# Patient Record
Sex: MALE | Race: ASIAN | NOT HISPANIC OR LATINO | URBAN - METROPOLITAN AREA
[De-identification: names, ages, dates, MRNs, and addresses within clinical notes are randomized per-mention and may not be internally consistent; named-entity substitution may affect disease eponyms.]

---

## 2017-03-04 ENCOUNTER — INPATIENT (INPATIENT)
Facility: HOSPITAL | Age: 67
LOS: 1 days | Discharge: HOME | End: 2017-03-06
Attending: INTERNAL MEDICINE | Admitting: INTERNAL MEDICINE
Payer: MEDICAID

## 2017-03-04 VITALS
SYSTOLIC BLOOD PRESSURE: 142 MMHG | OXYGEN SATURATION: 100 % | RESPIRATION RATE: 18 BRPM | HEART RATE: 60 BPM | DIASTOLIC BLOOD PRESSURE: 73 MMHG

## 2017-03-04 DIAGNOSIS — I24.9 ACUTE ISCHEMIC HEART DISEASE, UNSPECIFIED: ICD-10-CM

## 2017-03-04 DIAGNOSIS — Z95.1 PRESENCE OF AORTOCORONARY BYPASS GRAFT: Chronic | ICD-10-CM

## 2017-03-04 DIAGNOSIS — I25.10 ATHEROSCLEROTIC HEART DISEASE OF NATIVE CORONARY ARTERY WITHOUT ANGINA PECTORIS: ICD-10-CM

## 2017-03-04 DIAGNOSIS — I10 ESSENTIAL (PRIMARY) HYPERTENSION: ICD-10-CM

## 2017-03-04 LAB
ALBUMIN SERPL ELPH-MCNC: 4.1 G/DL — SIGNIFICANT CHANGE UP (ref 3.3–5)
ALP SERPL-CCNC: 91 U/L — SIGNIFICANT CHANGE UP (ref 40–120)
ALT FLD-CCNC: 12 U/L — SIGNIFICANT CHANGE UP (ref 4–41)
APTT BLD: 36.9 SEC — SIGNIFICANT CHANGE UP (ref 27.5–37.4)
AST SERPL-CCNC: 19 U/L — SIGNIFICANT CHANGE UP (ref 4–40)
BASE EXCESS BLDV CALC-SCNC: 3.9 MMOL/L — SIGNIFICANT CHANGE UP
BASOPHILS # BLD AUTO: 0.02 K/UL — SIGNIFICANT CHANGE UP (ref 0–0.2)
BASOPHILS NFR BLD AUTO: 0.4 % — SIGNIFICANT CHANGE UP (ref 0–2)
BILIRUB SERPL-MCNC: 0.4 MG/DL — SIGNIFICANT CHANGE UP (ref 0.2–1.2)
BLD GP AB SCN SERPL QL: NEGATIVE — SIGNIFICANT CHANGE UP
BLOOD GAS VENOUS - CREATININE: 1 MG/DL — SIGNIFICANT CHANGE UP (ref 0.5–1.3)
BUN SERPL-MCNC: 15 MG/DL — SIGNIFICANT CHANGE UP (ref 7–23)
CALCIUM SERPL-MCNC: 9 MG/DL — SIGNIFICANT CHANGE UP (ref 8.4–10.5)
CHLORIDE BLDV-SCNC: 104 MMOL/L — SIGNIFICANT CHANGE UP (ref 96–108)
CHLORIDE SERPL-SCNC: 101 MMOL/L — SIGNIFICANT CHANGE UP (ref 98–107)
CK MB BLD-MCNC: 4.65 NG/ML — SIGNIFICANT CHANGE UP (ref 1–6.6)
CK MB BLD-MCNC: 8.06 NG/ML — HIGH (ref 1–6.6)
CK MB BLD-MCNC: SIGNIFICANT CHANGE UP (ref 0–2.5)
CK SERPL-CCNC: 84 U/L — SIGNIFICANT CHANGE UP (ref 30–200)
CK SERPL-CCNC: 88 U/L — SIGNIFICANT CHANGE UP (ref 30–200)
CO2 SERPL-SCNC: 23 MMOL/L — SIGNIFICANT CHANGE UP (ref 22–31)
CREAT SERPL-MCNC: 1.11 MG/DL — SIGNIFICANT CHANGE UP (ref 0.5–1.3)
EOSINOPHIL # BLD AUTO: 0.1 K/UL — SIGNIFICANT CHANGE UP (ref 0–0.5)
EOSINOPHIL NFR BLD AUTO: 1.9 % — SIGNIFICANT CHANGE UP (ref 0–6)
GAS PNL BLDV: 134 MMOL/L — LOW (ref 136–146)
GLUCOSE BLDV-MCNC: 95 — SIGNIFICANT CHANGE UP (ref 70–99)
GLUCOSE SERPL-MCNC: 91 MG/DL — SIGNIFICANT CHANGE UP (ref 70–99)
HCO3 BLDV-SCNC: 25 MMOL/L — SIGNIFICANT CHANGE UP (ref 20–27)
HCT VFR BLD CALC: 40.2 % — SIGNIFICANT CHANGE UP (ref 39–50)
HCT VFR BLDV CALC: 43.5 % — SIGNIFICANT CHANGE UP (ref 39–51)
HGB BLD-MCNC: 13.8 G/DL — SIGNIFICANT CHANGE UP (ref 13–17)
HGB BLDV-MCNC: 14.2 G/DL — SIGNIFICANT CHANGE UP (ref 13–17)
IMM GRANULOCYTES NFR BLD AUTO: 0.2 % — SIGNIFICANT CHANGE UP (ref 0–1.5)
INR BLD: 0.99 — SIGNIFICANT CHANGE UP (ref 0.87–1.18)
LACTATE BLDV-MCNC: 1.8 MMOL/L — SIGNIFICANT CHANGE UP (ref 0.5–2)
LYMPHOCYTES # BLD AUTO: 1.56 K/UL — SIGNIFICANT CHANGE UP (ref 1–3.3)
LYMPHOCYTES # BLD AUTO: 29.3 % — SIGNIFICANT CHANGE UP (ref 13–44)
MCHC RBC-ENTMCNC: 29.3 PG — SIGNIFICANT CHANGE UP (ref 27–34)
MCHC RBC-ENTMCNC: 34.3 % — SIGNIFICANT CHANGE UP (ref 32–36)
MCV RBC AUTO: 85.4 FL — SIGNIFICANT CHANGE UP (ref 80–100)
MONOCYTES # BLD AUTO: 0.24 K/UL — SIGNIFICANT CHANGE UP (ref 0–0.9)
MONOCYTES NFR BLD AUTO: 4.5 % — SIGNIFICANT CHANGE UP (ref 2–14)
NEUTROPHILS # BLD AUTO: 3.4 K/UL — SIGNIFICANT CHANGE UP (ref 1.8–7.4)
NEUTROPHILS NFR BLD AUTO: 63.7 % — SIGNIFICANT CHANGE UP (ref 43–77)
PCO2 BLDV: 58 MMHG — HIGH (ref 41–51)
PH BLDV: 7.33 PH — SIGNIFICANT CHANGE UP (ref 7.32–7.43)
PLATELET # BLD AUTO: 156 K/UL — SIGNIFICANT CHANGE UP (ref 150–400)
PMV BLD: 11.6 FL — SIGNIFICANT CHANGE UP (ref 7–13)
PO2 BLDV: < 24 MMHG — LOW (ref 35–40)
POTASSIUM BLDV-SCNC: 4 MMOL/L — SIGNIFICANT CHANGE UP (ref 3.4–4.5)
POTASSIUM SERPL-MCNC: 3.8 MMOL/L — SIGNIFICANT CHANGE UP (ref 3.5–5.3)
POTASSIUM SERPL-SCNC: 3.8 MMOL/L — SIGNIFICANT CHANGE UP (ref 3.5–5.3)
PROT SERPL-MCNC: 7.3 G/DL — SIGNIFICANT CHANGE UP (ref 6–8.3)
PROTHROM AB SERPL-ACNC: 11.3 SEC — SIGNIFICANT CHANGE UP (ref 10–13.1)
RBC # BLD: 4.71 M/UL — SIGNIFICANT CHANGE UP (ref 4.2–5.8)
RBC # FLD: 13.9 % — SIGNIFICANT CHANGE UP (ref 10.3–14.5)
RH IG SCN BLD-IMP: POSITIVE — SIGNIFICANT CHANGE UP
SAO2 % BLDV: 33.8 % — LOW (ref 60–85)
SODIUM SERPL-SCNC: 140 MMOL/L — SIGNIFICANT CHANGE UP (ref 135–145)
TROPONIN T SERPL-MCNC: 0.14 NG/ML — HIGH (ref 0–0.06)
TROPONIN T SERPL-MCNC: < 0.06 NG/ML — SIGNIFICANT CHANGE UP (ref 0–0.06)
WBC # BLD: 5.33 K/UL — SIGNIFICANT CHANGE UP (ref 3.8–10.5)
WBC # FLD AUTO: 5.33 K/UL — SIGNIFICANT CHANGE UP (ref 3.8–10.5)

## 2017-03-04 PROCEDURE — 92937 PRQ TRLUML REVSC CAB GRF 1: CPT | Mod: LC

## 2017-03-04 PROCEDURE — 93459 L HRT ART/GRFT ANGIO: CPT | Mod: 26,59

## 2017-03-04 RX ORDER — CLOPIDOGREL BISULFATE 75 MG/1
600 TABLET, FILM COATED ORAL ONCE
Qty: 0 | Refills: 0 | Status: COMPLETED | OUTPATIENT
Start: 2017-03-04 | End: 2017-03-04

## 2017-03-04 RX ORDER — CLOPIDOGREL BISULFATE 75 MG/1
75 TABLET, FILM COATED ORAL DAILY
Qty: 0 | Refills: 0 | Status: DISCONTINUED | OUTPATIENT
Start: 2017-03-05 | End: 2017-03-06

## 2017-03-04 RX ORDER — GABAPENTIN 400 MG/1
300 CAPSULE ORAL THREE TIMES A DAY
Qty: 0 | Refills: 0 | Status: DISCONTINUED | OUTPATIENT
Start: 2017-03-04 | End: 2017-03-06

## 2017-03-04 RX ORDER — HEPARIN SODIUM 5000 [USP'U]/ML
5000 INJECTION INTRAVENOUS; SUBCUTANEOUS EVERY 8 HOURS
Qty: 0 | Refills: 0 | Status: DISCONTINUED | OUTPATIENT
Start: 2017-03-04 | End: 2017-03-04

## 2017-03-04 RX ORDER — HEPARIN SODIUM 5000 [USP'U]/ML
5000 INJECTION INTRAVENOUS; SUBCUTANEOUS EVERY 8 HOURS
Qty: 0 | Refills: 0 | Status: DISCONTINUED | OUTPATIENT
Start: 2017-03-05 | End: 2017-03-06

## 2017-03-04 RX ORDER — CARVEDILOL PHOSPHATE 80 MG/1
3.12 CAPSULE, EXTENDED RELEASE ORAL EVERY 12 HOURS
Qty: 0 | Refills: 0 | Status: DISCONTINUED | OUTPATIENT
Start: 2017-03-04 | End: 2017-03-06

## 2017-03-04 RX ORDER — HEPARIN SODIUM 5000 [USP'U]/ML
5000 INJECTION INTRAVENOUS; SUBCUTANEOUS ONCE
Qty: 0 | Refills: 0 | Status: COMPLETED | OUTPATIENT
Start: 2017-03-04 | End: 2017-03-04

## 2017-03-04 RX ORDER — ASPIRIN/CALCIUM CARB/MAGNESIUM 324 MG
81 TABLET ORAL DAILY
Qty: 0 | Refills: 0 | Status: DISCONTINUED | OUTPATIENT
Start: 2017-03-05 | End: 2017-03-06

## 2017-03-04 RX ORDER — ATORVASTATIN CALCIUM 80 MG/1
80 TABLET, FILM COATED ORAL AT BEDTIME
Qty: 0 | Refills: 0 | Status: DISCONTINUED | OUTPATIENT
Start: 2017-03-04 | End: 2017-03-06

## 2017-03-04 RX ADMIN — CLOPIDOGREL BISULFATE 600 MILLIGRAM(S): 75 TABLET, FILM COATED ORAL at 10:58

## 2017-03-04 RX ADMIN — ATORVASTATIN CALCIUM 80 MILLIGRAM(S): 80 TABLET, FILM COATED ORAL at 22:35

## 2017-03-04 RX ADMIN — GABAPENTIN 300 MILLIGRAM(S): 400 CAPSULE ORAL at 22:35

## 2017-03-04 RX ADMIN — HEPARIN SODIUM 5000 UNIT(S): 5000 INJECTION INTRAVENOUS; SUBCUTANEOUS at 10:58

## 2017-03-04 NOTE — ED PROVIDER NOTE - MEDICAL DECISION MAKING DETAILS
pt with active ischemia on EKGafter stress test  partial resolution of changes with treatment but elevation aVR  cath notified  received ASA enroute  plavix and heparin ordered  cath team activated pt with active ischemia on EKG after stress test  partial resolution of changes with treatment but elevation aVR  cath notified  received ASA enroute  plavix and heparin ordered  cath team activated

## 2017-03-04 NOTE — H&P ADULT. - FAMILY HISTORY
Sibling  Still living? Yes, Estimated age: Age Unknown  Family history of coronary artery disease in brother, Age at diagnosis: Age Unknown

## 2017-03-04 NOTE — ED ADULT NURSE REASSESSMENT NOTE - NS ED NURSE REASSESS COMMENT FT1
iv inserted to rt ac 20 gauge/ labs sent off/  pt has angio from ems in lt ac/  pt states pain at at 3 /10 after meds given by ems/ vss  additional meds to be given/

## 2017-03-04 NOTE — H&P ADULT. - HISTORY OF PRESENT ILLNESS
This is a 66yoM with PMHx CAD, HTN, PAD (stent to L lower extremity), s/p CABG ([facility], 2002), and HLD p/w chest pain when having his outpatient nuclear stress test.  Patient went for a nuclear stress test because he had worsening chest pain (sharp left sternal with radiation to L shoulder) for 3-4 months, worse with exertion a/w SOB, palpitations, and  dizziness and relieved by rest.  Denies pain at rest.  Patient was BIBEMS, chest pain was relieved by ASA and sublingual nitroglycerin; found to have TWI in interiolateral leads.  Cath team was activated.  LIMA found to be patent.  SVG to diag % occluded.  1 ANGI stent placed in SVG to OM2.

## 2017-03-04 NOTE — H&P ADULT. - PMH
CAD (coronary artery disease)    Constipation, unspecified constipation type    HTN (hypertension)    PAD (peripheral artery disease)  stent to L lower extremity artery  S/P CABG x 3  [facility], 2002

## 2017-03-04 NOTE — ED PROVIDER NOTE - OBJECTIVE STATEMENT
Pt arrives with chest pain which developed during stress test today.  Pt notes 1 wwk of similar sxs which have been occurring with exertion (walking)  aalso occasionally in the evenig   Pt with EKG changes on EMS EKG  ST dep TWI 1 avL  V$-V^  II and III with 3mm aVR elevation and 1 MM V! elevation  given ASA and ntg by EMS with improvement of sxs  EKG here V1 isoelectric aVR  1mm decrease in inf/lateral   cath team called 10:49   responded immediately by phone   team activated Pt arrives with chest pain which developed during stress test today.  Pt notes 1 wwk of similar sxs which have been occurring with exertion (walking)  aalso occasionally in the evenig   Pt with EKG changes on EMS EKG  ST dep TWI 1 avL  V4-V6  II and III with 3mm aVR elevation and 1 MM V! elevation  given ASA and ntg by EMS with improvement of sxs  EKG here V1 isoelectric aVR  1mm decrease in inf/lateral   cath team called 10:49   responded immediately by phone   team activated

## 2017-03-04 NOTE — ED PROVIDER NOTE - PMH
CAD (coronary artery disease)    Constipation, unspecified constipation type    HTN (hypertension)    PAD (peripheral artery disease)  stent to L lower extremity artery  S/P CABG x 3  [facility], 2002

## 2017-03-04 NOTE — ED ADULT TRIAGE NOTE - CHIEF COMPLAINT QUOTE
Arrives via EMS from PMD for abnormal EKG while having a stress test today.  Pt endorses active chest pain at this time.

## 2017-03-05 LAB
ALBUMIN SERPL ELPH-MCNC: 3.8 G/DL — SIGNIFICANT CHANGE UP (ref 3.3–5)
ALP SERPL-CCNC: 97 U/L — SIGNIFICANT CHANGE UP (ref 40–120)
ALT FLD-CCNC: 15 U/L — SIGNIFICANT CHANGE UP (ref 4–41)
AST SERPL-CCNC: 23 U/L — SIGNIFICANT CHANGE UP (ref 4–40)
BILIRUB SERPL-MCNC: 0.9 MG/DL — SIGNIFICANT CHANGE UP (ref 0.2–1.2)
BUN SERPL-MCNC: 17 MG/DL — SIGNIFICANT CHANGE UP (ref 7–23)
CALCIUM SERPL-MCNC: 9.1 MG/DL — SIGNIFICANT CHANGE UP (ref 8.4–10.5)
CHLORIDE SERPL-SCNC: 102 MMOL/L — SIGNIFICANT CHANGE UP (ref 98–107)
CHOLEST SERPL-MCNC: 199 MG/DL — SIGNIFICANT CHANGE UP (ref 120–199)
CK MB BLD-MCNC: 11.08 NG/ML — HIGH (ref 1–6.6)
CK SERPL-CCNC: 112 U/L — SIGNIFICANT CHANGE UP (ref 30–200)
CK SERPL-CCNC: 122 U/L — SIGNIFICANT CHANGE UP (ref 30–200)
CO2 SERPL-SCNC: 22 MMOL/L — SIGNIFICANT CHANGE UP (ref 22–31)
CREAT SERPL-MCNC: 1.08 MG/DL — SIGNIFICANT CHANGE UP (ref 0.5–1.3)
GLUCOSE SERPL-MCNC: 119 MG/DL — HIGH (ref 70–99)
HBA1C BLD-MCNC: 6.3 % — HIGH (ref 4–5.6)
HCT VFR BLD CALC: 42.2 % — SIGNIFICANT CHANGE UP (ref 39–50)
HDLC SERPL-MCNC: 26 MG/DL — LOW (ref 35–55)
HGB BLD-MCNC: 14.4 G/DL — SIGNIFICANT CHANGE UP (ref 13–17)
LIPID PNL WITH DIRECT LDL SERPL: 162 MG/DL — SIGNIFICANT CHANGE UP
MAGNESIUM SERPL-MCNC: 2.2 MG/DL — SIGNIFICANT CHANGE UP (ref 1.6–2.6)
MCHC RBC-ENTMCNC: 28.7 PG — SIGNIFICANT CHANGE UP (ref 27–34)
MCHC RBC-ENTMCNC: 34.1 % — SIGNIFICANT CHANGE UP (ref 32–36)
MCV RBC AUTO: 84.2 FL — SIGNIFICANT CHANGE UP (ref 80–100)
PHOSPHATE SERPL-MCNC: 3.7 MG/DL — SIGNIFICANT CHANGE UP (ref 2.5–4.5)
PLATELET # BLD AUTO: 144 K/UL — LOW (ref 150–400)
PMV BLD: 11.4 FL — SIGNIFICANT CHANGE UP (ref 7–13)
POTASSIUM SERPL-MCNC: 3.7 MMOL/L — SIGNIFICANT CHANGE UP (ref 3.5–5.3)
POTASSIUM SERPL-SCNC: 3.7 MMOL/L — SIGNIFICANT CHANGE UP (ref 3.5–5.3)
PROT SERPL-MCNC: 7 G/DL — SIGNIFICANT CHANGE UP (ref 6–8.3)
RBC # BLD: 5.01 M/UL — SIGNIFICANT CHANGE UP (ref 4.2–5.8)
RBC # FLD: 13.9 % — SIGNIFICANT CHANGE UP (ref 10.3–14.5)
SODIUM SERPL-SCNC: 141 MMOL/L — SIGNIFICANT CHANGE UP (ref 135–145)
TRIGL SERPL-MCNC: 97 MG/DL — SIGNIFICANT CHANGE UP (ref 10–149)
TROPONIN T SERPL-MCNC: 0.22 NG/ML — HIGH (ref 0–0.06)
TROPONIN T SERPL-MCNC: 0.28 NG/ML — HIGH (ref 0–0.06)
TSH SERPL-MCNC: 0.76 UIU/ML — SIGNIFICANT CHANGE UP (ref 0.27–4.2)
WBC # BLD: 6.02 K/UL — SIGNIFICANT CHANGE UP (ref 3.8–10.5)
WBC # FLD AUTO: 6.02 K/UL — SIGNIFICANT CHANGE UP (ref 3.8–10.5)

## 2017-03-05 PROCEDURE — 93010 ELECTROCARDIOGRAM REPORT: CPT

## 2017-03-05 PROCEDURE — 93306 TTE W/DOPPLER COMPLETE: CPT | Mod: 26

## 2017-03-05 RX ORDER — LISINOPRIL 2.5 MG/1
2.5 TABLET ORAL DAILY
Qty: 0 | Refills: 0 | Status: DISCONTINUED | OUTPATIENT
Start: 2017-03-05 | End: 2017-03-06

## 2017-03-05 RX ADMIN — GABAPENTIN 300 MILLIGRAM(S): 400 CAPSULE ORAL at 15:09

## 2017-03-05 RX ADMIN — HEPARIN SODIUM 5000 UNIT(S): 5000 INJECTION INTRAVENOUS; SUBCUTANEOUS at 05:56

## 2017-03-05 RX ADMIN — LISINOPRIL 2.5 MILLIGRAM(S): 2.5 TABLET ORAL at 11:45

## 2017-03-05 RX ADMIN — HEPARIN SODIUM 5000 UNIT(S): 5000 INJECTION INTRAVENOUS; SUBCUTANEOUS at 15:07

## 2017-03-05 RX ADMIN — CARVEDILOL PHOSPHATE 3.12 MILLIGRAM(S): 80 CAPSULE, EXTENDED RELEASE ORAL at 17:33

## 2017-03-05 RX ADMIN — HEPARIN SODIUM 5000 UNIT(S): 5000 INJECTION INTRAVENOUS; SUBCUTANEOUS at 22:22

## 2017-03-05 RX ADMIN — ATORVASTATIN CALCIUM 80 MILLIGRAM(S): 80 TABLET, FILM COATED ORAL at 22:21

## 2017-03-05 RX ADMIN — CLOPIDOGREL BISULFATE 75 MILLIGRAM(S): 75 TABLET, FILM COATED ORAL at 11:45

## 2017-03-05 RX ADMIN — GABAPENTIN 300 MILLIGRAM(S): 400 CAPSULE ORAL at 05:56

## 2017-03-05 RX ADMIN — CARVEDILOL PHOSPHATE 3.12 MILLIGRAM(S): 80 CAPSULE, EXTENDED RELEASE ORAL at 06:00

## 2017-03-05 RX ADMIN — Medication 81 MILLIGRAM(S): at 11:45

## 2017-03-05 RX ADMIN — GABAPENTIN 300 MILLIGRAM(S): 400 CAPSULE ORAL at 22:21

## 2017-03-06 ENCOUNTER — TRANSCRIPTION ENCOUNTER (OUTPATIENT)
Age: 67
End: 2017-03-06

## 2017-03-06 LAB
BUN SERPL-MCNC: 14 MG/DL — SIGNIFICANT CHANGE UP (ref 7–23)
CALCIUM SERPL-MCNC: 9.2 MG/DL — SIGNIFICANT CHANGE UP (ref 8.4–10.5)
CHLORIDE SERPL-SCNC: 103 MMOL/L — SIGNIFICANT CHANGE UP (ref 98–107)
CK MB BLD-MCNC: 2.76 NG/ML — SIGNIFICANT CHANGE UP (ref 1–6.6)
CK MB BLD-MCNC: SIGNIFICANT CHANGE UP (ref 0–2.5)
CK SERPL-CCNC: 45 U/L — SIGNIFICANT CHANGE UP (ref 30–200)
CO2 SERPL-SCNC: 22 MMOL/L — SIGNIFICANT CHANGE UP (ref 22–31)
CREAT SERPL-MCNC: 0.94 MG/DL — SIGNIFICANT CHANGE UP (ref 0.5–1.3)
GLUCOSE SERPL-MCNC: 112 MG/DL — HIGH (ref 70–99)
HCT VFR BLD CALC: 39.7 % — SIGNIFICANT CHANGE UP (ref 39–50)
HGB BLD-MCNC: 13.6 G/DL — SIGNIFICANT CHANGE UP (ref 13–17)
MAGNESIUM SERPL-MCNC: 2.2 MG/DL — SIGNIFICANT CHANGE UP (ref 1.6–2.6)
MCHC RBC-ENTMCNC: 28.9 PG — SIGNIFICANT CHANGE UP (ref 27–34)
MCHC RBC-ENTMCNC: 34.3 % — SIGNIFICANT CHANGE UP (ref 32–36)
MCV RBC AUTO: 84.5 FL — SIGNIFICANT CHANGE UP (ref 80–100)
NT-PROBNP SERPL-SCNC: 1054 PG/ML — SIGNIFICANT CHANGE UP
PHOSPHATE SERPL-MCNC: 3.7 MG/DL — SIGNIFICANT CHANGE UP (ref 2.5–4.5)
PLATELET # BLD AUTO: 144 K/UL — LOW (ref 150–400)
PMV BLD: 11.6 FL — SIGNIFICANT CHANGE UP (ref 7–13)
POTASSIUM SERPL-MCNC: 3.8 MMOL/L — SIGNIFICANT CHANGE UP (ref 3.5–5.3)
POTASSIUM SERPL-SCNC: 3.8 MMOL/L — SIGNIFICANT CHANGE UP (ref 3.5–5.3)
RBC # BLD: 4.7 M/UL — SIGNIFICANT CHANGE UP (ref 4.2–5.8)
RBC # FLD: 13.7 % — SIGNIFICANT CHANGE UP (ref 10.3–14.5)
SODIUM SERPL-SCNC: 142 MMOL/L — SIGNIFICANT CHANGE UP (ref 135–145)
TROPONIN T SERPL-MCNC: 0.3 NG/ML — HIGH (ref 0–0.06)
WBC # BLD: 4.95 K/UL — SIGNIFICANT CHANGE UP (ref 3.8–10.5)
WBC # FLD AUTO: 4.95 K/UL — SIGNIFICANT CHANGE UP (ref 3.8–10.5)

## 2017-03-06 PROCEDURE — 93010 ELECTROCARDIOGRAM REPORT: CPT

## 2017-03-06 PROCEDURE — 99233 SBSQ HOSP IP/OBS HIGH 50: CPT

## 2017-03-06 RX ORDER — CLOPIDOGREL BISULFATE 75 MG/1
1 TABLET, FILM COATED ORAL
Qty: 30 | Refills: 0 | OUTPATIENT
Start: 2017-03-06 | End: 2017-04-05

## 2017-03-06 RX ORDER — LISINOPRIL 2.5 MG/1
1 TABLET ORAL
Qty: 30 | Refills: 0 | OUTPATIENT
Start: 2017-03-06 | End: 2017-04-05

## 2017-03-06 RX ORDER — POTASSIUM CHLORIDE 20 MEQ
20 PACKET (EA) ORAL ONCE
Qty: 0 | Refills: 0 | Status: COMPLETED | OUTPATIENT
Start: 2017-03-06 | End: 2017-03-06

## 2017-03-06 RX ORDER — FUROSEMIDE 40 MG
20 TABLET ORAL ONCE
Qty: 0 | Refills: 0 | Status: COMPLETED | OUTPATIENT
Start: 2017-03-06 | End: 2017-03-06

## 2017-03-06 RX ORDER — CARVEDILOL PHOSPHATE 80 MG/1
1 CAPSULE, EXTENDED RELEASE ORAL
Qty: 60 | Refills: 0 | OUTPATIENT
Start: 2017-03-06 | End: 2017-04-05

## 2017-03-06 RX ORDER — LISINOPRIL 2.5 MG/1
1 TABLET ORAL
Qty: 0 | Refills: 0 | COMMUNITY
Start: 2017-03-06

## 2017-03-06 RX ORDER — CARVEDILOL PHOSPHATE 80 MG/1
1 CAPSULE, EXTENDED RELEASE ORAL
Qty: 0 | Refills: 0 | COMMUNITY
Start: 2017-03-06

## 2017-03-06 RX ORDER — ATORVASTATIN CALCIUM 80 MG/1
1 TABLET, FILM COATED ORAL
Qty: 30 | Refills: 0
Start: 2017-03-06 | End: 2017-04-05

## 2017-03-06 RX ORDER — ATORVASTATIN CALCIUM 80 MG/1
1 TABLET, FILM COATED ORAL
Qty: 0 | Refills: 0 | COMMUNITY
Start: 2017-03-06

## 2017-03-06 RX ADMIN — HEPARIN SODIUM 5000 UNIT(S): 5000 INJECTION INTRAVENOUS; SUBCUTANEOUS at 14:17

## 2017-03-06 RX ADMIN — Medication 81 MILLIGRAM(S): at 13:11

## 2017-03-06 RX ADMIN — GABAPENTIN 300 MILLIGRAM(S): 400 CAPSULE ORAL at 05:37

## 2017-03-06 RX ADMIN — Medication 20 MILLIEQUIVALENT(S): at 06:36

## 2017-03-06 RX ADMIN — Medication 20 MILLIGRAM(S): at 08:09

## 2017-03-06 RX ADMIN — LISINOPRIL 2.5 MILLIGRAM(S): 2.5 TABLET ORAL at 05:37

## 2017-03-06 RX ADMIN — CLOPIDOGREL BISULFATE 75 MILLIGRAM(S): 75 TABLET, FILM COATED ORAL at 13:11

## 2017-03-06 RX ADMIN — HEPARIN SODIUM 5000 UNIT(S): 5000 INJECTION INTRAVENOUS; SUBCUTANEOUS at 05:37

## 2017-03-06 RX ADMIN — CARVEDILOL PHOSPHATE 3.12 MILLIGRAM(S): 80 CAPSULE, EXTENDED RELEASE ORAL at 05:37

## 2017-03-06 RX ADMIN — GABAPENTIN 300 MILLIGRAM(S): 400 CAPSULE ORAL at 14:18

## 2017-03-06 NOTE — DISCHARGE NOTE ADULT - MEDICATION SUMMARY - MEDICATIONS TO TAKE
I will START or STAY ON the medications listed below when I get home from the hospital:    aspirin 81 mg oral tablet  -- 1 tab(s) by mouth once a day  -- Indication: For CAD (coronary artery disease)    acetaminophen-oxyCODONE 325 mg-5 mg oral tablet  -- 1 tab(s) by mouth every 6 hours, As needed, Moderate Pain  -- Indication: For Pain    lisinopril 2.5 mg oral tablet  -- 1 tab(s) by mouth once a day  -- Indication: For CAD (coronary artery disease)    gabapentin 300 mg oral capsule  -- Take 1 cap by mouth once in AM on day 1.  Take 1 caps by mouth twice a day on day 2.  Take 1 cap three times a day thereafter.  -- It is very important that you take or use this exactly as directed.  Do not skip doses or discontinue unless directed by your doctor.  May cause drowsiness.  Alcohol may intensify this effect.  Use care when operating dangerous machinery.    -- Indication: For Pain    atorvastatin 80 mg oral tablet  -- 1 tab(s) by mouth once a day (at bedtime)  -- Indication: For CAD (coronary artery disease)    Plavix 75 mg oral tablet  -- 1 tab(s) by mouth once a day  -- Indication: For CAD (coronary artery disease)    carvedilol 3.125 mg oral tablet  -- 1 tab(s) by mouth every 12 hours  -- Indication: For CAD (coronary artery disease)

## 2017-03-06 NOTE — DISCHARGE NOTE ADULT - PLAN OF CARE
prevent recurrence of acute coronary syndrome You were diagnosed with a heart attack during this hospitalization. You were taken to the catheterization lab and a stent was placed in your heart to fix the blockage. Please take your medications as prescribed to prevent complications from the stent. Please follow up with you primary care physician within 1 week of discharge from the hospital. Please seek medical attention if you develop pain/pressure/discomfort in the center of the chest, shortness of breath, racing or uneven heartbeat, or dizziness. You were diagnosed with a heart attack during this hospitalization. You were taken to the catheterization lab and a stent was placed in your heart to fix the blockage. Please take your medications as prescribed to prevent complications from the stent. Please follow up with you primary care physician (Dr. Kendall) within 1 week of discharge from the hospital. Please follow up with your cardiologist (Dr. Abarca) within 1 week of discharge from the hospital. Please seek medical attention if you develop pain/pressure/discomfort in the center of the chest, shortness of breath, racing or uneven heartbeat, or dizziness.

## 2017-03-06 NOTE — DISCHARGE NOTE ADULT - CARE PROVIDERS DIRECT ADDRESSES
[email or web address],DirectAddress_[email or web address]

## 2017-03-06 NOTE — DISCHARGE NOTE ADULT - CARE PROVIDER_API CALL
Matheus Khan (MD), Cardiovascular Disease; Internal Medicine; Interventional Cardiology  [address] [place] [address]  Phone: [phone]  Fax: [phone]    Jose Ramon Hernandes  Phone: [phone]  Fax: (   )    -    Bell Kendall  Phone: [phone]  Fax: (   )    - Matheus Khan), Cardiovascular Disease; Internal Medicine; Interventional Cardiology  [address] [place] [address]  Phone: [phone]  Fax: [phone]    Bell Kendall  Phone: [phone]  Fax: (   )    -    Jose Ramon Bustillos  Phone: [phone]  Fax: (   )    -

## 2017-03-06 NOTE — DISCHARGE NOTE ADULT - PATIENT PORTAL LINK FT
“You can access the [facility] Patient Portal, offered by [facility], by registering with the following website: [email or web address]”

## 2017-03-06 NOTE — DISCHARGE NOTE ADULT - MEDICATION SUMMARY - MEDICATIONS TO STOP TAKING
I will STOP taking the medications listed below when I get home from the hospital:    Zocor 40 mg oral tablet  -- 1 tab(s) by mouth once a day (at bedtime)    Toprol-XL 25 mg oral tablet, extended release  -- 1 tab(s) by mouth once a day    Norvasc 5 mg oral tablet  -- 1 tab(s) by mouth once a day  -- It is very important that you take or use this exactly as directed.  Do not skip doses or discontinue unless directed by your doctor.  Some non-prescription drugs may aggravate your condition.  Read all labels carefully.  If a warning appears, check with your doctor before taking.    losartan-hydroCHLOROthiazide 50mg-12.5mg oral tablet  -- tab(s) by mouth once a day

## 2017-03-06 NOTE — DISCHARGE NOTE ADULT - PROVIDER TOKENS
TOKEN:'[id number]',FREE:[LAST:[Cecilio],FIRST:[Sayed],PHONE:[phone]],FAX:[(   )    -]],FREE:[LAST:[Hossain],FIRST:[Bell],PHONE:[phone]:[(   )    -]] TOKEN:'[id number]',FREE:[LAST:[Hossain],FIRST:[Bell],PHONE:[phone]],FAX:[(   )    -]],FREE:[LAST:[Apollo],FIRST:[Sayed],PHONE:[phone]],FAX:[(   )    -]]

## 2017-03-06 NOTE — DISCHARGE NOTE ADULT - HOSPITAL COURSE
Mr. Rahman was admitted to the hospital for NSTEMI and underwent cath. He had % occlusion of the SVG-OM2 and ANGI was placed. He was monitored in the CCU and did not have any complications from the procedure. On the day of discharge, he was medically cleared with stable vitals. Mr. Rahman was admitted to the hospital for NSTEMI and underwent cath. He had % occlusion of the SVG-OM2 and ANGI was placed. He was monitored in the CCU and did not have any complications from the procedure. On the day of discharge, he was medically cleared with stable vitals. An attempt was made to schedule a cardiology appointment for follow up. As the clinic was not open, a message was left with the answering service and the patient will be contacted directly for an appointment.

## 2017-03-06 NOTE — DISCHARGE NOTE ADULT - CARE PLAN
Principal Discharge DX:	ACS (acute coronary syndrome)  Goal:	prevent recurrence of acute coronary syndrome  Instructions for follow-up, activity and diet:	You were diagnosed with a heart attack during this hospitalization. You were taken to the catheterization lab and a stent was placed in your heart to fix the blockage. Please take your medications as prescribed to prevent complications from the stent. Please follow up with you primary care physician within 1 week of discharge from the hospital. Please seek medical attention if you develop pain/pressure/discomfort in the center of the chest, shortness of breath, racing or uneven heartbeat, or dizziness. Principal Discharge DX:	ACS (acute coronary syndrome)  Goal:	prevent recurrence of acute coronary syndrome  Instructions for follow-up, activity and diet:	You were diagnosed with a heart attack during this hospitalization. You were taken to the catheterization lab and a stent was placed in your heart to fix the blockage. Please take your medications as prescribed to prevent complications from the stent. Please follow up with you primary care physician (Dr. Kendall) within 1 week of discharge from the hospital. Please follow up with your cardiologist (Dr. Abarca) within 1 week of discharge from the hospital. Please seek medical attention if you develop pain/pressure/discomfort in the center of the chest, shortness of breath, racing or uneven heartbeat, or dizziness.

## 2017-03-07 VITALS — TEMPERATURE: 98 F

## 2017-07-07 ENCOUNTER — INPATIENT (INPATIENT)
Facility: HOSPITAL | Age: 67
LOS: 1 days | Discharge: HOME | End: 2017-07-09
Attending: INTERNAL MEDICINE | Admitting: INTERNAL MEDICINE
Payer: MEDICAID

## 2017-07-07 VITALS
OXYGEN SATURATION: 100 % | TEMPERATURE: 98 F | RESPIRATION RATE: 16 BRPM | HEART RATE: 68 BPM | DIASTOLIC BLOOD PRESSURE: 66 MMHG | WEIGHT: 138.01 LBS | SYSTOLIC BLOOD PRESSURE: 115 MMHG

## 2017-07-07 DIAGNOSIS — I21.4 NON-ST ELEVATION (NSTEMI) MYOCARDIAL INFARCTION: ICD-10-CM

## 2017-07-07 DIAGNOSIS — Z98.890 OTHER SPECIFIED POSTPROCEDURAL STATES: Chronic | ICD-10-CM

## 2017-07-07 DIAGNOSIS — K59.00 CONSTIPATION, UNSPECIFIED: ICD-10-CM

## 2017-07-07 DIAGNOSIS — Z95.1 PRESENCE OF AORTOCORONARY BYPASS GRAFT: Chronic | ICD-10-CM

## 2017-07-07 DIAGNOSIS — I73.9 PERIPHERAL VASCULAR DISEASE, UNSPECIFIED: ICD-10-CM

## 2017-07-07 DIAGNOSIS — I25.10 ATHEROSCLEROTIC HEART DISEASE OF NATIVE CORONARY ARTERY WITHOUT ANGINA PECTORIS: ICD-10-CM

## 2017-07-07 DIAGNOSIS — I10 ESSENTIAL (PRIMARY) HYPERTENSION: ICD-10-CM

## 2017-07-07 DIAGNOSIS — Z86.79 PERSONAL HISTORY OF OTHER DISEASES OF THE CIRCULATORY SYSTEM: Chronic | ICD-10-CM

## 2017-07-07 DIAGNOSIS — Z95.1 PRESENCE OF AORTOCORONARY BYPASS GRAFT: ICD-10-CM

## 2017-07-07 LAB
ALBUMIN SERPL ELPH-MCNC: 3.7 G/DL — SIGNIFICANT CHANGE UP (ref 3.3–5)
ALP SERPL-CCNC: 95 U/L — SIGNIFICANT CHANGE UP (ref 40–120)
ALT FLD-CCNC: 19 U/L — SIGNIFICANT CHANGE UP (ref 4–41)
APTT BLD: 32.8 SEC — SIGNIFICANT CHANGE UP (ref 27.5–37.4)
AST SERPL-CCNC: 35 U/L — SIGNIFICANT CHANGE UP (ref 4–40)
BASE EXCESS BLDV CALC-SCNC: 1.4 MMOL/L — SIGNIFICANT CHANGE UP
BASOPHILS # BLD AUTO: 0.04 K/UL — SIGNIFICANT CHANGE UP (ref 0–0.2)
BASOPHILS NFR BLD AUTO: 0.8 % — SIGNIFICANT CHANGE UP (ref 0–2)
BILIRUB SERPL-MCNC: 0.4 MG/DL — SIGNIFICANT CHANGE UP (ref 0.2–1.2)
BLOOD GAS VENOUS - CREATININE: 1.11 MG/DL — SIGNIFICANT CHANGE UP (ref 0.5–1.3)
BUN SERPL-MCNC: 13 MG/DL — SIGNIFICANT CHANGE UP (ref 7–23)
CALCIUM SERPL-MCNC: 8.6 MG/DL — SIGNIFICANT CHANGE UP (ref 8.4–10.5)
CHLORIDE BLDV-SCNC: 108 MMOL/L — SIGNIFICANT CHANGE UP (ref 96–108)
CHLORIDE SERPL-SCNC: 105 MMOL/L — SIGNIFICANT CHANGE UP (ref 98–107)
CK MB BLD-MCNC: 14.2 — HIGH (ref 0–2.5)
CK MB BLD-MCNC: 27.8 NG/ML — HIGH (ref 1–6.6)
CK MB BLD-MCNC: 64.65 NG/ML — HIGH (ref 1–6.6)
CK SERPL-CCNC: 219 U/L — HIGH (ref 30–200)
CK SERPL-CCNC: 454 U/L — HIGH (ref 30–200)
CO2 SERPL-SCNC: 25 MMOL/L — SIGNIFICANT CHANGE UP (ref 22–31)
CREAT SERPL-MCNC: 1.13 MG/DL — SIGNIFICANT CHANGE UP (ref 0.5–1.3)
EOSINOPHIL # BLD AUTO: 0.23 K/UL — SIGNIFICANT CHANGE UP (ref 0–0.5)
EOSINOPHIL NFR BLD AUTO: 4.6 % — SIGNIFICANT CHANGE UP (ref 0–6)
GAS PNL BLDV: 142 MMOL/L — SIGNIFICANT CHANGE UP (ref 136–146)
GLUCOSE BLDV-MCNC: 110 — HIGH (ref 70–99)
GLUCOSE SERPL-MCNC: 114 MG/DL — HIGH (ref 70–99)
HCO3 BLDV-SCNC: 24 MMOL/L — SIGNIFICANT CHANGE UP (ref 20–27)
HCT VFR BLD CALC: 38.5 % — LOW (ref 39–50)
HCT VFR BLD CALC: 41.7 % — SIGNIFICANT CHANGE UP (ref 39–50)
HCT VFR BLDV CALC: 40.7 % — SIGNIFICANT CHANGE UP (ref 39–51)
HGB BLD-MCNC: 13 G/DL — SIGNIFICANT CHANGE UP (ref 13–17)
HGB BLD-MCNC: 13.9 G/DL — SIGNIFICANT CHANGE UP (ref 13–17)
HGB BLDV-MCNC: 13.2 G/DL — SIGNIFICANT CHANGE UP (ref 13–17)
IMM GRANULOCYTES # BLD AUTO: 0.01 # — SIGNIFICANT CHANGE UP
IMM GRANULOCYTES NFR BLD AUTO: 0.2 % — SIGNIFICANT CHANGE UP (ref 0–1.5)
INR BLD: 0.95 — SIGNIFICANT CHANGE UP (ref 0.88–1.17)
LACTATE BLDV-MCNC: 1.1 MMOL/L — SIGNIFICANT CHANGE UP (ref 0.5–2)
LYMPHOCYTES # BLD AUTO: 1.13 K/UL — SIGNIFICANT CHANGE UP (ref 1–3.3)
LYMPHOCYTES # BLD AUTO: 22.8 % — SIGNIFICANT CHANGE UP (ref 13–44)
MCHC RBC-ENTMCNC: 28.3 PG — SIGNIFICANT CHANGE UP (ref 27–34)
MCHC RBC-ENTMCNC: 29.2 PG — SIGNIFICANT CHANGE UP (ref 27–34)
MCHC RBC-ENTMCNC: 33.3 % — SIGNIFICANT CHANGE UP (ref 32–36)
MCHC RBC-ENTMCNC: 33.8 % — SIGNIFICANT CHANGE UP (ref 32–36)
MCV RBC AUTO: 84.9 FL — SIGNIFICANT CHANGE UP (ref 80–100)
MCV RBC AUTO: 86.5 FL — SIGNIFICANT CHANGE UP (ref 80–100)
MONOCYTES # BLD AUTO: 0.31 K/UL — SIGNIFICANT CHANGE UP (ref 0–0.9)
MONOCYTES NFR BLD AUTO: 6.3 % — SIGNIFICANT CHANGE UP (ref 2–14)
NEUTROPHILS # BLD AUTO: 3.23 K/UL — SIGNIFICANT CHANGE UP (ref 1.8–7.4)
NEUTROPHILS NFR BLD AUTO: 65.3 % — SIGNIFICANT CHANGE UP (ref 43–77)
NRBC # FLD: 0 — SIGNIFICANT CHANGE UP
NRBC # FLD: 0 — SIGNIFICANT CHANGE UP
PCO2 BLDV: 46 MMHG — SIGNIFICANT CHANGE UP (ref 41–51)
PH BLDV: 7.37 PH — SIGNIFICANT CHANGE UP (ref 7.32–7.43)
PLATELET # BLD AUTO: 124 K/UL — LOW (ref 150–400)
PLATELET # BLD AUTO: 134 K/UL — LOW (ref 150–400)
PMV BLD: 11.5 FL — SIGNIFICANT CHANGE UP (ref 7–13)
PMV BLD: 11.7 FL — SIGNIFICANT CHANGE UP (ref 7–13)
PO2 BLDV: 37 MMHG — SIGNIFICANT CHANGE UP (ref 35–40)
POTASSIUM BLDV-SCNC: 3.9 MMOL/L — SIGNIFICANT CHANGE UP (ref 3.4–4.5)
POTASSIUM SERPL-MCNC: 4 MMOL/L — SIGNIFICANT CHANGE UP (ref 3.5–5.3)
POTASSIUM SERPL-SCNC: 4 MMOL/L — SIGNIFICANT CHANGE UP (ref 3.5–5.3)
PROT SERPL-MCNC: 6.1 G/DL — SIGNIFICANT CHANGE UP (ref 6–8.3)
PROTHROM AB SERPL-ACNC: 10.6 SEC — SIGNIFICANT CHANGE UP (ref 9.8–13.1)
RBC # BLD: 4.45 M/UL — SIGNIFICANT CHANGE UP (ref 4.2–5.8)
RBC # BLD: 4.91 M/UL — SIGNIFICANT CHANGE UP (ref 4.2–5.8)
RBC # FLD: 13.8 % — SIGNIFICANT CHANGE UP (ref 10.3–14.5)
RBC # FLD: 13.8 % — SIGNIFICANT CHANGE UP (ref 10.3–14.5)
SAO2 % BLDV: 60.5 % — SIGNIFICANT CHANGE UP (ref 60–85)
SODIUM SERPL-SCNC: 142 MMOL/L — SIGNIFICANT CHANGE UP (ref 135–145)
TROPONIN T SERPL-MCNC: 0.6 NG/ML — HIGH (ref 0–0.06)
TROPONIN T SERPL-MCNC: 1.62 NG/ML — HIGH (ref 0–0.06)
WBC # BLD: 4.95 K/UL — SIGNIFICANT CHANGE UP (ref 3.8–10.5)
WBC # BLD: 7.11 K/UL — SIGNIFICANT CHANGE UP (ref 3.8–10.5)
WBC # FLD AUTO: 4.95 K/UL — SIGNIFICANT CHANGE UP (ref 3.8–10.5)
WBC # FLD AUTO: 7.11 K/UL — SIGNIFICANT CHANGE UP (ref 3.8–10.5)

## 2017-07-07 PROCEDURE — 93010 ELECTROCARDIOGRAM REPORT: CPT

## 2017-07-07 PROCEDURE — 71010: CPT | Mod: 26

## 2017-07-07 RX ORDER — ACETAMINOPHEN 500 MG
650 TABLET ORAL EVERY 6 HOURS
Qty: 0 | Refills: 0 | Status: DISCONTINUED | OUTPATIENT
Start: 2017-07-07 | End: 2017-07-09

## 2017-07-07 RX ORDER — CLOPIDOGREL BISULFATE 75 MG/1
300 TABLET, FILM COATED ORAL ONCE
Qty: 0 | Refills: 0 | Status: COMPLETED | OUTPATIENT
Start: 2017-07-07 | End: 2017-07-07

## 2017-07-07 RX ORDER — GABAPENTIN 400 MG/1
300 CAPSULE ORAL THREE TIMES A DAY
Qty: 0 | Refills: 0 | Status: DISCONTINUED | OUTPATIENT
Start: 2017-07-07 | End: 2017-07-09

## 2017-07-07 RX ORDER — HEPARIN SODIUM 5000 [USP'U]/ML
3800 INJECTION INTRAVENOUS; SUBCUTANEOUS EVERY 6 HOURS
Qty: 0 | Refills: 0 | Status: DISCONTINUED | OUTPATIENT
Start: 2017-07-07 | End: 2017-07-07

## 2017-07-07 RX ORDER — LISINOPRIL 2.5 MG/1
2.5 TABLET ORAL DAILY
Qty: 0 | Refills: 0 | Status: DISCONTINUED | OUTPATIENT
Start: 2017-07-07 | End: 2017-07-09

## 2017-07-07 RX ORDER — IPRATROPIUM/ALBUTEROL SULFATE 18-103MCG
3 AEROSOL WITH ADAPTER (GRAM) INHALATION ONCE
Qty: 0 | Refills: 0 | Status: COMPLETED | OUTPATIENT
Start: 2017-07-07 | End: 2017-07-07

## 2017-07-07 RX ORDER — HEPARIN SODIUM 5000 [USP'U]/ML
3800 INJECTION INTRAVENOUS; SUBCUTANEOUS ONCE
Qty: 0 | Refills: 0 | Status: COMPLETED | OUTPATIENT
Start: 2017-07-07 | End: 2017-07-07

## 2017-07-07 RX ORDER — TICAGRELOR 90 MG/1
90 TABLET ORAL
Qty: 0 | Refills: 0 | Status: DISCONTINUED | OUTPATIENT
Start: 2017-07-08 | End: 2017-07-09

## 2017-07-07 RX ORDER — HEPARIN SODIUM 5000 [USP'U]/ML
INJECTION INTRAVENOUS; SUBCUTANEOUS
Qty: 25000 | Refills: 0 | Status: DISCONTINUED | OUTPATIENT
Start: 2017-07-07 | End: 2017-07-07

## 2017-07-07 RX ORDER — ASPIRIN/CALCIUM CARB/MAGNESIUM 324 MG
81 TABLET ORAL DAILY
Qty: 0 | Refills: 0 | Status: DISCONTINUED | OUTPATIENT
Start: 2017-07-08 | End: 2017-07-09

## 2017-07-07 RX ORDER — CARVEDILOL PHOSPHATE 80 MG/1
3.12 CAPSULE, EXTENDED RELEASE ORAL EVERY 12 HOURS
Qty: 0 | Refills: 0 | Status: DISCONTINUED | OUTPATIENT
Start: 2017-07-07 | End: 2017-07-09

## 2017-07-07 RX ORDER — NITROGLYCERIN 6.5 MG
0.4 CAPSULE, EXTENDED RELEASE ORAL ONCE
Qty: 0 | Refills: 0 | Status: COMPLETED | OUTPATIENT
Start: 2017-07-07 | End: 2017-07-07

## 2017-07-07 RX ORDER — ASPIRIN/CALCIUM CARB/MAGNESIUM 324 MG
325 TABLET ORAL ONCE
Qty: 0 | Refills: 0 | Status: COMPLETED | OUTPATIENT
Start: 2017-07-07 | End: 2017-07-07

## 2017-07-07 RX ORDER — ATORVASTATIN CALCIUM 80 MG/1
80 TABLET, FILM COATED ORAL AT BEDTIME
Qty: 0 | Refills: 0 | Status: DISCONTINUED | OUTPATIENT
Start: 2017-07-07 | End: 2017-07-09

## 2017-07-07 RX ADMIN — HEPARIN SODIUM 750 UNIT(S)/HR: 5000 INJECTION INTRAVENOUS; SUBCUTANEOUS at 08:02

## 2017-07-07 RX ADMIN — Medication 3 MILLILITER(S): at 12:12

## 2017-07-07 RX ADMIN — GABAPENTIN 300 MILLIGRAM(S): 400 CAPSULE ORAL at 17:17

## 2017-07-07 RX ADMIN — CARVEDILOL PHOSPHATE 3.12 MILLIGRAM(S): 80 CAPSULE, EXTENDED RELEASE ORAL at 17:18

## 2017-07-07 RX ADMIN — HEPARIN SODIUM 3800 UNIT(S): 5000 INJECTION INTRAVENOUS; SUBCUTANEOUS at 08:02

## 2017-07-07 RX ADMIN — GABAPENTIN 300 MILLIGRAM(S): 400 CAPSULE ORAL at 23:03

## 2017-07-07 RX ADMIN — ATORVASTATIN CALCIUM 80 MILLIGRAM(S): 80 TABLET, FILM COATED ORAL at 23:03

## 2017-07-07 RX ADMIN — Medication 0.4 MILLIGRAM(S): at 06:57

## 2017-07-07 RX ADMIN — CLOPIDOGREL BISULFATE 300 MILLIGRAM(S): 75 TABLET, FILM COATED ORAL at 08:51

## 2017-07-07 NOTE — ED ADULT NURSE NOTE - OBJECTIVE STATEMENT
Received on stretcher in room 8. Awake, ambulatory, A&Ox4, NAD observed, respirations even and unlabored on room air. 68 YO male with h/o HTN, HLD, CABG in Plavix c/o left sided CP. Left sided CP radiates to left arm and left side of back. VS recorded. NSR on CM. 20G IV access obtained in right AC, labs drawn and sent as ordered. Comfort and safety measures provided. Call bell within reach.

## 2017-07-07 NOTE — ED PROVIDER NOTE - MEDICAL DECISION MAKING DETAILS
67 M h/o CAD s/p cabg and stent, with L sided chest pain woke him from sleep, concerning EKG. Labs, nitro, cards consult

## 2017-07-07 NOTE — H&P ADULT - ATTENDING COMMENTS
Patient seen and examined.  Agree with above pa note    s/p nstemi  s/p pci to svg to om  stable   cp free

## 2017-07-07 NOTE — H&P ADULT - HISTORY OF PRESENT ILLNESS
68 y/o male PmHx CAD on ASA and Plavix, PAD s/p stent to lower extremity in 2016 and s/p CABG (2002), multiple stents (most recently March 2017) p/w of severe, 10/10, nonpleuritic, non-reproducible, left sided chest pain radiating down his left arm and left jaw/neck and pressure that woke him up from sleep this morning at approximately 5 am. He took ASA and nitro with some relief prior to coming into the hospital.  Pt endorses at least 3 day history of mild left sided chest pain, associated with SOB, LANZA, palpitations, relieved with rest and nitro.  He denies N/V/D, diaphoresis, syncope, recent infections, recent travel. At time of exam patient stated the pain had become more of a pressure than the severe pain that woke him prior to his arrival, and the pain had subsided to a 4/10. 68 y/o male, with a PmHx of CAD on ASA and Plavix, PAD s/p stent to left lower extremity in 2016 and s/p CABG x 3 (2002), HTN, HLD, multiple stents (most recently March 2017) p/w of severe, 10/10, nonpleuritic, non-reproducible, left sided chest pain radiating down his left arm and left jaw/neck and pressure that woke him up from sleep this morning at approximately 5 am. He took ASA and nitro with some relief prior to coming into the hospital.  Pt endorses at least 3 day history of mild left sided chest pain, associated with SOB, LANZA, palpitations, relieved with rest and nitro.  He denies N/V/D, diaphoresis, syncope, recent infections, recent travel. At time of exam patient stated the pain had become more of a pressure than the severe pain that woke him prior to his arrival, and the pain had subsided to a 4/10. Pt was found to have a NSTEMI and was started on a heparin gtt by the ED. On Admission, he received 300mg plavix x 1 and had already received aspirin by EMS. Pt admitted to telemetry for NSTEMI.

## 2017-07-07 NOTE — H&P ADULT - NEGATIVE GENERAL SYMPTOMS
no chills/no weight gain/no fever/no sweating/no weight loss no sweating/no chills/no weight gain/no fever/no fatigue/no malaise/no weight loss

## 2017-07-07 NOTE — ED PROVIDER NOTE - PMH
CAD (coronary artery disease)    Constipation, unspecified constipation type    HTN (hypertension)    PAD (peripheral artery disease)  stent to L lower extremity artery  S/P CABG x 3  [facility], 2002

## 2017-07-07 NOTE — H&P ADULT - PMH
CAD (coronary artery disease)    Constipation, unspecified constipation type    HTN (hypertension)    PAD (peripheral artery disease)  stent to L lower extremity artery  S/P CABG x 3  [facility], 2002 CAD (coronary artery disease)    Constipation, unspecified constipation type    HTN (hypertension)    PAD (peripheral artery disease)  stent to L lower extremity artery

## 2017-07-07 NOTE — ED ADULT NURSE REASSESSMENT NOTE - NS ED NURSE REASSESS COMMENT FT1
Pt no longer being transported to [facility]. Pt being taken to Cath lab instead as per provider. Rpt given to DINESH Avery. Pt c/o 3/10 chest pressure. LIZ Gomez aware/at bedside. Pt transported to cath lab with PA & RN on cardiac monitor.

## 2017-07-07 NOTE — H&P ADULT - NSHPPHYSICALEXAM_GEN_ALL_CORE
Vital Signs Last 24 Hrs  T(C): 36.8 (07 Jul 2017 08:02), Max: 36.8 (07 Jul 2017 08:02)  T(F): 98.2 (07 Jul 2017 08:02), Max: 98.2 (07 Jul 2017 08:02)  HR: 57 (07 Jul 2017 08:02) (57 - 71)  BP: 111/64 (07 Jul 2017 08:02) (111/64 - 124/78)  BP(mean): 78 (07 Jul 2017 08:02) (78 - 78)  RR: 17 (07 Jul 2017 08:02) (16 - 20)  SpO2: 100% (07 Jul 2017 08:02) (100% - 100%)    EKG: NSR @ 68, T inv I, AVL, V5-6, ST dep V3-6 (unchanged from previous

## 2017-07-07 NOTE — H&P ADULT - NEGATIVE ENMT SYMPTOMS
no hearing difficulty/no tinnitus/no ear pain/no vertigo no hearing difficulty/no ear pain/no tinnitus/no vertigo/no nasal congestion/no nasal discharge

## 2017-07-07 NOTE — H&P ADULT - PROBLEM SELECTOR PLAN 1
Cards called- urgent cath is scheduled  Admit to telemetry  Continue ASA, Plavix, Heparin gtt,  F/U Norman ODONNELL note. Cards called- urgent cath is scheduled  Admit to telemetry  f/u ce's  Continue ASA, Plavix re-loaded, Heparin gtt,  F/U Norman ODONNELL note.

## 2017-07-07 NOTE — CHART NOTE - NSCHARTNOTEFT_GEN_A_CORE
Status post Cath, Right femoral site without bleeding or hematoma.  Dressing is intact.  Positive Pulses.  Will continue to monitor.                                                                                                                                                  WILLIE Kaiser, RPA-C [id number]

## 2017-07-07 NOTE — H&P ADULT - NEGATIVE OPHTHALMOLOGIC SYMPTOMS
no diplopia/no loss of vision L/no lacrimation R/no loss of vision R/no photophobia/no lacrimation L no photophobia/no loss of vision R/no blurred vision L/no blurred vision R/no diplopia/no loss of vision L

## 2017-07-07 NOTE — H&P ADULT - NSHPSOCIALHISTORY_GEN_ALL_CORE
Marital Status:     Occupation:     Tobacco Use: Smoked 1 pack a day for 20 years prior to quitting more than 20 years ago.    ETOH Use: Never.    Flu Vaccine:          unknown                        Pneumonia Vaccine: unknown

## 2017-07-07 NOTE — H&P ADULT - ASSESSMENT
68 y/o male PmHx CAD on ASA and Plavix, PAD s/p stent to lower extremity in 2016 and s/p CABG (2002), multiple stents (most recently March 2017) p/w of severe, 10/10, nonpleuritic, non-reproducible, left sided chest pain radiating down his left arm and left jaw/neck and pressure that woke him up from sleep this morning at approximately 5 am. First CE's positive, and EKG shoed NSR 68 bpm, ST depressions in aVR, and multiple TWI in lateral leads. 68 y/o male, with a PmHx of CAD on ASA and Plavix, PAD s/p stent to left lower extremity in 2016 and s/p CABG (2002), multiple stents (most recently March 2017) p/w of severe, 10/10, nonpleuritic, non-reproducible, left sided chest pain radiating down his left arm and left jaw/neck and pressure that woke him up from sleep this morning at approximately 5 am. First CE's positive, and EKG shoed NSR 68 bpm, ST depressions in aVR, and multiple TWI in lateral leads.

## 2017-07-07 NOTE — H&P ADULT - MS GEN HX ROS MEA POS PC
leg pain L/Claudication from PAD in left leg primarily. leg pain L/neck pain/Claudication from PAD in left leg primarily.

## 2017-07-07 NOTE — ED ADULT NURSE REASSESSMENT NOTE - NS ED NURSE REASSESS COMMENT FT1
Rpt rcvd from prior RN. Pt awake, alert. Aox4. In NAD at this time. Currently NSR on cardiac monitor, respirations even/unlabored. 20g IV observed to R ac, IV dry/intact. Hep infusing at 7.5ml/hr at this time. Rpt given for 729B to DINESH Campos. Will continue to monitor.

## 2017-07-07 NOTE — ED PROVIDER NOTE - OBJECTIVE STATEMENT
67 M h/o CAD s/p CABG and multiple stents, last in March 17, p/w L sided chest pain that woke him from sleep. Patient says he has been having exertional L sided chest pain x 3 days, with SOB and palpitations, relieved with rest and nitro. Then patient woke up at 5AM with severe L sided pain to back and L arm, with SOb and palpitations. took asa and nitro with some relief. Says this feels same but less severe to CP he had in March when he was cath'd.

## 2017-07-07 NOTE — ED PROVIDER NOTE - ATTENDING CONTRIBUTION TO CARE
67m, pmhx cad, s/p stent to OM in 04/2017. p/w left sided c/p radiating to left back and left shoulder, similar to recent MI. no sob, palps, leg pain/swelling. took asa by EMS and a ntg with improvement in pain. here, pain has started to return. exam, vs wnl, nad. hs and lungs normal, abdo being, legs normal. ECG shows lateral STD. aVR mildly elevated but less so then previously. pt is having ACS, will heparanize. tried to d/w [facility] cards dr. delong who is onv vacation. dr. pineda covering.

## 2017-07-07 NOTE — H&P ADULT - PSH
S/P CABG (coronary artery bypass graft) History of coronary angiogram  multiple stents placed  History of femoral angiogram  stent placed in Left leg  S/P CABG (coronary artery bypass graft)  [facility]

## 2017-07-07 NOTE — H&P ADULT - MUSCULOSKELETAL
details… detailed exam no joint erythema/normal/no calf tenderness/no joint swelling/no joint warmth/ROM intact

## 2017-07-07 NOTE — ED ADULT TRIAGE NOTE - CHIEF COMPLAINT QUOTE
pt comes to ED for CP x 1 hour that woke him up from sleep. Pt states he is now having shoulder and arm pain.  pt has hx of HTN, HLD, & CABG. pt is on Plavix. pt took 1 nitro and received 162 mg of ASA by EMS. EKG performed in triage.

## 2017-07-07 NOTE — ED PROVIDER NOTE - PROGRESS NOTE DETAILS
Dr. Puentes paged x 1 Dr. Puentes paged second time. Dr. Puentes paged second time.  pt now sleeping after second ntg. awaiting callback. trops pending. Klaudia: trop 0.6. pt with nstemi. d/w dr. pineda. he does not want CCU admit. agrees with heparin. will give verbal s/o to tele PA.

## 2017-07-08 ENCOUNTER — TRANSCRIPTION ENCOUNTER (OUTPATIENT)
Age: 67
End: 2017-07-08

## 2017-07-08 LAB
BUN SERPL-MCNC: 11 MG/DL — SIGNIFICANT CHANGE UP (ref 7–23)
CALCIUM SERPL-MCNC: 8.8 MG/DL — SIGNIFICANT CHANGE UP (ref 8.4–10.5)
CHLORIDE SERPL-SCNC: 105 MMOL/L — SIGNIFICANT CHANGE UP (ref 98–107)
CK MB BLD-MCNC: 10.9 — HIGH (ref 0–2.5)
CK MB BLD-MCNC: 33.69 NG/ML — HIGH (ref 1–6.6)
CK SERPL-CCNC: 308 U/L — HIGH (ref 30–200)
CO2 SERPL-SCNC: 23 MMOL/L — SIGNIFICANT CHANGE UP (ref 22–31)
CREAT SERPL-MCNC: 0.96 MG/DL — SIGNIFICANT CHANGE UP (ref 0.5–1.3)
GLUCOSE SERPL-MCNC: 130 MG/DL — HIGH (ref 70–99)
HCT VFR BLD CALC: 40.4 % — SIGNIFICANT CHANGE UP (ref 39–50)
HGB BLD-MCNC: 13.9 G/DL — SIGNIFICANT CHANGE UP (ref 13–17)
MCHC RBC-ENTMCNC: 29.5 PG — SIGNIFICANT CHANGE UP (ref 27–34)
MCHC RBC-ENTMCNC: 34.4 % — SIGNIFICANT CHANGE UP (ref 32–36)
MCV RBC AUTO: 85.8 FL — SIGNIFICANT CHANGE UP (ref 80–100)
NRBC # FLD: 0 — SIGNIFICANT CHANGE UP
PLATELET # BLD AUTO: 124 K/UL — LOW (ref 150–400)
PMV BLD: 11.9 FL — SIGNIFICANT CHANGE UP (ref 7–13)
POTASSIUM SERPL-MCNC: 3.5 MMOL/L — SIGNIFICANT CHANGE UP (ref 3.5–5.3)
POTASSIUM SERPL-SCNC: 3.5 MMOL/L — SIGNIFICANT CHANGE UP (ref 3.5–5.3)
RBC # BLD: 4.71 M/UL — SIGNIFICANT CHANGE UP (ref 4.2–5.8)
RBC # FLD: 13.6 % — SIGNIFICANT CHANGE UP (ref 10.3–14.5)
SODIUM SERPL-SCNC: 142 MMOL/L — SIGNIFICANT CHANGE UP (ref 135–145)
TROPONIN T SERPL-MCNC: 1.96 NG/ML — HIGH (ref 0–0.06)
WBC # BLD: 5.54 K/UL — SIGNIFICANT CHANGE UP (ref 3.8–10.5)
WBC # FLD AUTO: 5.54 K/UL — SIGNIFICANT CHANGE UP (ref 3.8–10.5)

## 2017-07-08 RX ORDER — TICAGRELOR 90 MG/1
1 TABLET ORAL
Qty: 0 | Refills: 0 | COMMUNITY
Start: 2017-07-08

## 2017-07-08 RX ORDER — TICAGRELOR 90 MG/1
1 TABLET ORAL
Qty: 180 | Refills: 0 | OUTPATIENT
Start: 2017-07-08 | End: 2017-10-06

## 2017-07-08 RX ORDER — FUROSEMIDE 40 MG
20 TABLET ORAL ONCE
Qty: 0 | Refills: 0 | Status: COMPLETED | OUTPATIENT
Start: 2017-07-08 | End: 2017-07-08

## 2017-07-08 RX ORDER — FUROSEMIDE 40 MG
20 TABLET ORAL ONCE
Qty: 0 | Refills: 0 | Status: COMPLETED | OUTPATIENT
Start: 2017-07-09 | End: 2017-07-09

## 2017-07-08 RX ORDER — ACETAMINOPHEN 500 MG
2 TABLET ORAL
Qty: 0 | Refills: 0 | COMMUNITY
Start: 2017-07-08

## 2017-07-08 RX ADMIN — GABAPENTIN 300 MILLIGRAM(S): 400 CAPSULE ORAL at 05:27

## 2017-07-08 RX ADMIN — Medication 20 MILLIGRAM(S): at 13:55

## 2017-07-08 RX ADMIN — CARVEDILOL PHOSPHATE 3.12 MILLIGRAM(S): 80 CAPSULE, EXTENDED RELEASE ORAL at 17:04

## 2017-07-08 RX ADMIN — Medication 81 MILLIGRAM(S): at 11:03

## 2017-07-08 RX ADMIN — TICAGRELOR 90 MILLIGRAM(S): 90 TABLET ORAL at 05:27

## 2017-07-08 RX ADMIN — TICAGRELOR 90 MILLIGRAM(S): 90 TABLET ORAL at 17:04

## 2017-07-08 RX ADMIN — GABAPENTIN 300 MILLIGRAM(S): 400 CAPSULE ORAL at 13:57

## 2017-07-08 RX ADMIN — ATORVASTATIN CALCIUM 80 MILLIGRAM(S): 80 TABLET, FILM COATED ORAL at 21:16

## 2017-07-08 RX ADMIN — CARVEDILOL PHOSPHATE 3.12 MILLIGRAM(S): 80 CAPSULE, EXTENDED RELEASE ORAL at 05:26

## 2017-07-08 RX ADMIN — LISINOPRIL 2.5 MILLIGRAM(S): 2.5 TABLET ORAL at 05:26

## 2017-07-08 RX ADMIN — GABAPENTIN 300 MILLIGRAM(S): 400 CAPSULE ORAL at 21:16

## 2017-07-08 NOTE — PROGRESS NOTE ADULT - ASSESSMENT
66 y/o male, with a PmHx of CABG x 3, CAD w/stents (last one in 3/2017), PAD w/Left leg stent, HTN, HLD, presented + NSTEMI, s/p cath     1. cv stable  no events on tele   + NSTEMI , S/P cath : PCI to severe lesion of SVG to OM2   EF 35%  continue with  ASA/statin/ ticagrelor   continue with BB/ ACEI  hemodynamically stable     2. SOB   no evidence of fluid overload on exam  given patient reduce EF, give lasix 20 mg IVP x 1   reassess patient after dose is given 66 y/o male, with a PmHx of CABG x 3, CAD w/stents (last one in 3/2017), PAD w/Left leg stent, HTN, HLD, presented + NSTEMI, s/p cath     1. cv stable  no events on tele   + NSTEMI , S/P cath : PCI to severe lesion of SVG to OM2   EF 35%  continue with  ASA/statin/ ticagrelor   continue with BB/ ACEI  hemodynamically stable     2. SOB   no evidence of fluid overload on exam  f/u ck/trop levels   given patient reduce EF, give lasix 20 mg IVP x 1   reassess patient after dose is given

## 2017-07-08 NOTE — DISCHARGE NOTE ADULT - SECONDARY DIAGNOSIS.
HTN (hypertension) PAD (peripheral artery disease) Combined systolic and diastolic congestive heart failure, unspecified congestive heart failure chronicity

## 2017-07-08 NOTE — PROGRESS NOTE ADULT - SUBJECTIVE AND OBJECTIVE BOX
CARDIOLOGY FOLLOW UP     CC no chest pain / c.o SOB       PHYSICAL EXAM:  T(C): 36.6 (07-08-17 @ 05:23), Max: 36.7 (07-07-17 @ 22:59)  HR: 72 (07-08-17 @ 05:23) (67 - 72)  BP: 131/78 (07-08-17 @ 05:23) (125/72 - 146/88)  RR: 18 (07-08-17 @ 05:23) (18 - 18)  SpO2: 98% (07-08-17 @ 05:23) (98% - 100%)  Wt(kg): --  I&O's Summary    07 Jul 2017 07:01  -  08 Jul 2017 07:00  --------------------------------------------------------  IN: 100 mL / OUT: 625 mL / NET: -525 mL        Appearance: Normal	  Cardiovascular: Normal S1 S2,RRR, No JVD, No murmurs  Respiratory: Lungs clear to auscultation	  Gastrointestinal:  Soft, Non-tender, + BS	  Extremities: Normal range of motion, No clubbing, cyanosis or edema  s/p cath right groin , dressing CDI, no ecchymosis  ,  + swelling, + pulses noted bl to LE         MEDICATIONS  (STANDING):  aspirin enteric coated 81 milliGRAM(s) Oral daily  lisinopril 2.5 milliGRAM(s) Oral daily  gabapentin 300 milliGRAM(s) Oral three times a day  atorvastatin 80 milliGRAM(s) Oral at bedtime  carvedilol 3.125 milliGRAM(s) Oral every 12 hours  ticagrelor 90 milliGRAM(s) Oral two times a day      TELEMETRY: 	 NSR      RADIOLOGY:   DIAGNOSTIC TESTING:    [ x]  Catheterization:  < from: Cardiac Cath Lab - Adult (07.07.17 @ 09:31) >  DIAGNOSTIC RECOMMENDATIONS: PCI to severe lesion of SVG to OM2 in setting  of ACS/NSTEMI. ASA and brilinta. Echo in 3 months to re-evaluate ejection  fraction.  INTERVENTIONAL IMPRESSIONS: NSTEMI presentation with recurrent angina on  maximal medical therapy. Cath with severe subtotal proximal lesion in SVG  to OM2. More distal stent of SVG to OM2 was patent. Patent RIDER to LAD.  Closed native RCA with distal vessel filled via left to right collaterals.  Severe LV dysfunction.  INTERVENTIONAL RECOMMENDATIONS: PCI to severe lesion of SVG to OM2 in  setting of ACS/NSTEMI. ASA and brilinta. Echo in 3 months to re-evaluate  ejection fraction.    < end of copied text >    OTHER: 	    LABS:	 	        CKMB: 64.65 ng/mL (07-07 @ 19:35)                          13.9   5.54  )-----------( 124      ( 08 Jul 2017 05:40 )             40.4     07-08    142  |  105  |  11  ----------------------------<  130<H>  3.5   |  23  |  0.96    Ca    8.8      08 Jul 2017 05:40    TPro  6.1  /  Alb  3.7  /  TBili  0.4  /  DBili  x   /  AST  35  /  ALT  19  /  AlkPhos  95  07-07    PT/INR - ( 07 Jul 2017 06:48 )   PT: 10.6 SEC;   INR: 0.95          PTT - ( 07 Jul 2017 06:48 )  PTT:32.8 SEC

## 2017-07-08 NOTE — DISCHARGE NOTE ADULT - CARE PLAN
Principal Discharge DX:	NSTEMI (non-ST elevated myocardial infarction)  Goal:	Resolution of symptoms and preservation of blood flow through the Coronary Arteries.  Instructions for follow-up, activity and diet:	Continue medications as prescribed including coreg, lisinopril, aspirin, ticagrelor, and lipitor.   Low salt, low fat, fluid restricted diet.  Follow up with cardiology within 1-2 weeks for further monitoring.  You will need a repeat echocardiogram with cardiology as outpatient in 3 months.  Secondary Diagnosis:	HTN (hypertension)  Instructions for follow-up, activity and diet:	Continue coreg and lisinopril as prescribed.  Low salt diet.  Follow up with cardiology within 1-2 weeks.  Secondary Diagnosis:	PAD (peripheral artery disease)  Instructions for follow-up, activity and diet:	Continue medications as prescribed.  Low salt, low fat diet.  Follow up with your PCP and cardiology.  Secondary Diagnosis:	Combined systolic and diastolic congestive heart failure, unspecified congestive heart failure chronicity  Instructions for follow-up, activity and diet:	Continue medications as prescribed.  Low salt, low fat, fluid restricted diet.  Monitor your daily morning weight.  Follow up with cardiology within 1-2 weeks.  You will need a repeat echocardiogram with cardiology as outpatient in 3 months.

## 2017-07-08 NOTE — DISCHARGE NOTE ADULT - PLAN OF CARE
Resolution of symptoms and preservation of blood flow through the Coronary Arteries. Continue medications as prescribed including coreg, lisinopril, aspirin, ticagrelor, and lipitor.   Low salt, low fat, fluid restricted diet.  Follow up with cardiology within 1-2 weeks for further monitoring.  You will need a repeat echocardiogram with cardiology as outpatient in 3 months. Continue coreg and lisinopril as prescribed.  Low salt diet.  Follow up with cardiology within 1-2 weeks. Continue medications as prescribed.  Low salt, low fat diet.  Follow up with your PCP and cardiology. Continue medications as prescribed.  Low salt, low fat, fluid restricted diet.  Monitor your daily morning weight.  Follow up with cardiology within 1-2 weeks.  You will need a repeat echocardiogram with cardiology as outpatient in 3 months.

## 2017-07-08 NOTE — DISCHARGE NOTE ADULT - MEDICATION SUMMARY - MEDICATIONS TO TAKE
I will START or STAY ON the medications listed below when I get home from the hospital:    aspirin 81 mg oral tablet  -- 1 tab(s) by mouth once a day  -- Indication: For NSTEMI (non-ST elevated myocardial infarction)    acetaminophen 325 mg oral tablet  -- 2 tab(s) by mouth every 6 hours, As needed, mild, moderate pain  -- Indication: For PAin    lisinopril 2.5 mg oral tablet  -- 1 tab(s) by mouth once a day  -- Indication: For HTN (hypertension)    gabapentin 300 mg oral tablet  -- 1 tab(s) by mouth 3 times a day  -- Indication: For Neuropathy    atorvastatin 80 mg oral tablet  -- 1 tab(s) by mouth once a day (at bedtime)  -- Indication: For Non-ST elevation (NSTEMI) myocardial infarction    ticagrelor 90 mg oral tablet  -- 1 tab(s) by mouth 2 times a day  -- Indication: For NSTEMI (non-ST elevated myocardial infarction)    carvedilol 3.125 mg oral tablet  -- 1 tab(s) by mouth every 12 hours  -- Indication: For NSTEMI (non-ST elevated myocardial infarction)    furosemide 20 mg oral tablet  -- 1 tab(s) by mouth once a day  -- Indication: For Combined systolic and diastolic congestive heart failure, unspecified congestive heart failure chronicity    polyethylene glycol 3350 oral powder for reconstitution  -- 17 gram(s) by mouth once a day  -- Indication: For Constipation, unspecified constipation type

## 2017-07-08 NOTE — DISCHARGE NOTE ADULT - PATIENT PORTAL LINK FT
“You can access the Follow[facility] Patient Portal, offered by [facility], by registering with the following website: [email or web address]”

## 2017-07-08 NOTE — DISCHARGE NOTE ADULT - CARE PROVIDER_API CALL
Ulisses Abarca), Cardiovascular Disease; Internal Medicine; Nuclear Cardiology  [address] [place] [address]  Phone: [phone]  Fax: [phone]

## 2017-07-08 NOTE — DISCHARGE NOTE ADULT - HOSPITAL COURSE
HPI:  66 y/o male, with a PmHx of CAD on ASA and Plavix, PAD s/p stent to left lower extremity in 2016 and s/p CABG x 3 (2002), HTN, HLD, multiple stents (most recently March 2017) p/w of severe, 10/10, nonpleuritic, non-reproducible, left sided chest pain radiating down his left arm and left jaw/neck and pressure that woke him up from sleep this morning at approximately 5 am. He took ASA and nitro with some relief prior to coming into the hospital.  Pt endorses at least 3 day history of mild left sided chest pain, associated with SOB, LANZA, palpitations, relieved with rest and nitro.  He denies N/V/D, diaphoresis, syncope, recent infections, recent travel. At time of exam patient stated the pain had become more of a pressure than the severe pain that woke him prior to his arrival, and the pain had subsided to a 4/10. Pt was found to have a NSTEMI and was started on a heparin gtt by the ED. On Admission, he received 300mg plavix x 1 and had already received aspirin by EMS. Pt admitted to telemetry for NSTEMI. (07 Jul 2017 08:27)    On admission:  EKG: NSR @ 68, T inv I, AVL, V5-6, ST dep V3-6 (unchanged)  CE # 1 (CK: 219; MB: 27.80; Trop: 0.60)     CE # 2 (, MB 64.65, CKMB RI 14.2, Trop: 1.62)                    7/7 CXR - clear lungs  7/7 LHC: PCI to severe lesion of SVG to OM2 in setting of ACS/NSTEMI. ASA and brilinta. Ef 35%, Echo in 3 months to re-evaluate ejection fraction, RFA accessed, Full report in [place].  RFA site checked and remained stable.  Monitored overnight with no events.  Patient on asa, brilinta, lisinopril, coreg, and lipitor 80.  Patient cleared for discharge with outpatient follow up with cardiology. HPI:  68 y/o male, with a PmHx of CAD on ASA and Plavix, PAD s/p stent to left lower extremity in 2016 and s/p CABG x 3 (2002), HTN, HLD, multiple stents (most recently March 2017) p/w of severe, 10/10, nonpleuritic, non-reproducible, left sided chest pain radiating down his left arm and left jaw/neck and pressure that woke him up from sleep this morning at approximately 5 am. He took ASA and nitro with some relief prior to coming into the hospital.  Pt endorses at least 3 day history of mild left sided chest pain, associated with SOB, LANZA, palpitations, relieved with rest and nitro.  He denies N/V/D, diaphoresis, syncope, recent infections, recent travel. At time of exam patient stated the pain had become more of a pressure than the severe pain that woke him prior to his arrival, and the pain had subsided to a 4/10. Pt was found to have a NSTEMI and was started on a heparin gtt by the ED. On Admission, he received 300mg plavix x 1 and had already received aspirin by EMS. Pt admitted to telemetry for NSTEMI. (07 Jul 2017 08:27)    On admission:  EKG: NSR @ 68, T inv I, AVL, V5-6, ST dep V3-6 (unchanged)  CE # 1 (CK: 219; MB: 27.80; Trop: 0.60)     CE # 2 (, MB 64.65, CKMB RI 14.2, Trop: 1.62)                    7/7 CXR - clear lungs  7/7 LHC: PCI to severe lesion of SVG to OM2 in setting of ACS/NSTEMI. ASA and brilinta. Ef 35%, Echo in 3 months to re-evaluate ejection fraction, RFA accessed, Full report in [place].  RFA site checked and remained stable.  Monitored overnight with no events.  Patient on asa, brilinta, lisinopril, coreg, and lipitor 80.  Patient cleared for discharge with outpatient follow up with cardiology.     Patient cleared for discharge by Dr. Puentes.  Outpatient follow up with Dr. Abarca (cardiology) within 1-2 weeks.

## 2017-07-08 NOTE — DISCHARGE NOTE ADULT - ADDITIONAL INSTRUCTIONS
Follow up with cardiology within 1-2 weeks; call for appointment.  You will need a repeat echocardiogram in 3 months with cardiology.    Monitor cardiac catheterization site for signs of bleeding, increased bruising, swelling, or discharge. If you experience any of these symptoms, please follow up with your primary care physician or return to the hospital immediately. Do not submerge the site in water (bathe or swim). You may shower. No strenuous activity for 3 weeks. Do not drive for 48hrs following angiogram.

## 2017-07-09 VITALS
HEART RATE: 75 BPM | SYSTOLIC BLOOD PRESSURE: 142 MMHG | DIASTOLIC BLOOD PRESSURE: 85 MMHG | TEMPERATURE: 98 F | OXYGEN SATURATION: 100 % | RESPIRATION RATE: 16 BRPM

## 2017-07-09 LAB
BUN SERPL-MCNC: 12 MG/DL — SIGNIFICANT CHANGE UP (ref 7–23)
CALCIUM SERPL-MCNC: 9.3 MG/DL — SIGNIFICANT CHANGE UP (ref 8.4–10.5)
CHLORIDE SERPL-SCNC: 103 MMOL/L — SIGNIFICANT CHANGE UP (ref 98–107)
CO2 SERPL-SCNC: 21 MMOL/L — LOW (ref 22–31)
CREAT SERPL-MCNC: 0.92 MG/DL — SIGNIFICANT CHANGE UP (ref 0.5–1.3)
GLUCOSE SERPL-MCNC: 108 MG/DL — HIGH (ref 70–99)
HCT VFR BLD CALC: 40.7 % — SIGNIFICANT CHANGE UP (ref 39–50)
HGB BLD-MCNC: 14.2 G/DL — SIGNIFICANT CHANGE UP (ref 13–17)
MAGNESIUM SERPL-MCNC: 2.1 MG/DL — SIGNIFICANT CHANGE UP (ref 1.6–2.6)
MCHC RBC-ENTMCNC: 29.6 PG — SIGNIFICANT CHANGE UP (ref 27–34)
MCHC RBC-ENTMCNC: 34.9 % — SIGNIFICANT CHANGE UP (ref 32–36)
MCV RBC AUTO: 84.8 FL — SIGNIFICANT CHANGE UP (ref 80–100)
NRBC # FLD: 0 — SIGNIFICANT CHANGE UP
PLATELET # BLD AUTO: 130 K/UL — LOW (ref 150–400)
PMV BLD: 11.6 FL — SIGNIFICANT CHANGE UP (ref 7–13)
POTASSIUM SERPL-MCNC: 3.7 MMOL/L — SIGNIFICANT CHANGE UP (ref 3.5–5.3)
POTASSIUM SERPL-SCNC: 3.7 MMOL/L — SIGNIFICANT CHANGE UP (ref 3.5–5.3)
RBC # BLD: 4.8 M/UL — SIGNIFICANT CHANGE UP (ref 4.2–5.8)
RBC # FLD: 13.6 % — SIGNIFICANT CHANGE UP (ref 10.3–14.5)
SODIUM SERPL-SCNC: 140 MMOL/L — SIGNIFICANT CHANGE UP (ref 135–145)
WBC # BLD: 5.33 K/UL — SIGNIFICANT CHANGE UP (ref 3.8–10.5)
WBC # FLD AUTO: 5.33 K/UL — SIGNIFICANT CHANGE UP (ref 3.8–10.5)

## 2017-07-09 RX ORDER — FUROSEMIDE 40 MG
20 TABLET ORAL DAILY
Qty: 0 | Refills: 0 | Status: DISCONTINUED | OUTPATIENT
Start: 2017-07-09 | End: 2017-07-09

## 2017-07-09 RX ORDER — POLYETHYLENE GLYCOL 3350 17 G/17G
17 POWDER, FOR SOLUTION ORAL DAILY
Qty: 0 | Refills: 0 | Status: DISCONTINUED | OUTPATIENT
Start: 2017-07-09 | End: 2017-07-09

## 2017-07-09 RX ORDER — FUROSEMIDE 40 MG
1 TABLET ORAL
Qty: 30 | Refills: 0 | OUTPATIENT
Start: 2017-07-09 | End: 2017-08-08

## 2017-07-09 RX ORDER — TICAGRELOR 90 MG/1
1 TABLET ORAL
Qty: 180 | Refills: 0 | OUTPATIENT
Start: 2017-07-09 | End: 2017-10-07

## 2017-07-09 RX ORDER — POLYETHYLENE GLYCOL 3350 17 G/17G
17 POWDER, FOR SOLUTION ORAL
Qty: 119 | Refills: 0 | OUTPATIENT
Start: 2017-07-09 | End: 2017-07-16

## 2017-07-09 RX ADMIN — Medication 81 MILLIGRAM(S): at 11:24

## 2017-07-09 RX ADMIN — TICAGRELOR 90 MILLIGRAM(S): 90 TABLET ORAL at 05:45

## 2017-07-09 RX ADMIN — POLYETHYLENE GLYCOL 3350 17 GRAM(S): 17 POWDER, FOR SOLUTION ORAL at 13:15

## 2017-07-09 RX ADMIN — GABAPENTIN 300 MILLIGRAM(S): 400 CAPSULE ORAL at 13:15

## 2017-07-09 RX ADMIN — CARVEDILOL PHOSPHATE 3.12 MILLIGRAM(S): 80 CAPSULE, EXTENDED RELEASE ORAL at 05:45

## 2017-07-09 RX ADMIN — LISINOPRIL 2.5 MILLIGRAM(S): 2.5 TABLET ORAL at 05:45

## 2017-07-09 RX ADMIN — GABAPENTIN 300 MILLIGRAM(S): 400 CAPSULE ORAL at 05:45

## 2017-07-09 RX ADMIN — Medication 20 MILLIGRAM(S): at 08:21

## 2017-07-09 NOTE — PROGRESS NOTE ADULT - ATTENDING COMMENTS
Patient seen and examined.  Agree with above NP note.    cv stable   nstemi s/p pci to svg to om  asa/brilinta  lasix 20 daily for icmp/dyspnea  f/u with dr. roth in office  re-eval ef in 3 mos
Patient seen and examined.  Agree with above NP note.    cv stable  cp free  groin ok  some beltran today   ? maybe secondary to volume/lv dysfxn/mi  lasix iv x 1 now  if imrpoves d/c home later today on 20 po lasix    f/u Dr. roth next week  asa/juan pablota

## 2017-07-09 NOTE — PROGRESS NOTE ADULT - ASSESSMENT
68 y/o male, with a PmHx of CABG x 3, CAD w/stents (last one in 3/2017), PAD w/Left leg stent, HTN, HLD, presented + NSTEMI, s/p cath     1. cv stable  no events on tele   + NSTEMI , S/P cath : PCI to severe lesion of SVG to OM2   EF 35%  continue with  ASA/statin/ ticagrelor   continue with BB/ ACEI  hemodynamically stable     2. SOB   per patient SOB improved yesterday after lasix dose    no evidence of fluid overload on exam  given patient reduce EF, start lasix 20 mg PO daily     dc planning today, f/u with Dr. Abarca

## 2017-07-09 NOTE — PROGRESS NOTE ADULT - SUBJECTIVE AND OBJECTIVE BOX
CARDIOLOGY FOLLOW UP     CC no chest pain  occasional SOB       PHYSICAL EXAM:  T(C): 36.8 (07-09-17 @ 05:44), Max: 36.8 (07-08-17 @ 21:00)  HR: 74 (07-09-17 @ 08:20) (60 - 78)  BP: 141/80 (07-09-17 @ 08:20) (121/85 - 145/85)  RR: 16 (07-09-17 @ 05:44) (16 - 16)  SpO2: 100% (07-09-17 @ 05:44) (100% - 100%)  Wt(kg): --  I&O's Summary    09 Jul 2017 07:01  -  09 Jul 2017 12:28  --------------------------------------------------------  IN: 240 mL / OUT: 580 mL / NET: -340 mL        Appearance: Normal	  Cardiovascular: Normal S1 S2,RRR, No JVD, No murmurs  Respiratory: Lungs clear to auscultation	  Gastrointestinal:  Soft, Non-tender, + BS	  Extremities: Normal range of motion, No clubbing, cyanosis or edema  s/p cath right groin , dressing CDI, no ecchymosis  ,  + minimal  swelling, no hematoma + pulses noted bl to LE         MEDICATIONS  (STANDING):  aspirin enteric coated 81 milliGRAM(s) Oral daily  lisinopril 2.5 milliGRAM(s) Oral daily  gabapentin 300 milliGRAM(s) Oral three times a day  atorvastatin 80 milliGRAM(s) Oral at bedtime  carvedilol 3.125 milliGRAM(s) Oral every 12 hours  ticagrelor 90 milliGRAM(s) Oral two times a day      TELEMETRY: NSR 	    DIAGNOSTIC TESTING:    [ x]  Catheterization:    < from: Cardiac Cath Lab - Adult (07.07.17 @ 09:31) >  DIAGNOSTIC RECOMMENDATIONS: PCI to severe lesion of SVG to OM2 in setting  of ACS/NSTEMI. ASA and brilinta. Echo in 3 months to re-evaluate ejection  fraction.  INTERVENTIONAL IMPRESSIONS: NSTEMI presentation with recurrent angina on  maximal medical therapy. Cath with severe subtotal proximal lesion in SVG  to OM2. More distal stent of SVG to OM2 was patent. Patent RIDER to LAD.  Closed native RCA with distal vessel filled via left to right collaterals.  Severe LV dysfunction.  INTERVENTIONAL RECOMMENDATIONS: PCI to severe lesion of SVG to OM2 in  setting of ACS/NSTEMI. ASA and brilinta. Echo in 3 months to re-evaluate  ejection fraction.    < end of copied text >    OTHER: 	    LABS:	 	                                14.2   5.33  )-----------( 130      ( 09 Jul 2017 07:00 )             40.7     07-09    140  |  103  |  12  ----------------------------<  108<H>  3.7   |  21<L>  |  0.92    Ca    9.3      09 Jul 2017 07:00  Mg     2.1     07-09

## 2017-07-09 NOTE — PROVIDER CONTACT NOTE (OTHER) - SITUATION
PA made aware patient c/o sob- currently lying in bed, 99% on room air, base of lungs light crackles- pa aware

## 2018-03-20 ENCOUNTER — INPATIENT (INPATIENT)
Facility: HOSPITAL | Age: 68
LOS: 3 days | Discharge: HOME | End: 2018-03-24
Attending: INTERNAL MEDICINE | Admitting: INTERNAL MEDICINE
Payer: MEDICAID

## 2018-03-20 VITALS
DIASTOLIC BLOOD PRESSURE: 60 MMHG | OXYGEN SATURATION: 100 % | TEMPERATURE: 98 F | SYSTOLIC BLOOD PRESSURE: 123 MMHG | RESPIRATION RATE: 18 BRPM | HEART RATE: 68 BPM

## 2018-03-20 DIAGNOSIS — Z86.79 PERSONAL HISTORY OF OTHER DISEASES OF THE CIRCULATORY SYSTEM: Chronic | ICD-10-CM

## 2018-03-20 DIAGNOSIS — Z98.890 OTHER SPECIFIED POSTPROCEDURAL STATES: Chronic | ICD-10-CM

## 2018-03-20 DIAGNOSIS — Z95.1 PRESENCE OF AORTOCORONARY BYPASS GRAFT: Chronic | ICD-10-CM

## 2018-03-20 DIAGNOSIS — I21.4 NON-ST ELEVATION (NSTEMI) MYOCARDIAL INFARCTION: ICD-10-CM

## 2018-03-20 LAB
BUN SERPL-MCNC: 23 MG/DL — SIGNIFICANT CHANGE UP (ref 7–23)
CALCIUM SERPL-MCNC: 9.2 MG/DL — SIGNIFICANT CHANGE UP (ref 8.4–10.5)
CHLORIDE SERPL-SCNC: 103 MMOL/L — SIGNIFICANT CHANGE UP (ref 98–107)
CO2 SERPL-SCNC: 23 MMOL/L — SIGNIFICANT CHANGE UP (ref 22–31)
CREAT SERPL-MCNC: 1.34 MG/DL — HIGH (ref 0.5–1.3)
D DIMER BLD IA.RAPID-MCNC: 351 NG/ML — SIGNIFICANT CHANGE UP
GLUCOSE SERPL-MCNC: 100 MG/DL — HIGH (ref 70–99)
HBA1C BLD-MCNC: 6.1 % — HIGH (ref 4–5.6)
HCT VFR BLD CALC: 40.2 % — SIGNIFICANT CHANGE UP (ref 39–50)
HGB BLD-MCNC: 13.7 G/DL — SIGNIFICANT CHANGE UP (ref 13–17)
MCHC RBC-ENTMCNC: 29.5 PG — SIGNIFICANT CHANGE UP (ref 27–34)
MCHC RBC-ENTMCNC: 34.1 % — SIGNIFICANT CHANGE UP (ref 32–36)
MCV RBC AUTO: 86.6 FL — SIGNIFICANT CHANGE UP (ref 80–100)
NRBC # FLD: 0 — SIGNIFICANT CHANGE UP
NT-PROBNP SERPL-SCNC: 1173 PG/ML — SIGNIFICANT CHANGE UP
PLATELET # BLD AUTO: 137 K/UL — LOW (ref 150–400)
PMV BLD: 11.9 FL — SIGNIFICANT CHANGE UP (ref 7–13)
POTASSIUM SERPL-MCNC: 5 MMOL/L — SIGNIFICANT CHANGE UP (ref 3.5–5.3)
POTASSIUM SERPL-SCNC: 5 MMOL/L — SIGNIFICANT CHANGE UP (ref 3.5–5.3)
RBC # BLD: 4.64 M/UL — SIGNIFICANT CHANGE UP (ref 4.2–5.8)
RBC # FLD: 12.3 % — SIGNIFICANT CHANGE UP (ref 10.3–14.5)
SODIUM SERPL-SCNC: 139 MMOL/L — SIGNIFICANT CHANGE UP (ref 135–145)
WBC # BLD: 7.1 K/UL — SIGNIFICANT CHANGE UP (ref 3.8–10.5)
WBC # FLD AUTO: 7.1 K/UL — SIGNIFICANT CHANGE UP (ref 3.8–10.5)

## 2018-03-20 PROCEDURE — 71045 X-RAY EXAM CHEST 1 VIEW: CPT | Mod: 26

## 2018-03-20 PROCEDURE — 93010 ELECTROCARDIOGRAM REPORT: CPT | Mod: 76

## 2018-03-20 PROCEDURE — 93010 ELECTROCARDIOGRAM REPORT: CPT | Mod: 77

## 2018-03-20 RX ORDER — FUROSEMIDE 40 MG
20 TABLET ORAL DAILY
Qty: 0 | Refills: 0 | Status: DISCONTINUED | OUTPATIENT
Start: 2018-03-20 | End: 2018-03-21

## 2018-03-20 RX ORDER — IPRATROPIUM/ALBUTEROL SULFATE 18-103MCG
3 AEROSOL WITH ADAPTER (GRAM) INHALATION ONCE
Qty: 0 | Refills: 0 | Status: COMPLETED | OUTPATIENT
Start: 2018-03-20 | End: 2018-03-20

## 2018-03-20 RX ORDER — ACETAMINOPHEN 500 MG
650 TABLET ORAL ONCE
Qty: 0 | Refills: 0 | Status: COMPLETED | OUTPATIENT
Start: 2018-03-20 | End: 2018-03-20

## 2018-03-20 RX ORDER — ASPIRIN/CALCIUM CARB/MAGNESIUM 324 MG
81 TABLET ORAL DAILY
Qty: 0 | Refills: 0 | Status: DISCONTINUED | OUTPATIENT
Start: 2018-03-21 | End: 2018-03-24

## 2018-03-20 RX ORDER — CARVEDILOL PHOSPHATE 80 MG/1
12.5 CAPSULE, EXTENDED RELEASE ORAL EVERY 12 HOURS
Qty: 0 | Refills: 0 | Status: DISCONTINUED | OUTPATIENT
Start: 2018-03-20 | End: 2018-03-24

## 2018-03-20 RX ORDER — SODIUM CHLORIDE 9 MG/ML
3 INJECTION INTRAMUSCULAR; INTRAVENOUS; SUBCUTANEOUS EVERY 8 HOURS
Qty: 0 | Refills: 0 | Status: DISCONTINUED | OUTPATIENT
Start: 2018-03-20 | End: 2018-03-24

## 2018-03-20 RX ORDER — ATORVASTATIN CALCIUM 80 MG/1
80 TABLET, FILM COATED ORAL AT BEDTIME
Qty: 0 | Refills: 0 | Status: DISCONTINUED | OUTPATIENT
Start: 2018-03-20 | End: 2018-03-24

## 2018-03-20 RX ORDER — RANOLAZINE 500 MG/1
500 TABLET, FILM COATED, EXTENDED RELEASE ORAL
Qty: 0 | Refills: 0 | Status: DISCONTINUED | OUTPATIENT
Start: 2018-03-20 | End: 2018-03-24

## 2018-03-20 RX ORDER — GABAPENTIN 400 MG/1
1 CAPSULE ORAL
Qty: 0 | Refills: 0 | COMMUNITY

## 2018-03-20 RX ORDER — LORATADINE 10 MG/1
10 TABLET ORAL DAILY
Qty: 0 | Refills: 0 | Status: DISCONTINUED | OUTPATIENT
Start: 2018-03-20 | End: 2018-03-24

## 2018-03-20 RX ORDER — IPRATROPIUM/ALBUTEROL SULFATE 18-103MCG
3 AEROSOL WITH ADAPTER (GRAM) INHALATION ONCE
Qty: 0 | Refills: 0 | Status: DISCONTINUED | OUTPATIENT
Start: 2018-03-20 | End: 2018-03-20

## 2018-03-20 RX ORDER — TICAGRELOR 90 MG/1
90 TABLET ORAL
Qty: 0 | Refills: 0 | Status: DISCONTINUED | OUTPATIENT
Start: 2018-03-20 | End: 2018-03-24

## 2018-03-20 RX ORDER — INFLUENZA VIRUS VACCINE 15; 15; 15; 15 UG/.5ML; UG/.5ML; UG/.5ML; UG/.5ML
0.5 SUSPENSION INTRAMUSCULAR ONCE
Qty: 0 | Refills: 0 | Status: DISCONTINUED | OUTPATIENT
Start: 2018-03-20 | End: 2018-03-24

## 2018-03-20 RX ORDER — FUROSEMIDE 40 MG
20 TABLET ORAL ONCE
Qty: 0 | Refills: 0 | Status: COMPLETED | OUTPATIENT
Start: 2018-03-20 | End: 2018-03-20

## 2018-03-20 RX ADMIN — CARVEDILOL PHOSPHATE 12.5 MILLIGRAM(S): 80 CAPSULE, EXTENDED RELEASE ORAL at 18:04

## 2018-03-20 RX ADMIN — ATORVASTATIN CALCIUM 80 MILLIGRAM(S): 80 TABLET, FILM COATED ORAL at 21:08

## 2018-03-20 RX ADMIN — TICAGRELOR 90 MILLIGRAM(S): 90 TABLET ORAL at 18:04

## 2018-03-20 RX ADMIN — Medication 20 MILLIGRAM(S): at 22:56

## 2018-03-20 RX ADMIN — SODIUM CHLORIDE 3 MILLILITER(S): 9 INJECTION INTRAMUSCULAR; INTRAVENOUS; SUBCUTANEOUS at 17:21

## 2018-03-20 RX ADMIN — Medication 650 MILLIGRAM(S): at 21:30

## 2018-03-20 RX ADMIN — Medication 650 MILLIGRAM(S): at 20:59

## 2018-03-20 RX ADMIN — RANOLAZINE 500 MILLIGRAM(S): 500 TABLET, FILM COATED, EXTENDED RELEASE ORAL at 18:53

## 2018-03-20 RX ADMIN — Medication 3 MILLILITER(S): at 22:45

## 2018-03-20 RX ADMIN — SODIUM CHLORIDE 3 MILLILITER(S): 9 INJECTION INTRAMUSCULAR; INTRAVENOUS; SUBCUTANEOUS at 21:09

## 2018-03-20 NOTE — H&P CARDIOLOGY - ATTENDING COMMENTS
s/ cath shows Prox SVG to OM2 99% stenosis s/p ANGI neri stent, switch plavix to brilinta     will admit pt overnight for observation

## 2018-03-20 NOTE — H&P CARDIOLOGY - PMH
CAD (coronary artery disease)    Constipation, unspecified constipation type    HTN (hypertension)    PAD (peripheral artery disease)  stent to L lower extremity artery CAD (coronary artery disease)    Constipation, unspecified constipation type    HTN (hypertension)    Hyperlipidemia    NSTEMI (non-ST elevated myocardial infarction)  2017 and February 2018  PAD (peripheral artery disease)  stent to L lower extremity artery

## 2018-03-20 NOTE — H&P CARDIOLOGY - SOURCE
Patient/- reliable and medical records Patient/- poor historian and medical records and wife - fair historian and patient poor historian via [place]  [id number] and medical records/Patient

## 2018-03-20 NOTE — H&P CARDIOLOGY - PSH
History of coronary angiogram  multiple stents placed  History of femoral angiogram  stent placed in Left leg  S/P CABG (coronary artery bypass graft)  [facility] History of coronary angiogram  multiple stents placed  History of femoral angiogram  stent placed in Left leg  S/P CABG (coronary artery bypass graft)  3V CABG; [facility]

## 2018-03-20 NOTE — H&P CARDIOLOGY - FAMILY HISTORY
Sibling  Still living? Yes, Estimated age: Age Unknown  Family history of coronary artery disease in brother, Age at diagnosis: Age Unknown Sibling  Still living? Yes, Estimated age: Age Unknown  Family history of coronary artery disease in brother, Age at diagnosis: Age Unknown     Mother  Still living? No  Family history of stroke, Age at diagnosis: Age Unknown

## 2018-03-20 NOTE — CHART NOTE - NSCHARTNOTEFT_GEN_A_CORE
BRENT BECKMAN 67y Male s/p cath femoral site check.  Cardiac cath showed , LCx 100, , LIMA to LAD patent, SVG to OM 99 treated with resolute neri stent. Patient endorsing SOB and abdominal pain. States symptoms started about 1 month ago when he was in [facility]. Symptoms unchanged since stent placement.       Vital Signs Last 24 Hrs  T(C): 36.3 (03-20-18 @ 22:22), Max: 36.7 (03-20-18 @ 20:06)  T(F): 97.4 (03-20-18 @ 22:22), Max: 98.1 (03-20-18 @ 20:06)  HR: 74 (03-20-18 @ 22:46) (67 - 82)  BP: 113/66 (03-20-18 @ 22:22) (109/61 - 123/60)  RR: 18 (03-20-18 @ 22:22) (18 - 18)  SpO2: 96% (03-20-18 @ 22:46) (96% - 100%)    Appearance: Normal, NAD, NRD.  Cardiovascular: Normal S1 S2, No JVD, No murmurs, No edema  Respiratory: Lungs coarse to ascultation; no rales/rhonchi/wheezing	  Psychiatry: A & O x 3, Mood & affect appropriate  Gastrointestinal:  endorsing epigastric pain ;Soft ND + BS	  Extremities: Normal range of motion, No clubbing, cyanosis or . Dressing is clear/dry/intact. Site is without hematoma or bleeding. Pulses palpable, cap refill <2 sec.  Vascular: Peripheral pulses palpable    EKG unchanged from post cath EKG.   Ddimer 351  BNP 1150      Will give 1 dose of IV lasix 20mg x 1   Duoneb x 1   O2 via NC  continue to monitor.     ADDENDUM: 1030pm Pt seen and examined post Nebulizer treatment and IV lasix. Now asymptomatic, resting comfortably in bed. Will continue to monitor closely. Serial EKGs.

## 2018-03-20 NOTE — H&P CARDIOLOGY - HISTORY OF PRESENT ILLNESS
67 year old male with HTN, PAD with stent, known CAD with PTCA/stent who presented to his Cardiologist complaining of   In light of patients cardiac risk factors, symptoms and abnormal noninvasive test findings there is high suspicion for CAD. Patient is now referred to [facility] for a cardiac catheterization with possible PTCA/stent. 67 year old Pashto speaking male with HTN, hyperlipidemia, PAD with stent, known CAD with PTCA/stent and 3V CABG 2002 who presented to his Cardiologist complaining of a heart attack in [facility] 1 month ago for which he was hospitalized and treated with medications. At time of MI last month patient admits to feeling chest pain, however, since he denies symptoms. Admits to SOB laying in bed at nights with stable 2 pillows. Admits to increase in fatigue and decrease in exercise tolerance. Denies edema, PND.   In light of patients cardiac risk factors and symptoms there is high suspicion for CAD progression. Patient is now referred to [facility] for a cardiac catheterization with possible PTCA/stent. 67 year old English and Sami speaking male with HTN, hyperlipidemia, PAD with stent, known CAD with PTCA/stent and 3V CABG 2002 who presented to his Cardiologist complaining of a heart attack in [facility] 1 month ago for which he was hospitalized and treated with medications. At time of MI last month patient admits to feeling chest pain, however, since he denies symptoms. Admits to SOB laying in bed at nights with stable 2 pillows. Admits to increase in fatigue and decrease in exercise tolerance. Denies edema, PND.   In light of patients cardiac risk factors and symptoms there is high suspicion for CAD progression. Patient is now referred to [facility] for a cardiac catheterization with possible PTCA/stent.

## 2018-03-21 DIAGNOSIS — I25.10 ATHEROSCLEROTIC HEART DISEASE OF NATIVE CORONARY ARTERY WITHOUT ANGINA PECTORIS: ICD-10-CM

## 2018-03-21 DIAGNOSIS — I21.4 NON-ST ELEVATION (NSTEMI) MYOCARDIAL INFARCTION: ICD-10-CM

## 2018-03-21 DIAGNOSIS — I10 ESSENTIAL (PRIMARY) HYPERTENSION: ICD-10-CM

## 2018-03-21 LAB
BASOPHILS # BLD AUTO: 0.04 K/UL — SIGNIFICANT CHANGE UP (ref 0–0.2)
BASOPHILS NFR BLD AUTO: 0.7 % — SIGNIFICANT CHANGE UP (ref 0–2)
BUN SERPL-MCNC: 22 MG/DL — SIGNIFICANT CHANGE UP (ref 7–23)
CALCIUM SERPL-MCNC: 9 MG/DL — SIGNIFICANT CHANGE UP (ref 8.4–10.5)
CHLORIDE SERPL-SCNC: 102 MMOL/L — SIGNIFICANT CHANGE UP (ref 98–107)
CHOLEST SERPL-MCNC: 160 MG/DL — SIGNIFICANT CHANGE UP (ref 120–199)
CO2 SERPL-SCNC: 21 MMOL/L — LOW (ref 22–31)
CREAT SERPL-MCNC: 1.26 MG/DL — SIGNIFICANT CHANGE UP (ref 0.5–1.3)
EOSINOPHIL # BLD AUTO: 0.08 K/UL — SIGNIFICANT CHANGE UP (ref 0–0.5)
EOSINOPHIL NFR BLD AUTO: 1.4 % — SIGNIFICANT CHANGE UP (ref 0–6)
GLUCOSE SERPL-MCNC: 105 MG/DL — HIGH (ref 70–99)
HCT VFR BLD CALC: 40.9 % — SIGNIFICANT CHANGE UP (ref 39–50)
HDLC SERPL-MCNC: 25 MG/DL — LOW (ref 35–55)
HGB BLD-MCNC: 13.8 G/DL — SIGNIFICANT CHANGE UP (ref 13–17)
IMM GRANULOCYTES # BLD AUTO: 0.03 # — SIGNIFICANT CHANGE UP
IMM GRANULOCYTES NFR BLD AUTO: 0.5 % — SIGNIFICANT CHANGE UP (ref 0–1.5)
LIPID PNL WITH DIRECT LDL SERPL: 125 MG/DL — SIGNIFICANT CHANGE UP
LYMPHOCYTES # BLD AUTO: 0.98 K/UL — LOW (ref 1–3.3)
LYMPHOCYTES # BLD AUTO: 17 % — SIGNIFICANT CHANGE UP (ref 13–44)
MAGNESIUM SERPL-MCNC: 2.2 MG/DL — SIGNIFICANT CHANGE UP (ref 1.6–2.6)
MCHC RBC-ENTMCNC: 29.1 PG — SIGNIFICANT CHANGE UP (ref 27–34)
MCHC RBC-ENTMCNC: 33.7 % — SIGNIFICANT CHANGE UP (ref 32–36)
MCV RBC AUTO: 86.3 FL — SIGNIFICANT CHANGE UP (ref 80–100)
MONOCYTES # BLD AUTO: 0.36 K/UL — SIGNIFICANT CHANGE UP (ref 0–0.9)
MONOCYTES NFR BLD AUTO: 6.2 % — SIGNIFICANT CHANGE UP (ref 2–14)
NEUTROPHILS # BLD AUTO: 4.28 K/UL — SIGNIFICANT CHANGE UP (ref 1.8–7.4)
NEUTROPHILS NFR BLD AUTO: 74.2 % — SIGNIFICANT CHANGE UP (ref 43–77)
NRBC # FLD: 0 — SIGNIFICANT CHANGE UP
NT-PROBNP SERPL-SCNC: 1376 PG/ML — SIGNIFICANT CHANGE UP
PHOSPHATE SERPL-MCNC: 4.2 MG/DL — SIGNIFICANT CHANGE UP (ref 2.5–4.5)
PLATELET # BLD AUTO: 118 K/UL — LOW (ref 150–400)
PMV BLD: 12.3 FL — SIGNIFICANT CHANGE UP (ref 7–13)
POTASSIUM SERPL-MCNC: 4.2 MMOL/L — SIGNIFICANT CHANGE UP (ref 3.5–5.3)
POTASSIUM SERPL-SCNC: 4.2 MMOL/L — SIGNIFICANT CHANGE UP (ref 3.5–5.3)
RBC # BLD: 4.74 M/UL — SIGNIFICANT CHANGE UP (ref 4.2–5.8)
RBC # FLD: 12.2 % — SIGNIFICANT CHANGE UP (ref 10.3–14.5)
SODIUM SERPL-SCNC: 138 MMOL/L — SIGNIFICANT CHANGE UP (ref 135–145)
TRIGL SERPL-MCNC: 90 MG/DL — SIGNIFICANT CHANGE UP (ref 10–149)
WBC # BLD: 5.77 K/UL — SIGNIFICANT CHANGE UP (ref 3.8–10.5)
WBC # FLD AUTO: 5.77 K/UL — SIGNIFICANT CHANGE UP (ref 3.8–10.5)

## 2018-03-21 PROCEDURE — 74177 CT ABD & PELVIS W/CONTRAST: CPT | Mod: 26

## 2018-03-21 RX ORDER — SENNA PLUS 8.6 MG/1
2 TABLET ORAL AT BEDTIME
Qty: 0 | Refills: 0 | Status: DISCONTINUED | OUTPATIENT
Start: 2018-03-21 | End: 2018-03-24

## 2018-03-21 RX ORDER — DOCUSATE SODIUM 100 MG
100 CAPSULE ORAL
Qty: 0 | Refills: 0 | Status: DISCONTINUED | OUTPATIENT
Start: 2018-03-21 | End: 2018-03-24

## 2018-03-21 RX ORDER — PANTOPRAZOLE SODIUM 20 MG/1
40 TABLET, DELAYED RELEASE ORAL EVERY 12 HOURS
Qty: 0 | Refills: 0 | Status: DISCONTINUED | OUTPATIENT
Start: 2018-03-21 | End: 2018-03-24

## 2018-03-21 RX ORDER — FUROSEMIDE 40 MG
20 TABLET ORAL EVERY 12 HOURS
Qty: 0 | Refills: 0 | Status: DISCONTINUED | OUTPATIENT
Start: 2018-03-22 | End: 2018-03-24

## 2018-03-21 RX ORDER — FUROSEMIDE 40 MG
40 TABLET ORAL ONCE
Qty: 0 | Refills: 0 | Status: COMPLETED | OUTPATIENT
Start: 2018-03-21 | End: 2018-03-21

## 2018-03-21 RX ADMIN — Medication 20 MILLIGRAM(S): at 06:10

## 2018-03-21 RX ADMIN — Medication 100 MILLIGRAM(S): at 17:31

## 2018-03-21 RX ADMIN — CARVEDILOL PHOSPHATE 12.5 MILLIGRAM(S): 80 CAPSULE, EXTENDED RELEASE ORAL at 06:10

## 2018-03-21 RX ADMIN — PANTOPRAZOLE SODIUM 40 MILLIGRAM(S): 20 TABLET, DELAYED RELEASE ORAL at 17:31

## 2018-03-21 RX ADMIN — RANOLAZINE 500 MILLIGRAM(S): 500 TABLET, FILM COATED, EXTENDED RELEASE ORAL at 17:31

## 2018-03-21 RX ADMIN — LORATADINE 10 MILLIGRAM(S): 10 TABLET ORAL at 12:09

## 2018-03-21 RX ADMIN — ATORVASTATIN CALCIUM 80 MILLIGRAM(S): 80 TABLET, FILM COATED ORAL at 21:53

## 2018-03-21 RX ADMIN — Medication 40 MILLIGRAM(S): at 12:09

## 2018-03-21 RX ADMIN — RANOLAZINE 500 MILLIGRAM(S): 500 TABLET, FILM COATED, EXTENDED RELEASE ORAL at 06:10

## 2018-03-21 RX ADMIN — TICAGRELOR 90 MILLIGRAM(S): 90 TABLET ORAL at 17:31

## 2018-03-21 RX ADMIN — SODIUM CHLORIDE 3 MILLILITER(S): 9 INJECTION INTRAMUSCULAR; INTRAVENOUS; SUBCUTANEOUS at 06:11

## 2018-03-21 RX ADMIN — CARVEDILOL PHOSPHATE 12.5 MILLIGRAM(S): 80 CAPSULE, EXTENDED RELEASE ORAL at 17:31

## 2018-03-21 RX ADMIN — Medication 81 MILLIGRAM(S): at 12:09

## 2018-03-21 RX ADMIN — SODIUM CHLORIDE 3 MILLILITER(S): 9 INJECTION INTRAMUSCULAR; INTRAVENOUS; SUBCUTANEOUS at 15:06

## 2018-03-21 RX ADMIN — TICAGRELOR 90 MILLIGRAM(S): 90 TABLET ORAL at 06:10

## 2018-03-21 RX ADMIN — SODIUM CHLORIDE 3 MILLILITER(S): 9 INJECTION INTRAMUSCULAR; INTRAVENOUS; SUBCUTANEOUS at 21:53

## 2018-03-21 NOTE — CONSULT NOTE ADULT - SUBJECTIVE AND OBJECTIVE BOX
Patient is a 67y old  Male who presents with a chief complaint of abdominal pain    HPI:  67 year old English and Azeri speaking male with HTN, hyperlipidemia, PAD with stent, known CAD with PTCA/stent and 3V CABG  who presented to his Cardiologist complaining of a heart attack in [facility] 1 month ago for which he was hospitalized and treated with medications. At time of MI last month patient admits to feeling chest pain, however, since he denies symptoms. Admits to SOB laying in bed at nights with stable 2 pillows. Admits to increase in fatigue and decrease in exercise tolerance. Denies edema, PND.   In light of patients cardiac risk factors and symptoms there is high suspicion for CAD progression. Patient is now referred to [facility] for a cardiac catheterization with possible PTCA/stent. (20 Mar 2018 09:18)      PAST MEDICAL & SURGICAL HISTORY:  NSTEMI (non-ST elevated myocardial infarction):  and 2018  Hyperlipidemia  Constipation, unspecified constipation type  PAD (peripheral artery disease): stent to L lower extremity artery  HTN (hypertension)  CAD (coronary artery disease)  History of femoral angiogram: stent placed in Left leg  History of coronary angiogram: multiple stents placed  S/P CABG (coronary artery bypass graft): 3V CABG; [facility]      MEDICATIONS  (STANDING):  aspirin enteric coated 81 milliGRAM(s) Oral daily  atorvastatin 80 milliGRAM(s) Oral at bedtime  carvedilol 12.5 milliGRAM(s) Oral every 12 hours  docusate sodium 100 milliGRAM(s) Oral two times a day  enalapril 20 milliGRAM(s) Oral daily  furosemide    Tablet 20 milliGRAM(s) Oral daily  influenza   Vaccine 0.5 milliLiter(s) IntraMuscular once  loratadine 10 milliGRAM(s) Oral daily  ranolazine 500 milliGRAM(s) Oral two times a day  senna 2 Tablet(s) Oral at bedtime  sodium chloride 0.9% lock flush 3 milliLiter(s) IV Push every 8 hours  ticagrelor 90 milliGRAM(s) Oral two times a day      Allergies    Broccoli (Anaphylaxis)  No Known Drug Allergies  shellfish (Anaphylaxis)    Intolerances        SOCIAL HISTORY:  Denies ETOh,Smoking,     FAMILY HISTORY:  Family history of stroke: 60yo CVA, heart attack 61yo  Family history of coronary artery disease in brother (Sibling): 4brothers with MI/CABG, 1  at 77yo      REVIEW OF SYSTEMS:    CONSTITUTIONAL: No weakness, fevers or chills  EYES/ENT: No visual changes;  No vertigo or throat pain   NECK: No pain or stiffness  RESPIRATORY: No cough, wheezing, hemoptysis; No shortness of breath  CARDIOVASCULAR: No chest pain or palpitations  GASTROINTESTINAL: No abdominal or epigastric pain. No nausea, vomiting, or hematemesis; No diarrhea or constipation. No melena or hematochezia.  GENITOURINARY: No dysuria, frequency or hematuria  NEUROLOGICAL: No numbness or weakness  SKIN: No itching, burning, rashes, or lesions   All other review of systems is negative unless indicated above.    VITAL:  T(C): , Max: 36.7 (18 @ 20:06)  T(F): , Max: 98.1 (18 @ 20:06)  HR: 70 (18 @ 12:08)  BP: 101/60 (18 @ 12:08)  BP(mean): --  RR: 18 (18 @ 12:08)  SpO2: 100% (18 @ 12:08)  Wt(kg): --    I and O's:     @ 07:01  -   @ 14:27  --------------------------------------------------------  IN: 100 mL / OUT: 500 mL / NET: -400 mL          PHYSICAL EXAM:    Constitutional: NAD  HEENT: PERRLA,   Neck: No JVD  Respiratory: CTA B/L  Cardiovascular: S1 and S2  Gastrointestinal: BS+, soft, NT/ND  Extremities: No peripheral edema  Neurological: A/O x 3, no focal deficits  Psychiatric: Normal mood, normal affect  : No Patricia  Skin: No rashes  Access: Not applicable  Back: No CVA tenderness    LABS:                        13.8   5.77  )-----------( 118      ( 21 Mar 2018 07:10 )             40.9     -    138  |  102  |  22  ----------------------------<  105<H>  4.2   |  21<L>  |  1.26    Ca    9.0      21 Mar 2018 07:10  Phos  4.2     03-21  Mg     2.2     03-21            RADIOLOGY & ADDITIONAL STUDIES:

## 2018-03-21 NOTE — CONSULT NOTE ADULT - ASSESSMENT
67 year old English and French speaking male with HTN, hyperlipidemia, PAD with stent, known CAD with PTCA/stent and 3V CABG 2002 who presented to his Cardiologist complaining of a heart attack in [facility] 1 month ago for which he was hospitalized and treated with medications. At time of MI last month patient admits to feeling chest pain, however, since he denies symptoms. Admits to SOB laying in bed at nights with stable 2 pillows. Admits to increase in fatigue and decrease in exercise tolerance. Denies edema, PND.   In light of patients cardiac risk factors and symptoms there is high suspicion for CAD progression. Patient is now referred to [facility] for a cardiac catheterization with possible PTCA/stent. (20 Mar 2018 09:18)  now s/p stent:

## 2018-03-21 NOTE — CONSULT NOTE ADULT - SUBJECTIVE AND OBJECTIVE BOX
I have seen and examined the patient and reviewed the history Pt has no underlying pulmonary disease but admits to LANZA : He had MI in [facility] and came here for further treatment.   Patient is a 67y old  Male who presents with a chief complaint of     HPI:  67 year old English and Malay speaking male with HTN, hyperlipidemia, PAD with stent, known CAD with PTCA/stent and 3V CABG  who presented to his Cardiologist complaining of a heart attack in [facility] 1 month ago for which he was hospitalized and treated with medications. At time of MI last month patient admits to feeling chest pain, however, since he denies symptoms. Admits to SOB laying in bed at nights with stable 2 pillows. Admits to increase in fatigue and decrease in exercise tolerance. Denies edema, PND.   In light of patients cardiac risk factors and symptoms there is high suspicion for CAD progression. Patient is now referred to [facility] for a cardiac catheterization with possible PTCA/stent. (20 Mar 2018 09:18)      ?FOLLOWING PRESENT  [ x] Hx of PE/DVT, [x ] Hx COPD, [x ] Hx of Asthma, [ x] Hx of Hospitalization, [ x]  Hx of BiPAP/CPAP use, x[ ] Hx of SANGEETHA    Allergies    Broccoli (Anaphylaxis)  No Known Drug Allergies  shellfish (Anaphylaxis)    Intolerances        PAST MEDICAL & SURGICAL HISTORY:  NSTEMI (non-ST elevated myocardial infarction):  and 2018  Hyperlipidemia  Constipation, unspecified constipation type  PAD (peripheral artery disease): stent to L lower extremity artery  HTN (hypertension)  CAD (coronary artery disease)  History of femoral angiogram: stent placed in Left leg  History of coronary angiogram: multiple stents placed  S/P CABG (coronary artery bypass graft): 3V CABG; [facility]      FAMILY HISTORY:  Family history of stroke: 58yo CVA, heart attack 61yo  Family history of coronary artery disease in brother (Sibling): 4brothers with MI/CABG, 1  at 79yo      Social History: [ ex smoker: 15 yrs  ] TOBACCO                  [x  ] ETOH                                 [ x ] IVDA/DRUGS    REVIEW OF SYSTEMS      General:x	    Skin/Breast:x  	  Ophthalmologic:x  	  ENMT:	x    Respiratory and Thorax: LANZA , chest pain   	  Cardiovascular:	x    Gastrointestinal:	x    Genitourinary:	x    Musculoskeletal:	x    Neurological:	x    Psychiatric:	x  xx  Hematology/Lymphatics:	x    Endocrine:x	    Allergic/Immunologic:	x    MEDICATIONS  (STANDING):  aspirin enteric coated 81 milliGRAM(s) Oral daily  atorvastatin 80 milliGRAM(s) Oral at bedtime  carvedilol 12.5 milliGRAM(s) Oral every 12 hours  docusate sodium 100 milliGRAM(s) Oral two times a day  enalapril 20 milliGRAM(s) Oral daily  furosemide    Tablet 20 milliGRAM(s) Oral daily  influenza   Vaccine 0.5 milliLiter(s) IntraMuscular once  loratadine 10 milliGRAM(s) Oral daily  ranolazine 500 milliGRAM(s) Oral two times a day  senna 2 Tablet(s) Oral at bedtime  sodium chloride 0.9% lock flush 3 milliLiter(s) IV Push every 8 hours  ticagrelor 90 milliGRAM(s) Oral two times a day    MEDICATIONS  (PRN):       Vital Signs Last 24 Hrs  T(C): 36.3 (21 Mar 2018 12:08), Max: 36.7 (20 Mar 2018 20:06)  T(F): 97.4 (21 Mar 2018 12:08), Max: 98.1 (20 Mar 2018 20:06)  HR: 70 (21 Mar 2018 12:08) (61 - 82)  BP: 101/60 (21 Mar 2018 12:08) (101/60 - 123/60)  BP(mean): --  RR: 18 (21 Mar 2018 12:08) (18 - 18)  SpO2: 100% (21 Mar 2018 12:08) (96% - 100%)        I&O's Summary    21 Mar 2018 07:01  -  21 Mar 2018 13:32  --------------------------------------------------------  IN: 100 mL / OUT: 500 mL / NET: -400 mL        Physical Exam:   GENERAL: NAD, well-groomed, well-developed  HEENT: NEWTON/   Atraumatic, Normocephalic  ENMT: No tonsillar erythema, exudates, or enlargement; Moist mucous membranes, Good dentition, No lesions  NECK: Supple, No JVD, Normal thyroid  CHEST/LUNG: Clear to auscultation bilaterally; No rales, rhonchi, wheezing, or rubs  CVS: Regular rate and rhythm; No murmurs, rubs, or gallops  GI: : Soft, Nontender, Nondistended; Bowel sounds present  NERVOUS SYSTEM:  Alert & Oriented X3  EXTREMITIES:  2+ Peripheral Pulses, No clubbing, cyanosis, or edema  LYMPH: No lymphadenopathy noted  SKIN: No rashes or lesions  ENDOCRINOLOGY: No Thyromegaly  PSYCH: Appropriate    Labs:                              13.8   5.77  )-----------( 118      ( 21 Mar 2018 07:10 )             40.9                         13.7   7.10  )-----------( 137      ( 20 Mar 2018 10:00 )             40.2     -    138  |  102  |  22  ----------------------------<  105<H>  4.2   |  21<L>  |  1.26      139  |  103  |  23  ----------------------------<  100<H>  5.0   |  23  |  1.34<H>    Ca    9.0      21 Mar 2018 07:10  Ca    9.2      20 Mar 2018 10:00  Phos  4.2       Mg     2.2           CAPILLARY BLOOD GLUCOSE              D DImer  D-Dimer Assay, Quantitative: 351 ng/mL ( @ 21:21)  Serum Pro-Brain Natriuretic Peptide: 1376 pg/mL ( @ 07:10)  Serum Pro-Brain Natriuretic Peptide: 1173 pg/mL ( @ 21:21)    Cultures:                   < from: Xray Chest 1 View- PORTABLE-Urgent (18 @ 21:38) >    PROCEDURE DATE:  Mar 20 2018         INTERPRETATION:  CLINICAL INFORMATION: Shortness of breath.    COMPARISON:  Chest x-ray dated 2017.    TECHNIQUE:   AP portable chest xray.    FINDINGS:   Status post venous sternotomy.  Clips there are seen within the mediastinum.    There is no focal consolidation.  There are no pleural effusions.  No evidence of pneumothorax.    Degenerative changes of the spine are noted.  The cardiac silhouette not well evaluated but appears enlarged.    IMPRESSION:      The lungs are clear.                HEMALATHA SELLERS M.D., RADIOLOGY RESIDENT  This document has been electronically signed.  EMILEE SANDOVAL M.D., ATTENDING RADIOLOGIST  This document has been electronically signed. Mar 21 2018 10:40AM        < end of copied text >          Studies  Chest X-RAY  CT SCAN Chest   CT Abdomen  Venous Dopplers: LE:   Others          < from: CT Head No Cont (16 @ 09:14) >  INTERPRETATION:  History: Left leg pain and numbness.    Technique:  Multiple axial sections were acquired from the base of the skull to the   vertex without contrast enhancement. Coronal and sagittal reconstructed   images were performed as well.    Findings:  The lateral ventricles have a normal configuration.    There is no evidence of acute hemorrhage, mass or mass-effect in the   posterior fossa or in the supratentorial region.    Evaluation of the osseous structures with the appropriate window appears   unremarkable.    IMPRESSION: Unremarkable noncontrast head CT.                   CARMINA LANDA M.D., ATTENDING RADIOLOGIST  This document has been electronically signed. Aug 12 2016  9:37A              < end of copied text >

## 2018-03-21 NOTE — CONSULT NOTE ADULT - ASSESSMENT
pt with complaints of abdominal pain.  reports post cath.  points to periumbicla area.  english is appropriate.  exam benign.  I do not think symtpoms are related to cath.  recommend ct abdomen pelvis.  will start protonix bid.

## 2018-03-21 NOTE — CONSULT NOTE ADULT - PROBLEM SELECTOR RECOMMENDATION 9
s/p stent: doing ok: His LANZA seems to be related to his CAD: He is s/p stent placement: He is an ex smoker too: he would need to do full PFT as an outpatient: He has not been wheezing at this time

## 2018-03-22 LAB
BUN SERPL-MCNC: 23 MG/DL — SIGNIFICANT CHANGE UP (ref 7–23)
CALCIUM SERPL-MCNC: 8.8 MG/DL — SIGNIFICANT CHANGE UP (ref 8.4–10.5)
CHLORIDE SERPL-SCNC: 98 MMOL/L — SIGNIFICANT CHANGE UP (ref 98–107)
CO2 SERPL-SCNC: 22 MMOL/L — SIGNIFICANT CHANGE UP (ref 22–31)
CREAT SERPL-MCNC: 1.25 MG/DL — SIGNIFICANT CHANGE UP (ref 0.5–1.3)
GLUCOSE SERPL-MCNC: 135 MG/DL — HIGH (ref 70–99)
HCT VFR BLD CALC: 38.5 % — LOW (ref 39–50)
HGB BLD-MCNC: 13.2 G/DL — SIGNIFICANT CHANGE UP (ref 13–17)
MAGNESIUM SERPL-MCNC: 2.2 MG/DL — SIGNIFICANT CHANGE UP (ref 1.6–2.6)
MCHC RBC-ENTMCNC: 29.7 PG — SIGNIFICANT CHANGE UP (ref 27–34)
MCHC RBC-ENTMCNC: 34.3 % — SIGNIFICANT CHANGE UP (ref 32–36)
MCV RBC AUTO: 86.5 FL — SIGNIFICANT CHANGE UP (ref 80–100)
NRBC # FLD: 0 — SIGNIFICANT CHANGE UP
PHOSPHATE SERPL-MCNC: 4 MG/DL — SIGNIFICANT CHANGE UP (ref 2.5–4.5)
PLATELET # BLD AUTO: 118 K/UL — LOW (ref 150–400)
PMV BLD: 11.9 FL — SIGNIFICANT CHANGE UP (ref 7–13)
POTASSIUM SERPL-MCNC: 3.9 MMOL/L — SIGNIFICANT CHANGE UP (ref 3.5–5.3)
POTASSIUM SERPL-SCNC: 3.9 MMOL/L — SIGNIFICANT CHANGE UP (ref 3.5–5.3)
RBC # BLD: 4.45 M/UL — SIGNIFICANT CHANGE UP (ref 4.2–5.8)
RBC # FLD: 12.3 % — SIGNIFICANT CHANGE UP (ref 10.3–14.5)
SODIUM SERPL-SCNC: 135 MMOL/L — SIGNIFICANT CHANGE UP (ref 135–145)
WBC # BLD: 6.43 K/UL — SIGNIFICANT CHANGE UP (ref 3.8–10.5)
WBC # FLD AUTO: 6.43 K/UL — SIGNIFICANT CHANGE UP (ref 3.8–10.5)

## 2018-03-22 PROCEDURE — 93306 TTE W/DOPPLER COMPLETE: CPT | Mod: 26

## 2018-03-22 RX ADMIN — Medication 20 MILLIGRAM(S): at 05:13

## 2018-03-22 RX ADMIN — Medication 100 MILLIGRAM(S): at 05:12

## 2018-03-22 RX ADMIN — Medication 100 MILLIGRAM(S): at 18:40

## 2018-03-22 RX ADMIN — Medication 20 MILLIGRAM(S): at 18:41

## 2018-03-22 RX ADMIN — SODIUM CHLORIDE 3 MILLILITER(S): 9 INJECTION INTRAMUSCULAR; INTRAVENOUS; SUBCUTANEOUS at 05:15

## 2018-03-22 RX ADMIN — Medication 20 MILLIGRAM(S): at 05:12

## 2018-03-22 RX ADMIN — CARVEDILOL PHOSPHATE 12.5 MILLIGRAM(S): 80 CAPSULE, EXTENDED RELEASE ORAL at 18:40

## 2018-03-22 RX ADMIN — RANOLAZINE 500 MILLIGRAM(S): 500 TABLET, FILM COATED, EXTENDED RELEASE ORAL at 05:13

## 2018-03-22 RX ADMIN — LORATADINE 10 MILLIGRAM(S): 10 TABLET ORAL at 12:03

## 2018-03-22 RX ADMIN — PANTOPRAZOLE SODIUM 40 MILLIGRAM(S): 20 TABLET, DELAYED RELEASE ORAL at 18:40

## 2018-03-22 RX ADMIN — TICAGRELOR 90 MILLIGRAM(S): 90 TABLET ORAL at 18:51

## 2018-03-22 RX ADMIN — ATORVASTATIN CALCIUM 80 MILLIGRAM(S): 80 TABLET, FILM COATED ORAL at 21:44

## 2018-03-22 RX ADMIN — RANOLAZINE 500 MILLIGRAM(S): 500 TABLET, FILM COATED, EXTENDED RELEASE ORAL at 18:40

## 2018-03-22 RX ADMIN — Medication 81 MILLIGRAM(S): at 12:03

## 2018-03-22 RX ADMIN — SENNA PLUS 2 TABLET(S): 8.6 TABLET ORAL at 21:44

## 2018-03-22 RX ADMIN — TICAGRELOR 90 MILLIGRAM(S): 90 TABLET ORAL at 05:13

## 2018-03-22 RX ADMIN — SODIUM CHLORIDE 3 MILLILITER(S): 9 INJECTION INTRAMUSCULAR; INTRAVENOUS; SUBCUTANEOUS at 21:46

## 2018-03-22 RX ADMIN — SODIUM CHLORIDE 3 MILLILITER(S): 9 INJECTION INTRAMUSCULAR; INTRAVENOUS; SUBCUTANEOUS at 18:38

## 2018-03-22 RX ADMIN — CARVEDILOL PHOSPHATE 12.5 MILLIGRAM(S): 80 CAPSULE, EXTENDED RELEASE ORAL at 05:13

## 2018-03-22 RX ADMIN — PANTOPRAZOLE SODIUM 40 MILLIGRAM(S): 20 TABLET, DELAYED RELEASE ORAL at 05:12

## 2018-03-23 ENCOUNTER — TRANSCRIPTION ENCOUNTER (OUTPATIENT)
Age: 68
End: 2018-03-23

## 2018-03-23 LAB
BUN SERPL-MCNC: 21 MG/DL — SIGNIFICANT CHANGE UP (ref 7–23)
CALCIUM SERPL-MCNC: 9.2 MG/DL — SIGNIFICANT CHANGE UP (ref 8.4–10.5)
CHLORIDE SERPL-SCNC: 104 MMOL/L — SIGNIFICANT CHANGE UP (ref 98–107)
CO2 SERPL-SCNC: 21 MMOL/L — LOW (ref 22–31)
CREAT SERPL-MCNC: 1.25 MG/DL — SIGNIFICANT CHANGE UP (ref 0.5–1.3)
GLUCOSE SERPL-MCNC: 112 MG/DL — HIGH (ref 70–99)
HCT VFR BLD CALC: 40.4 % — SIGNIFICANT CHANGE UP (ref 39–50)
HGB BLD-MCNC: 13.8 G/DL — SIGNIFICANT CHANGE UP (ref 13–17)
MAGNESIUM SERPL-MCNC: 2.3 MG/DL — SIGNIFICANT CHANGE UP (ref 1.6–2.6)
MCHC RBC-ENTMCNC: 29.4 PG — SIGNIFICANT CHANGE UP (ref 27–34)
MCHC RBC-ENTMCNC: 34.2 % — SIGNIFICANT CHANGE UP (ref 32–36)
MCV RBC AUTO: 86.1 FL — SIGNIFICANT CHANGE UP (ref 80–100)
NRBC # FLD: 0 — SIGNIFICANT CHANGE UP
PLATELET # BLD AUTO: 130 K/UL — LOW (ref 150–400)
PMV BLD: 12.5 FL — SIGNIFICANT CHANGE UP (ref 7–13)
POTASSIUM SERPL-MCNC: 4.3 MMOL/L — SIGNIFICANT CHANGE UP (ref 3.5–5.3)
POTASSIUM SERPL-SCNC: 4.3 MMOL/L — SIGNIFICANT CHANGE UP (ref 3.5–5.3)
RBC # BLD: 4.69 M/UL — SIGNIFICANT CHANGE UP (ref 4.2–5.8)
RBC # FLD: 12.3 % — SIGNIFICANT CHANGE UP (ref 10.3–14.5)
SODIUM SERPL-SCNC: 138 MMOL/L — SIGNIFICANT CHANGE UP (ref 135–145)
WBC # BLD: 5.79 K/UL — SIGNIFICANT CHANGE UP (ref 3.8–10.5)
WBC # FLD AUTO: 5.79 K/UL — SIGNIFICANT CHANGE UP (ref 3.8–10.5)

## 2018-03-23 RX ADMIN — Medication 20 MILLIGRAM(S): at 06:15

## 2018-03-23 RX ADMIN — Medication 20 MILLIGRAM(S): at 18:27

## 2018-03-23 RX ADMIN — RANOLAZINE 500 MILLIGRAM(S): 500 TABLET, FILM COATED, EXTENDED RELEASE ORAL at 06:11

## 2018-03-23 RX ADMIN — ATORVASTATIN CALCIUM 80 MILLIGRAM(S): 80 TABLET, FILM COATED ORAL at 21:48

## 2018-03-23 RX ADMIN — CARVEDILOL PHOSPHATE 12.5 MILLIGRAM(S): 80 CAPSULE, EXTENDED RELEASE ORAL at 18:26

## 2018-03-23 RX ADMIN — PANTOPRAZOLE SODIUM 40 MILLIGRAM(S): 20 TABLET, DELAYED RELEASE ORAL at 18:28

## 2018-03-23 RX ADMIN — Medication 20 MILLIGRAM(S): at 06:10

## 2018-03-23 RX ADMIN — Medication 100 MILLIGRAM(S): at 18:31

## 2018-03-23 RX ADMIN — TICAGRELOR 90 MILLIGRAM(S): 90 TABLET ORAL at 18:26

## 2018-03-23 RX ADMIN — SENNA PLUS 2 TABLET(S): 8.6 TABLET ORAL at 21:48

## 2018-03-23 RX ADMIN — Medication 100 MILLIGRAM(S): at 06:10

## 2018-03-23 RX ADMIN — SODIUM CHLORIDE 3 MILLILITER(S): 9 INJECTION INTRAMUSCULAR; INTRAVENOUS; SUBCUTANEOUS at 16:12

## 2018-03-23 RX ADMIN — Medication 81 MILLIGRAM(S): at 11:42

## 2018-03-23 RX ADMIN — RANOLAZINE 500 MILLIGRAM(S): 500 TABLET, FILM COATED, EXTENDED RELEASE ORAL at 18:27

## 2018-03-23 RX ADMIN — CARVEDILOL PHOSPHATE 12.5 MILLIGRAM(S): 80 CAPSULE, EXTENDED RELEASE ORAL at 06:10

## 2018-03-23 RX ADMIN — PANTOPRAZOLE SODIUM 40 MILLIGRAM(S): 20 TABLET, DELAYED RELEASE ORAL at 06:10

## 2018-03-23 RX ADMIN — SODIUM CHLORIDE 3 MILLILITER(S): 9 INJECTION INTRAMUSCULAR; INTRAVENOUS; SUBCUTANEOUS at 21:45

## 2018-03-23 RX ADMIN — TICAGRELOR 90 MILLIGRAM(S): 90 TABLET ORAL at 06:14

## 2018-03-23 RX ADMIN — SODIUM CHLORIDE 3 MILLILITER(S): 9 INJECTION INTRAMUSCULAR; INTRAVENOUS; SUBCUTANEOUS at 06:17

## 2018-03-23 RX ADMIN — LORATADINE 10 MILLIGRAM(S): 10 TABLET ORAL at 11:42

## 2018-03-23 NOTE — DISCHARGE NOTE ADULT - PLAN OF CARE
To be asymptomatic, to reduce risks factors such as hypertension, diabetes and hyperlipidemia to lower the risk of blood clots formation; and to prevent complications of coronary artery disease such as worsening chest pain, heart attack and death. Continue aspirin and Brilinta, do not stop unless instructed by your physician.  Continue low salt, fat, cholesterol and carbohydrate diet. Follow up with cardiologist and primary care physician's routine appointment. Low sodium and fat diet, continue anti-hypertensive medications, and follow up with primary care physician.

## 2018-03-23 NOTE — DISCHARGE NOTE ADULT - PATIENT PORTAL LINK FT
You can access the [facility] Patient Portal, offered by [facility], by registering with the following website: [email or web address]

## 2018-03-23 NOTE — DISCHARGE NOTE ADULT - CARE PLAN
Principal Discharge DX:	CAD (coronary artery disease)  Goal:	To be asymptomatic, to reduce risks factors such as hypertension, diabetes and hyperlipidemia to lower the risk of blood clots formation; and to prevent complications of coronary artery disease such as worsening chest pain, heart attack and death.  Assessment and plan of treatment:	Continue aspirin and Brilinta, do not stop unless instructed by your physician.  Continue low salt, fat, cholesterol and carbohydrate diet. Follow up with cardiologist and primary care physician's routine appointment.  Secondary Diagnosis:	HTN (hypertension)  Assessment and plan of treatment:	Low sodium and fat diet, continue anti-hypertensive medications, and follow up with primary care physician.

## 2018-03-23 NOTE — DISCHARGE NOTE ADULT - HOSPITAL COURSE
67 year old English and Amharic speaking male with HTN, hyperlipidemia, PAD with stent, known CAD with PTCA/stent and 3V CABG 2002 who presented to his Cardiologist complaining of a heart attack in [facility] 1 month ago for which he was hospitalized and treated with medications. At time of MI last month patient admits to feeling chest pain, however, since he denies symptoms. Admits to SOB laying in bed at nights with stable 2 pillows. Admits to increase in fatigue and decrease in exercise tolerance. Denies edema, PND. In light of patients cardiac risk factors and symptoms there is high suspicion for CAD progression. Patient is now referred to [facility] for a cardiac catheterization with possible PTCA/stent.     On admission Pt underwent cardiac cath on 3/21 which revealed , LCx 100, , LIMA to LAD patent, SVG to OM 99 treated with resolute neri stent; RFA access.     Pulmonary c/s Mehrishi: LANZA seems to be related to his CAD: He is s/p stent placement: He is an ex smoker too: he would need to do full PFT as an outpatient: He has not been wheezing at this time.    GI c/s was called for c/o abdominal pain. Reports post cath. Points to periumbicla area. Exam benign. I do not think symptoms are related to cath. Recommend CT abdomen pelvis. Will start Protonix bid. CT abd/pelvis: Gallstones. Right inguinal hernia containing fat. GI follow up: gallstones, fat containing hernia. Continue rx.    TTE: EF 41%: Mitral annular calcification, otherwise normal mitral valve. Mild mitral regurgitation. Calcified trileaflet aortic valve with normal opening. Mild aortic regurgitation. Normal left ventricular internal dimensions and wall thicknesses. Moderate segmental left ventricular systolic dysfunction. Hypokinesis of the basal to mid inferior wall and basal to mid-inferolateral wall. Normal right ventricular size and function. *** Compared with echocardiogram of 3/5/2017, no  significant changes noted. 67 year old English and Kazakh speaking male with HTN, hyperlipidemia, PAD with stent, known CAD with PTCA/stent and 3V CABG 2002 who presented to his Cardiologist complaining of a heart attack in [facility] 1 month ago for which he was hospitalized and treated with medications. At time of MI last month patient admits to feeling chest pain, however, since he denies symptoms. Admits to SOB laying in bed at nights with stable 2 pillows. Admits to increase in fatigue and decrease in exercise tolerance. Denies edema, PND. In light of patients cardiac risk factors and symptoms there is high suspicion for CAD progression. Patient is now referred to [facility] for a cardiac catheterization with possible PTCA/stent.     On admission Pt underwent cardiac cath on 3/21 which revealed , LCx 100, , LIMA to LAD patent, SVG to OM 99 treated with resolute neri stent; RFA access.     Pulmonary c/s Mehrishi: LANZA seems to be related to his CAD: He is s/p stent placement: He is an ex smoker too: he would need to do full PFT as an outpatient: He has not been wheezing at this time.    GI c/s was called for c/o abdominal pain. Reports post cath. Points to periumbicla area. Exam benign. I do not think symptoms are related to cath. Recommend CT abdomen pelvis. Will start Protonix bid. CT abd/pelvis: Gallstones. Right inguinal hernia containing fat. GI follow up: gallstones, fat containing hernia. Continue rx.    TTE: EF 41%: Mitral annular calcification, otherwise normal mitral valve. Mild mitral regurgitation. Calcified trileaflet aortic valve with normal opening. Mild aortic regurgitation. Normal left ventricular internal dimensions and wall thicknesses. Moderate segmental left ventricular systolic dysfunction. Hypokinesis of the basal to mid inferior wall and basal to mid-inferolateral wall. Normal right ventricular size and function. *** Compared with echocardiogram of 3/5/2017, no  significant changes noted.    Pt. received stent to SVG to OM

## 2018-03-23 NOTE — DISCHARGE NOTE ADULT - MEDICATION SUMMARY - MEDICATIONS TO TAKE
I will START or STAY ON the medications listed below when I get home from the hospital:    aspirin 81 mg oral tablet  -- 1 tab(s) by mouth once a day  -- Indication: For CAD (coronary artery disease)    enalapril 20 mg oral tablet  -- 1 tab(s) by mouth once a day  -- Indication: For HTN (hypertension)    Ranexa 500 mg oral tablet, extended release  -- 1 tab(s) by mouth 2 times a day  -- Indication: For CAD (coronary artery disease)    loratadine 10 mg oral tablet  -- 1 tab(s) by mouth once a day  -- Indication: For allergies     atorvastatin 80 mg oral tablet  -- 1 tab(s) by mouth once a day (at bedtime)  -- Indication: For Hyperlipidemia    ticagrelor 90 mg oral tablet  -- 1 tab(s) by mouth 2 times a day  -- Indication: For CAD (coronary artery disease)    Coreg 12.5 mg oral tablet  -- 1 tab(s) by mouth 2 times a day  -- Indication: For CAD (coronary artery disease)    furosemide 40 mg oral tablet  -- 1 tab(s) by mouth once a day  -- Indication: For HTN (hypertension)    pantoprazole 40 mg oral delayed release tablet  -- 1 tab(s) by mouth every 12 hours  -- Indication: For GI protection

## 2018-03-23 NOTE — DISCHARGE NOTE ADULT - INSTRUCTIONS
low salt, low cholesterol PLEASE BRING ALL DISCHARGE PAPERWORK WITH YOU TO YOUR FOLLOW-UP APPOINTMENTS.

## 2018-03-24 VITALS
RESPIRATION RATE: 17 BRPM | DIASTOLIC BLOOD PRESSURE: 52 MMHG | OXYGEN SATURATION: 99 % | TEMPERATURE: 98 F | HEART RATE: 86 BPM | SYSTOLIC BLOOD PRESSURE: 112 MMHG

## 2018-03-24 LAB
BUN SERPL-MCNC: 19 MG/DL — SIGNIFICANT CHANGE UP (ref 7–23)
CALCIUM SERPL-MCNC: 9 MG/DL — SIGNIFICANT CHANGE UP (ref 8.4–10.5)
CHLORIDE SERPL-SCNC: 100 MMOL/L — SIGNIFICANT CHANGE UP (ref 98–107)
CO2 SERPL-SCNC: 24 MMOL/L — SIGNIFICANT CHANGE UP (ref 22–31)
CREAT SERPL-MCNC: 1.33 MG/DL — HIGH (ref 0.5–1.3)
GLUCOSE SERPL-MCNC: 112 MG/DL — HIGH (ref 70–99)
HCT VFR BLD CALC: 41.4 % — SIGNIFICANT CHANGE UP (ref 39–50)
HGB BLD-MCNC: 14.3 G/DL — SIGNIFICANT CHANGE UP (ref 13–17)
MAGNESIUM SERPL-MCNC: 2.8 MG/DL — HIGH (ref 1.6–2.6)
MCHC RBC-ENTMCNC: 29.5 PG — SIGNIFICANT CHANGE UP (ref 27–34)
MCHC RBC-ENTMCNC: 34.5 % — SIGNIFICANT CHANGE UP (ref 32–36)
MCV RBC AUTO: 85.5 FL — SIGNIFICANT CHANGE UP (ref 80–100)
NRBC # FLD: 0 — SIGNIFICANT CHANGE UP
PHOSPHATE SERPL-MCNC: 4 MG/DL — SIGNIFICANT CHANGE UP (ref 2.5–4.5)
PLATELET # BLD AUTO: 135 K/UL — LOW (ref 150–400)
PMV BLD: 12.1 FL — SIGNIFICANT CHANGE UP (ref 7–13)
POTASSIUM SERPL-MCNC: 4.1 MMOL/L — SIGNIFICANT CHANGE UP (ref 3.5–5.3)
POTASSIUM SERPL-SCNC: 4.1 MMOL/L — SIGNIFICANT CHANGE UP (ref 3.5–5.3)
RBC # BLD: 4.84 M/UL — SIGNIFICANT CHANGE UP (ref 4.2–5.8)
RBC # FLD: 12.1 % — SIGNIFICANT CHANGE UP (ref 10.3–14.5)
SODIUM SERPL-SCNC: 139 MMOL/L — SIGNIFICANT CHANGE UP (ref 135–145)
WBC # BLD: 7.72 K/UL — SIGNIFICANT CHANGE UP (ref 3.8–10.5)
WBC # FLD AUTO: 7.72 K/UL — SIGNIFICANT CHANGE UP (ref 3.8–10.5)

## 2018-03-24 RX ORDER — IBUPROFEN 200 MG
1 TABLET ORAL
Qty: 0 | Refills: 0 | COMMUNITY

## 2018-03-24 RX ORDER — FUROSEMIDE 40 MG
1 TABLET ORAL
Qty: 30 | Refills: 0
Start: 2018-03-24 | End: 2018-04-22

## 2018-03-24 RX ORDER — PANTOPRAZOLE SODIUM 20 MG/1
1 TABLET, DELAYED RELEASE ORAL
Qty: 60 | Refills: 0
Start: 2018-03-24 | End: 2018-04-22

## 2018-03-24 RX ORDER — TICAGRELOR 90 MG/1
1 TABLET ORAL
Qty: 60 | Refills: 0 | OUTPATIENT
Start: 2018-03-24 | End: 2018-04-22

## 2018-03-24 RX ORDER — CLOPIDOGREL BISULFATE 75 MG/1
1 TABLET, FILM COATED ORAL
Qty: 0 | Refills: 0 | COMMUNITY

## 2018-03-24 RX ORDER — FUROSEMIDE 40 MG
1 TABLET ORAL
Qty: 0 | Refills: 0 | COMMUNITY

## 2018-03-24 RX ORDER — FUROSEMIDE 40 MG
40 TABLET ORAL DAILY
Qty: 0 | Refills: 0 | Status: DISCONTINUED | OUTPATIENT
Start: 2018-03-24 | End: 2018-03-24

## 2018-03-24 RX ORDER — PANTOPRAZOLE SODIUM 20 MG/1
1 TABLET, DELAYED RELEASE ORAL
Qty: 0 | Refills: 0 | DISCHARGE
Start: 2018-03-24 | End: 2018-04-22

## 2018-03-24 RX ADMIN — TICAGRELOR 90 MILLIGRAM(S): 90 TABLET ORAL at 05:15

## 2018-03-24 RX ADMIN — CARVEDILOL PHOSPHATE 12.5 MILLIGRAM(S): 80 CAPSULE, EXTENDED RELEASE ORAL at 05:15

## 2018-03-24 RX ADMIN — RANOLAZINE 500 MILLIGRAM(S): 500 TABLET, FILM COATED, EXTENDED RELEASE ORAL at 05:15

## 2018-03-24 RX ADMIN — SODIUM CHLORIDE 3 MILLILITER(S): 9 INJECTION INTRAMUSCULAR; INTRAVENOUS; SUBCUTANEOUS at 05:18

## 2018-03-24 RX ADMIN — Medication 81 MILLIGRAM(S): at 12:03

## 2018-03-24 RX ADMIN — PANTOPRAZOLE SODIUM 40 MILLIGRAM(S): 20 TABLET, DELAYED RELEASE ORAL at 05:15

## 2018-03-24 RX ADMIN — Medication 20 MILLIGRAM(S): at 05:15

## 2018-03-24 RX ADMIN — Medication 100 MILLIGRAM(S): at 05:15

## 2018-03-24 RX ADMIN — SODIUM CHLORIDE 3 MILLILITER(S): 9 INJECTION INTRAMUSCULAR; INTRAVENOUS; SUBCUTANEOUS at 13:12

## 2018-03-24 RX ADMIN — LORATADINE 10 MILLIGRAM(S): 10 TABLET ORAL at 12:03

## 2018-03-24 NOTE — PROGRESS NOTE ADULT - PROVIDER SPECIALTY LIST ADULT
Cardiology
Gastroenterology
Pulmonology

## 2018-03-24 NOTE — PROGRESS NOTE ADULT - ATTENDING COMMENTS
3/24: pt has improved: no SOB only mild LANZA: his creatinine is high too; Very low suspicion of PE: would avoid giving him any nephrotoxic contrast!

## 2018-03-24 NOTE — PROGRESS NOTE ADULT - SUBJECTIVE AND OBJECTIVE BOX
Patient is a 67y old  Male who presents with a chief complaint of shortness of breath (23 Mar 2018 10:51)    Any change in ROS: denies SOB, cough, sputum     MEDICATIONS  (STANDING):  aspirin enteric coated 81 milliGRAM(s) Oral daily  atorvastatin 80 milliGRAM(s) Oral at bedtime  carvedilol 12.5 milliGRAM(s) Oral every 12 hours  docusate sodium 100 milliGRAM(s) Oral two times a day  enalapril 20 milliGRAM(s) Oral daily  furosemide    Tablet 40 milliGRAM(s) Oral daily  influenza   Vaccine 0.5 milliLiter(s) IntraMuscular once  loratadine 10 milliGRAM(s) Oral daily  pantoprazole    Tablet 40 milliGRAM(s) Oral every 12 hours  ranolazine 500 milliGRAM(s) Oral two times a day  senna 2 Tablet(s) Oral at bedtime  sodium chloride 0.9% lock flush 3 milliLiter(s) IV Push every 8 hours  ticagrelor 90 milliGRAM(s) Oral two times a day    MEDICATIONS  (PRN):    Vital Signs Last 24 Hrs  T(C): 36.8 (24 Mar 2018 14:17), Max: 36.8 (24 Mar 2018 14:17)  T(F): 98.3 (24 Mar 2018 14:17), Max: 98.3 (24 Mar 2018 14:17)  HR: 86 (24 Mar 2018 14:17) (69 - 86)  BP: 112/52 (24 Mar 2018 14:17) (110/61 - 113/59)  BP(mean): --  RR: 17 (24 Mar 2018 14:17) (16 - 19)  SpO2: 99% (24 Mar 2018 14:17) (99% - 100%)    I&O's Summary    23 Mar 2018 07:01  -  24 Mar 2018 07:00  --------------------------------------------------------  IN: 0 mL / OUT: 400 mL / NET: -400 mL      Physical Exam:   General: NAD, well developed, well nourished.   Eyes: PERRL, EOM intact; conjunctiva and sclera clear  Head: Normocephalic, atraumatic  ENMT: No nasal discharge, airway clear  Neck: Supple, non tender,  no masses, no JVD  Respiratory: Clear to auscultation bilaterally without wheezing, rhonchi or rales  Cardiovascular: Regular rate and rhythm, no murmurs.  Gastrointestinal: Soft non-tender non-distended, positive bowel sounds  Extremities: Normal range of motion, no clubbing, cyanosis or edema  Vascular: Peripheral pulses palpable 2+ bilaterally  Neurological: Alert and oriented x3, follows commands, answers questions appropriately.   Skin: Warm and dry. No obvious rash  Lymph Nodes: No acute cervical or supraclavicular adenopathy  Musculoskeletal: kyphosis ( - ), Move all extremities,   Psychiatric: Cooperative and appropriate mood    Labs:              14.3   7.72  )-----------( 135      ( 24 Mar 2018 06:00 )             41.4                         13.8   5.79  )-----------( 130      ( 23 Mar 2018 07:00 )             40.4                         13.2   6.43  )-----------( 118      ( 22 Mar 2018 05:20 )             38.5                         13.8   5.77  )-----------( 118      ( 21 Mar 2018 07:10 )             40.9     03-24    139  |  100  |  19  ----------------------------<  112<H>  4.1   |  24  |  1.33<H>  03-23    138  |  104  |  21  ----------------------------<  112<H>  4.3   |  21<L>  |  1.25  03-22    135  |  98  |  23  ----------------------------<  135<H>  3.9   |  22  |  1.25  03-21    138  |  102  |  22  ----------------------------<  105<H>  4.2   |  21<L>  |  1.26    Ca    9.0      24 Mar 2018 06:00  Ca    9.2      23 Mar 2018 07:00  Phos  4.0     03-24  Mg     2.8     03-24  Mg     2.3     03-23      CAPILLARY BLOOD GLUCOSE    D-Dimer Assay, Quantitative: 351 ng/mL (03-20 @ 21:21)  Cultures:       Studies  Chest X-RAY < from: Xray Chest 1 View- PORTABLE-Urgent (03.20.18 @ 21:38) >  EXAM:  XR CHEST PORTABLE URGENT 1V        PROCEDURE DATE:  Mar 20 2018         INTERPRETATION:  CLINICAL INFORMATION: Shortness of breath.    COMPARISON:  Chest x-ray dated 7/7/2017.    TECHNIQUE:   AP portable chest xray.    FINDINGS:   Status post venous sternotomy.  Clips there are seen within the mediastinum.    There is no focal consolidation.  There are no pleural effusions.  No evidence of pneumothorax.    Degenerative changes of the spine are noted.  The cardiac silhouette not well evaluated but appears enlarged.    IMPRESSION:      The lungs are clear.    < end of copied text >    CT SCAN Chest     Venous Dopplers: LE:   CT Abdomen  Others    < from: Transthoracic Echocardiogram (03.22.18 @ 10:23) >  Patient name: BRENT BECKMAN  YOB: 1950   Age: 67 (M)   MR#: [id number]  Study Date: 3/22/2018  Location: [place] [address] w/c kSonographer: Lidya Evans RDCS  Study quality: Technically good  Referring Physician: Ulisses Abarca MD  Blood Pressure: 113/66 mmHg  Height: 165 cm  Weight: 63 kg  BSA: 1.7 m2  ------------------------------------------------------------------------  PROCEDURE: Transthoracic echocardiogram with 2-D, M-Mode  and complete spectral and color flow Doppler.  INDICATION: Other forms of chronic ischemic heart disease  (I25.89)  ------------------------------------------------------------------------  DIMENSIONS:  Dimensions:     Normal Values:  LA:     3.7 cm    2.0 - 4.0 cm  Ao:     3.0 cm    2.0 - 3.8 cm  SEPTUM:0.9 cm    0.6 - 1.2 cm  PWT:    0.8 cm    0.6 - 1.1 cm  LVIDd:  5.4 cm    3.0 - 5.6 cm  LVIDs:  4.3 cm    1.8 - 4.0 cm  Derived Variables:  LVMI: 99 g/m2  RWT: 0.29  Fractional short: 20 %  Ejection Fraction (Teicholtz): 41 %  ------------------------------------------------------------------------  OBSERVATIONS:  Mitral Valve: Mitral annular calcification, otherwise  normal mitral valve. Mild mitral regurgitation.  Aortic Root: Normal aortic root.  Aortic Valve: Calcified trileaflet aortic valvewith normal  opening. Mild aortic regurgitation.  Left Atrium: Normal left atrium.  Left Ventricle: Moderate segmental left ventricular  systolic dysfunction.  Hypokinesis of the basal to mid  inferior wall and basal to mid inferolateral wall. Normal  left ventricular internal dimensions and wall thicknesses.  Right Heart: Normal right atrium. Normal right ventricular  size and function. Normal tricuspid valve. Normal pulmonic  valve.  Pericardium/PleuraNormal pericardium with no pericardial  effusion.  ------------------------------------------------------------------------  CONCLUSIONS:  1. Mitral annular calcification, otherwise normal mitral  valve. Mild mitral regurgitation.  2. Calcified trileaflet aortic valve with normal opening.  Mild aortic regurgitation.  3. Normal left ventricular internal dimensions and wall  thicknesses.  4. Moderate segmental left ventricular systolic  dysfunction.  Hypokinesis of the basal to mid inferior wall  and basal to mid inferolateral wall.  5. Normal right ventricular size and function.  *** Compared with echocardiogram of 3/5/2017, no    < end of copied text >
BRENT [employer]:[id number],   67yMale followed for:  Broccoli (Anaphylaxis)  No Known Drug Allergies  shellfish (Anaphylaxis)    PAST MEDICAL & SURGICAL HISTORY:  NSTEMI (non-ST elevated myocardial infarction):  and 2018  Hyperlipidemia  Constipation, unspecified constipation type  PAD (peripheral artery disease): stent to L lower extremity artery  HTN (hypertension)  CAD (coronary artery disease)  History of femoral angiogram: stent placed in Left leg  History of coronary angiogram: multiple stents placed  S/P CABG (coronary artery bypass graft): 3V CABG; [facility]    FAMILY HISTORY:  Family history of stroke: 58yo CVA, heart attack 59yo  Family history of coronary artery disease in brother (Sibling): 4brothers with MI/CABG, 1  at 77yo    MEDICATIONS  (STANDING):  aspirin enteric coated 81 milliGRAM(s) Oral daily  atorvastatin 80 milliGRAM(s) Oral at bedtime  carvedilol 12.5 milliGRAM(s) Oral every 12 hours  docusate sodium 100 milliGRAM(s) Oral two times a day  enalapril 20 milliGRAM(s) Oral daily  furosemide   Injectable 20 milliGRAM(s) IV Push every 12 hours  influenza   Vaccine 0.5 milliLiter(s) IntraMuscular once  loratadine 10 milliGRAM(s) Oral daily  pantoprazole    Tablet 40 milliGRAM(s) Oral every 12 hours  ranolazine 500 milliGRAM(s) Oral two times a day  senna 2 Tablet(s) Oral at bedtime  sodium chloride 0.9% lock flush 3 milliLiter(s) IV Push every 8 hours  ticagrelor 90 milliGRAM(s) Oral two times a day    MEDICATIONS  (PRN):      Vital Signs Last 24 Hrs  T(C): 36.6 (23 Mar 2018 11:41), Max: 36.7 (22 Mar 2018 18:36)  T(F): 97.8 (23 Mar 2018 11:41), Max: 98 (22 Mar 2018 18:36)  HR: 78 (23 Mar 2018 11:41) (67 - 78)  BP: 128/62 (23 Mar 2018 11:41) (104/54 - 128/62)  BP(mean): --  RR: 18 (23 Mar 2018 11:41) (18 - 18)  SpO2: 100% (23 Mar 2018 11:41) (100% - 100%)  nc/at  s1s2  cta  soft, nt, nd no guarding or rebound  no c/c/e    CBC Full  -  ( 23 Mar 2018 07:00 )  WBC Count : 5.79 K/uL  Hemoglobin : 13.8 g/dL  Hematocrit : 40.4 %  Platelet Count - Automated : 130 K/uL  Mean Cell Volume : 86.1 fL  Mean Cell Hemoglobin : 29.4 pg  Mean Cell Hemoglobin Concentration : 34.2 %  Auto Neutrophil # : x  Auto Lymphocyte # : x  Auto Monocyte # : x  Auto Eosinophil # : x  Auto Basophil # : x  Auto Neutrophil % : x  Auto Lymphocyte % : x  Auto Monocyte % : x  Auto Eosinophil % : x  Auto Basophil % : x    03-23    138  |  104  |  21  ----------------------------<  112<H>  4.3   |  21<L>  |  1.25    Ca    9.2      23 Mar 2018 07:00  Phos  4.0     03-22  Mg     2.3     03-23
BRENT [employer]:[id number],   67yMale followed for:  Broccoli (Anaphylaxis)  No Known Drug Allergies  shellfish (Anaphylaxis)    PAST MEDICAL & SURGICAL HISTORY:  NSTEMI (non-ST elevated myocardial infarction):  and 2018  Hyperlipidemia  Constipation, unspecified constipation type  PAD (peripheral artery disease): stent to L lower extremity artery  HTN (hypertension)  CAD (coronary artery disease)  History of femoral angiogram: stent placed in Left leg  History of coronary angiogram: multiple stents placed  S/P CABG (coronary artery bypass graft): 3V CABG; [facility]    FAMILY HISTORY:  Family history of stroke: 58yo CVA, heart attack 59yo  Family history of coronary artery disease in brother (Sibling): 4brothers with MI/CABG, 1  at 77yo    MEDICATIONS  (STANDING):  aspirin enteric coated 81 milliGRAM(s) Oral daily  atorvastatin 80 milliGRAM(s) Oral at bedtime  carvedilol 12.5 milliGRAM(s) Oral every 12 hours  docusate sodium 100 milliGRAM(s) Oral two times a day  enalapril 20 milliGRAM(s) Oral daily  furosemide   Injectable 20 milliGRAM(s) IV Push every 12 hours  influenza   Vaccine 0.5 milliLiter(s) IntraMuscular once  loratadine 10 milliGRAM(s) Oral daily  pantoprazole    Tablet 40 milliGRAM(s) Oral every 12 hours  ranolazine 500 milliGRAM(s) Oral two times a day  senna 2 Tablet(s) Oral at bedtime  sodium chloride 0.9% lock flush 3 milliLiter(s) IV Push every 8 hours  ticagrelor 90 milliGRAM(s) Oral two times a day    MEDICATIONS  (PRN):      Vital Signs Last 24 Hrs  T(C): 36.7 (22 Mar 2018 05:05), Max: 36.7 (22 Mar 2018 05:05)  T(F): 98 (22 Mar 2018 05:05), Max: 98 (22 Mar 2018 05:05)  HR: 82 (22 Mar 2018 05:05) (70 - 82)  BP: 112/62 (22 Mar 2018 05:05) (95/57 - 112/62)  BP(mean): --  RR: 16 (22 Mar 2018 05:05) (16 - 18)  SpO2: 100% (22 Mar 2018 05:05) (100% - 100%)  nc/at  s1s2  cta  soft, nt, nd no guarding or rebound  no c/c/e    CBC Full  -  ( 22 Mar 2018 05:20 )  WBC Count : 6.43 K/uL  Hemoglobin : 13.2 g/dL  Hematocrit : 38.5 %  Platelet Count - Automated : 118 K/uL  Mean Cell Volume : 86.5 fL  Mean Cell Hemoglobin : 29.7 pg  Mean Cell Hemoglobin Concentration : 34.3 %  Auto Neutrophil # : x  Auto Lymphocyte # : x  Auto Monocyte # : x  Auto Eosinophil # : x  Auto Basophil # : x  Auto Neutrophil % : x  Auto Lymphocyte % : x  Auto Monocyte % : x  Auto Eosinophil % : x  Auto Basophil % : x        135  |  98  |  23  ----------------------------<  135<H>  3.9   |  22  |  1.25    Ca    8.8      22 Mar 2018 05:20  Phos  4.0     -  Mg     2.2     
BRENT [employer]:[id number],   67yMale followed for:  Broccoli (Anaphylaxis)  No Known Drug Allergies  shellfish (Anaphylaxis)    PAST MEDICAL & SURGICAL HISTORY:  NSTEMI (non-ST elevated myocardial infarction):  and 2018  Hyperlipidemia  Constipation, unspecified constipation type  PAD (peripheral artery disease): stent to L lower extremity artery  HTN (hypertension)  CAD (coronary artery disease)  History of femoral angiogram: stent placed in Left leg  History of coronary angiogram: multiple stents placed  S/P CABG (coronary artery bypass graft): 3V CABG; [facility]    FAMILY HISTORY:  Family history of stroke: 58yo CVA, heart attack 59yo  Family history of coronary artery disease in brother (Sibling): 4brothers with MI/CABG, 1  at 79yo    MEDICATIONS  (STANDING):  aspirin enteric coated 81 milliGRAM(s) Oral daily  atorvastatin 80 milliGRAM(s) Oral at bedtime  carvedilol 12.5 milliGRAM(s) Oral every 12 hours  docusate sodium 100 milliGRAM(s) Oral two times a day  enalapril 20 milliGRAM(s) Oral daily  furosemide   Injectable 20 milliGRAM(s) IV Push every 12 hours  influenza   Vaccine 0.5 milliLiter(s) IntraMuscular once  loratadine 10 milliGRAM(s) Oral daily  pantoprazole    Tablet 40 milliGRAM(s) Oral every 12 hours  ranolazine 500 milliGRAM(s) Oral two times a day  senna 2 Tablet(s) Oral at bedtime  sodium chloride 0.9% lock flush 3 milliLiter(s) IV Push every 8 hours  ticagrelor 90 milliGRAM(s) Oral two times a day    MEDICATIONS  (PRN):      Vital Signs Last 24 Hrs  T(C): 36.4 (24 Mar 2018 05:13), Max: 36.7 (23 Mar 2018 18:25)  T(F): 97.5 (24 Mar 2018 05:13), Max: 98 (23 Mar 2018 18:25)  HR: 85 (24 Mar 2018 08:00) (69 - 85)  BP: 113/59 (24 Mar 2018 05:13) (110/61 - 128/62)  BP(mean): --  RR: 16 (24 Mar 2018 08:00) (16 - 19)  SpO2: 99% (24 Mar 2018 05:13) (99% - 100%)  nc/at  s1s2  cta  soft, nt, nd no guarding or rebound  no c/c/e    CBC Full  -  ( 24 Mar 2018 06:00 )  WBC Count : 7.72 K/uL  Hemoglobin : 14.3 g/dL  Hematocrit : 41.4 %  Platelet Count - Automated : 135 K/uL  Mean Cell Volume : 85.5 fL  Mean Cell Hemoglobin : 29.5 pg  Mean Cell Hemoglobin Concentration : 34.5 %  Auto Neutrophil # : x  Auto Lymphocyte # : x  Auto Monocyte # : x  Auto Eosinophil # : x  Auto Basophil # : x  Auto Neutrophil % : x  Auto Lymphocyte % : x  Auto Monocyte % : x  Auto Eosinophil % : x  Auto Basophil % : x    03-24    139  |  100  |  19  ----------------------------<  112<H>  4.1   |  24  |  1.33<H>    Ca    9.0      24 Mar 2018 06:00  Phos  4.0     03-24  Mg     2.8     
Patient is a 67y old  Male who presents with a chief complaint of     Any change in ROS: Doing ok: but is still complaining of SOB especially LANZA :   he is complaining of abdominal distension too:    MEDICATIONS  (STANDING):  aspirin enteric coated 81 milliGRAM(s) Oral daily  atorvastatin 80 milliGRAM(s) Oral at bedtime  carvedilol 12.5 milliGRAM(s) Oral every 12 hours  docusate sodium 100 milliGRAM(s) Oral two times a day  enalapril 20 milliGRAM(s) Oral daily  furosemide   Injectable 20 milliGRAM(s) IV Push every 12 hours  influenza   Vaccine 0.5 milliLiter(s) IntraMuscular once  loratadine 10 milliGRAM(s) Oral daily  pantoprazole    Tablet 40 milliGRAM(s) Oral every 12 hours  ranolazine 500 milliGRAM(s) Oral two times a day  senna 2 Tablet(s) Oral at bedtime  sodium chloride 0.9% lock flush 3 milliLiter(s) IV Push every 8 hours  ticagrelor 90 milliGRAM(s) Oral two times a day    MEDICATIONS  (PRN):    Vital Signs Last 24 Hrs  T(C): 36.7 (22 Mar 2018 18:36), Max: 36.7 (22 Mar 2018 05:05)  T(F): 98 (22 Mar 2018 18:36), Max: 98 (22 Mar 2018 05:05)  HR: 71 (22 Mar 2018 18:36) (70 - 82)  BP: 113/61 (22 Mar 2018 18:36) (95/57 - 113/61)  BP(mean): --  RR: 18 (22 Mar 2018 18:36) (16 - 18)  SpO2: 100% (22 Mar 2018 18:36) (100% - 100%)    I&O's Summary    21 Mar 2018 07:01  -  22 Mar 2018 07:00  --------------------------------------------------------  IN: 200 mL / OUT: 1460 mL / NET: -1260 mL          Physical Exam:   GENERAL: NAD, well-groomed, well-developed  HEENT: [place]/   Atraumatic, Normocephalic  ENMT: No tonsillar erythema, exudates, or enlargement; Moist mucous membranes, Good dentition, No lesions  NECK: Supple, No JVD, Normal thyroid  CHEST/LUNG: Clear to auscultaion, ; No rales, rhonchi, wheezing, or rubs  CVS: Regular rate and rhythm; No murmurs, rubs, or gallops  GI: : Soft, Nontender, Nondistended; Bowel sounds present  NERVOUS SYSTEM:  Alert & Oriented X3  EXTREMITIES:  2+ Peripheral Pulses, No clubbing, cyanosis, or edema  LYMPH: No lymphadenopathy noted  SKIN: No rashes or lesions  ENDOCRINOLOGY: No Thyromegaly  PSYCH: Appropriate    Labs:                              13.2   6.43  )-----------( 118      ( 22 Mar 2018 05:20 )             38.5                         13.8   5.77  )-----------( 118      ( 21 Mar 2018 07:10 )             40.9                         13.7   7.10  )-----------( 137      ( 20 Mar 2018 10:00 )             40.2     03-22    135  |  98  |  23  ----------------------------<  135<H>  3.9   |  22  |  1.25  03-21    138  |  102  |  22  ----------------------------<  105<H>  4.2   |  21<L>  |  1.26  03-20    139  |  103  |  23  ----------------------------<  100<H>  5.0   |  23  |  1.34<H>    Ca    8.8      22 Mar 2018 05:20  Ca    9.0      21 Mar 2018 07:10  Phos  4.0     03-22  Phos  4.2     03-21  Mg     2.2     03-22  Mg     2.2     03-21      CAPILLARY BLOOD GLUCOSE                D-Dimer Assay, Quantitative: 351 ng/mL (03-20 @ 21:21)  Serum Pro-Brain Natriuretic Peptide: 1376 pg/mL (03-21 @ 07:10)  Serum Pro-Brain Natriuretic Peptide: 1173 pg/mL (03-20 @ 21:21)  Cultures:             < from: CT Abdomen and Pelvis w/ Oral Cont and w/ IV Cont (03.21.18 @ 18:34) >  granuloma.    LIVER: A 1.5 x 1 cm left hepatic lesion too small to characterize.  BILE DUCTS: Normal caliber.  GALLBLADDER: Collapsed with stones.  SPLEEN: Within normal limits.  PANCREAS: Within normal limits.  ADRENALS: Within normal limits.  KIDNEYS/URETERS: Left kidney cyst. Otherwise, normal kidneys.    BLADDER: Within normal limits.  REPRODUCTIVE ORGANS: Prostate and seminal vesicles normal.    BOWEL: No bowel obstruction. Appendix normal.  PERITONEUM: No ascites.  VESSELS:  Extensive calcific atherosclerotic disease. Patent left common   iliac artery stent.  RETROPERITONEUM: No lymphadenopathy.    ABDOMINAL WALL: Right inguinal hernia containing fat.  BONES: Within normal limits.    IMPRESSION: Gallstones. Right inguinal hernia containing fat.                      VILMA SR M.D., ATTENDING RADIOLOGIST    < end of copied text >          < from: Transthoracic Echocardiogram (03.22.18 @ 10:23) >  Ventricle: Moderate segmental left ventricular  systolic dysfunction.  Hypokinesis of the basal to mid  inferior wall and basal to mid inferolateral wall. Normal  left ventricular internal dimensions and wall thicknesses.  Right Heart: Normal right atrium. Normal right ventricular  size and function. Normal tricuspid valve. Normal pulmonic  valve.  Pericardium/PleuraNormal pericardium with no pericardial  effusion.  ------------------------------------------------------------------------  CONCLUSIONS:  1. Mitral annular calcification, otherwise normal mitral  valve. Mild mitral regurgitation.  2. Calcified trileaflet aortic valve with normal opening.  Mild aortic regurgitation.  3. Normal left ventricular internal dimensions and wall  thicknesses.  4. Moderate segmental left ventricular systolic  dysfunction.  Hypokinesis of the basal to mid inferior wall  and basal to mid inferolateral wall.  5. Normal right ventricular size and function.  *** Compared with echocardiogram of 3/5/2017, no  significant changes noted.  ------------------------------------------------------------------------  Confirmed on  3/22/2018 - 12:58:29 by Cleve Bueno M.D.  ------------------------------------------------------------------------    < end of copied text >          Studies  Chest X-RAY  CT SCAN Chest   Venous Dopplers: LE:   CT Abdomen  Others
Patient is a 67y old  Male who presents with a chief complaint of     Any change in ROS: today he feels little better     MEDICATIONS  (STANDING):  aspirin enteric coated 81 milliGRAM(s) Oral daily  atorvastatin 80 milliGRAM(s) Oral at bedtime  carvedilol 12.5 milliGRAM(s) Oral every 12 hours  docusate sodium 100 milliGRAM(s) Oral two times a day  enalapril 20 milliGRAM(s) Oral daily  furosemide   Injectable 20 milliGRAM(s) IV Push every 12 hours  influenza   Vaccine 0.5 milliLiter(s) IntraMuscular once  loratadine 10 milliGRAM(s) Oral daily  pantoprazole    Tablet 40 milliGRAM(s) Oral every 12 hours  ranolazine 500 milliGRAM(s) Oral two times a day  senna 2 Tablet(s) Oral at bedtime  sodium chloride 0.9% lock flush 3 milliLiter(s) IV Push every 8 hours  ticagrelor 90 milliGRAM(s) Oral two times a day    MEDICATIONS  (PRN):    Vital Signs Last 24 Hrs  T(C): 36.6 (23 Mar 2018 11:41), Max: 36.7 (22 Mar 2018 18:36)  T(F): 97.8 (23 Mar 2018 11:41), Max: 98 (22 Mar 2018 18:36)  HR: 78 (23 Mar 2018 11:41) (67 - 78)  BP: 128/62 (23 Mar 2018 11:41) (104/54 - 128/62)  BP(mean): --  RR: 18 (23 Mar 2018 11:41) (18 - 18)  SpO2: 100% (23 Mar 2018 11:41) (100% - 100%)    I&O's Summary    22 Mar 2018 07:01  -  23 Mar 2018 07:00  --------------------------------------------------------  IN: 0 mL / OUT: 800 mL / NET: -800 mL          Physical Exam:   GENERAL: NAD, well-groomed, well-developed  HEENT: [place]/   Atraumatic, Normocephalic  ENMT: No tonsillar erythema, exudates, or enlargement; Moist mucous membranes, Good dentition, No lesions  NECK: Supple, No JVD, Normal thyroid  CHEST/LUNG: Clear to auscultaion, ; No rales, rhonchi, wheezing, or rubs  CVS: Regular rate and rhythm; No murmurs, rubs, or gallops  GI: : Soft, Nontender, Nondistended; Bowel sounds present  NERVOUS SYSTEM:  Alert & Oriented X3  EXTREMITIES:  2+ Peripheral Pulses, No clubbing, cyanosis, or edema  LYMPH: No lymphadenopathy noted  SKIN: No rashes or lesions  ENDOCRINOLOGY: No Thyromegaly  PSYCH: Appropriate    Labs:                              13.8   5.79  )-----------( 130      ( 23 Mar 2018 07:00 )             40.4                         13.2   6.43  )-----------( 118      ( 22 Mar 2018 05:20 )             38.5                         13.8   5.77  )-----------( 118      ( 21 Mar 2018 07:10 )             40.9                         13.7   7.10  )-----------( 137      ( 20 Mar 2018 10:00 )             40.2     03-23    138  |  104  |  21  ----------------------------<  112<H>  4.3   |  21<L>  |  1.25  03-22    135  |  98  |  23  ----------------------------<  135<H>  3.9   |  22  |  1.25  03-21    138  |  102  |  22  ----------------------------<  105<H>  4.2   |  21<L>  |  1.26  03-20    139  |  103  |  23  ----------------------------<  100<H>  5.0   |  23  |  1.34<H>    Ca    9.2      23 Mar 2018 07:00  Ca    8.8      22 Mar 2018 05:20  Phos  4.0     03-22  Mg     2.3     03-23  Mg     2.2     03-22      CAPILLARY BLOOD GLUCOSE                D-Dimer Assay, Quantitative: 351 ng/mL (03-20 @ 21:21)  Serum Pro-Brain Natriuretic Peptide: 1376 pg/mL (03-21 @ 07:10)  Serum Pro-Brain Natriuretic Peptide: 1173 pg/mL (03-20 @ 21:21)  Cultures:             < from: CT Abdomen and Pelvis w/ Oral Cont and w/ IV Cont (03.21.18 @ 18:34) >  rast: positive contrast was administered.  Sagittal and coronal reformats were performed.    FINDINGS:    LOWER CHEST: Median sternotomy. Small right lower lobe calcified   granuloma.    LIVER: A 1.5 x 1 cm left hepatic lesion too small to characterize.  BILE DUCTS: Normal caliber.  GALLBLADDER: Collapsed with stones.  SPLEEN: Within normal limits.  PANCREAS: Within normal limits.  ADRENALS: Within normal limits.  KIDNEYS/URETERS: Left kidney cyst. Otherwise, normal kidneys.    BLADDER: Within normal limits.  REPRODUCTIVE ORGANS: Prostate and seminal vesicles normal.    BOWEL: No bowel obstruction. Appendix normal.  PERITONEUM: No ascites.  VESSELS:  Extensive calcific atherosclerotic disease. Patent left common   iliac artery stent.  RETROPERITONEUM: No lymphadenopathy.    ABDOMINAL WALL: Right inguinal hernia containing fat.  BONES: Within normal limits.    IMPRESSION: Gallstones. Right inguinal hernia containing fat.                      VILMA SR M.D., ATTENDING RADIOLOGIST  This document has been electronically signed. Mar 22 2018  9:56AM        < end of copied text >              < from: Xray Chest 1 View- PORTABLE-Urgent (03.20.18 @ 21:38) >      PROCEDURE DATE:  Mar 20 2018         INTERPRETATION:  CLINICAL INFORMATION: Shortness of breath.    COMPARISON:  Chest x-ray dated 7/7/2017.    TECHNIQUE:   AP portable chest xray.    FINDINGS:   Status post venous sternotomy.  Clips there are seen within the mediastinum.    There is no focal consolidation.  There are no pleural effusions.  No evidence of pneumothorax.    Degenerative changes of the spine are noted.  The cardiac silhouette not well evaluated but appears enlarged.    IMPRESSION:      The lungs are clear.                HEMALATHA SELLERS M.D., RADIOLOGY RESIDENT  This document has been electronically signed.  EMILEE SANDOVAL M.D., ATTENDING RADIOLOGIST  This document has been electronically signed. Mar 21 2018 10:40AM        < end of copied text >      Studies  Chest X-RAY  CT SCAN Chest   Venous Dopplers: LE:   CT Abdomen  Others
Ulisses Abarca MD  Interventional Cardiology  [place] Office : [address] [place] [address] [address]. [address]  Tel:   [place] Office : [address] [facility] [place] [address]  Tel: [phone]  Cell : [phone]    Subjective : Pt lying in bed comfortable, not in distress, denies any chest pain or SOB  	  MEDICATIONS:  aspirin enteric coated 81 milliGRAM(s) Oral daily  carvedilol 12.5 milliGRAM(s) Oral every 12 hours  enalapril 20 milliGRAM(s) Oral daily  furosemide   Injectable 20 milliGRAM(s) IV Push every 12 hours  ranolazine 500 milliGRAM(s) Oral two times a day  ticagrelor 90 milliGRAM(s) Oral two times a day      loratadine 10 milliGRAM(s) Oral daily      docusate sodium 100 milliGRAM(s) Oral two times a day  pantoprazole    Tablet 40 milliGRAM(s) Oral every 12 hours  senna 2 Tablet(s) Oral at bedtime    atorvastatin 80 milliGRAM(s) Oral at bedtime    influenza   Vaccine 0.5 milliLiter(s) IntraMuscular once      PHYSICAL EXAM:  T(C): 36.7 (03-23-18 @ 18:25), Max: 36.7 (03-23-18 @ 18:25)  HR: 72 (03-23-18 @ 18:25) (67 - 78)  BP: 110/61 (03-23-18 @ 18:25) (104/54 - 128/62)  RR: 19 (03-23-18 @ 18:25) (18 - 19)  SpO2: 100% (03-23-18 @ 18:25) (100% - 100%)  Wt(kg): --  I&O's Summary    22 Mar 2018 07:01  -  23 Mar 2018 07:00  --------------------------------------------------------  IN: 0 mL / OUT: 800 mL / NET: -800 mL    23 Mar 2018 07:01  -  23 Mar 2018 20:32  --------------------------------------------------------  IN: 0 mL / OUT: 400 mL / NET: -400 mL          Appearance: Normal	  HEENT:   Normal oral mucosa, PERRL, EOMI	  Cardiovascular: Normal S1 S2, No JVD, No murmurs, No edema  Respiratory: Lungs clear to auscultation	  Gastrointestinal:  Soft, Non-tender, + BS	  Extremities: Normal range of motion, No clubbing, cyanosis or edema                                    13.8   5.79  )-----------( 130      ( 23 Mar 2018 07:00 )             40.4     03-23    138  |  104  |  21  ----------------------------<  112<H>  4.3   |  21<L>  |  1.25    Ca    9.2      23 Mar 2018 07:00  Phos  4.0     03-22  Mg     2.3     03-23      proBNP:   Lipid Profile:   HgA1c:   TSH:
Ulisses Abarca MD  Interventional Cardiology  [place] Office : [address] [place] [address] [facility] [address]  Tel:   [place] Office : [address] [facility] [place] [address]  Tel: [phone]  Cell : [phone]    Subjective : Pt lying in bed comfortable, not in distress, denies any chest pain, SOB improving  	  MEDICATIONS:  aspirin enteric coated 81 milliGRAM(s) Oral daily  carvedilol 12.5 milliGRAM(s) Oral every 12 hours  enalapril 20 milliGRAM(s) Oral daily  furosemide   Injectable 20 milliGRAM(s) IV Push every 12 hours  ranolazine 500 milliGRAM(s) Oral two times a day  ticagrelor 90 milliGRAM(s) Oral two times a day  loratadine 10 milliGRAM(s) Oral daily  docusate sodium 100 milliGRAM(s) Oral two times a day  pantoprazole    Tablet 40 milliGRAM(s) Oral every 12 hours  senna 2 Tablet(s) Oral at bedtime  atorvastatin 80 milliGRAM(s) Oral at bedtime  influenza   Vaccine 0.5 milliLiter(s) IntraMuscular once      PHYSICAL EXAM:  T(C): 36.7 (03-22-18 @ 12:01), Max: 36.7 (03-22-18 @ 05:05)  HR: 70 (03-22-18 @ 12:01) (70 - 82)  BP: 101/62 (03-22-18 @ 12:01) (95/57 - 112/62)  RR: 17 (03-22-18 @ 12:01) (16 - 17)  SpO2: 100% (03-22-18 @ 12:01) (100% - 100%)  Wt(kg): --  I&O's Summary    21 Mar 2018 07:01  -  22 Mar 2018 07:00  --------------------------------------------------------  IN: 200 mL / OUT: 1460 mL / NET: -1260 mL          Appearance: Normal	  HEENT:   Normal oral mucosa, PERRL, EOMI	  Cardiovascular: Normal S1 S2, No JVD, No murmurs, No edema  Respiratory: Lungs clear to auscultation	  Gastrointestinal:  Soft, Non-tender, + BS	  Extremities: Normal range of motion, No clubbing, cyanosis or edema                                    13.2   6.43  )-----------( 118      ( 22 Mar 2018 05:20 )             38.5     03-22    135  |  98  |  23  ----------------------------<  135<H>  3.9   |  22  |  1.25    Ca    8.8      22 Mar 2018 05:20  Phos  4.0     03-22  Mg     2.2     03-22      proBNP:   Lipid Profile:   HgA1c:   TSH:
Ulisses Abarca MD  Interventional Cardiology  [place] Office : [address] [place] [address]  Tel:   [place] Office : [address] [facility] [place] [address]  Tel: [phone]  Cell : [phone]    Subjective : Pt lying in bed comfortable, not in distress, complaining of shortness of breath   	  MEDICATIONS:  aspirin enteric coated 81 milliGRAM(s) Oral daily  carvedilol 12.5 milliGRAM(s) Oral every 12 hours  enalapril 20 milliGRAM(s) Oral daily  furosemide    Tablet 20 milliGRAM(s) Oral daily  ranolazine 500 milliGRAM(s) Oral two times a day  ticagrelor 90 milliGRAM(s) Oral two times a day      loratadine 10 milliGRAM(s) Oral daily      docusate sodium 100 milliGRAM(s) Oral two times a day  pantoprazole    Tablet 40 milliGRAM(s) Oral every 12 hours  senna 2 Tablet(s) Oral at bedtime    atorvastatin 80 milliGRAM(s) Oral at bedtime    influenza   Vaccine 0.5 milliLiter(s) IntraMuscular once      PHYSICAL EXAM:  T(C): 36.3 (03-21-18 @ 12:08), Max: 36.7 (03-20-18 @ 20:06)  HR: 70 (03-21-18 @ 12:08) (61 - 82)  BP: 101/60 (03-21-18 @ 12:08) (101/60 - 123/60)  RR: 18 (03-21-18 @ 12:08) (18 - 18)  SpO2: 100% (03-21-18 @ 12:08) (96% - 100%)  Wt(kg): --  I&O's Summary    21 Mar 2018 07:01  -  21 Mar 2018 15:23  --------------------------------------------------------  IN: 100 mL / OUT: 500 mL / NET: -400 mL          Appearance: Normal	  HEENT:   Normal oral mucosa, PERRL, EOMI	  Cardiovascular: Normal S1 S2, No JVD, No murmurs, No edema  Respiratory: Lungs clear to auscultation	  Gastrointestinal:  Soft, Non-tender, + BS	  Extremities: no edema right groin site soft                                     13.8   5.77  )-----------( 118      ( 21 Mar 2018 07:10 )             40.9     03-21    138  |  102  |  22  ----------------------------<  105<H>  4.2   |  21<L>  |  1.26    Ca    9.0      21 Mar 2018 07:10  Phos  4.2     03-21  Mg     2.2     03-21      proBNP: Serum Pro-Brain Natriuretic Peptide: 1376 pg/mL (03-21 @ 07:10)  Serum Pro-Brain Natriuretic Peptide: 1173 pg/mL (03-20 @ 21:21)    Lipid Profile:   HgA1c:   TSH:

## 2018-03-24 NOTE — PROGRESS NOTE ADULT - ASSESSMENT
67 year old English and Khmer speaking male with HTN, hyperlipidemia, PAD with stent, known CAD with PTCA/stent and 3V CABG 2002 who presented to his Cardiologist complaining of a heart attack in [facility] 1 month ago for which he was hospitalized and treated with medications. At time of MI last month patient admits to feeling chest pain, however, since he denies symptoms. Admits to SOB laying in bed at nights with stable 2 pillows. Admits to increase in fatigue and decrease in exercise tolerance. Denies edema, PND.   In light of patients cardiac risk factors and symptoms there is high suspicion for CAD progression. Patient is now referred to [facility] for a cardiac catheterization with possible PTCA/stent. (20 Mar 2018 09:18)  now s/p stent:
1) NSTEMI - s/p PCI of proximal SVG to OM2, drug eluting stent , cont aspirin and brilinta, SOB improving cont  20mg IV lasix q12     2) SOB - start IV lasix strict ins and outs ,    3) ischemic cardiomyopathy - cont coreg and enalapril
1) NSTEMI - s/p PCI of proximal SVG to OM2, drug eluting stent , cont aspirin and brilinta, SOB improving cont  20mg IV lasix q12     2) SOB - start IV lasix strict ins and outs , CT chest r/o PE    3) ischemic cardiomyopathy - cont coreg and enalapril
1) NSTEMI - s/p PCI of proximal SVG to OM2, drug eluting stent , cont aspirin and brilinta, pt complaining of SOB will get pulm consult, start diuresis 40mg IV now followed by 20mg IV lasix q12     2) SOB - start IV lasix strict ins and outs , pulm consult     3) ischemic cardiomyopathy - cont coreg and enalapril
67 year old English and Georgian speaking male with HTN, hyperlipidemia, PAD with stent, known CAD with PTCA/stent and 3V CABG 2002 who presented to his Cardiologist complaining of a heart attack in [facility] 1 month ago for which he was hospitalized and treated with medications. At time of MI last month patient admits to feeling chest pain, however, since he denies symptoms. Admits to SOB laying in bed at nights with stable 2 pillows. Admits to increase in fatigue and decrease in exercise tolerance. Denies edema, PND.   In light of patients cardiac risk factors and symptoms there is high suspicion for CAD progression. Patient is now referred to [facility] for a cardiac catheterization with possible PTCA/stent. (20 Mar 2018 09:18)  now s/p stent:
67 year old English and Kazakh speaking male with HTN, hyperlipidemia, PAD with stent, known CAD with PTCA/stent and 3V CABG 2002 who presented to his Cardiologist complaining of a heart attack in [facility] 1 month ago for which he was hospitalized and treated with medications. At time of MI last month patient admits to feeling chest pain, however, since he denies symptoms. Admits to SOB laying in bed at nights with stable 2 pillows. Admits to increase in fatigue and decrease in exercise tolerance. Denies edema, PND.   In light of patients cardiac risk factors and symptoms there is high suspicion for CAD progression. Patient is now referred to [facility] for a cardiac catheterization with possible PTCA/stent. (20 Mar 2018 09:18)  now s/p stent:
abdominal pain improved.  continue current rx.
continue ppi. no acute complaints.  agree with d.c plan
gallstones, fat contianging hernia.  continue rx.

## 2018-03-24 NOTE — PROGRESS NOTE ADULT - PROBLEM SELECTOR PLAN 3
seems to be controlled  3/23: Blood pressure is controlled  3/24 stable
seems to be controlled
seems to be controlled  3/23: Blood pressure is controlled

## 2018-03-24 NOTE — PROGRESS NOTE ADULT - PROBLEM SELECTOR PLAN 2
s/p stent placement: cont current treatment  3/23: Cont current treatment::  3/24 s/p PCI with ANGI
s/p stent placement: cont current treatment
s/p stent placement: cont current treatment  3/23: Cont current treatment::

## 2018-03-24 NOTE — PROGRESS NOTE ADULT - PROBLEM SELECTOR PLAN 1
s/p stent placement: Has moderate LV dysfunction: chest x-ray is clear: His SOB seems also to be contributed by his abdominal distension:  May need PE evaluation: recently received contrast with cath: If by tomorrow his SOB is still persisting, would consider doing CTA.  HIs D idmer is high too:  3/23: NOEMI cardiologist: carol order CTA: He is still very SOB  3/24 Per Cardiology s/p PCI of proximal SVG to OM2, drug eluting stent , cont aspirin and brilinta
s/p stent placement: Has moderate LV dysfunction: chest x-ray is clear: His SOB seems also to be contributed by his abdominal distension:  May need PE evaluation: recently received contrast with cath: If by tomorrow his SOB is still persisting, would consider doing CTA.  HIs D idmer is high too:  3/23: NOEMI cardiologist: carol order CTA: He is still very SOB
s/p stent placement: Has moderate LV dysfunction: chest x-ray is clear: His SOB seems also to be contributed by his abdominal distension:  May need PE evaluation: recently received contrast with cath: If by tomorrw his SOB is still persisting, would consider doing CTA.  HIs D idmer is high too:

## 2019-02-01 ENCOUNTER — OUTPATIENT (OUTPATIENT)
Dept: OUTPATIENT SERVICES | Facility: HOSPITAL | Age: 69
LOS: 1 days | End: 2019-02-01
Payer: MEDICARE

## 2019-02-01 DIAGNOSIS — Z95.1 PRESENCE OF AORTOCORONARY BYPASS GRAFT: Chronic | ICD-10-CM

## 2019-02-01 DIAGNOSIS — Z86.79 PERSONAL HISTORY OF OTHER DISEASES OF THE CIRCULATORY SYSTEM: Chronic | ICD-10-CM

## 2019-02-01 DIAGNOSIS — Z98.890 OTHER SPECIFIED POSTPROCEDURAL STATES: Chronic | ICD-10-CM

## 2019-02-01 PROCEDURE — G9005: CPT

## 2019-02-27 ENCOUNTER — INPATIENT (INPATIENT)
Facility: HOSPITAL | Age: 69
LOS: 1 days | Discharge: HOME | End: 2019-03-01
Attending: INTERNAL MEDICINE | Admitting: INTERNAL MEDICINE
Payer: MEDICARE

## 2019-02-27 VITALS
SYSTOLIC BLOOD PRESSURE: 152 MMHG | OXYGEN SATURATION: 98 % | RESPIRATION RATE: 15 BRPM | TEMPERATURE: 98 F | HEART RATE: 69 BPM | DIASTOLIC BLOOD PRESSURE: 70 MMHG

## 2019-02-27 DIAGNOSIS — Z98.890 OTHER SPECIFIED POSTPROCEDURAL STATES: Chronic | ICD-10-CM

## 2019-02-27 DIAGNOSIS — Z95.1 PRESENCE OF AORTOCORONARY BYPASS GRAFT: Chronic | ICD-10-CM

## 2019-02-27 DIAGNOSIS — Z86.79 PERSONAL HISTORY OF OTHER DISEASES OF THE CIRCULATORY SYSTEM: Chronic | ICD-10-CM

## 2019-02-27 RX ORDER — SODIUM CHLORIDE 9 MG/ML
500 INJECTION INTRAMUSCULAR; INTRAVENOUS; SUBCUTANEOUS ONCE
Qty: 0 | Refills: 0 | Status: COMPLETED | OUTPATIENT
Start: 2019-02-27 | End: 2019-02-27

## 2019-02-27 RX ORDER — METOCLOPRAMIDE HCL 10 MG
10 TABLET ORAL ONCE
Qty: 0 | Refills: 0 | Status: COMPLETED | OUTPATIENT
Start: 2019-02-27 | End: 2019-02-27

## 2019-02-27 RX ADMIN — SODIUM CHLORIDE 500 MILLILITER(S): 9 INJECTION INTRAMUSCULAR; INTRAVENOUS; SUBCUTANEOUS at 23:56

## 2019-02-27 RX ADMIN — Medication 10 MILLIGRAM(S): at 23:56

## 2019-02-27 NOTE — ED ADULT NURSE NOTE - OBJECTIVE STATEMENT
RN Facilitator: PT received into room 29 A&Ox4, ambulatory C/O Dizziness, nausea, vomiting and chest pain. PT states he was eating dinner tonight after eating felt suddenly dizzy, nauseous and vomited, tried eating a second time and vomited again; felt Chest pain shortly afterwards after vomiting but is currently denying CP on assessment. Denies SOB, fevers, chills, sick contacts, diarrhea. PMH: CABG, HTN, HLD, Cardiac cath with stent placement 1 month ago. MD evaluated, VS as noted. 18 G IV placed to L ARM, labs sent, medicated as ordered. Family at bedside, comfort measures provided. Report given to primary area RN.

## 2019-02-27 NOTE — ED ADULT TRIAGE NOTE - CHIEF COMPLAINT QUOTE
Pt brought in by EMS for dizziness and vomiting starting after eating fish and rice.  Immediately after vomiting, pt tried to eat soup and then vomited again.  Denies any chest pain/SOB/palpitations/vision changes.  PMHx:  CABG, MI, HLD, constipation, CAD, HTN, PAD Pt brought in by EMS for dizziness and vomiting starting after eating fish and rice.  Immediately after vomiting, pt tried to eat soup and then vomited again.  Denies any chest pain/SOB/palpitations/vision changes.  PMHx:  CABG, MI, HLD, constipation, CAD, HTN, PAD    EKG abnormal, charge RN notified.

## 2019-02-27 NOTE — ED ADULT NURSE NOTE - CHIEF COMPLAINT QUOTE
Pt brought in by EMS for dizziness and vomiting starting after eating fish and rice.  Immediately after vomiting, pt tried to eat soup and then vomited again.  Denies any chest pain/SOB/palpitations/vision changes.  PMHx:  CABG, MI, HLD, constipation, CAD, HTN, PAD    EKG abnormal, charge RN notified.

## 2019-02-27 NOTE — ED ADULT NURSE NOTE - PMH
CAD (coronary artery disease)    Constipation, unspecified constipation type    HTN (hypertension)    Hyperlipidemia    NSTEMI (non-ST elevated myocardial infarction)  2017 and February 2018  PAD (peripheral artery disease)  stent to L lower extremity artery

## 2019-02-28 DIAGNOSIS — R42 DIZZINESS AND GIDDINESS: ICD-10-CM

## 2019-02-28 DIAGNOSIS — I73.9 PERIPHERAL VASCULAR DISEASE, UNSPECIFIED: ICD-10-CM

## 2019-02-28 DIAGNOSIS — E78.5 HYPERLIPIDEMIA, UNSPECIFIED: ICD-10-CM

## 2019-02-28 DIAGNOSIS — Z29.9 ENCOUNTER FOR PROPHYLACTIC MEASURES, UNSPECIFIED: ICD-10-CM

## 2019-02-28 DIAGNOSIS — I50.22 CHRONIC SYSTOLIC (CONGESTIVE) HEART FAILURE: ICD-10-CM

## 2019-02-28 DIAGNOSIS — I65.23 OCCLUSION AND STENOSIS OF BILATERAL CAROTID ARTERIES: ICD-10-CM

## 2019-02-28 DIAGNOSIS — I25.10 ATHEROSCLEROTIC HEART DISEASE OF NATIVE CORONARY ARTERY WITHOUT ANGINA PECTORIS: ICD-10-CM

## 2019-02-28 DIAGNOSIS — Z90.49 ACQUIRED ABSENCE OF OTHER SPECIFIED PARTS OF DIGESTIVE TRACT: Chronic | ICD-10-CM

## 2019-02-28 DIAGNOSIS — I10 ESSENTIAL (PRIMARY) HYPERTENSION: ICD-10-CM

## 2019-02-28 LAB
ALBUMIN SERPL ELPH-MCNC: 4.5 G/DL — SIGNIFICANT CHANGE UP (ref 3.3–5)
ALP SERPL-CCNC: 136 U/L — HIGH (ref 40–120)
ALT FLD-CCNC: 21 U/L — SIGNIFICANT CHANGE UP (ref 4–41)
ANION GAP SERPL CALC-SCNC: 15 MMO/L — HIGH (ref 7–14)
APTT BLD: 34.8 SEC — SIGNIFICANT CHANGE UP (ref 27.5–36.3)
AST SERPL-CCNC: 20 U/L — SIGNIFICANT CHANGE UP (ref 4–40)
BASE EXCESS BLDV CALC-SCNC: -0.3 MMOL/L — SIGNIFICANT CHANGE UP
BASOPHILS # BLD AUTO: 0.02 K/UL — SIGNIFICANT CHANGE UP (ref 0–0.2)
BASOPHILS NFR BLD AUTO: 0.3 % — SIGNIFICANT CHANGE UP (ref 0–2)
BILIRUB SERPL-MCNC: 1.1 MG/DL — SIGNIFICANT CHANGE UP (ref 0.2–1.2)
BLOOD GAS VENOUS - CREATININE: 0.77 MG/DL — SIGNIFICANT CHANGE UP (ref 0.5–1.3)
BUN SERPL-MCNC: 11 MG/DL — SIGNIFICANT CHANGE UP (ref 7–23)
CALCIUM SERPL-MCNC: 9.5 MG/DL — SIGNIFICANT CHANGE UP (ref 8.4–10.5)
CHLORIDE BLDV-SCNC: 107 MMOL/L — SIGNIFICANT CHANGE UP (ref 96–108)
CHLORIDE SERPL-SCNC: 102 MMOL/L — SIGNIFICANT CHANGE UP (ref 98–107)
CO2 SERPL-SCNC: 21 MMOL/L — LOW (ref 22–31)
CREAT SERPL-MCNC: 0.88 MG/DL — SIGNIFICANT CHANGE UP (ref 0.5–1.3)
EOSINOPHIL # BLD AUTO: 0.04 K/UL — SIGNIFICANT CHANGE UP (ref 0–0.5)
EOSINOPHIL NFR BLD AUTO: 0.7 % — SIGNIFICANT CHANGE UP (ref 0–6)
GAS PNL BLDV: 137 MMOL/L — SIGNIFICANT CHANGE UP (ref 136–146)
GLUCOSE BLDV-MCNC: 127 — HIGH (ref 70–99)
GLUCOSE SERPL-MCNC: 125 MG/DL — HIGH (ref 70–99)
HCO3 BLDV-SCNC: 24 MMOL/L — SIGNIFICANT CHANGE UP (ref 20–27)
HCT VFR BLD CALC: 37.6 % — LOW (ref 39–50)
HCT VFR BLDV CALC: 38.3 % — LOW (ref 39–51)
HCV AB S/CO SERPL IA: 0.12 S/CO — SIGNIFICANT CHANGE UP (ref 0–0.79)
HCV AB SERPL-IMP: SIGNIFICANT CHANGE UP
HGB BLD-MCNC: 12.1 G/DL — LOW (ref 13–17)
HGB BLDV-MCNC: 12.4 G/DL — LOW (ref 13–17)
IMM GRANULOCYTES NFR BLD AUTO: 0.5 % — SIGNIFICANT CHANGE UP (ref 0–1.5)
INR BLD: 1.08 — SIGNIFICANT CHANGE UP (ref 0.88–1.17)
LACTATE BLDV-MCNC: 2 MMOL/L — SIGNIFICANT CHANGE UP (ref 0.5–2)
LYMPHOCYTES # BLD AUTO: 0.99 K/UL — LOW (ref 1–3.3)
LYMPHOCYTES # BLD AUTO: 16.6 % — SIGNIFICANT CHANGE UP (ref 13–44)
MCHC RBC-ENTMCNC: 27.5 PG — SIGNIFICANT CHANGE UP (ref 27–34)
MCHC RBC-ENTMCNC: 32.2 % — SIGNIFICANT CHANGE UP (ref 32–36)
MCV RBC AUTO: 85.5 FL — SIGNIFICANT CHANGE UP (ref 80–100)
MONOCYTES # BLD AUTO: 0.2 K/UL — SIGNIFICANT CHANGE UP (ref 0–0.9)
MONOCYTES NFR BLD AUTO: 3.4 % — SIGNIFICANT CHANGE UP (ref 2–14)
NEUTROPHILS # BLD AUTO: 4.67 K/UL — SIGNIFICANT CHANGE UP (ref 1.8–7.4)
NEUTROPHILS NFR BLD AUTO: 78.5 % — HIGH (ref 43–77)
NRBC # FLD: 0 K/UL — LOW (ref 25–125)
NT-PROBNP SERPL-SCNC: 1405 PG/ML — SIGNIFICANT CHANGE UP
PCO2 BLDV: 40 MMHG — LOW (ref 41–51)
PH BLDV: 7.39 PH — SIGNIFICANT CHANGE UP (ref 7.32–7.43)
PLATELET # BLD AUTO: 123 K/UL — LOW (ref 150–400)
PMV BLD: 12.9 FL — SIGNIFICANT CHANGE UP (ref 7–13)
PO2 BLDV: 38 MMHG — SIGNIFICANT CHANGE UP (ref 35–40)
POTASSIUM BLDV-SCNC: 3.7 MMOL/L — SIGNIFICANT CHANGE UP (ref 3.4–4.5)
POTASSIUM SERPL-MCNC: 3.7 MMOL/L — SIGNIFICANT CHANGE UP (ref 3.5–5.3)
POTASSIUM SERPL-SCNC: 3.7 MMOL/L — SIGNIFICANT CHANGE UP (ref 3.5–5.3)
PROT SERPL-MCNC: 7.6 G/DL — SIGNIFICANT CHANGE UP (ref 6–8.3)
PROTHROM AB SERPL-ACNC: 12 SEC — SIGNIFICANT CHANGE UP (ref 9.8–13.1)
RBC # BLD: 4.4 M/UL — SIGNIFICANT CHANGE UP (ref 4.2–5.8)
RBC # FLD: 14.1 % — SIGNIFICANT CHANGE UP (ref 10.3–14.5)
SAO2 % BLDV: 69.4 % — SIGNIFICANT CHANGE UP (ref 60–85)
SODIUM SERPL-SCNC: 138 MMOL/L — SIGNIFICANT CHANGE UP (ref 135–145)
TROPONIN T, HIGH SENSITIVITY: 14 NG/L — SIGNIFICANT CHANGE UP (ref ?–14)
TROPONIN T, HIGH SENSITIVITY: 9 NG/L — SIGNIFICANT CHANGE UP (ref ?–14)
WBC # BLD: 5.95 K/UL — SIGNIFICANT CHANGE UP (ref 3.8–10.5)
WBC # FLD AUTO: 5.95 K/UL — SIGNIFICANT CHANGE UP (ref 3.8–10.5)

## 2019-02-28 PROCEDURE — 70498 CT ANGIOGRAPHY NECK: CPT | Mod: 26

## 2019-02-28 PROCEDURE — 93306 TTE W/DOPPLER COMPLETE: CPT | Mod: 26

## 2019-02-28 PROCEDURE — 70496 CT ANGIOGRAPHY HEAD: CPT | Mod: 26

## 2019-02-28 PROCEDURE — 71045 X-RAY EXAM CHEST 1 VIEW: CPT | Mod: 26

## 2019-02-28 PROCEDURE — 70551 MRI BRAIN STEM W/O DYE: CPT | Mod: 26

## 2019-02-28 PROCEDURE — 99222 1ST HOSP IP/OBS MODERATE 55: CPT | Mod: GC

## 2019-02-28 RX ORDER — ASPIRIN/CALCIUM CARB/MAGNESIUM 324 MG
81 TABLET ORAL DAILY
Qty: 0 | Refills: 0 | Status: DISCONTINUED | OUTPATIENT
Start: 2019-02-28 | End: 2019-03-01

## 2019-02-28 RX ORDER — LISINOPRIL 2.5 MG/1
5 TABLET ORAL DAILY
Qty: 0 | Refills: 0 | Status: DISCONTINUED | OUTPATIENT
Start: 2019-02-28 | End: 2019-03-01

## 2019-02-28 RX ORDER — ATORVASTATIN CALCIUM 80 MG/1
80 TABLET, FILM COATED ORAL AT BEDTIME
Qty: 0 | Refills: 0 | Status: DISCONTINUED | OUTPATIENT
Start: 2019-02-28 | End: 2019-03-01

## 2019-02-28 RX ORDER — CLONAZEPAM 1 MG
0.5 TABLET ORAL
Qty: 0 | Refills: 0 | Status: DISCONTINUED | OUTPATIENT
Start: 2019-02-28 | End: 2019-02-28

## 2019-02-28 RX ORDER — ISOSORBIDE MONONITRATE 60 MG/1
60 TABLET, EXTENDED RELEASE ORAL DAILY
Qty: 0 | Refills: 0 | Status: DISCONTINUED | OUTPATIENT
Start: 2019-02-28 | End: 2019-03-01

## 2019-02-28 RX ORDER — MECLIZINE HCL 12.5 MG
12.5 TABLET ORAL ONCE
Qty: 0 | Refills: 0 | Status: COMPLETED | OUTPATIENT
Start: 2019-02-28 | End: 2019-02-28

## 2019-02-28 RX ORDER — METOCLOPRAMIDE HCL 10 MG
5 TABLET ORAL
Qty: 0 | Refills: 0 | Status: DISCONTINUED | OUTPATIENT
Start: 2019-02-28 | End: 2019-03-01

## 2019-02-28 RX ORDER — SODIUM CHLORIDE 9 MG/ML
1000 INJECTION INTRAMUSCULAR; INTRAVENOUS; SUBCUTANEOUS
Qty: 0 | Refills: 0 | Status: DISCONTINUED | OUTPATIENT
Start: 2019-02-28 | End: 2019-03-01

## 2019-02-28 RX ORDER — CARVEDILOL PHOSPHATE 80 MG/1
12.5 CAPSULE, EXTENDED RELEASE ORAL EVERY 12 HOURS
Qty: 0 | Refills: 0 | Status: DISCONTINUED | OUTPATIENT
Start: 2019-02-28 | End: 2019-03-01

## 2019-02-28 RX ORDER — HEPARIN SODIUM 5000 [USP'U]/ML
5000 INJECTION INTRAVENOUS; SUBCUTANEOUS EVERY 12 HOURS
Qty: 0 | Refills: 0 | Status: DISCONTINUED | OUTPATIENT
Start: 2019-02-28 | End: 2019-03-01

## 2019-02-28 RX ORDER — RANOLAZINE 500 MG/1
1 TABLET, FILM COATED, EXTENDED RELEASE ORAL
Qty: 0 | Refills: 0 | COMMUNITY

## 2019-02-28 RX ORDER — PANTOPRAZOLE SODIUM 20 MG/1
40 TABLET, DELAYED RELEASE ORAL
Qty: 0 | Refills: 0 | Status: DISCONTINUED | OUTPATIENT
Start: 2019-02-28 | End: 2019-03-01

## 2019-02-28 RX ORDER — PRASUGREL 5 MG/1
10 TABLET, FILM COATED ORAL DAILY
Qty: 0 | Refills: 0 | Status: DISCONTINUED | OUTPATIENT
Start: 2019-02-28 | End: 2019-03-01

## 2019-02-28 RX ORDER — FUROSEMIDE 40 MG
40 TABLET ORAL DAILY
Qty: 0 | Refills: 0 | Status: DISCONTINUED | OUTPATIENT
Start: 2019-02-28 | End: 2019-03-01

## 2019-02-28 RX ORDER — MECLIZINE HCL 12.5 MG
25 TABLET ORAL EVERY 8 HOURS
Qty: 0 | Refills: 0 | Status: DISCONTINUED | OUTPATIENT
Start: 2019-02-28 | End: 2019-03-01

## 2019-02-28 RX ORDER — LORATADINE 10 MG/1
1 TABLET ORAL
Qty: 0 | Refills: 0 | COMMUNITY

## 2019-02-28 RX ADMIN — Medication 12.5 MILLIGRAM(S): at 00:33

## 2019-02-28 RX ADMIN — ATORVASTATIN CALCIUM 80 MILLIGRAM(S): 80 TABLET, FILM COATED ORAL at 22:12

## 2019-02-28 RX ADMIN — PRASUGREL 10 MILLIGRAM(S): 5 TABLET, FILM COATED ORAL at 12:35

## 2019-02-28 RX ADMIN — HEPARIN SODIUM 5000 UNIT(S): 5000 INJECTION INTRAVENOUS; SUBCUTANEOUS at 18:07

## 2019-02-28 RX ADMIN — CARVEDILOL PHOSPHATE 12.5 MILLIGRAM(S): 80 CAPSULE, EXTENDED RELEASE ORAL at 18:07

## 2019-02-28 RX ADMIN — ISOSORBIDE MONONITRATE 60 MILLIGRAM(S): 60 TABLET, EXTENDED RELEASE ORAL at 12:44

## 2019-02-28 RX ADMIN — SODIUM CHLORIDE 500 MILLILITER(S): 9 INJECTION INTRAMUSCULAR; INTRAVENOUS; SUBCUTANEOUS at 01:30

## 2019-02-28 RX ADMIN — Medication 0.5 MILLIGRAM(S): at 18:07

## 2019-02-28 RX ADMIN — Medication 81 MILLIGRAM(S): at 12:35

## 2019-02-28 RX ADMIN — Medication 40 MILLIGRAM(S): at 12:35

## 2019-02-28 RX ADMIN — LISINOPRIL 5 MILLIGRAM(S): 2.5 TABLET ORAL at 12:35

## 2019-02-28 RX ADMIN — SODIUM CHLORIDE 125 MILLILITER(S): 9 INJECTION INTRAMUSCULAR; INTRAVENOUS; SUBCUTANEOUS at 10:48

## 2019-02-28 NOTE — H&P ADULT - NEGATIVE GENERAL GENITOURINARY SYMPTOMS
no urinary hesitancy/no renal colic/no dysuria/normal urinary frequency/no flank pain R/no flank pain L/no incontinence/no hematuria/no bladder infections

## 2019-02-28 NOTE — H&P ADULT - NSHPSOCIALHISTORY_GEN_ALL_CORE
Marital status: . Has 2 adult children.  Living situation: Lives in a house with his wife.  Occupation: Works at [employer]. Out of work for the past month due to health issues.  Tobacco use: Denies.  Alcohol use: Denies.  Substance use: Denies.

## 2019-02-28 NOTE — H&P ADULT - ASSESSMENT
68 year old male, PmHx of CAD s/p NSTEMI (2017 and 2018), multiple stents (last Feb 2019 [facility]) and CABG x 3 (2002), PAD s/p LLE stent 2016, HTN, and HLD, presenting with multiple episodes of dizziness and vomiting since last night. Patient found to have multiple vessel stenosis on head CT and is admitted to telemetry for further workup and monitoring. 68 year old male, PmHx of CAD s/p NSTEMI (2017 and 2018), multiple stents (last Feb 2019 [facility]) and CABG x 3 (2002), PAD s/p LLE stent 2016, HTN, and HLD, presenting with multiple episodes of dizziness and vomiting with multiple vessel cerebral vasculature stenosis

## 2019-02-28 NOTE — H&P ADULT - PROBLEM SELECTOR PLAN 1
Possibly related to vertebral stenosis vs peripheral vertigo.  Admitted to telemetry. Neurology consulted and following.  Clonopin 0.5 BID PRN for vertigo.  Reglan PRN for nausea/vomiting.  IV fluids for rehydration. Possibly related to vertebral stenosis vs peripheral vertigo.  Admitted to telemetry. Neurology consulted and following.  Clonopin 0.5 BID PRN for vertigo.  Reglan PRN for nausea/vomiting.  IV fluids for rehydration.  Consider MRI  Case dw Dr Bustillos Possibly related to vertebral stenosis vs peripheral vertigo.  Admitted to telemetry. Neurology consulted and following.  Clonopin 0.5 BID PRN for vertigo.  Reglan PRN for nausea/vomiting.  IV fluids for rehydration.  Consider MRI  Case dw Dr Bustillos- as per attending order MRI and TTE

## 2019-02-28 NOTE — H&P ADULT - NEGATIVE GENERAL SYMPTOMS
no malaise/no weight gain/no polyuria/no polydipsia/no chills/no anorexia/no polyphagia/no weight loss/no sweating/no fever

## 2019-02-28 NOTE — H&P ADULT - PSH
History of coronary angiogram  multiple stents placed  History of femoral angiogram  stent placed in LLE 2016  History of laparoscopic cholecystectomy  2016  S/P CABG (coronary artery bypass graft)  x3; [facility] 2002

## 2019-02-28 NOTE — H&P ADULT - PROBLEM SELECTOR PLAN 4
Continue Lisinopril 5 mg PO daily and Coreg.   Monitor BP and adjust medications as necessary.  DASH diet.

## 2019-02-28 NOTE — ED PROVIDER NOTE - CLINICAL SUMMARY MEDICAL DECISION MAKING FREE TEXT BOX
67 yo M with a PMH CAD s/p multiple stents (last 2/10/19 at [facility]) and multiple vessel CABG 2002, HTN, HLD who presents with vomiting and dizziness after recent cath with Impella 2.5 weeks ago. Patient is at high risk for stroke but without focal neuro deficits, less likely. Given Reglan and fluids with some improvement, but now starting to worsen slightly. Will give Meclizine, check CXR due to exam findings, labs 67 yo M c CAD s/p multiple stents (last 2/10/19 at [facility]) and multiple vessel CABG 2002, HTN, HLD who presents with vomiting and dizziness after recent cath with Impella 2.5 weeks ago. Patient is at high risk for stroke but without focal neuro deficits, somewhat less likely. Given Reglan and fluids with some improvement, but now starting to worsen slightly. Will give Meclizine, check CXR due to exam findings, labs.

## 2019-02-28 NOTE — H&P ADULT - ATTENDING COMMENTS
EKG - NSR LVH  Echo 3/18 - mod segmental LV dysfunction    a/p     1) CAD s/p CABG s/p PCI - s/p PCI of SVG to OM , CONT ASA EFFIENT     2) Vertigo - MRI brain negative for acute stroke shows old lacunar infarct, CTA shows multiple arteries with intra cranial stenosis , meclizine prn vertigo , neuro consult appreciated     3) PVD - s/p stent to external Iliac , cont asa and effient    3)

## 2019-02-28 NOTE — ED PROVIDER NOTE - ATTENDING CONTRIBUTION TO CARE
Attending Attestation: Dr. Paul  I have personally performed a history and physical examination of the patient and discussed management with the resident as well as the patient.  I reviewed the resident's note and agree with the documented findings and plan of care.  I have authored and modified critical sections of the Provider Note, including but not limited to HPI, Physical Exam and MDM. 67 yo M c CAD s/p multiple stents (last 2/10/19 at [facility]) and multiple vessel CABG 2002, HTN, HLD who presents with vomiting and dizziness after recent cath with Impella 2.5 weeks ago. Patient is at high risk for stroke but without focal neuro deficits, somewhat less likely. Given Reglan and fluids with some improvement, but now starting to worsen slightly. Will give Meclizine, check CXR due to exam findings, labs.

## 2019-02-28 NOTE — H&P ADULT - NSHPOUTPATIENTPROVIDERS_GEN_ALL_CORE
PCP: Dr. Luu [phone]  GI: Dr. Olga Lidia Seals [phone]  Cardio: Dr. Ulisses Abarca [phone]

## 2019-02-28 NOTE — H&P ADULT - NEGATIVE CARDIOVASCULAR SYMPTOMS
no dyspnea on exertion/no palpitations/no peripheral edema/no claudication/no orthopnea/no paroxysmal nocturnal dyspnea

## 2019-02-28 NOTE — H&P ADULT - NEGATIVE ENMT SYMPTOMS
No no dysphagia/no ear pain/no sinus symptoms/no nasal congestion/no nasal obstruction/no post-nasal discharge/no nose bleeds/no dry mouth/no nasal discharge/no recurrent cold sores/no hearing difficulty/no tinnitus/no throat pain

## 2019-02-28 NOTE — CONSULT NOTE ADULT - SUBJECTIVE AND OBJECTIVE BOX
Neurology Consult    Reason for consult: Intracranial stenosis    HPI: Patient is a 68 year old right handed Urdu male presenting with vertigo. Patient has PMH of CAD (s/p stents and CABG), L anterior BG stroke (no symptoms), HLD, HTN. Patient states he was sleeping around 5pm when he woke up and felt vertiginous. He had nausea and vomiting. Episode would last several minutes and resolve spontaneously, worse with head movement and sitting up. Better when laying still. Symptoms improved while in ED although still vertiginous when sitting up. Reports generalized weakness.    REVIEW OF SYSTEMS:  Constitutional: No fever, chills, fatigue, weakness.  Eyes: No eye pain, visual disturbances, or discharge.  ENT:  No difficulty hearing, tinnitus, vertigo. No sinus or throat pain.  Neck: No pain or stiffness.  Respiratory: No cough, dyspnea, wheezing.  Cardiovascular: No chest pain, palpitations.  Gastrointestinal: No abdominal pain. No nausea, vomiting, diarrhea, or constipation.   Genitourinary: No dysuria, frequency, hematuria or incontinence.  Neurological: No headaches, lightheadedness, numbness or tremors. vertigo  Psychiatric: No depression, anxiety, mood swings, or difficulty sleeping.  Musculoskeletal: No joint pain or swelling. No muscle, back, or extremity pain.  Skin: No itching, burning, rashes or lesions.   Lymph Nodes: No enlarged glands  Endocrine: No heat or cold intolerance, no hair loss.  Allergy and Immunologic: No hives or eczema.    MEDICATIONS  ASA  Plavix    PMH: NSTEMI (non-ST elevated myocardial infarction)  Hyperlipidemia  Constipation, unspecified constipation type  S/P CABG x 3  PAD (peripheral artery disease)  HTN (hypertension)  CAD (coronary artery disease)     PSH: History of femoral angiogram  History of coronary angiogram  S/P CABG (coronary artery bypass graft)    FAMILY HISTORY:  Family history of stroke: 58yo CVA, heart attack 59yo  Family history of coronary artery disease in brother (Sibling): 4brothers with MI/CABG, 1  at 79yo  No history of dementia, or seizures     SOCIAL HISTORY:  No history of tobacco or alcohol use     Allergies  Broccoli (Anaphylaxis)  No Known Drug Allergies  shellfish (Anaphylaxis)    Vital Signs Last 24 Hrs  T(C): 36.8 (2019 06:34), Max: 36.8 (2019 06:34)  T(F): 98.3 (2019 06:34), Max: 98.3 (2019 06:34)  HR: 72 (2019 06:34) (69 - 84)  BP: 134/63 (2019 06:34) (119/58 - 167/72)  RR: 16 (2019 06:34) (15 - 18)  SpO2: 99% (2019 06:34) (97% - 100%)    Neurological Examination:    Mental Status: Patient is alert, awake, oriented X3. Patient is fluent, no dysarthria, no aphasia. Follows commands well and able to answer questions appropriately. Mood and affect normal.    Cranial Nerves: PERRL, EOMI, visual field intact, no nystagmus. V1-V3 intact, no gross facial asymmetry, tongue/uvula midline  Reggie hallpike negative     Motor Exam: No drift  Right upper extremity: 5/5  Left upper extremity: 5/5  Right lower extremity: 5/5  Left lower extremity: 5/5    Normal bulk/tone    Sensory: Intact to light touch bilaterally. No extinction    Coordination: Finger to nose intact bilaterally     Gait: Unable to test as patient felt unsteady when sitting up.    Reflexes: Bilateral 2+ Biceps, Brachial, Patellar  Plantar: Down bilateral    GENERAL: No acute distress  HEENT:  Normocephalic, atraumatic  EXTREMITIES: No edema, clubbing, cyanosis  MUSCULOSKELETAL: Normal range of motion  SKIN: No rashes    LABS:  CBC Full  -  ( 2019 23:45 )  WBC Count : 5.95 K/uL  Hemoglobin : 12.1 g/dL  Hematocrit : 37.6 %  Platelet Count - Automated : 123 K/uL  Mean Cell Volume : 85.5 fL  Mean Cell Hemoglobin : 27.5 pg  Mean Cell Hemoglobin Concentration : 32.2 %  Auto Neutrophil # : 4.67 K/uL  Auto Lymphocyte # : 0.99 K/uL  Auto Monocyte # : 0.20 K/uL  Auto Eosinophil # : 0.04 K/uL  Auto Basophil # : 0.02 K/uL  Auto Neutrophil % : 78.5 %  Auto Lymphocyte % : 16.6 %  Auto Monocyte % : 3.4 %  Auto Eosinophil % : 0.7 %  Auto Basophil % : 0.3 %        138  |  102  |  11  ----------------------------<  125<H>  3.7   |  21<L>  |  0.88    Ca    9.5      2019 23:45    TPro  7.6  /  Alb  4.5  /  TBili  1.1  /  DBili  x   /  AST  20  /  ALT  21  /  AlkPhos  136<H>  02    LIVER FUNCTIONS - ( 2019 23:45 )  Alb: 4.5 g/dL / Pro: 7.6 g/dL / ALK PHOS: 136 u/L / ALT: 21 u/L / AST: 20 u/L / GGT: x           PT/INR - ( 2019 23:45 )   PT: 12.0 SEC;   INR: 1.08       PTT - ( 2019 23:45 )  PTT:34.8 SEC

## 2019-02-28 NOTE — CONSULT NOTE ADULT - ATTENDING COMMENTS
Patient seen and examined with neurology team and above note reviewed and I agree with assessment and plan as outlined. Patient hx as noted and presented with acute vertigo and nausea and vomiting made worse with movement and attempting to sit up or stand.  No nystagmus noted and no finger to nose dysmetria noted or focal weakness or numbness.  CTA head revealed multiple regions and segments of intracranial stenosis and so will proceed with MRI brain to rule out an acute infarct that may potentially causing his symptoms.  Continue fluids and symptomatic management of his vertigo/dizziness.   PT evaluation and continue medical mgt and supportive care. All questions answered.

## 2019-02-28 NOTE — ED PROVIDER NOTE - PSH
History of coronary angiogram  multiple stents placed  History of femoral angiogram  stent placed in Left leg  S/P CABG (coronary artery bypass graft)  3V CABG; [facility]

## 2019-02-28 NOTE — H&P ADULT - NEUROLOGICAL DETAILS
sensation intact/no spontaneous movement/normal strength/alert and oriented x 3/responds to pain/responds to verbal commands

## 2019-02-28 NOTE — ED PROVIDER NOTE - CARE PLAN
Principal Discharge DX:	Carotid artery stenosis, symptomatic, bilateral  Assessment and plan of treatment:	Nausea/dizziness. Improvement in symptoms, f/u neuro (consulted already, will see in AM), monitor on telemetry.

## 2019-02-28 NOTE — ED PROVIDER NOTE - PLAN OF CARE
Nausea/dizziness. Improvement in symptoms, f/u neuro (consulted already, will see in AM), monitor on telemetry.

## 2019-02-28 NOTE — H&P ADULT - HISTORY OF PRESENT ILLNESS
This is a 68 year old male, PmHx of CAD s/p NSTEMI (2017 and 2018), multiple stents (last Feb 2019 [facility]) and CABG x 3 (2002), PAD s/p LLE stent 2016, HTN, and HLD, presenting with multiple episodes of dizziness and vomiting since last night. Patient states the episodes starting around 5 PM last night when he woke up from sleeping. When he opened his eyes the room was spinning and he had 1 episode of NBNB emesis. Patient describes the emesis as white/yellow in color mixed with food and was about 2 cups in quantity. The dizziness lasted about 10 minutes and was associated with a band-like 3/10 pressure headache on his right side. He went back to sleep and woke up at 7 PM to the same symptoms and 2 episodes of NBNB emesis. The dizziness is worse with movement with no alleviating factors. He called 911 and states he had 1 episode of emesis in the ambulance and 1 since being in the ER. Patient states when vomiting he gets a left sided, non-radiating, non-pleuritic, non-reproducible 5/10 chest pain. He also complains of LLE "biting" pain when he is sleeping at night almost every night for 3-4 years. Of note, patient was recently admitted to [facility] (Feb 10-11 2019) for CP and SOB x 1 month, found to have a mild heart attack and they placed a stent. He states symptoms have since resolved. Patient states he feels generalized weakness from the vomiting. Patient denies SOB, orthopnea, abdominal pain, hematemesis, weight changes, numbness/tingling, cough, sinus symptoms, dysphagia, fevers, chills, leg edema, dysuria, urinary frequency, diarrhea, syncope, or change in appetite. This is a 68 year old male, PmHx of CAD s/p NSTEMI (2017 and 2018), multiple stents (last Feb 2019 [facility]) and CABG x 3 (2002), PAD s/p LLE stent 2016, HTN, and HLD, presenting with multiple episodes of dizziness and vomiting since last night. Patient states the episodes started around 5 PM last night when he woke up from sleeping. When he opened his eyes the room was spinning and he had 1 episode of NBNB emesis. Patient describes the emesis as white/yellow in color mixed with food and was about 2 cups in quantity. The dizziness lasted about 10 minutes and was associated with a band-like 3/10 pressure headache on his right side. He went back to sleep and woke up at 7 PM to the same symptoms and 2 episodes of NBNB emesis. The dizziness is worse with movement with no alleviating factors. He called 911 and states he had 1 episode of emesis in the ambulance and 1 since being in the ER. Patient states when vomiting he gets a left sided, non-radiating, non-pleuritic, non-reproducible 5/10 chest pain. He also complains of LLE "biting" pain when he is sleeping at night almost every night for 3-4 years. Of note, patient was recently admitted to [facility] (Feb 10-11 2019) for CP and SOB x 1 month, found to have a mild heart attack and they placed a stent. He states symptoms have since resolved. Patient states he feels generalized weakness from the vomiting. Patient denies SOB, orthopnea, abdominal pain, hematemesis, weight changes, numbness/tingling, cough, sinus symptoms, dysphagia, fevers, chills, leg edema, dysuria, urinary frequency, diarrhea, syncope, or change in appetite. 68 year old male, PmHx of CAD s/p NSTEMI (2017 and 2018), multiple stents (last Feb 2019 [facility]) and CABG x 3 (2002), PAD s/p LLE stent 2016, HTN, and HLD, presenting with multiple episodes of dizziness and vomiting since last night. Patient states the episodes started around 5 PM last night when he woke up from sleeping. When he opened his eyes the room was spinning and he had 1 episode of NBNB emesis. Patient describes the emesis as white/yellow in color mixed with food and was about 2 cups in quantity. The dizziness lasted about 10 minutes and was associated with a band-like 3/10 pressure headache on his right side. He went back to sleep and woke up at 7 PM to the same symptoms and 2 episodes of NBNB emesis. The dizziness is worse with movement with no alleviating factors. He called 911 and states he had 1 episode of emesis in the ambulance and 1 since being in the ER. Patient states when vomiting, he gets a left sided, non-radiating, non-pleuritic, non-reproducible 5/10 chest pain.   He also complains of LLE "biting" pain when he is sleeping at night almost every night for 3-4 years. Of note, patient was recently admitted to [facility] (Feb 10-11 2019) for CP and SOB x 1 month, found to have a mild heart attack and they placed a stent. He states symptoms have since resolved. Patient states he feels generalized weakness from the vomiting. Patient denies SOB, orthopnea, abdominal pain, hematemesis, weight changes, numbness/tingling, cough, sinus symptoms, dysphagia, fevers, chills, leg edema, dysuria, urinary frequency, diarrhea, syncope, or change in appetite. 68 year old male, PmHx of CAD s/p NSTEMI (2017 and 2018), multiple stents (last Feb 2019 [facility]) and CABG x 3 (2002), PAD s/p LLE stent 2016, HTN, and HLD, presenting with multiple episodes of dizziness and vomiting since last night. Patient states the episodes started around 5 PM last night when he woke up from sleeping. When he opened his eyes the room was spinning and he had 1 episode of NBNB emesis. Patient describes the emesis as white/yellow in color mixed with food and was about 2 cups in quantity. The dizziness lasted about 10 minutes and was associated with a band-like 3/10 pressure headache on his right side. He went back to sleep and woke up at 7 PM to the same symptoms and 2 episodes of NBNB emesis. The dizziness is worse with movement with no alleviating factors. He called 911 and states he had 1 episode of emesis in the ambulance and 1 since being in the ER. Patient states when vomiting, he gets a left sided, non-radiating, non-pleuritic, non-reproducible "pressure like "5/10 chest pain lasting 5-10 minutes which occur right after vomiting. He also complains of LLE "biting" pain when he is sleeping at night almost every night for 3-4 years. Of note, patient was recently admitted to [facility] (Feb 10-11 2019) for CP and SOB x 1 month, found to have a mild heart attack and they placed a stent. He states symptoms have since resolved. Patient states he feels generalized weakness from the vomiting. Patient denies SOB, orthopnea, abdominal pain, hematemesis, weight changes, numbness/tingling, cough, sinus symptoms, dysphagia, fevers, chills, leg edema, dysuria, urinary frequency, diarrhea, syncope, or change in appetite.

## 2019-02-28 NOTE — H&P ADULT - NEGATIVE MUSCULOSKELETAL SYMPTOMS
no joint swelling/no myalgia/no stiffness/no arm pain R/no leg pain L/no leg pain R/no arm pain L/no muscle weakness/no arthralgia/no arthritis

## 2019-02-28 NOTE — H&P ADULT - PROBLEM SELECTOR PLAN 6
Continue Celebrex 200 mg PO BID, aspirin, statin, and prasugrel. Continue  aspirin, statin, and prasugrel.

## 2019-02-28 NOTE — CONSULT NOTE ADULT - ASSESSMENT
68 year old right handed [facility] male presenting with vertigo. Patient has PMH of CAD (s/p stents and CABG), L anterior BG stroke (no symptoms), HLD, HTN. Patient states he was sleeping around 5pm when he woke up and felt vertiginous. He had nausea and vomiting. Episode would last several minutes and resolve spontaneously, worse with head movement and sitting up. Better when laying still. Symptoms improved while in ED although still vertiginous when sitting up. Reports generalized weakness.  Nonfocal neuro exam.  CTH showed age indeterminate left anterior BG stroke. Multiple vessels with moderate to severe stenosis: Left proximal cervical internal carotid artery at the carotid bifurcation, approximately 50% stenosis. Bilateral distal cervical internal carotid arteries, approximately 50% stenosis. Right internal carotid artery distal petrous and lacerous segments, up to   80% stenosis. Left ICA distal petrous and proximal cavernous segments, approximately 50% stenosis. Right vertebral artery V4 segment multifocal severe stenoses. Left vertebral artery V4 segment moderate stenosis.    Peripheral vertigo with incidental finding of multiple asymptomatic vessel stenosis    Recommend:  Clonopin 0.5 BID PRN for vertigo  Reglan PRN  IV fluids  Outpatient vestibular therapy  Outpatient follow up with neurology at [address] [place] [address] [phone] 68 year old right handed [facility] male presenting with vertigo. Patient has PMH of CAD (s/p stents and CABG), L anterior BG stroke (no symptoms), HLD, HTN. Patient states he was sleeping around 5pm when he woke up and felt vertiginous. He had nausea and vomiting. Episode would last several minutes and resolve spontaneously, worse with head movement and sitting up. Better when laying still. Symptoms improved while in ED although still vertiginous when sitting up. Reports generalized weakness.  Nonfocal neuro exam.  CTH showed age indeterminate left anterior BG stroke. Multiple vessels with moderate to severe stenosis: Left proximal cervical internal carotid artery at the carotid bifurcation, approximately 50% stenosis. Bilateral distal cervical internal carotid arteries, approximately 50% stenosis. Right internal carotid artery distal petrous and lacerous segments, up to   80% stenosis. Left ICA distal petrous and proximal cavernous segments, approximately 50% stenosis. Right vertebral artery V4 segment multifocal severe stenoses. Left vertebral artery V4 segment moderate stenosis.    Peripheral vertigo with incidental finding of multiple asymptomatic vessel stenosis    Recommend:    Clonopin 0.5 BID PRN for vertigo  Reglan PRN  IV fluids  MRI brain to rule out ischemic event  Outpatient vestibular therapy if MRI negative for infarct  Outpatient follow up with neurology at [address] [place] [address] [phone]

## 2019-02-28 NOTE — H&P ADULT - FAMILY HISTORY
Family history of stroke, 60yo CVA, heart attack 61yo     Sibling  Still living? No  Family history of coronary artery disease in brother, Age at diagnosis: Age Unknown  Family history of coronary artery disease in sister, Age at diagnosis: Age Unknown     Mother  Still living? No  Family history of MI (myocardial infarction), Age at diagnosis: Age Unknown

## 2019-02-28 NOTE — H&P ADULT - PMH
CAD (coronary artery disease)    Constipation, unspecified constipation type    HTN (hypertension)    Hyperlipidemia    NSTEMI (non-ST elevated myocardial infarction)  2017 and February 2018 and 2019  PAD (peripheral artery disease)  stent to L lower extremity artery

## 2019-02-28 NOTE — H&P ADULT - NEGATIVE OPHTHALMOLOGIC SYMPTOMS
no discharge L/no pain R/no loss of vision L/no scleral injection L/no scleral injection R/no blurred vision R/no irritation L/no irritation R/no loss of vision R/no diplopia/no photophobia/no lacrimation L/no lacrimation R/no blurred vision L/no discharge R/no pain L

## 2019-02-28 NOTE — ED PROVIDER NOTE - OBJECTIVE STATEMENT
67 yo M with a PMH CAD s/p multiple stents (last 2/10/19 at [facility]) and multiple vessel CABG 2002, HTN, HLD who presents with vomiting and dizziness. The symptoms started suddenly at 5PM (7 hours ago) as he was lying down. He felt the room spinning and has vomited NBNB emesis multiple times since. He was feeling some chest tightness after vomiting and has started coughing some recently. Breathing is otherwise good. Last BM was this morning, no abdominal pain. No blood in stool.  Of note, he was at [facility] from 2/10-11/2019 (2.5 weeks ago) for chest pain, found to have a "mild heart attack" with Impella placement and stent placement. 69 yo M with CAD s/p multiple stents (last 2/10/19 at [facility]) and multiple vessel CABG 2002, HTN, HLD who presents with vomiting and dizziness. The symptoms started suddenly at 5PM (7 hours ago) as he was lying down. He felt the room spinning and has vomited NBNB emesis multiple times since. He was feeling some chest tightness after vomiting and has started coughing some recently. Breathing is otherwise good. Last BM was this morning, no abdominal pain. No blood in stool. Of note, he was at [facility] from 2/10-11/2019 (2.5 weeks ago) for chest pain, found to have a "mild heart attack" with Impella placement and stent placement.  Well appearing at present. Is dizzy only when he moves his head

## 2019-02-28 NOTE — H&P ADULT - NEGATIVE ALLERGY TYPES
no reactions to medicines/no reactions to animals/no outdoor environmental allergies/no indoor environmental allergies

## 2019-02-28 NOTE — ED PROVIDER NOTE - FAMILY HISTORY
Family history of stroke, 58yo CVA, heart attack 59yo     Sibling  Still living? Yes, Estimated age: Age Unknown  Family history of coronary artery disease in brother, Age at diagnosis: Age Unknown

## 2019-02-28 NOTE — H&P ADULT - PROBLEM SELECTOR PLAN 2
Continue Prasugrel 10 mg PO daily, Aspirin 81 mg PO daily, Atovastatin 80 mg PO daily, and Coreg 12.5 mg PO BID.  Order [id number] and FLP.

## 2019-02-28 NOTE — ED PROVIDER NOTE - PROGRESS NOTE DETAILS
Feeling better, no nausea and dizziness unless moving. CTA shows multiple levels of stenoses, particularly at the different levels of the ICA b/l and vertebral arteries. Will plan for admission, notified pt. Also consulted Neuro, will see in AM. Feeling better, no nausea and dizziness unless moving. CTA shows multiple levels of stenoses, particularly at the different levels of the ICA b/l and vertebral arteries. Will admit to Dr. Ulisses Abarca (spoke with him, pt's PCP), notified pt. Also consulted Neuro, who will see in AM.

## 2019-02-28 NOTE — H&P ADULT - PROBLEM SELECTOR PLAN 3
Continue Isosorbide mononitrate 60 mg PO daily and Lasix 40 mg PO daily  I&Os and daily weights. Continue Isosorbide mononitrate 60 mg PO daily and Lasix 40 mg PO daily, coreg, ACEI  I&Os and daily weights.

## 2019-02-28 NOTE — H&P ADULT - NEGATIVE NEUROLOGICAL SYMPTOMS
no focal seizures/no transient paralysis/no tremors/no loss of sensation/no syncope/no confusion/no paresthesias/no loss of consciousness/no generalized seizures

## 2019-02-28 NOTE — H&P ADULT - RS GEN PE MLT RESP DETAILS PC
good air movement/no rhonchi/no wheezes/breath sounds equal/respirations non-labored/clear to auscultation bilaterally/no rales/no chest wall tenderness/airway patent

## 2019-03-01 ENCOUNTER — OUTPATIENT (OUTPATIENT)
Dept: OUTPATIENT SERVICES | Facility: HOSPITAL | Age: 69
LOS: 1 days | End: 2019-03-01
Payer: MEDICARE

## 2019-03-01 ENCOUNTER — TRANSCRIPTION ENCOUNTER (OUTPATIENT)
Age: 69
End: 2019-03-01

## 2019-03-01 VITALS
HEART RATE: 65 BPM | DIASTOLIC BLOOD PRESSURE: 72 MMHG | SYSTOLIC BLOOD PRESSURE: 123 MMHG | RESPIRATION RATE: 18 BRPM | OXYGEN SATURATION: 98 %

## 2019-03-01 DIAGNOSIS — Z95.1 PRESENCE OF AORTOCORONARY BYPASS GRAFT: Chronic | ICD-10-CM

## 2019-03-01 DIAGNOSIS — Z98.890 OTHER SPECIFIED POSTPROCEDURAL STATES: Chronic | ICD-10-CM

## 2019-03-01 DIAGNOSIS — Z86.79 PERSONAL HISTORY OF OTHER DISEASES OF THE CIRCULATORY SYSTEM: Chronic | ICD-10-CM

## 2019-03-01 DIAGNOSIS — Z71.89 OTHER SPECIFIED COUNSELING: ICD-10-CM

## 2019-03-01 DIAGNOSIS — Z90.49 ACQUIRED ABSENCE OF OTHER SPECIFIED PARTS OF DIGESTIVE TRACT: Chronic | ICD-10-CM

## 2019-03-01 PROBLEM — I21.4 NON-ST ELEVATION (NSTEMI) MYOCARDIAL INFARCTION: Chronic | Status: ACTIVE | Noted: 2018-03-20

## 2019-03-01 LAB
ANION GAP SERPL CALC-SCNC: 10 MMO/L — SIGNIFICANT CHANGE UP (ref 7–14)
BASOPHILS # BLD AUTO: 0.03 K/UL — SIGNIFICANT CHANGE UP (ref 0–0.2)
BASOPHILS NFR BLD AUTO: 0.8 % — SIGNIFICANT CHANGE UP (ref 0–2)
BUN SERPL-MCNC: 14 MG/DL — SIGNIFICANT CHANGE UP (ref 7–23)
CALCIUM SERPL-MCNC: 8.5 MG/DL — SIGNIFICANT CHANGE UP (ref 8.4–10.5)
CHLORIDE SERPL-SCNC: 111 MMOL/L — HIGH (ref 98–107)
CO2 SERPL-SCNC: 22 MMOL/L — SIGNIFICANT CHANGE UP (ref 22–31)
CREAT SERPL-MCNC: 0.94 MG/DL — SIGNIFICANT CHANGE UP (ref 0.5–1.3)
EOSINOPHIL # BLD AUTO: 0.12 K/UL — SIGNIFICANT CHANGE UP (ref 0–0.5)
EOSINOPHIL NFR BLD AUTO: 3.4 % — SIGNIFICANT CHANGE UP (ref 0–6)
GLUCOSE SERPL-MCNC: 93 MG/DL — SIGNIFICANT CHANGE UP (ref 70–99)
HCT VFR BLD CALC: 33.5 % — LOW (ref 39–50)
HGB BLD-MCNC: 11 G/DL — LOW (ref 13–17)
IMM GRANULOCYTES NFR BLD AUTO: 0 % — SIGNIFICANT CHANGE UP (ref 0–1.5)
LYMPHOCYTES # BLD AUTO: 1.33 K/UL — SIGNIFICANT CHANGE UP (ref 1–3.3)
LYMPHOCYTES # BLD AUTO: 37.6 % — SIGNIFICANT CHANGE UP (ref 13–44)
MAGNESIUM SERPL-MCNC: 2 MG/DL — SIGNIFICANT CHANGE UP (ref 1.6–2.6)
MCHC RBC-ENTMCNC: 27.7 PG — SIGNIFICANT CHANGE UP (ref 27–34)
MCHC RBC-ENTMCNC: 32.8 % — SIGNIFICANT CHANGE UP (ref 32–36)
MCV RBC AUTO: 84.4 FL — SIGNIFICANT CHANGE UP (ref 80–100)
MONOCYTES # BLD AUTO: 0.27 K/UL — SIGNIFICANT CHANGE UP (ref 0–0.9)
MONOCYTES NFR BLD AUTO: 7.6 % — SIGNIFICANT CHANGE UP (ref 2–14)
NEUTROPHILS # BLD AUTO: 1.79 K/UL — LOW (ref 1.8–7.4)
NEUTROPHILS NFR BLD AUTO: 50.6 % — SIGNIFICANT CHANGE UP (ref 43–77)
NRBC # FLD: 0 K/UL — LOW (ref 25–125)
PHOSPHATE SERPL-MCNC: 2.9 MG/DL — SIGNIFICANT CHANGE UP (ref 2.5–4.5)
PLATELET # BLD AUTO: 97 K/UL — LOW (ref 150–400)
PMV BLD: 13.2 FL — HIGH (ref 7–13)
POTASSIUM SERPL-MCNC: 3.3 MMOL/L — LOW (ref 3.5–5.3)
POTASSIUM SERPL-SCNC: 3.3 MMOL/L — LOW (ref 3.5–5.3)
RBC # BLD: 3.97 M/UL — LOW (ref 4.2–5.8)
RBC # FLD: 14.4 % — SIGNIFICANT CHANGE UP (ref 10.3–14.5)
SODIUM SERPL-SCNC: 143 MMOL/L — SIGNIFICANT CHANGE UP (ref 135–145)
WBC # BLD: 3.54 K/UL — LOW (ref 3.8–10.5)
WBC # FLD AUTO: 3.54 K/UL — LOW (ref 3.8–10.5)

## 2019-03-01 PROCEDURE — 99232 SBSQ HOSP IP/OBS MODERATE 35: CPT | Mod: GC

## 2019-03-01 RX ORDER — LISINOPRIL 2.5 MG/1
1 TABLET ORAL
Qty: 0 | Refills: 0 | COMMUNITY

## 2019-03-01 RX ORDER — CELECOXIB 200 MG/1
1 CAPSULE ORAL
Qty: 0 | Refills: 0 | COMMUNITY

## 2019-03-01 RX ORDER — LISINOPRIL 2.5 MG/1
1 TABLET ORAL
Qty: 30 | Refills: 0
Start: 2019-03-01 | End: 2019-03-30

## 2019-03-01 RX ORDER — MECLIZINE HCL 12.5 MG
1 TABLET ORAL
Qty: 90 | Refills: 0
Start: 2019-03-01 | End: 2019-03-30

## 2019-03-01 RX ORDER — POTASSIUM CHLORIDE 20 MEQ
40 PACKET (EA) ORAL ONCE
Qty: 0 | Refills: 0 | Status: COMPLETED | OUTPATIENT
Start: 2019-03-01 | End: 2019-03-01

## 2019-03-01 RX ADMIN — CARVEDILOL PHOSPHATE 12.5 MILLIGRAM(S): 80 CAPSULE, EXTENDED RELEASE ORAL at 17:43

## 2019-03-01 RX ADMIN — HEPARIN SODIUM 5000 UNIT(S): 5000 INJECTION INTRAVENOUS; SUBCUTANEOUS at 05:51

## 2019-03-01 RX ADMIN — LISINOPRIL 5 MILLIGRAM(S): 2.5 TABLET ORAL at 05:51

## 2019-03-01 RX ADMIN — SODIUM CHLORIDE 125 MILLILITER(S): 9 INJECTION INTRAMUSCULAR; INTRAVENOUS; SUBCUTANEOUS at 04:15

## 2019-03-01 RX ADMIN — PRASUGREL 10 MILLIGRAM(S): 5 TABLET, FILM COATED ORAL at 12:39

## 2019-03-01 RX ADMIN — Medication 40 MILLIGRAM(S): at 05:51

## 2019-03-01 RX ADMIN — ISOSORBIDE MONONITRATE 60 MILLIGRAM(S): 60 TABLET, EXTENDED RELEASE ORAL at 12:39

## 2019-03-01 RX ADMIN — Medication 81 MILLIGRAM(S): at 12:39

## 2019-03-01 RX ADMIN — Medication 40 MILLIEQUIVALENT(S): at 10:32

## 2019-03-01 RX ADMIN — PANTOPRAZOLE SODIUM 40 MILLIGRAM(S): 20 TABLET, DELAYED RELEASE ORAL at 05:51

## 2019-03-01 NOTE — DISCHARGE NOTE ADULT - CARE PLAN
Principal Discharge DX:	Chronic systolic heart failure  Goal:	To relieve and prevent worsening symptoms associated with congestive heart failure, to improve quality of life, and to treat underlying conditions such as coronary heart disease, high blood pressure, or diabetes, and to maintain euvolemia.  Assessment and plan of treatment:	Low salt diet, monitor your fluid intake and weight daily, exercise as tolerated 30 minutes daily, and follow up with your physician within one week.  Secondary Diagnosis:	Vertigo  Goal:	on meclizine, MRI brain is neg continue present care

## 2019-03-01 NOTE — DISCHARGE NOTE ADULT - ADDITIONAL INSTRUCTIONS
follow up with your doctor in a week Tevin [phone]  follow up with your doctor in a week Dr Luu [phone]

## 2019-03-01 NOTE — PROGRESS NOTE ADULT - ASSESSMENT
EKG - NSR LVH  Echo 2/19 - mod segmental LV dysfunction    a/p     1) CAD s/p CABG s/p PCI - s/p PCI of SVG to OM , CONT ASA EFFIENT     2) Vertigo - MRI brain negative for acute stroke shows old lacunar infarct, CTA shows multiple arteries with intra cranial stenosis , meclizine prn vertigo , neuro consult appreciated     3) PVD - s/p stent to external Iliac , cont asa and effient    d/c plan

## 2019-03-01 NOTE — DISCHARGE NOTE ADULT - CARE PROVIDER_API CALL
Ulisses Abarca (MD)  Cardiovascular Disease; Internal Medicine; Nuclear Cardiology  [address] [place] [address]  Phone: [phone]  Fax: [phone]  Follow Up Time:

## 2019-03-01 NOTE — DISCHARGE NOTE ADULT - PLAN OF CARE
To relieve and prevent worsening symptoms associated with congestive heart failure, to improve quality of life, and to treat underlying conditions such as coronary heart disease, high blood pressure, or diabetes, and to maintain euvolemia. Low salt diet, monitor your fluid intake and weight daily, exercise as tolerated 30 minutes daily, and follow up with your physician within one week. on meclizine, MRI brain is neg continue present care

## 2019-03-01 NOTE — PROGRESS NOTE ADULT - ATTENDING COMMENTS
Patient seen and examined with neurology resident and above note reviewed and I agree with assessment and plan as outlined. Patient reports that his vertigo symptoms have improved a great deal. Exam shows no nystagmus and he is able to sit and walk without any reproduction of symptoms.  MRI brain reviewed and no acute stroke or masses.   Will continue with ASA and Effient and medical management and outpatient vestibular therapy and neurology follow up and information given to patient and he understood plan.

## 2019-03-01 NOTE — PROGRESS NOTE ADULT - SUBJECTIVE AND OBJECTIVE BOX
Neurology Follow up note    Patient is a 68y old  Male who presents with a chief complaint of dizziness and vomiting (28 Feb 2019 08:51)    Subjective:Interval History - No events overnight, patient feels better, not dizzy    Objective:   Vital Signs Last 24 Hrs  T(C): 37 (01 Mar 2019 12:36), Max: 37 (01 Mar 2019 12:36)  T(F): 98.6 (01 Mar 2019 12:36), Max: 98.6 (01 Mar 2019 12:36)  HR: 64 (01 Mar 2019 12:36) (55 - 64)  BP: 137/71 (01 Mar 2019 12:36) (118/61 - 138/68)  BP(mean): --  RR: 18 (01 Mar 2019 12:36) (18 - 18)  SpO2: 99% (01 Mar 2019 12:36) (99% - 100%)    General Exam:   General appearance: No acute distress                   Neurological Exam:  Mental Status: Orientated to self, date and place.  Attention intact.  No dysarthria, aphasia or neglect.  Knowledge intact.  Registration intact.  Short and long term memory grossly intact.    Cranial Nerves:  PERRL, EOMI, VFF, no nystagmus or diplopia. No facial asymmetry.  Hearing intact to finger rub bilaterally.  Tongue, uvula and palate midline.  Sternocleidomastoid and Trapezius intact bilaterally.    Motor:   Tone: normal.                  Strength: intact throughout  Pronator drift: none                 Dysmeria: None to finger-nose-finger   No truncal ataxia.    Tremor: No resting, postural or action tremor.  No myoclonus.  Sensation: intact to light touch  Toes flexor bilaterally  Gait: normal and stable.      Other:  03-01    143  |  111<H>  |  14  ----------------------------<  93  3.3<L>   |  22  |  0.94    Ca    8.5      01 Mar 2019 05:59  Phos  2.9     03-01  Mg     2.0     03-01    TPro  7.6  /  Alb  4.5  /  TBili  1.1  /  DBili  x   /  AST  20  /  ALT  21  /  AlkPhos  136<H>  02-27    LIVER FUNCTIONS - ( 27 Feb 2019 23:45 )  Alb: 4.5 g/dL / Pro: 7.6 g/dL / ALK PHOS: 136 u/L / ALT: 21 u/L / AST: 20 u/L / GGT: x                      11.0   3.54  )-----------( 97       ( 01 Mar 2019 05:59 )             33.5     MEDICATIONS  (STANDING):  aspirin enteric coated 81 milliGRAM(s) Oral daily  atorvastatin 80 milliGRAM(s) Oral at bedtime  carvedilol 12.5 milliGRAM(s) Oral every 12 hours  furosemide    Tablet 40 milliGRAM(s) Oral daily  heparin  Injectable 5000 Unit(s) SubCutaneous every 12 hours  isosorbide   mononitrate ER Tablet (IMDUR) 60 milliGRAM(s) Oral daily  lisinopril 5 milliGRAM(s) Oral daily  pantoprazole    Tablet 40 milliGRAM(s) Oral before breakfast  prasugrel 10 milliGRAM(s) Oral daily  sodium chloride 0.9%. 1000 milliLiter(s) (125 mL/Hr) IV Continuous <Continuous>    MEDICATIONS  (PRN):  meclizine 25 milliGRAM(s) Oral every 8 hours PRN vertigo  metoclopramide 5 milliGRAM(s) Oral two times a day PRN nausea

## 2019-03-01 NOTE — DISCHARGE NOTE ADULT - MEDICATION SUMMARY - MEDICATIONS TO TAKE
I will START or STAY ON the medications listed below when I get home from the hospital:    aspirin 81 mg oral tablet  -- 1 tab(s) by mouth once a day  -- Indication: For Cv protecton    lisinopril 5 mg oral tablet  -- 1 tab(s) by mouth once a day  -- Indication: For HTN (hypertension)    Imdur 60 mg oral tablet, extended release  -- 1 tab(s) by mouth once a day (in the morning)  -- Indication: For CAD (coronary artery disease)    meclizine 25 mg oral tablet  -- 1 tab(s) by mouth every 8 hours, As needed, vertigo  -- Indication: For vertigo    atorvastatin 80 mg oral tablet  -- 1 tab(s) by mouth once a day (at bedtime)  -- Indication: For Cholesterol    prasugrel 10 mg oral tablet  -- 1 tab(s) by mouth once a day  -- Indication: For CAD (coronary artery disease)    Coreg 12.5 mg oral tablet  -- 1 tab(s) by mouth 2 times a day  -- Indication: For CAD (coronary artery disease)    furosemide 40 mg oral tablet  -- 1 tab(s) by mouth once a day  -- Indication: For CAD (coronary artery disease)    pantoprazole 40 mg oral delayed release tablet  -- 1 tab(s) by mouth every 12 hours  -- Indication: For gerd I will START or STAY ON the medications listed below when I get home from the hospital:    vestibular physical Therapy for Vertigo  -- Indication: For Vertigo    aspirin 81 mg oral tablet  -- 1 tab(s) by mouth once a day  -- Indication: For Cv protecton    lisinopril 5 mg oral tablet  -- 1 tab(s) by mouth once a day  -- Indication: For HTN (hypertension)    Imdur 60 mg oral tablet, extended release  -- 1 tab(s) by mouth once a day (in the morning)  -- Indication: For CAD (coronary artery disease)    meclizine 25 mg oral tablet  -- 1 tab(s) by mouth every 8 hours, As needed, vertigo  -- Indication: For vertigo    atorvastatin 80 mg oral tablet  -- 1 tab(s) by mouth once a day (at bedtime)  -- Indication: For Cholesterol    prasugrel 10 mg oral tablet  -- 1 tab(s) by mouth once a day  -- Indication: For CAD (coronary artery disease)    Coreg 12.5 mg oral tablet  -- 1 tab(s) by mouth 2 times a day  -- Indication: For CAD (coronary artery disease)    furosemide 40 mg oral tablet  -- 1 tab(s) by mouth once a day  -- Indication: For CAD (coronary artery disease)    pantoprazole 40 mg oral delayed release tablet  -- 1 tab(s) by mouth every 12 hours  -- Indication: For gerd

## 2019-03-01 NOTE — PROGRESS NOTE ADULT - SUBJECTIVE AND OBJECTIVE BOX
Ulisses Abarca MD  Interventional Cardiology / Advance Heart Failure and Cardiac Transplant Specialist  [place] Office : [address] [place] [id number]  Tel:   [place] Office : [address] [facility] [address] [address]  Tel: [phone]  Cell : [phone]    Subjective : Pt lying in bed comfortable, not in distress, denies any chest pain or SOB  	  MEDICATIONS:  aspirin enteric coated 81 milliGRAM(s) Oral daily  carvedilol 12.5 milliGRAM(s) Oral every 12 hours  furosemide    Tablet 40 milliGRAM(s) Oral daily  heparin  Injectable 5000 Unit(s) SubCutaneous every 12 hours  isosorbide   mononitrate ER Tablet (IMDUR) 60 milliGRAM(s) Oral daily  lisinopril 5 milliGRAM(s) Oral daily  prasugrel 10 milliGRAM(s) Oral daily        meclizine 25 milliGRAM(s) Oral every 8 hours PRN    metoclopramide 5 milliGRAM(s) Oral two times a day PRN  pantoprazole    Tablet 40 milliGRAM(s) Oral before breakfast    atorvastatin 80 milliGRAM(s) Oral at bedtime    sodium chloride 0.9%. 1000 milliLiter(s) IV Continuous <Continuous>      PHYSICAL EXAM:  T(C): 37 (03-01-19 @ 12:36), Max: 37 (03-01-19 @ 12:36)  HR: 64 (03-01-19 @ 12:36) (55 - 64)  BP: 137/71 (03-01-19 @ 12:36) (118/61 - 138/68)  RR: 18 (03-01-19 @ 12:36) (18 - 18)  SpO2: 99% (03-01-19 @ 12:36) (99% - 100%)  Wt(kg): --  I&O's Summary    28 Feb 2019 07:01  -  01 Mar 2019 07:00  --------------------------------------------------------  IN: 1317 mL / OUT: 800 mL / NET: 517 mL      Height (cm): 177.8 (02-28 @ 21:25)  Weight (kg): 62.1 (02-28 @ 21:25)  BMI (kg/m2): 19.6 (02-28 @ 21:25)  BSA (m2): 1.78 (02-28 @ 21:25)      HEENT:   Normal oral mucosa, PERRL, EOMI	  Cardiovascular: Normal S1 S2, No JVD, No murmurs, No edema  Respiratory: Lungs clear to auscultation	  Gastrointestinal:  Soft, Non-tender, + BS	  Extremities: Normal range of motion, No clubbing, cyanosis or edema                                    11.0   3.54  )-----------( 97       ( 01 Mar 2019 05:59 )             33.5     03-01    143  |  111<H>  |  14  ----------------------------<  93  3.3<L>   |  22  |  0.94    Ca    8.5      01 Mar 2019 05:59  Phos  2.9     03-01  Mg     2.0     03-01    TPro  7.6  /  Alb  4.5  /  TBili  1.1  /  DBili  x   /  AST  20  /  ALT  21  /  AlkPhos  136<H>  02-27    proBNP:   Lipid Profile:   HgA1c:   TSH:

## 2019-03-01 NOTE — DISCHARGE NOTE ADULT - HOSPITAL COURSE
68 year old right handed Spanish male presenting with vertigo. Patient has PMH of CAD (s/p stents and CABG), L anterior BG stroke (no symptoms), HLD, HTN PVD Patient states he was sleeping around 5pm when he woke up and felt vertiginous.   EKG - NSR LVH  Echo 2/19 - mod segmental LV dysfunction    a/p     1) CAD s/p CABG s/p PCI - s/p PCI of SVG to OM , CONT ASA EFFIENT     2) Vertigo - MRI brain negative for acute stroke shows old lacunar infarct, CTA shows multiple arteries with intra cranial stenosis , meclizine prn vertigo , neuro consult appreciated     3) PVD - s/p stent to external Iliac , cont asa and effient  Patient is hemodynamically stable and without complaints and patient is ready for discharge.  Case discussed and medications reviewed with PMD.  Agreed with above.

## 2019-03-01 NOTE — PROGRESS NOTE ADULT - ASSESSMENT
68 year old right handed [facility] male presenting with vertigo. Patient has PMH of CAD (s/p stents and CABG), L anterior BG stroke (no symptoms), HLD, HTN. Patient states he was sleeping around 5pm when he woke up and felt vertiginous. He had nausea and vomiting. Episode would last several minutes and resolve spontaneously, worse with head movement and sitting up. Better when laying still. Symptoms improved while in ED although still vertiginous when sitting up. Reports generalized weakness.  Nonfocal neuro exam.  CTH showed age indeterminate left anterior BG stroke. Multiple vessels with moderate to severe stenosis: Left proximal cervical internal carotid artery at the carotid bifurcation, approximately 50% stenosis. Bilateral distal cervical internal carotid arteries, approximately 50% stenosis. Right internal carotid artery distal petrous and lacerous segments, up to   80% stenosis. Left ICA distal petrous and proximal cavernous segments, approximately 50% stenosis. Right vertebral artery V4 segment multifocal severe stenoses. Left vertebral artery V4 segment moderate stenosis.    Peripheral vertigo with incidental finding of multiple asymptomatic vessel stenosis    Plan  [x] outpatient vestibular therapy  [x] continue ASA 81, effient 10 daily  [x] follow up at [address] [place] [address] for outpatient neuro,   [x] no further neurological workup. 68 year old right handed [facility] male presenting with vertigo. Patient has PMH of CAD (s/p stents and CABG), L anterior BG stroke (no symptoms), HLD, HTN. Patient states he was sleeping around 5pm when he woke up and felt vertiginous. He had nausea and vomiting. Episode would last several minutes and resolve spontaneously, worse with head movement and sitting up. Better when laying still. Symptoms improved while in ED although still vertiginous when sitting up. Reports generalized weakness.  Nonfocal neuro exam.  CTH showed age indeterminate left anterior BG stroke. Multiple vessels with moderate to severe stenosis: Left proximal cervical internal carotid artery at the carotid bifurcation, approximately 50% stenosis. Bilateral distal cervical internal carotid arteries, approximately 50% stenosis. Right internal carotid artery distal petrous and lacerous segments, up to   80% stenosis. Left ICA distal petrous and proximal cavernous segments, approximately 50% stenosis. Right vertebral artery V4 segment multifocal severe stenoses. Left vertebral artery V4 segment moderate stenosis.    Peripheral vertigo with incidental finding of multiple asymptomatic vessel stenosis    Plan  [x] outpatient vestibular therapy  [x] continue ASA 81, effient 10 daily for multifocal intracranial stenosis  [x] follow up at [address] [place] [address] for outpatient neuro,   [x] no further neurological workup and all questions answered.

## 2019-03-01 NOTE — DISCHARGE NOTE ADULT - PATIENT PORTAL LINK FT
You can access the [facility] Patient Portal, offered by [facility], by registering with the following website: [email or web address]

## 2019-03-06 DIAGNOSIS — Z76.89 PERSONS ENCOUNTERING HEALTH SERVICES IN OTHER SPECIFIED CIRCUMSTANCES: ICD-10-CM

## 2019-04-12 DIAGNOSIS — Z71.89 OTHER SPECIFIED COUNSELING: ICD-10-CM

## 2019-06-30 ENCOUNTER — INPATIENT (INPATIENT)
Facility: HOSPITAL | Age: 69
LOS: 0 days | Discharge: HOME | End: 2019-07-01
Attending: INTERNAL MEDICINE | Admitting: INTERNAL MEDICINE
Payer: MEDICARE

## 2019-06-30 VITALS
RESPIRATION RATE: 18 BRPM | HEART RATE: 82 BPM | OXYGEN SATURATION: 100 % | TEMPERATURE: 99 F | SYSTOLIC BLOOD PRESSURE: 159 MMHG | DIASTOLIC BLOOD PRESSURE: 75 MMHG

## 2019-06-30 DIAGNOSIS — R42 DIZZINESS AND GIDDINESS: ICD-10-CM

## 2019-06-30 DIAGNOSIS — Z90.49 ACQUIRED ABSENCE OF OTHER SPECIFIED PARTS OF DIGESTIVE TRACT: Chronic | ICD-10-CM

## 2019-06-30 DIAGNOSIS — I10 ESSENTIAL (PRIMARY) HYPERTENSION: ICD-10-CM

## 2019-06-30 DIAGNOSIS — E78.5 HYPERLIPIDEMIA, UNSPECIFIED: ICD-10-CM

## 2019-06-30 DIAGNOSIS — Z86.79 PERSONAL HISTORY OF OTHER DISEASES OF THE CIRCULATORY SYSTEM: Chronic | ICD-10-CM

## 2019-06-30 DIAGNOSIS — R94.31 ABNORMAL ELECTROCARDIOGRAM [ECG] [EKG]: ICD-10-CM

## 2019-06-30 DIAGNOSIS — Z29.9 ENCOUNTER FOR PROPHYLACTIC MEASURES, UNSPECIFIED: ICD-10-CM

## 2019-06-30 DIAGNOSIS — I50.22 CHRONIC SYSTOLIC (CONGESTIVE) HEART FAILURE: ICD-10-CM

## 2019-06-30 DIAGNOSIS — Z98.890 OTHER SPECIFIED POSTPROCEDURAL STATES: Chronic | ICD-10-CM

## 2019-06-30 DIAGNOSIS — Z95.1 PRESENCE OF AORTOCORONARY BYPASS GRAFT: Chronic | ICD-10-CM

## 2019-06-30 LAB
ALBUMIN SERPL ELPH-MCNC: 4.1 G/DL — SIGNIFICANT CHANGE UP (ref 3.3–5)
ALP SERPL-CCNC: 125 U/L — HIGH (ref 40–120)
ALT FLD-CCNC: 19 U/L — SIGNIFICANT CHANGE UP (ref 4–41)
ANION GAP SERPL CALC-SCNC: 10 MMO/L — SIGNIFICANT CHANGE UP (ref 7–14)
AST SERPL-CCNC: 21 U/L — SIGNIFICANT CHANGE UP (ref 4–40)
BASOPHILS # BLD AUTO: 0.03 K/UL — SIGNIFICANT CHANGE UP (ref 0–0.2)
BASOPHILS NFR BLD AUTO: 0.7 % — SIGNIFICANT CHANGE UP (ref 0–2)
BILIRUB SERPL-MCNC: 0.8 MG/DL — SIGNIFICANT CHANGE UP (ref 0.2–1.2)
BUN SERPL-MCNC: 12 MG/DL — SIGNIFICANT CHANGE UP (ref 7–23)
CALCIUM SERPL-MCNC: 9.6 MG/DL — SIGNIFICANT CHANGE UP (ref 8.4–10.5)
CHLORIDE SERPL-SCNC: 106 MMOL/L — SIGNIFICANT CHANGE UP (ref 98–107)
CO2 SERPL-SCNC: 22 MMOL/L — SIGNIFICANT CHANGE UP (ref 22–31)
CREAT SERPL-MCNC: 0.94 MG/DL — SIGNIFICANT CHANGE UP (ref 0.5–1.3)
EOSINOPHIL # BLD AUTO: 0.1 K/UL — SIGNIFICANT CHANGE UP (ref 0–0.5)
EOSINOPHIL NFR BLD AUTO: 2.2 % — SIGNIFICANT CHANGE UP (ref 0–6)
GLUCOSE SERPL-MCNC: 123 MG/DL — HIGH (ref 70–99)
HCT VFR BLD CALC: 37.4 % — LOW (ref 39–50)
HGB BLD-MCNC: 12.5 G/DL — LOW (ref 13–17)
IMM GRANULOCYTES NFR BLD AUTO: 0 % — SIGNIFICANT CHANGE UP (ref 0–1.5)
LYMPHOCYTES # BLD AUTO: 1.06 K/UL — SIGNIFICANT CHANGE UP (ref 1–3.3)
LYMPHOCYTES # BLD AUTO: 23.7 % — SIGNIFICANT CHANGE UP (ref 13–44)
MCHC RBC-ENTMCNC: 27.5 PG — SIGNIFICANT CHANGE UP (ref 27–34)
MCHC RBC-ENTMCNC: 33.4 % — SIGNIFICANT CHANGE UP (ref 32–36)
MCV RBC AUTO: 82.4 FL — SIGNIFICANT CHANGE UP (ref 80–100)
MONOCYTES # BLD AUTO: 0.4 K/UL — SIGNIFICANT CHANGE UP (ref 0–0.9)
MONOCYTES NFR BLD AUTO: 8.9 % — SIGNIFICANT CHANGE UP (ref 2–14)
NEUTROPHILS # BLD AUTO: 2.89 K/UL — SIGNIFICANT CHANGE UP (ref 1.8–7.4)
NEUTROPHILS NFR BLD AUTO: 64.5 % — SIGNIFICANT CHANGE UP (ref 43–77)
NRBC # FLD: 0 K/UL — SIGNIFICANT CHANGE UP (ref 0–0)
PLATELET # BLD AUTO: 137 K/UL — LOW (ref 150–400)
PMV BLD: 11.7 FL — SIGNIFICANT CHANGE UP (ref 7–13)
POTASSIUM SERPL-MCNC: 4.2 MMOL/L — SIGNIFICANT CHANGE UP (ref 3.5–5.3)
POTASSIUM SERPL-SCNC: 4.2 MMOL/L — SIGNIFICANT CHANGE UP (ref 3.5–5.3)
PROT SERPL-MCNC: 7.4 G/DL — SIGNIFICANT CHANGE UP (ref 6–8.3)
RBC # BLD: 4.54 M/UL — SIGNIFICANT CHANGE UP (ref 4.2–5.8)
RBC # FLD: 14.6 % — HIGH (ref 10.3–14.5)
SODIUM SERPL-SCNC: 138 MMOL/L — SIGNIFICANT CHANGE UP (ref 135–145)
TROPONIN T, HIGH SENSITIVITY: 14 NG/L — SIGNIFICANT CHANGE UP (ref ?–14)
TROPONIN T, HIGH SENSITIVITY: 16 NG/L — SIGNIFICANT CHANGE UP (ref ?–14)
WBC # BLD: 4.48 K/UL — SIGNIFICANT CHANGE UP (ref 3.8–10.5)
WBC # FLD AUTO: 4.48 K/UL — SIGNIFICANT CHANGE UP (ref 3.8–10.5)

## 2019-06-30 PROCEDURE — 72125 CT NECK SPINE W/O DYE: CPT | Mod: 26

## 2019-06-30 PROCEDURE — 70450 CT HEAD/BRAIN W/O DYE: CPT | Mod: 26

## 2019-06-30 RX ORDER — ATORVASTATIN CALCIUM 80 MG/1
80 TABLET, FILM COATED ORAL AT BEDTIME
Refills: 0 | Status: DISCONTINUED | OUTPATIENT
Start: 2019-06-30 | End: 2019-07-01

## 2019-06-30 RX ORDER — CARVEDILOL PHOSPHATE 80 MG/1
12.5 CAPSULE, EXTENDED RELEASE ORAL EVERY 12 HOURS
Refills: 0 | Status: DISCONTINUED | OUTPATIENT
Start: 2019-06-30 | End: 2019-07-01

## 2019-06-30 RX ORDER — PRASUGREL 5 MG/1
10 TABLET, FILM COATED ORAL DAILY
Refills: 0 | Status: DISCONTINUED | OUTPATIENT
Start: 2019-06-30 | End: 2019-07-01

## 2019-06-30 RX ORDER — LISINOPRIL 2.5 MG/1
5 TABLET ORAL DAILY
Refills: 0 | Status: DISCONTINUED | OUTPATIENT
Start: 2019-06-30 | End: 2019-07-01

## 2019-06-30 RX ORDER — ACETAMINOPHEN 500 MG
650 TABLET ORAL ONCE
Refills: 0 | Status: COMPLETED | OUTPATIENT
Start: 2019-06-30 | End: 2019-06-30

## 2019-06-30 RX ORDER — HEPARIN SODIUM 5000 [USP'U]/ML
5000 INJECTION INTRAVENOUS; SUBCUTANEOUS EVERY 12 HOURS
Refills: 0 | Status: DISCONTINUED | OUTPATIENT
Start: 2019-06-30 | End: 2019-07-01

## 2019-06-30 RX ORDER — MECLIZINE HCL 12.5 MG
25 TABLET ORAL EVERY 8 HOURS
Refills: 0 | Status: DISCONTINUED | OUTPATIENT
Start: 2019-06-30 | End: 2019-07-01

## 2019-06-30 RX ORDER — ASPIRIN/CALCIUM CARB/MAGNESIUM 324 MG
81 TABLET ORAL DAILY
Refills: 0 | Status: DISCONTINUED | OUTPATIENT
Start: 2019-06-30 | End: 2019-07-01

## 2019-06-30 RX ORDER — ISOSORBIDE MONONITRATE 60 MG/1
60 TABLET, EXTENDED RELEASE ORAL DAILY
Refills: 0 | Status: DISCONTINUED | OUTPATIENT
Start: 2019-06-30 | End: 2019-07-01

## 2019-06-30 RX ORDER — FUROSEMIDE 40 MG
40 TABLET ORAL DAILY
Refills: 0 | Status: DISCONTINUED | OUTPATIENT
Start: 2019-06-30 | End: 2019-07-01

## 2019-06-30 RX ORDER — PANTOPRAZOLE SODIUM 20 MG/1
40 TABLET, DELAYED RELEASE ORAL
Refills: 0 | Status: DISCONTINUED | OUTPATIENT
Start: 2019-06-30 | End: 2019-07-01

## 2019-06-30 RX ADMIN — Medication 650 MILLIGRAM(S): at 14:08

## 2019-06-30 RX ADMIN — Medication 650 MILLIGRAM(S): at 23:40

## 2019-06-30 NOTE — ED PROVIDER NOTE - PHYSICAL EXAMINATION
CN II-XII normal.  5/5 UE and LE strength.  Rapid alternating movements intact.  Negative Rhomberg.  Negative Pronator drift.  Ambulatory without assistance.  Sensation intact to light touch.  Skin with no signs of acute trauma.   Neck with FROM, mild tenderness to the cervical spine and point of occiput. CN II-XII normal.  5/5 UE and LE strength.  Rapid alternating movements intact.  Negative Rhomberg.  Negative Pronator drift.  Ambulatory without assistance.  Sensation intact to light touch.  Skin with no signs of acute trauma.   Neck with FROM, mild tenderness to the cervical spine and point of occiput.    Attending/Dagmar: NAD, PERRL/EOMI, no nystagmus, no papilledema or retinal hemorrhages; Lt TM-+perforation, no d/c; supple, no JVD, no carotid bruit; RRR; CTAB; Abd-soft, NT/ND, no HSM; no LE edema, +2 DP/PT; A&Ox3, nonfocal

## 2019-06-30 NOTE — H&P ADULT - NSICDXPASTSURGICALHX_GEN_ALL_CORE_FT
PAST SURGICAL HISTORY:  History of coronary angiogram multiple stents placed    History of femoral angiogram stent placed in LLE 2016    History of laparoscopic cholecystectomy 2016    S/P CABG (coronary artery bypass graft) x3; [facility] 2002

## 2019-06-30 NOTE — H&P ADULT - NSHPLABSRESULTS_GEN_ALL_CORE
12.5   4.48  )-----------( 137      ( 30 Jun 2019 15:19 )             37.4     06-30    138  |  106  |  12  ----------------------------<  123<H>  4.2   |  22  |  0.94    Ca    9.6      30 Jun 2019 15:19    TPro  7.4  /  Alb  4.1  /  TBili  0.8  /  DBili  x   /  AST  21  /  ALT  19  /  AlkPhos  125<H>  06-30    CT HEAD: No acute intracranial hemorrhage or mass effect. No displaced calvarial fracture.    CT CERVICAL SPINE: No acute fracture or traumatic subluxation. No prevertebral soft tissue swelling. Degenerative changes.    EKG: Sinus rhythm with marked sinus arrhythmia at a rate of 67 with TWI in leads V3-V6, I, AVL and QTc of 429

## 2019-06-30 NOTE — ED PROVIDER NOTE - PSH
History of coronary angiogram  multiple stents placed  History of femoral angiogram  stent placed in LLE 2016  History of laparoscopic cholecystectomy  2016  S/P CABG (coronary artery bypass graft)  x3; [facility] 2002

## 2019-06-30 NOTE — H&P ADULT - PROBLEM SELECTOR PLAN 1
CT head and CT-C spine negative for any acute pathology. Patient stated that these symptoms are different from prior Vertigo. Currently asymptomatic   Will monitor on telemetry for now   TTE ordered  Orthostatics ordered  Consider Neurology consult in am if headache/dizziness does not improve(currently asymptomatic)   Fall precautions ordered

## 2019-06-30 NOTE — ED PROVIDER NOTE - CARE PLAN
Principal Discharge DX:	Headache Principal Discharge DX:	Dizziness  Secondary Diagnosis:	Diaphoresis

## 2019-06-30 NOTE — H&P ADULT - NEGATIVE MUSCULOSKELETAL SYMPTOMS
no stiffness/no arthritis/no arthralgia/no joint swelling/no myalgia/no muscle cramps/no muscle weakness/no neck pain

## 2019-06-30 NOTE — H&P ADULT - NEGATIVE CARDIOVASCULAR SYMPTOMS
no palpitations/no orthopnea/no paroxysmal nocturnal dyspnea/no chest pain/no dyspnea on exertion/no peripheral edema

## 2019-06-30 NOTE — ED ADULT NURSE NOTE - CHIEF COMPLAINT QUOTE
pt BIBA from home, pt c/o headache x a few days.  denies n/v.  pt is scheduled to get a head CT on 7/2, denies trauma

## 2019-06-30 NOTE — H&P ADULT - NEGATIVE OPHTHALMOLOGIC SYMPTOMS
no diplopia/no photophobia/no lacrimation R/no blurred vision L/no blurred vision R/no lacrimation L/no discharge L/no discharge R

## 2019-06-30 NOTE — ED PROVIDER NOTE - OBJECTIVE STATEMENT
69 yo M with A PMHx of CAD s/p NSTEMI multiple stents, CABG x 3 (2002), PAD s/p LLE with stent, HTN, and HLD here with CHF, headache, neck pain, dizziness x today x 2 weeks, s/p head trauma two weeks ago. after incident, dizziness but no pain, no LOC. 2 days ago, dizziness, sweating and temporal/parietal episode, near syncope, yesterday with the same, and this am. Tylenol with some relief.   Denies nausea, vomiting, visual changes, weakness, numbness, tingling.   on Daily ASA    PMD: Dr Ric Kendall. 67 yo M with A PMHx of CAD s/p NSTEMI multiple stents, CABG x 3 (2002), PAD s/p LLE with stent, HTN, and HLD here with CHF, headache, neck pain, dizziness x today x 2 weeks, s/p head trauma two weeks ago mirror came off dresser and hit him on the top of his head. after incident, dizziness but no pain, no LOC. 2 days ago, dizziness, sweating and temporal/parietal episode, near syncope, yesterday with the same, and this am. Tylenol with some relief.   Denies nausea, vomiting, visual changes, weakness, numbness, tingling.   on Daily ASA and prasugrel    PMD: Dr Ric Kendall. 67 yo M with a PMHx of CAD s/p NSTEMI multiple stents, CABG x 3 (2002), PAD s/p LLE with stent, HTN, and HLD, CHF, here with temporal parietal headache neck pain, dizziness x2 weeks, worse over the past 2 days s/p head trauma two weeks ago mirror came off dresser and hit him on the top of his head. after incident, dizziness did occur but no pain, no LOC 2 days ago, dizziness, sweating and temporal/parietal episode, near syncope, yesterday with the same, and this am with the same event. Tylenol with some relief.   Denies nausea, vomiting, visual changes, weakness, numbness, tingling, cp, sob, abdominal pain, f/c.   on Daily ASA and prasugrel    PMD: Dr Ric Kendall. 69 yo M with a PMHx of CAD s/p NSTEMI multiple stents, CABG x 3 (2002), PAD s/p LLE with stent, HTN, and HLD, CHF, here with temporal parietal headache neck pain, dizziness x2 weeks, worse over the past 2 days s/p head trauma two weeks ago mirror came off dresser and hit him on the top of his head. after incident, dizziness did occur but no pain, no LOC 2 days ago, dizziness, sweating and temporal/parietal episode, near syncope, yesterday with the same, and this am with the same event. Tylenol with some relief.   Denies nausea, vomiting, visual changes, weakness, numbness, tingling, cp, sob, abdominal pain, f/c.   on Daily ASA and prasugrel    PMD: Dr Ric Kendall.    Attending/Dagmar: 69 yo M as described above, complicated medical history including CAD, PVD, CHF, HTN p/w three days episodic vertiginous symptoms that includes nausea. Pt denies CP, palp, SOB. He does reports ofd incident two weeks when a mirror on a dresser fell onto this head with subsequent lightheadedness that improve after a day or two. Denies recent changes in medications/dosages.

## 2019-06-30 NOTE — H&P ADULT - NEGATIVE GASTROINTESTINAL SYMPTOMS
no nausea/no vomiting/no melena/no change in bowel habits/no constipation/no abdominal pain/no diarrhea/no hematochezia

## 2019-06-30 NOTE — H&P ADULT - NEGATIVE ENMT SYMPTOMS
no hearing difficulty/no vertigo/no nasal discharge/no sinus symptoms/no ear pain/no tinnitus/no nasal congestion

## 2019-06-30 NOTE — H&P ADULT - NSICDXFAMILYHX_GEN_ALL_CORE_FT
FAMILY HISTORY:  Family history of MI (myocardial infarction), mother,   Family history of stroke, 58yo CVA, heart attack 59yo    Sibling  Still living? No  Family history of coronary artery disease in brother, Age at diagnosis: Age Unknown  Family history of coronary artery disease in sister, Age at diagnosis: Age Unknown

## 2019-06-30 NOTE — H&P ADULT - PROBLEM SELECTOR PLAN 2
EKG revealed "Sinus rhythm with marked sinus arrhythmia at a rate of 67 with TWI in leads V3-V6, I, AVL and QTc of 429". Patient currently chest pain free and HsT on admission was 14 with repeat being 16  Will monitor on telemetry, serial EKG and Stoney prn for any episodes of chest pain   HgbA1C, TSH, lipid profile, CBC, CMP in am   Consider ischemic workup with NST this admission   TTE ordered   Continue with Aspirin 81mg and Effient 10mg daily

## 2019-06-30 NOTE — H&P ADULT - HISTORY OF PRESENT ILLNESS
67 y/o M with PMH of CAD(s/p 3 stents and 3V-CABG), PAD(s/p LLE stent), HTN, HLD, CHF presented with headache, neck pain and dizziness for 2 weeks. 67 y/o M with PMH of CAD(s/p 3 stents and 3V-CABG), PAD(s/p LLE stent), HTN, HLD, CHF presented with headache, neck pain and dizziness for 2 weeks. As per the patient he had a mirror on a dresser fall on his head about 2 weeks ago and then also developed dizziness. Patient stated that this episode of dizziness is different from when he had vertigo symptoms. Patient stated that 3 days ago he started to develop occipital headache and neck pain. Patient stated that today the headache was worse which prompted him to come to the ER. Patient stated that the headache is intermittent, characterized as a throbbing type of pain, without any aggravating factors, alleviated after he took Tylenol, with radiation of the pain to the neck, with a severity of 6/10. Patient denied any changes in vision, slurred speech or any changes in vision. Patient denied any CP, SOB, fevers, chills, N/V/D/C, abdominal pain, dysuria, melena, hematochezia, recent travel, sick contact, pleuritic or positional chest pain.     On ED admission EKG revealed Sinus rhythm with marked sinus arrhythmia at a rate of 67 with TWI in leads V3-V6, I, AVL and QTc of 429, CE x 1: Trop: 14, CE x 2: Trop: 16, Plt: 137, H&H: 12.5/37.4, Gluc: 123, Alk Phos: 125. CT HEAD: No acute intracranial hemorrhage or mass effect. No displaced calvarial fracture. CT CERVICAL SPINE: No acute fracture or traumatic subluxation. No prevertebral soft tissue swelling. Degenerative changes. When examined patient is resting in the stretcher and endorsed neck pain, but denied any headaches.

## 2019-06-30 NOTE — ED ADULT NURSE NOTE - OBJECTIVE STATEMENT
Pt rcvd to int 10B c/o head pain x 2weeks that worsened over past 2 days. As per pt, he hit his head, a mirror fell on head 2 weeks ago. Pt has been having pain for 2 weeks. Denies LOC. On daily Aspirin. Pt is awake/alert, Aox4, NSR on cardiac monitor. No neuro deficits observed at this time. Denies numbness/tingling. +/strong and = hand  B/L. NO swelling/edema observed to B/L lower extrem. Hx: of CAD, NSTEMI multiple stents, CABG x 3, PAD s/p LLE with stent, HTN, and HLD, CHF. Evaluated by MD Labs drawn and sent.

## 2019-06-30 NOTE — H&P ADULT - NSICDXPASTMEDICALHX_GEN_ALL_CORE_FT
PAST MEDICAL HISTORY:  Constipation, unspecified constipation type     HTN (hypertension)     Hyperlipidemia     NSTEMI (non-ST elevated myocardial infarction) 2017 and February 2018 and 2019    PAD (peripheral artery disease) stent to L lower extremity artery

## 2019-06-30 NOTE — H&P ADULT - GASTROINTESTINAL DETAILS
no guarding/soft/normal/no rigidity/nontender/no rebound tenderness/no distention/bowel sounds normal

## 2019-06-30 NOTE — H&P ADULT - ATTENDING COMMENTS
Pt seen and examined at bedside ; agree with above assessment     EKG: SR Lat TWI old    Echo: < from: Transthoracic Echocardiogram (02.28.19 @ 20:32) >    1. Mitral annular calcification, otherwise normal mitral  valve. Mild mitral regurgitation.  2. Calcified trileaflet aortic valve with normal opening.  Mild aortic regurgitation.  3. Moderately dilated left atrium.  LA volume index = 44  cc/m2.  4. Normal left ventricular internal dimensions and wall  thicknesses.  5. Moderate segmental left ventricular systolic  dysfunction.  Hypokinesis of the basal to mid inferior  wall, basal to mid inferolateral wall, and basal  inferoseptum.  6. Normal right ventricular size and function.  *** Compared with echocardiogram of 3/22/2018, no  significant changes noted.    < end of copied text >    Assessment and Plan     1) Head trauma / Headache : CT head/neck ok , Tylenol for pain control    2) CAD s/p CABG and PCI: Currently CP free , denies SOB, c/w asa, effiennt , lipitor , coreg     3) Mod LV dysfunction :euvolemic on exam , c/w lasix     F/U as OP next week

## 2019-06-30 NOTE — ED PROVIDER NOTE - CLINICAL SUMMARY MEDICAL DECISION MAKING FREE TEXT BOX
69 yo M with headache, dizziness, sweating, near syncope x 3 episodes.   s/p trauma 2 weeks ago, blunt trauma with mirror to top of head.  Plan for ct head and cervical spine.  R/o ICH, fracture malalignment.   With pt PMHxof CAD, CHF. plan for EKG r/o stemi in setting of dizziness, pt otherwise denying cp sob. 67 yo M with headache, dizziness, sweating, near syncope x 3 episodes.   s/p trauma 2 weeks ago, blunt trauma with mirror to top of head.  Plan for ct head and cervical spine.  R/o ICH, fracture malalignment.   With pt PMHx of CAD, CHF. plan for EKG r/o stemi in setting of dizziness, pt otherwise denying cp sob.

## 2019-06-30 NOTE — H&P ADULT - NEGATIVE NEUROLOGICAL SYMPTOMS
no loss of consciousness/no focal seizures/no transient paralysis/no weakness/no paresthesias/no confusion/no syncope/no difficulty walking/no generalized seizures/no hemiparesis/no tremors/no vertigo/no loss of sensation

## 2019-06-30 NOTE — H&P ADULT - PROBLEM SELECTOR PLAN 3
Patient with mild rales noted on left lung base. No pitting edema and currently laying flat   Will monitor for now   Continue with Lasix 40mg QD  TTE ordered

## 2019-06-30 NOTE — ED PROVIDER NOTE - PROGRESS NOTE DETAILS
Pt Ct head and neg negative. EKG with chnages, labs and trop added to work up.  trop 14,  repeat was ordered and resulted 16.   EKG with new changes since feb2019 TWI in V3 and worsening depressions in v5v6.  Spoke with patient cardiologist, Dr. Abarca can admit to his service on tele for further work up.

## 2019-06-30 NOTE — H&P ADULT - RS GEN PE MLT RESP DETAILS PC
respirations non-labored/no wheezes/no intercostal retractions/rales/no chest wall tenderness/no rhonchi/normal/airway patent

## 2019-06-30 NOTE — H&P ADULT - ASSESSMENT
69 y/o M with PMH of CAD(s/p 3 stents and 3V-CABG), PAD(s/p LLE stent), HTN, HLD, CHF presented with headache, neck pain and dizziness for 2 weeks.

## 2019-07-01 ENCOUNTER — TRANSCRIPTION ENCOUNTER (OUTPATIENT)
Age: 69
End: 2019-07-01

## 2019-07-01 VITALS
HEART RATE: 68 BPM | DIASTOLIC BLOOD PRESSURE: 64 MMHG | OXYGEN SATURATION: 100 % | SYSTOLIC BLOOD PRESSURE: 132 MMHG | RESPIRATION RATE: 17 BRPM

## 2019-07-01 LAB
ANION GAP SERPL CALC-SCNC: 10 MMO/L — SIGNIFICANT CHANGE UP (ref 7–14)
BUN SERPL-MCNC: 16 MG/DL — SIGNIFICANT CHANGE UP (ref 7–23)
CALCIUM SERPL-MCNC: 9.6 MG/DL — SIGNIFICANT CHANGE UP (ref 8.4–10.5)
CHLORIDE SERPL-SCNC: 107 MMOL/L — SIGNIFICANT CHANGE UP (ref 98–107)
CHOLEST SERPL-MCNC: 121 MG/DL — SIGNIFICANT CHANGE UP (ref 120–199)
CO2 SERPL-SCNC: 24 MMOL/L — SIGNIFICANT CHANGE UP (ref 22–31)
CREAT SERPL-MCNC: 1 MG/DL — SIGNIFICANT CHANGE UP (ref 0.5–1.3)
GLUCOSE SERPL-MCNC: 107 MG/DL — HIGH (ref 70–99)
HBA1C BLD-MCNC: 5.7 % — HIGH (ref 4–5.6)
HCT VFR BLD CALC: 35.3 % — LOW (ref 39–50)
HDLC SERPL-MCNC: 25 MG/DL — LOW (ref 35–55)
HGB BLD-MCNC: 11.9 G/DL — LOW (ref 13–17)
LIPID PNL WITH DIRECT LDL SERPL: 96 MG/DL — SIGNIFICANT CHANGE UP
MAGNESIUM SERPL-MCNC: 2.2 MG/DL — SIGNIFICANT CHANGE UP (ref 1.6–2.6)
MCHC RBC-ENTMCNC: 28.2 PG — SIGNIFICANT CHANGE UP (ref 27–34)
MCHC RBC-ENTMCNC: 33.7 % — SIGNIFICANT CHANGE UP (ref 32–36)
MCV RBC AUTO: 83.6 FL — SIGNIFICANT CHANGE UP (ref 80–100)
NRBC # FLD: 0 K/UL — SIGNIFICANT CHANGE UP (ref 0–0)
PHOSPHATE SERPL-MCNC: 3.7 MG/DL — SIGNIFICANT CHANGE UP (ref 2.5–4.5)
PLATELET # BLD AUTO: 120 K/UL — LOW (ref 150–400)
PMV BLD: 11.8 FL — SIGNIFICANT CHANGE UP (ref 7–13)
POTASSIUM SERPL-MCNC: 4 MMOL/L — SIGNIFICANT CHANGE UP (ref 3.5–5.3)
POTASSIUM SERPL-SCNC: 4 MMOL/L — SIGNIFICANT CHANGE UP (ref 3.5–5.3)
RBC # BLD: 4.22 M/UL — SIGNIFICANT CHANGE UP (ref 4.2–5.8)
RBC # FLD: 14.7 % — HIGH (ref 10.3–14.5)
SODIUM SERPL-SCNC: 141 MMOL/L — SIGNIFICANT CHANGE UP (ref 135–145)
TRIGL SERPL-MCNC: 64 MG/DL — SIGNIFICANT CHANGE UP (ref 10–149)
TSH SERPL-MCNC: 2.61 UIU/ML — SIGNIFICANT CHANGE UP (ref 0.27–4.2)
WBC # BLD: 4.24 K/UL — SIGNIFICANT CHANGE UP (ref 3.8–10.5)
WBC # FLD AUTO: 4.24 K/UL — SIGNIFICANT CHANGE UP (ref 3.8–10.5)

## 2019-07-01 RX ADMIN — PRASUGREL 10 MILLIGRAM(S): 5 TABLET, FILM COATED ORAL at 11:45

## 2019-07-01 RX ADMIN — PANTOPRAZOLE SODIUM 40 MILLIGRAM(S): 20 TABLET, DELAYED RELEASE ORAL at 09:11

## 2019-07-01 RX ADMIN — Medication 40 MILLIGRAM(S): at 05:36

## 2019-07-01 RX ADMIN — HEPARIN SODIUM 5000 UNIT(S): 5000 INJECTION INTRAVENOUS; SUBCUTANEOUS at 05:36

## 2019-07-01 RX ADMIN — Medication 81 MILLIGRAM(S): at 11:45

## 2019-07-01 RX ADMIN — LISINOPRIL 5 MILLIGRAM(S): 2.5 TABLET ORAL at 05:36

## 2019-07-01 RX ADMIN — ISOSORBIDE MONONITRATE 60 MILLIGRAM(S): 60 TABLET, EXTENDED RELEASE ORAL at 11:45

## 2019-07-01 NOTE — DISCHARGE NOTE PROVIDER - CARE PROVIDER_API CALL
Ulisses Abarca (MD)  Cardiovascular Disease; Nuclear Cardiology  [address] [place] [address]  Phone: [phone]  Fax: [phone]  Follow Up Time:

## 2019-07-01 NOTE — DISCHARGE NOTE PROVIDER - HOSPITAL COURSE
69 y/o M with PMH of CAD(s/p 3 stents and 3V-CABG), PAD(s/p LLE stent), HTN, HLD, CHF presented with headache, neck pain and dizziness for 2 weeks. Pt had CT Head/C-spine which was negative. 69 y/o M with PMH of CAD(s/p 3 stents and 3V-CABG), PAD(s/p LLE stent), HTN, HLD, CHF presented with headache, neck pain and dizziness for 2 weeks. Pt had CT Head/C-spine which was negative. Pt was seen by Dr. Abarca and pt is medically stable for discharge home today with outpatient follow-up with Dr. Abarca.

## 2019-07-01 NOTE — DISCHARGE NOTE NURSING/CASE MANAGEMENT/SOCIAL WORK - NSDCDPATPORTLINK_GEN_ALL_CORE
You can access the [facility] Patient Portal, offered by [facility], by registering with the following website: [email or web address]

## 2019-07-01 NOTE — DISCHARGE NOTE NURSING/CASE MANAGEMENT/SOCIAL WORK - NSDCPEPT PROEDHF_GEN_ALL_CORE
Low salt diet/Report signs and symptoms to primary care provider/Call primary care provider for follow up after discharge/Monitor weight daily/Activities as tolerated

## 2019-07-02 ENCOUNTER — APPOINTMENT (OUTPATIENT)
Dept: CT IMAGING | Facility: CLINIC | Age: 69
End: 2019-07-02

## 2019-10-24 ENCOUNTER — APPOINTMENT (OUTPATIENT)
Dept: ELECTROPHYSIOLOGY | Facility: CLINIC | Age: 69
End: 2019-10-24
Payer: MEDICARE

## 2019-10-24 VITALS
SYSTOLIC BLOOD PRESSURE: 127 MMHG | WEIGHT: 139 LBS | DIASTOLIC BLOOD PRESSURE: 66 MMHG | BODY MASS INDEX: 27.29 KG/M2 | HEART RATE: 70 BPM | HEIGHT: 60 IN | OXYGEN SATURATION: 97 %

## 2019-10-24 DIAGNOSIS — R06.02 SHORTNESS OF BREATH: ICD-10-CM

## 2019-10-24 DIAGNOSIS — R53.83 OTHER FATIGUE: ICD-10-CM

## 2019-10-24 PROCEDURE — 93000 ELECTROCARDIOGRAM COMPLETE: CPT

## 2019-10-24 PROCEDURE — 99205 OFFICE O/P NEW HI 60 MIN: CPT

## 2019-10-24 NOTE — REVIEW OF SYSTEMS
[Negative] : Endocrine [Shortness Of Breath] : shortness of breath [Dyspnea on exertion] : dyspnea during exertion

## 2019-10-24 NOTE — PHYSICAL EXAM
[General Appearance - Well Developed] : well developed [Normal Appearance] : normal appearance [Well Groomed] : well groomed [No Deformities] : no deformities [General Appearance - Well Nourished] : well nourished [General Appearance - In No Acute Distress] : no acute distress [Normal Conjunctiva] : the conjunctiva exhibited no abnormalities [Eyelids - No Xanthelasma] : the eyelids demonstrated no xanthelasmas [Normal Oral Mucosa] : normal oral mucosa [No Oral Cyanosis] : no oral cyanosis [No Oral Pallor] : no oral pallor [Normal Jugular Venous A Waves Present] : normal jugular venous A waves present [No Jugular Venous [place] A Waves] : no jugular venous angeles A waves [Normal Jugular Venous V Waves Present] : normal jugular venous V waves present [Normal Rate] : normal [Normal] : normal [Rhythm Regular] : regular [Normal S1] : normal S1 [Normal S2] : normal S2 [Respiration, Rhythm And Depth] : normal respiratory rhythm and effort [Exaggerated Use Of Accessory Muscles For Inspiration] : no accessory muscle use [Abdomen Soft] : soft [Auscultation Breath Sounds / Voice Sounds] : lungs were clear to auscultation bilaterally [Abdomen Tenderness] : non-tender [Abnormal Walk] : normal gait [Abdomen Mass (___ Cm)] : no abdominal mass palpated [Nail Clubbing] : no clubbing of the fingernails [Gait - Sufficient For Exercise Testing] : the gait was sufficient for exercise testing [Petechial Hemorrhages (___cm)] : no petechial hemorrhages [Cyanosis, Localized] : no localized cyanosis [Skin Color & Pigmentation] : normal skin color and pigmentation [No Venous Stasis] : no venous stasis [Skin Lesions] : no skin lesions [] : no rash [No Skin Ulcers] : no skin ulcer [No Xanthoma] : no  xanthoma was observed

## 2019-10-30 LAB
ANION GAP SERPL CALC-SCNC: 15 MMOL/L
BASOPHILS # BLD AUTO: 0.05 K/UL
BASOPHILS NFR BLD AUTO: 1.1 %
BUN SERPL-MCNC: 15 MG/DL
CALCIUM SERPL-MCNC: 9.5 MG/DL
CHLORIDE SERPL-SCNC: 105 MMOL/L
CO2 SERPL-SCNC: 22 MMOL/L
CREAT SERPL-MCNC: 1.05 MG/DL
EOSINOPHIL # BLD AUTO: 0.16 K/UL
EOSINOPHIL NFR BLD AUTO: 3.5 %
GLUCOSE SERPL-MCNC: 82 MG/DL
HCT VFR BLD CALC: 37.5 %
HGB BLD-MCNC: 11.7 G/DL
IMM GRANULOCYTES NFR BLD AUTO: 0.2 %
LYMPHOCYTES # BLD AUTO: 1.21 K/UL
LYMPHOCYTES NFR BLD AUTO: 26.8 %
MAN DIFF?: NORMAL
MCHC RBC-ENTMCNC: 25.4 PG
MCHC RBC-ENTMCNC: 31.2 GM/DL
MCV RBC AUTO: 81.5 FL
MONOCYTES # BLD AUTO: 0.32 K/UL
MONOCYTES NFR BLD AUTO: 7.1 %
NEUTROPHILS # BLD AUTO: 2.77 K/UL
NEUTROPHILS NFR BLD AUTO: 61.3 %
PLATELET # BLD AUTO: 142 K/UL
POTASSIUM SERPL-SCNC: 4 MMOL/L
RBC # BLD: 4.6 M/UL
RBC # FLD: 14.6 %
SODIUM SERPL-SCNC: 142 MMOL/L
WBC # FLD AUTO: 4.52 K/UL

## 2019-11-04 ENCOUNTER — NON-APPOINTMENT (OUTPATIENT)
Age: 69
End: 2019-11-04

## 2019-11-04 NOTE — DISCUSSION/SUMMARY
[FreeTextEntry1] : IN summary, Mr. Rahman is a pleasant 69 emily old gentleman with PMH of CAD(s/p 3 stents and 3V-CABG - 2002) , PAD(s/p LLE stent), HTN, had NSTEMI underwent cath with 99% occlusion in SVG to OM2 and ANGI was placed. EF 45 %. A recent echocardiogram performed at the cardiologist office showed EF ~ 25 % despite being on optimal medical therapy and revascularization (PCI 2 yrs ago ). \par \par 1) Cardiomyopathy : Recommend ICD for primary prevention of SCD. \par ACCF/AHA guidelines recommend an ICD in patients with ischemic cardiomyopathy who are at least 40 days post-MI; have an LVEF = 30%; are in NYHA functional class I; are on long-term optimal medical therapy; and are expected to survive > 1 year with good functional status. Continue GDMT per cardiologist reccomendations.\par A thorough discussion was had with the patient concerning all aspects of ICD therapy. We reviewed the data supporting ICD therapy and how it applies individually.  We discussed the procedures, risks and outcomes of ICD implantation an living with an ICD. We discussed management of ICD therapy throughout life, including deactivation of the ICD. After all questions were answered, and literature from the [facility] was provided, it was a shared decision to proceed with ICD therapy.\par \par 2) HTN: Continue oral antihypertensives as prescribed. Encouraged heart healthy diet, sodium restricted and weight loss. Continue regular follow up with regular cardiologist for further HTN management. \par \par 3) HLD - Continue LIpitor 80 mg \par 4) CAD - S/p 3 stents and 3V-CABG - 2002) , PAD(s/p LLE stent), HTN, had NSTEMI underwent cath with 99% occlusion in SVG to OM2 and ANGI was placed.  \par \par I appreciate the opportunity to be involved in his care.\par \par Thank you. \par

## 2019-11-04 NOTE — DATA REVIEWED
[de-identified] : EXAM:    BRAIN  MRI   \par PROCEDURE  DATE:    Aug  28  2016\par    INTERPRETATION:    \par HISTORY:  Left-sided  weakness  for  2  months.  \par MRI  OF  THE  BRAIN  WITHOUT  CONTRAST:\par   DATE:    08/20/2016.\par   TECHNIQUE: Sagittal  T1  FLAIR,  axial  T1  FLAIR,  T2  FLAIR,  SWI,  T2  propeller,  and  diffusion-weighted  series  with  ADC  map  were  obtained  without  the  use  of contrast.  Correlation  is  made  to  the  prior  CT  head  08/12/2016.\par   FINDINGS:  There  is  mild  enlargement  of  the  ventricles  and  sulci  greater  than  expected  for  age  consistent  with  volume  loss.  There  are  scattered  foci  of  hyperintense  T2  and  FLAIR  signal  within  the  periventricular  and  subcortical  white  matter,  these  are  nonspecific  and  likely  minimal    microvascular ischemic  change.  There  is  no    acute  intra  or  extra  axial  hemorrhage  or  midline  shift.  There  is  no restricted  diffusion  to  suggest    recent  ischemic  change.  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   The  pituitary  is  unremarkable.  There  is  increased  prominence  of  the  sulci  within  the  cerebellum  consistent  with  volume  loss.  There  is  tortuosity  of the  intracranial  arterial  circulation  this  may  be  seen  with  hypertension, the  left  vertebral  artery  flattens  the  left  anterior  medullary  pyramid.  The  orbital  globes  are  unremarkable.  There  is  scattered  mucosal  thickening  in  the  maxillary,  ethmoid,  frontal  and  sphenoid  sinuses.  There  is  decreased  pneumatization  of  the  left  greater  than  right  mastoid  tip.  The  calvarium  is  unremarkable.\par   IMPRESSION:  Volume  loss  with    microvascular  ischemic  change,  there  is  no  acute  hemorrhage,  restricted  diffusion,  or  midline  shift..

## 2019-11-04 NOTE — ASSESSMENT
[FreeTextEntry1] : IN summary, Mr. Rahman is a 66 year old male with a history of open heart surgery and HTN who returns in follow-up for left hemibody pain and to discuss the results of his brain MRI. His left hemibody pain has resolved. His brain MRI showed atrophy, and I reviewed the images with him and his daughter and let them know that he is at greater risk for dementia because of these findings. He does not have symptoms of memory impairment except for misplacing items and I cannot perform accurate memory testing as his first language is not English. If his memory worsens in the future, could consider a trial of Aricept. \par \par We reviewed his medications today and I noticed that he is on a multivitamin prescribed by his doctor as well as an OTC multivitamin. I recommended he take only one of those medications, not both. Otherwise, his medications look good. \par \par Follow-up PRN

## 2019-11-04 NOTE — REASON FOR VISIT
[Consultation] : a consultation visit [FreeTextEntry2] : ICD consideration  [FreeTextEntry1] : ICD consideration

## 2019-11-04 NOTE — HISTORY OF PRESENT ILLNESS
[FreeTextEntry1] : Dear Dr. Bustillos,\par  \par As you know, Mr. Rahman is a pleasant 69 emily old gentleman with PMH of  CAD(s/p 3 stents and 3V-CABG - 2002) , PAD(s/p LLE stent), HTN, had NSTEMI underwent cath with 99% occlusion in SVG to OM2 and ANGI was placed. EF 45 %. A recent echocardiogram performed at the cardiologist office showed EF ~ 25 % despite being on optimal medical therapy and revascularization (PCI 2 yrs ago ). \par \par He is accompanied by his wife today. He reports shortness of breath and LANZA and profound fatigue. \par TTE performed at his cardiologist office showed EF of 25%.

## 2019-11-12 ENCOUNTER — INPATIENT (INPATIENT)
Facility: HOSPITAL | Age: 69
LOS: 0 days | Discharge: HOME HEALTH | End: 2019-11-13
Attending: STUDENT IN AN ORGANIZED HEALTH CARE EDUCATION/TRAINING PROGRAM | Admitting: STUDENT IN AN ORGANIZED HEALTH CARE EDUCATION/TRAINING PROGRAM
Payer: MEDICARE

## 2019-11-12 VITALS
TEMPERATURE: 97 F | DIASTOLIC BLOOD PRESSURE: 53 MMHG | SYSTOLIC BLOOD PRESSURE: 112 MMHG | OXYGEN SATURATION: 100 % | HEART RATE: 62 BPM | RESPIRATION RATE: 17 BRPM

## 2019-11-12 DIAGNOSIS — Z98.890 OTHER SPECIFIED POSTPROCEDURAL STATES: Chronic | ICD-10-CM

## 2019-11-12 DIAGNOSIS — Z90.49 ACQUIRED ABSENCE OF OTHER SPECIFIED PARTS OF DIGESTIVE TRACT: Chronic | ICD-10-CM

## 2019-11-12 DIAGNOSIS — Z86.79 PERSONAL HISTORY OF OTHER DISEASES OF THE CIRCULATORY SYSTEM: Chronic | ICD-10-CM

## 2019-11-12 DIAGNOSIS — I50.20 UNSPECIFIED SYSTOLIC (CONGESTIVE) HEART FAILURE: ICD-10-CM

## 2019-11-12 DIAGNOSIS — Z95.1 PRESENCE OF AORTOCORONARY BYPASS GRAFT: Chronic | ICD-10-CM

## 2019-11-12 PROCEDURE — 33207 INSERT HEART PM VENTRICULAR: CPT

## 2019-11-12 PROCEDURE — 93010 ELECTROCARDIOGRAM REPORT: CPT

## 2019-11-12 PROCEDURE — 71045 X-RAY EXAM CHEST 1 VIEW: CPT | Mod: 26

## 2019-11-12 RX ORDER — CARVEDILOL PHOSPHATE 80 MG/1
1 CAPSULE, EXTENDED RELEASE ORAL
Qty: 0 | Refills: 0 | DISCHARGE

## 2019-11-12 RX ORDER — GABAPENTIN 400 MG/1
300 CAPSULE ORAL DAILY
Refills: 0 | Status: DISCONTINUED | OUTPATIENT
Start: 2019-11-12 | End: 2019-11-13

## 2019-11-12 RX ORDER — LISINOPRIL 2.5 MG/1
10 TABLET ORAL DAILY
Refills: 0 | Status: DISCONTINUED | OUTPATIENT
Start: 2019-11-12 | End: 2019-11-13

## 2019-11-12 RX ORDER — ISOSORBIDE MONONITRATE 60 MG/1
60 TABLET, EXTENDED RELEASE ORAL DAILY
Refills: 0 | Status: DISCONTINUED | OUTPATIENT
Start: 2019-11-12 | End: 2019-11-13

## 2019-11-12 RX ORDER — PANTOPRAZOLE SODIUM 20 MG/1
40 TABLET, DELAYED RELEASE ORAL
Refills: 0 | Status: DISCONTINUED | OUTPATIENT
Start: 2019-11-12 | End: 2019-11-13

## 2019-11-12 RX ORDER — SODIUM CHLORIDE 9 MG/ML
3 INJECTION INTRAMUSCULAR; INTRAVENOUS; SUBCUTANEOUS EVERY 8 HOURS
Refills: 0 | Status: DISCONTINUED | OUTPATIENT
Start: 2019-11-12 | End: 2019-11-13

## 2019-11-12 RX ORDER — ATORVASTATIN CALCIUM 80 MG/1
80 TABLET, FILM COATED ORAL AT BEDTIME
Refills: 0 | Status: DISCONTINUED | OUTPATIENT
Start: 2019-11-12 | End: 2019-11-13

## 2019-11-12 RX ORDER — ASPIRIN/CALCIUM CARB/MAGNESIUM 324 MG
81 TABLET ORAL DAILY
Refills: 0 | Status: DISCONTINUED | OUTPATIENT
Start: 2019-11-12 | End: 2019-11-13

## 2019-11-12 RX ORDER — CEFAZOLIN SODIUM 1 G
1000 VIAL (EA) INJECTION EVERY 8 HOURS
Refills: 0 | Status: COMPLETED | OUTPATIENT
Start: 2019-11-13 | End: 2019-11-13

## 2019-11-12 RX ORDER — PRASUGREL 5 MG/1
10 TABLET, FILM COATED ORAL DAILY
Refills: 0 | Status: DISCONTINUED | OUTPATIENT
Start: 2019-11-12 | End: 2019-11-13

## 2019-11-12 RX ORDER — CARVEDILOL PHOSPHATE 80 MG/1
12.5 CAPSULE, EXTENDED RELEASE ORAL EVERY 12 HOURS
Refills: 0 | Status: DISCONTINUED | OUTPATIENT
Start: 2019-11-12 | End: 2019-11-13

## 2019-11-12 RX ORDER — FUROSEMIDE 40 MG
40 TABLET ORAL DAILY
Refills: 0 | Status: DISCONTINUED | OUTPATIENT
Start: 2019-11-12 | End: 2019-11-13

## 2019-11-12 RX ORDER — SPIRONOLACTONE 25 MG/1
25 TABLET, FILM COATED ORAL DAILY
Refills: 0 | Status: DISCONTINUED | OUTPATIENT
Start: 2019-11-12 | End: 2019-11-13

## 2019-11-12 RX ADMIN — SODIUM CHLORIDE 3 MILLILITER(S): 9 INJECTION INTRAMUSCULAR; INTRAVENOUS; SUBCUTANEOUS at 22:18

## 2019-11-12 RX ADMIN — PRASUGREL 10 MILLIGRAM(S): 5 TABLET, FILM COATED ORAL at 19:14

## 2019-11-12 RX ADMIN — Medication 81 MILLIGRAM(S): at 19:14

## 2019-11-12 RX ADMIN — ATORVASTATIN CALCIUM 80 MILLIGRAM(S): 80 TABLET, FILM COATED ORAL at 22:18

## 2019-11-12 NOTE — CONSULT NOTE ADULT - SUBJECTIVE AND OBJECTIVE BOX
Ulisses Abarca MD  Interventional Cardiology / Endovascular Specialist  [place] Office : [address] [place] [address] [place] [address]  Tel:   [place] Office : [address] [facility] [place] [address]  Tel: [phone]  Cell : [phone]      HISTORY OF PRESENTING ILLNESS:    69 y/o M with PMH of CAD(s/p 3 stents and 3V-CABG), PAD(s/p LLE stent), HTN, HLD, CHF presented for elective ICD implantation , denies SOB, CP     PAST MEDICAL & SURGICAL HISTORY:  NSTEMI (non-ST elevated myocardial infarction):  and 2018 and 2019  Hyperlipidemia  Constipation, unspecified constipation type  PAD (peripheral artery disease): stent to L lower extremity artery  HTN (hypertension)  History of laparoscopic cholecystectomy: 2016  History of femoral angiogram: stent placed in LLE   History of coronary angiogram: multiple stents placed  S/P CABG (coronary artery bypass graft): x3; [facility]       SOCIAL HISTORY: Substance Use (street drugs): ( x ) never used  (  ) other:    FAMILY HISTORY:  Family history of MI (myocardial infarction): mother,   Family history of coronary artery disease in sister (Sibling):  from MI  Family history of stroke: 58yo CVA, heart attack 59yo  Family history of coronary artery disease in brother (Sibling): 4 brothers with MI/CABG, 1  at 79yo      REVIEW OF SYSTEMS:  CONSTITUTIONAL: No fever, weight loss, or fatigue  EYES: No eye pain, visual disturbances, or discharge  ENMT:  No difficulty hearing, tinnitus, vertigo; No sinus or throat pain  BREASTS: No pain, masses, or nipple discharge  GASTROINTESTINAL: No abdominal or epigastric pain. No nausea, vomiting, or hematemesis; No diarrhea or constipation. No melena or hematochezia.  GENITOURINARY: No dysuria, frequency, hematuria, or incontinence  NEUROLOGICAL: No headaches, memory loss, loss of strength, numbness, or tremors  ENDOCRINE: No heat or cold intolerance; No hair loss  MUSCULOSKELETAL: No joint pain or swelling; No muscle, back, or extremity pain  PSYCHIATRIC: No depression, anxiety, mood swings, or difficulty sleeping  HEME/LYMPH: No easy bruising, or bleeding gums  All others negative    MEDICATIONS:  aspirin enteric coated 81 milliGRAM(s) Oral daily  carvedilol 12.5 milliGRAM(s) Oral every 12 hours  furosemide    Tablet 40 milliGRAM(s) Oral daily  isosorbide   mononitrate ER Tablet (IMDUR) 60 milliGRAM(s) Oral daily  lisinopril 10 milliGRAM(s) Oral daily  prasugrel 10 milliGRAM(s) Oral daily  spironolactone 25 milliGRAM(s) Oral daily        gabapentin 300 milliGRAM(s) Oral daily    pantoprazole    Tablet 40 milliGRAM(s) Oral before breakfast    atorvastatin 80 milliGRAM(s) Oral at bedtime    sodium chloride 0.9% lock flush 3 milliLiter(s) IV Push every 8 hours      FAMILY HISTORY:  Family history of MI (myocardial infarction): mother,   Family history of coronary artery disease in sister (Sibling):  from MI  Family history of stroke: 58yo CVA, heart attack 59yo  Family history of coronary artery disease in brother (Sibling): 4 brothers with MI/CABG, 1  at 79yo        Allergies    Broccoli (Anaphylaxis)  No Known Drug Allergies  shellfish (Anaphylaxis)    Intolerances    	      PHYSICAL EXAM:  T(C): 36.3 (19 @ 20:44), Max: 36.3 (19 @ 20:44)  HR: 62 (19 @ 20:44) (62 - 62)  BP: 112/53 (19 @ 20:44) (112/53 - 112/53)  RR: 17 (19 @ 20:44) (17 - 17)  SpO2: 100% (19 @ 20:44) (100% - 100%)  Wt(kg): --  I&O's Summary      GENERAL: NAD, well-groomed, well-developed  EYES: EOMI, PERRLA, conjunctiva and sclera clear  ENMT: No tonsillar erythema, exudates, or enlargement; Moist mucous membranes, Good dentition, No lesions  Cardiovascular: Normal S1 S2, No JVD, No murmurs, No edema  Respiratory: Lungs clear to auscultation	  Gastrointestinal:  Soft, Non-tender, + BS	  Extremities: Normal range of motion, No clubbing, cyanosis or edema  LYMPH: No lymphadenopathy noted  NERVOUS SYSTEM:  Alert & Oriented X3, Good concentration; Motor Strength 5/5 B/L upper and lower extremities; DTRs 2+ intact and symmetric      LABS:	 	    CARDIAC MARKERS:                    proBNP:   Lipid Profile:   HgA1c:   TSH:     Consultant(s) Notes Reviewed:  [x ] YES  [ ] NO    Care Discussed with Consultants/Other Providers [ x] YES  [ ] NO    Imaging Personally Reviewed independently:  [x] YES  [ ] NO    All labs, radiologic studies, vitals, orders and medications list reviewed. Patient is seen and examined at bedside. Case discussed with medical team.    ASSESSMENT/PLAN:

## 2019-11-12 NOTE — CONSULT NOTE ADULT - ASSESSMENT
EKG - NSR LVH  Echo 2/19 - mod segmental LV dysfunction    a/p     1) CAD s/p CABG s/p PCI - s/p PCI of SVG to OM , CONT ASA EFFIENT     2) ICM: C/W home meds planned for ICD today     3) PVD - s/p stent to external Iliac , cont asa and effient

## 2019-11-12 NOTE — H&P CARDIOLOGY - HISTORY OF PRESENT ILLNESS
69 y.o male presents today for elective ICD implant.   see hard copy of H&P from [employer] in patient's chart.   The patient denies any new complaints since the last time she was seen by Dr. López.

## 2019-11-12 NOTE — H&P CARDIOLOGY - REVIEW OF SYSTEMS
The patient denies chest pain, palpitations, dizziness, presyncope, syncope, b/l lower  extremities edema, abdominal pain, N/V/D/C, melena, hematochezia, h/o DVT, PE, SANGEETHA, fever, chills, urinary symptoms, hematuria.

## 2019-11-13 ENCOUNTER — TRANSCRIPTION ENCOUNTER (OUTPATIENT)
Age: 69
End: 2019-11-13

## 2019-11-13 VITALS
RESPIRATION RATE: 18 BRPM | SYSTOLIC BLOOD PRESSURE: 118 MMHG | HEART RATE: 64 BPM | OXYGEN SATURATION: 100 % | DIASTOLIC BLOOD PRESSURE: 53 MMHG

## 2019-11-13 LAB
ANION GAP SERPL CALC-SCNC: 12 MMO/L — SIGNIFICANT CHANGE UP (ref 7–14)
BUN SERPL-MCNC: 20 MG/DL — SIGNIFICANT CHANGE UP (ref 7–23)
CALCIUM SERPL-MCNC: 8.6 MG/DL — SIGNIFICANT CHANGE UP (ref 8.4–10.5)
CHLORIDE SERPL-SCNC: 104 MMOL/L — SIGNIFICANT CHANGE UP (ref 98–107)
CO2 SERPL-SCNC: 22 MMOL/L — SIGNIFICANT CHANGE UP (ref 22–31)
CREAT SERPL-MCNC: 1.18 MG/DL — SIGNIFICANT CHANGE UP (ref 0.5–1.3)
GLUCOSE SERPL-MCNC: 111 MG/DL — HIGH (ref 70–99)
HCT VFR BLD CALC: 34.4 % — LOW (ref 39–50)
HGB BLD-MCNC: 11.2 G/DL — LOW (ref 13–17)
MAGNESIUM SERPL-MCNC: 2.3 MG/DL — SIGNIFICANT CHANGE UP (ref 1.6–2.6)
MCHC RBC-ENTMCNC: 25.8 PG — LOW (ref 27–34)
MCHC RBC-ENTMCNC: 32.6 % — SIGNIFICANT CHANGE UP (ref 32–36)
MCV RBC AUTO: 79.3 FL — LOW (ref 80–100)
NRBC # FLD: 0 K/UL — SIGNIFICANT CHANGE UP (ref 0–0)
PHOSPHATE SERPL-MCNC: 3.6 MG/DL — SIGNIFICANT CHANGE UP (ref 2.5–4.5)
PLATELET # BLD AUTO: 119 K/UL — LOW (ref 150–400)
PMV BLD: 13.5 FL — HIGH (ref 7–13)
POTASSIUM SERPL-MCNC: 4 MMOL/L — SIGNIFICANT CHANGE UP (ref 3.5–5.3)
POTASSIUM SERPL-SCNC: 4 MMOL/L — SIGNIFICANT CHANGE UP (ref 3.5–5.3)
RBC # BLD: 4.34 M/UL — SIGNIFICANT CHANGE UP (ref 4.2–5.8)
RBC # FLD: 15.1 % — HIGH (ref 10.3–14.5)
SODIUM SERPL-SCNC: 138 MMOL/L — SIGNIFICANT CHANGE UP (ref 135–145)
WBC # BLD: 5.81 K/UL — SIGNIFICANT CHANGE UP (ref 3.8–10.5)
WBC # FLD AUTO: 5.81 K/UL — SIGNIFICANT CHANGE UP (ref 3.8–10.5)

## 2019-11-13 PROCEDURE — 99024 POSTOP FOLLOW-UP VISIT: CPT

## 2019-11-13 RX ORDER — ACETAMINOPHEN 500 MG
2 TABLET ORAL
Qty: 0 | Refills: 0 | DISCHARGE
Start: 2019-11-13

## 2019-11-13 RX ORDER — ACETAMINOPHEN 500 MG
650 TABLET ORAL EVERY 6 HOURS
Refills: 0 | Status: DISCONTINUED | OUTPATIENT
Start: 2019-11-13 | End: 2019-11-13

## 2019-11-13 RX ORDER — NITROGLYCERIN 6.5 MG
1 CAPSULE, EXTENDED RELEASE ORAL
Qty: 0 | Refills: 0 | DISCHARGE

## 2019-11-13 RX ADMIN — GABAPENTIN 300 MILLIGRAM(S): 400 CAPSULE ORAL at 13:07

## 2019-11-13 RX ADMIN — CARVEDILOL PHOSPHATE 12.5 MILLIGRAM(S): 80 CAPSULE, EXTENDED RELEASE ORAL at 06:22

## 2019-11-13 RX ADMIN — ISOSORBIDE MONONITRATE 60 MILLIGRAM(S): 60 TABLET, EXTENDED RELEASE ORAL at 13:07

## 2019-11-13 RX ADMIN — Medication 650 MILLIGRAM(S): at 09:45

## 2019-11-13 RX ADMIN — SPIRONOLACTONE 25 MILLIGRAM(S): 25 TABLET, FILM COATED ORAL at 06:22

## 2019-11-13 RX ADMIN — Medication 81 MILLIGRAM(S): at 13:07

## 2019-11-13 RX ADMIN — Medication 650 MILLIGRAM(S): at 04:12

## 2019-11-13 RX ADMIN — PRASUGREL 10 MILLIGRAM(S): 5 TABLET, FILM COATED ORAL at 13:07

## 2019-11-13 RX ADMIN — Medication 100 MILLIGRAM(S): at 00:57

## 2019-11-13 RX ADMIN — Medication 650 MILLIGRAM(S): at 11:30

## 2019-11-13 RX ADMIN — Medication 650 MILLIGRAM(S): at 03:12

## 2019-11-13 RX ADMIN — PANTOPRAZOLE SODIUM 40 MILLIGRAM(S): 20 TABLET, DELAYED RELEASE ORAL at 06:22

## 2019-11-13 RX ADMIN — LISINOPRIL 10 MILLIGRAM(S): 2.5 TABLET ORAL at 06:22

## 2019-11-13 RX ADMIN — SODIUM CHLORIDE 3 MILLILITER(S): 9 INJECTION INTRAMUSCULAR; INTRAVENOUS; SUBCUTANEOUS at 06:22

## 2019-11-13 RX ADMIN — Medication 100 MILLIGRAM(S): at 08:10

## 2019-11-13 RX ADMIN — Medication 40 MILLIGRAM(S): at 06:22

## 2019-11-13 NOTE — DISCHARGE NOTE PROVIDER - NSDCCPCAREPLAN_GEN_ALL_CORE_FT
PRINCIPAL DISCHARGE DIAGNOSIS  Diagnosis: S/P ICD (internal cardiac defibrillator) procedure  Assessment and Plan of Treatment: No strenuous activity x 3 weeks, No heavy lifting x 1 week, May shower but no bath x7 days, Monitor site for any bleeding, infection, redness, pain, swelling, Contact MD if any of these symptoms occurs  Follow up at [facility] on 12/23 at 9 AM      SECONDARY DISCHARGE DIAGNOSES  Diagnosis: HLD (hyperlipidemia)  Assessment and Plan of Treatment: Low fat diet, exercise daily and continue current medications. Follow up with primary care physician and cardiologist for management.      Diagnosis: PVD (peripheral vascular disease)  Assessment and Plan of Treatment: Follow up with Your PMD    Diagnosis: CAD, multiple vessel  Assessment and Plan of Treatment: Continue aspirin and Effient,  do not stop unless instructed by your physician.  Continue low salt, fat, cholesterol and carbohydrate diet. Follow up with cardiologist and primary care physician's routine appointment.

## 2019-11-13 NOTE — DISCHARGE NOTE PROVIDER - HOSPITAL COURSE
69 y.o. male presents today for electve ICD implant        1) CAD s/p CABG s/p PCI - s/p PCI of SVG to OM , CONT ASA EFFIENT         2) ICM: C/W home meds s/p ICD tolerating the procedure well         3) PVD - s/p stent to external Iliac , cont asa and effient    Case discussed with attending, Pt is Stable for Discharge.

## 2019-11-13 NOTE — DISCHARGE NOTE PROVIDER - NSDCMRMEDTOKEN_GEN_ALL_CORE_FT
acetaminophen 325 mg oral tablet: 2 tab(s) orally every 6 hours, As needed, Mild Pain (1 - 3), Moderate Pain (4 - 6)  aspirin 81 mg oral tablet: 1 tab(s) orally once a day  atorvastatin 80 mg oral tablet: 1 tab(s) orally once a day (at bedtime)  Coreg 12.5 mg oral tablet: 1 tab(s) orally once a day  furosemide 40 mg oral tablet: 1 tab(s) orally once a day  gabapentin 300 mg oral capsule: 1 cap(s) orally once a day  Imdur 60 mg oral tablet, extended release: 1 tab(s) orally once a day (in the morning)  lisinopril 10 mg oral tablet: 1 tab(s) orally once a day  loratadine 10 mg oral tablet: 1 tab(s) orally once a day, As Needed  pantoprazole 40 mg oral delayed release tablet: 1 tab(s) orally every 12 hours  prasugrel 10 mg oral tablet: 1 tab(s) orally once a day  spironolactone 25 mg oral tablet: 1 tab(s) orally once a day

## 2019-11-13 NOTE — PROGRESS NOTE ADULT - SUBJECTIVE AND OBJECTIVE BOX
Ulisses Abarca MD  Interventional Cardiology / Endovascular Specialist  [place] Office : [address] [place] [address] [place] [address]  Tel:   [place] Office : [address] [facility] [place] [address]  Tel: [phone]  Cell : [phone]    HISTORY OF PRESENTING ILLNESS:    69 y/o M with PMH of CAD(s/p 3 stents and 3V-CABG), PAD(s/p LLE stent), HTN, HLD, CHF presented for elective ICD implantation , denies SOB, CP  	  MEDICATIONS:  aspirin enteric coated 81 milliGRAM(s) Oral daily  carvedilol 12.5 milliGRAM(s) Oral every 12 hours  furosemide    Tablet 40 milliGRAM(s) Oral daily  isosorbide   mononitrate ER Tablet (IMDUR) 60 milliGRAM(s) Oral daily  lisinopril 10 milliGRAM(s) Oral daily  prasugrel 10 milliGRAM(s) Oral daily  spironolactone 25 milliGRAM(s) Oral daily        acetaminophen   Tablet .. 650 milliGRAM(s) Oral every 6 hours PRN  gabapentin 300 milliGRAM(s) Oral daily    pantoprazole    Tablet 40 milliGRAM(s) Oral before breakfast    atorvastatin 80 milliGRAM(s) Oral at bedtime    sodium chloride 0.9% lock flush 3 milliLiter(s) IV Push every 8 hours      PAST MEDICAL/SURGICAL HISTORY  PAST MEDICAL & SURGICAL HISTORY:  NSTEMI (non-ST elevated myocardial infarction):  and 2018 and 2019  Hyperlipidemia  Constipation, unspecified constipation type  PAD (peripheral artery disease): stent to L lower extremity artery  HTN (hypertension)  History of laparoscopic cholecystectomy: 2016  History of femoral angiogram: stent placed in LLE 2016  History of coronary angiogram: multiple stents placed  S/P CABG (coronary artery bypass graft): x3; [facility]       SOCIAL HISTORY: Substance Use (street drugs): ( x ) never used  (  ) other:    FAMILY HISTORY:  Family history of MI (myocardial infarction): mother,   Family history of coronary artery disease in sister (Sibling):  from MI  Family history of stroke: 58yo CVA, heart attack 61yo  Family history of coronary artery disease in brother (Sibling): 4 brothers with MI/CABG, 1  at 77yo      REVIEW OF SYSTEMS:  CONSTITUTIONAL: No fever, weight loss, or fatigue  EYES: No eye pain, visual disturbances, or discharge  ENMT:  No difficulty hearing, tinnitus, vertigo; No sinus or throat pain  BREASTS: No pain, masses, or nipple discharge  GASTROINTESTINAL: No abdominal or epigastric pain. No nausea, vomiting, or hematemesis; No diarrhea or constipation. No melena or hematochezia.  GENITOURINARY: No dysuria, frequency, hematuria, or incontinence  NEUROLOGICAL: No headaches, memory loss, loss of strength, numbness, or tremors  ENDOCRINE: No heat or cold intolerance; No hair loss  MUSCULOSKELETAL: No joint pain or swelling; No muscle, back, or extremity pain  PSYCHIATRIC: No depression, anxiety, mood swings, or difficulty sleeping  HEME/LYMPH: No easy bruising, or bleeding gums  c/p pain at ICD site    PHYSICAL EXAM:  T(C): 36.5 (19 @ 08:07), Max: 36.7 (19 @ 06:16)  HR: 62 (19 @ 08:07) (62 - 69)  BP: 100/56 (19 @ 08:07) (100/56 - 116/53)  RR: 16 (19 @ 08:07) (16 - 17)  SpO2: 96% (19 @ 08:07) (96% - 100%)  Wt(kg): --  I&O's Summary    Height (cm): 165.1 ( @ 01:02)  Weight (kg): 64.2 ( @ 01:02)  BMI (kg/m2): 23.6 ( @ 01:02)  BSA (m2): 1.71 ( @ 01:02)    GENERAL: NAD, well-groomed, well-developed  EYES: EOMI, PERRLA, conjunctiva and sclera clear  ENMT: No tonsillar erythema, exudates, or enlargement; Moist mucous membranes, Good dentition, No lesions  Cardiovascular: Normal S1 S2, No JVD, No murmurs, No edema  Respiratory: Lungs clear to auscultation	  Gastrointestinal:  Soft, Non-tender, + BS	  Extremities: Normal range of motion, No clubbing, cyanosis or edema  LYMPH: No lymphadenopathy noted  NERVOUS SYSTEM:  Alert & Oriented X3, Good concentration; Motor Strength 5/5 B/L upper and lower extremities; DTRs 2+ intact and symmetric                                    11.2   5.81  )-----------( 119      ( 2019 06:50 )             34.4         138  |  104  |  20  ----------------------------<  111<H>  4.0   |  22  |  1.18    Ca    8.6      2019 06:50  Phos  3.6       Mg     2.3           proBNP:   Lipid Profile:   HgA1c:   TSH:     Consultant(s) Notes Reviewed:  [x ] YES  [ ] NO    Care Discussed with Consultants/Other Providers [ x] YES  [ ] NO    Imaging Personally Reviewed independently:  [x] YES  [ ] NO    All labs, radiologic studies, vitals, orders and medications list reviewed. Patient is seen and examined at bedside. Case discussed with medical team.

## 2019-11-13 NOTE — DISCHARGE NOTE PROVIDER - CARE PROVIDERS DIRECT ADDRESSES
,DirectAddress_Unknown,[email or web address]

## 2019-11-13 NOTE — PROGRESS NOTE ADULT - SUBJECTIVE AND OBJECTIVE BOX
Patient is a 69y old  Male who presents with a chief complaint of for AICD placement (13 Nov 2019 02:11)  Mild wound site pain relived with Tylenol.  Patient denies CP, SOB or palpitations.    PAST MEDICAL & SURGICAL HISTORY:  NSTEMI (non-ST elevated myocardial infarction): 2017 and February 2018 and 2019  Hyperlipidemia  Constipation, unspecified constipation type  S/P CABG x 3: [facility], 2002  PAD (peripheral artery disease): stent to L lower extremity artery  HTN (hypertension)  CAD (coronary artery disease)  History of laparoscopic cholecystectomy: 2016  History of femoral angiogram: stent placed in LLE 2016  History of coronary angiogram: multiple stents placed  S/P CABG (coronary artery bypass graft): x3; [facility] 2002      MEDICATIONS  (STANDING):  aspirin enteric coated 81 milliGRAM(s) Oral daily  atorvastatin 80 milliGRAM(s) Oral at bedtime  carvedilol 12.5 milliGRAM(s) Oral every 12 hours  furosemide    Tablet 40 milliGRAM(s) Oral daily  gabapentin 300 milliGRAM(s) Oral daily  isosorbide   mononitrate ER Tablet (IMDUR) 60 milliGRAM(s) Oral daily  lisinopril 10 milliGRAM(s) Oral daily  pantoprazole    Tablet 40 milliGRAM(s) Oral before breakfast  prasugrel 10 milliGRAM(s) Oral daily  sodium chloride 0.9% lock flush 3 milliLiter(s) IV Push every 8 hours  spironolactone 25 milliGRAM(s) Oral daily    MEDICATIONS  (PRN):  acetaminophen   Tablet .. 650 milliGRAM(s) Oral every 6 hours PRN Mild Pain (1 - 3), Moderate Pain (4 - 6)            Vital Signs Last 24 Hrs  T(C): 36.5 (13 Nov 2019 08:07), Max: 36.7 (13 Nov 2019 06:16)  T(F): 97.7 (13 Nov 2019 08:07), Max: 98.1 (13 Nov 2019 06:16)  HR: 64 (13 Nov 2019 13:59) (62 - 69)  BP: 118/53 (13 Nov 2019 13:59) (100/56 - 118/53)  BP(mean): --  RR: 18 (13 Nov 2019 13:59) (16 - 18)  SpO2: 100% (13 Nov 2019 13:59) (96% - 100%)            INTERPRETATION OF TELEMETRY:  V paced     ECG: V paced        LABS:                        11.2   5.81  )-----------( 119      ( 13 Nov 2019 06:50 )             34.4     11-13    138  |  104  |  20  ----------------------------<  111<H>  4.0   |  22  |  1.18    Ca    8.6      13 Nov 2019 06:50  Phos  3.6     11-13  Mg     2.3     11-13                BNP  RADIOLOGY & ADDITIONAL STUDIES:      PHYSICAL EXAM:    GENERAL: In no apparent distress, well nourished, and hydrated.  NECK: Supple and normal thyroid.  No JVD or carotid bruit.  Carotid pulse is 2+ bilaterally.  HEART: Regular rate and rhythm; No murmurs, rubs, or gallops.  Left side ACW incision site with dermabond dry intact, no active bleeding or hematoma   PULMONARY: Clear to auscultation and perfusion.  No rales, wheezing, or rhonchi bilaterally.  ABDOMEN: Soft, Nontender, Nondistended; Bowel sounds present  EXTREMITIES:  2+ Peripheral Pulses, No clubbing, cyanosis, or edema  NEUROLOGICAL: Grossly nonfocal

## 2019-11-13 NOTE — PROGRESS NOTE ADULT - ASSESSMENT
EKG - NSR LVH  Echo 2/19 - mod segmental LV dysfunction    a/p     1) CAD s/p CABG s/p PCI - s/p PCI of SVG to OM , CONT ASA EFFIENT     2) ICM: C/W home meds s/p ICD tolerating the procedure well     3) PVD - s/p stent to external Iliac , cont asa and effient

## 2019-11-13 NOTE — PROGRESS NOTE ADULT - ASSESSMENT
69 y/o M with PMH of CAD(s/p 3 stents and 3V-CABG), PAD(s/p LLE stent), HTN, HLD, chronic HFrEF presented for ICD for primary prevention for sudden cardiac death from ventricular tachyarrhythmia s/p Medtronic MRI conditional ICD on 11/12.  Post ICD device teaching provided to patient and his family.    -Complete Abx for prophylaxis  -Resume home meds  -f/u in the device clinic on 12/23 at 9am    -Stable for discharge home

## 2019-11-13 NOTE — PROGRESS NOTE ADULT - SUBJECTIVE AND OBJECTIVE BOX
ACP team PA note     >> site check s/p AICD placement, pt denies any complaints,   __ site is C/D/I, no swelling, no hematoma, no bleeding, no tenderness to palpation,   -- Abx are in place, prelim CXR-- no PMX.

## 2019-11-13 NOTE — DISCHARGE NOTE PROVIDER - CARE PROVIDER_API CALL
Ulisses Abarca)  Cardiovascular Disease; Nuclear Cardiology  [address] [place] [address]  Phone: [phone]  Fax: [phone]  Follow Up Time:     Adwoa López)  Cardiac Electrophysiology; Cardiology; Internal Medicine  [address] [place] [address]  Phone: [phone]  Fax: [phone]  Follow Up Time:

## 2019-11-13 NOTE — PROGRESS NOTE ADULT - ATTENDING COMMENTS
69 y/o M with PMH of CAD(s/p 3 stents and 3V-CABG), PAD(s/p LLE stent), HTN, HLD, chronic HFrEF presented for ICD for primary prevention for sudden cardiac death from ventricular tachyarrhythmia s/p Medtronic MRI conditional ICD on 11/12. Device teaching provided. FU appt in office.

## 2019-12-09 ENCOUNTER — APPOINTMENT (OUTPATIENT)
Dept: ELECTROPHYSIOLOGY | Facility: CLINIC | Age: 69
End: 2019-12-09
Payer: MEDICARE

## 2019-12-09 DIAGNOSIS — I42.9 CARDIOMYOPATHY, UNSPECIFIED: ICD-10-CM

## 2019-12-09 PROCEDURE — 99024 POSTOP FOLLOW-UP VISIT: CPT

## 2019-12-09 RX ORDER — GABAPENTIN 300 MG/1
300 CAPSULE ORAL
Refills: 0 | Status: ACTIVE | COMMUNITY

## 2019-12-09 RX ORDER — LORATADINE 10 MG/1
10 TABLET ORAL
Refills: 0 | Status: DISCONTINUED | COMMUNITY
End: 2019-12-09

## 2019-12-09 RX ORDER — PANTOPRAZOLE SODIUM 40 MG/10ML
40 INJECTION, POWDER, FOR SOLUTION INTRAVENOUS
Refills: 0 | Status: DISCONTINUED | COMMUNITY
End: 2019-12-09

## 2019-12-18 NOTE — ED PROVIDER NOTE - EYES, MLM
Eprescribed to pharmacy  Last office visit: 10/25/19  Patient has a follow up on: 4/06/20    Last refill: 10/25/19 90 tabs w/ 1 refill  sertraline (ZOLOFT) 50 MG tablet      
Clear bilaterally, pupils equal, round and reactive to light.

## 2020-02-01 PROCEDURE — G9005: CPT

## 2020-02-24 ENCOUNTER — APPOINTMENT (OUTPATIENT)
Dept: ELECTROPHYSIOLOGY | Facility: CLINIC | Age: 70
End: 2020-02-24

## 2020-03-11 ENCOUNTER — APPOINTMENT (OUTPATIENT)
Dept: ELECTROPHYSIOLOGY | Facility: CLINIC | Age: 70
End: 2020-03-11

## 2020-04-03 ENCOUNTER — APPOINTMENT (OUTPATIENT)
Dept: ELECTROPHYSIOLOGY | Facility: CLINIC | Age: 70
End: 2020-04-03
Payer: MEDICARE

## 2020-04-03 PROCEDURE — 93296 REM INTERROG EVL PM/IDS: CPT

## 2020-04-03 PROCEDURE — 93295 DEV INTERROG REMOTE 1/2/MLT: CPT

## 2020-08-20 NOTE — H&P CARDIOLOGY - CENTRAL VENOUS CATHETER
Ms. Ramirez is a 68 year old female here to establish care.    History of Present Illness:  Moved from [place] a few years back.     History of R breast cancer diagnosed age 40 yo, treated in 1994, DCIS/invasive malignancy and pagets, had simple mastectomy and CMF chemo at [facility], followed by 5 years of tamoxifen.    She also had colitis felt to be UC possibly Crohns overlap.  This is well controlled. She goes to [place] for this.  1/17:   DIAGNOSIS:     A.  Terminal Ileum, endoscopic biopsy:  Normal ileal mucosa.        B.  Colon, Right, endoscopic biopsy:  Colonic mucosa without    diagnostic abnormality.        C.  Colon, Left, endoscopic biopsy:  Colonic mucosa without    diagnostic abnormality.   She had 8 lumps removed from the left side of her neck.      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  Was seen at [place] for this. She did have a BM Bx in 2016.  Sister has leukemia.  She takes an ASA only.  10/16  DIAGNOSIS:    Peripheral blood, bone marrow aspirate and biopsy, iliac crest:          1)  Normocellular bone marrow with atypical megakaryocytes and    intact erythropoiesis and granulopoiesis.        2)  Positive for the JAK2 V617F mutation. See comment.     She has noted tinnitus bilat (cricket sounds) recently, 5/19. Saw ENT at [facility] and did hearing test and cerumen cleaning.  Had mild high frequency hearing loss.    She feels her BP may be creeping up to 130s systolic.  Occasionally feels palpitations. No caffeine, she is a vegetarian.      A full 10-pt Review of Systems was performed, verified and is negative except as documented in the HPI.  All health questionnaires were reviewed, verified and relevant information documented above.    Past Medical History:  Past Medical History:   Diagnosis Date     Breast cancer (H)      Colitis      Hypertension      Myeloproliferative neoplasm (H)     likely PV with +Jak2 mutation positive     Tinnitus         Past Surgical History:  Past Surgical History:   Procedure Laterality Date     AS RAD RESEC TONSIL/PILLARS       MASTECTOMY Right 1994       Active Meds:  Current Outpatient Medications   Medication     aspirin 81 MG EC tablet     LORazepam (ATIVAN) 0.5 MG tablet     mesalamine (LIALDA) 1.2 g EC tablet     nebivolol (BYSTOLIC) 10 MG tablet     sodium chloride (SURYA 128) 5 % ophthalmic ointment     VITAMIN D PO     No current facility-administered medications for this visit.         Allergies:  Contrast dye; Shellfish allergy; and Epinephrine    Family History:  family history includes Atrial fibrillation in her brother; Breast Cancer in her cousin; Breast Cancer (age of onset: 72) in her mother; Cerebrovascular Disease (age of onset: 63) in her father; Colitis in her cousin and mother; Colon Cancer in her mother; Heart Failure in her mother; Leukemia in her sister; Lupus in her sister; Prostate Cancer in her brother; Psoriasis in her brother; Tuberculosis in her mother.    Social History:  Social History     Tobacco Use     Smoking status: Never Smoker     Smokeless tobacco: Never Used   Substance Use Topics     Alcohol use: None     Drug use: None       Physical Exam:  Vitals: /82 (BP Location: Right arm, Patient Position: Sitting, Cuff Size: Adult Regular)   Pulse 64   Wt 57.6 kg (127 lb)   SpO2 98%   Constitutional: Alert, oriented, pleasant, no acute distress  Head: Normocephalic, atraumatic  Eyes: Extra-ocular movements intact, pupils equally round and reactive bilaterally, no scleral icterus  ENT: Oropharynx clear, moist mucus membranes, good dentition  Neck: Supple, no lymphadenopathy, no thyromegaly  Breasts: normal without suspicious masses, skin changes or axillary nodes of R breast  Cardiovascular: Regular rate and rhythm, no murmurs, rubs or gallops, peripheral pulses full/symmetric  Respiratory: Good air movement bilaterally, lungs clear, no wheezes/rales/rhonchi  GI: Abdomen soft, bowel  sounds present, nondistended, nontender, no organomegaly or masses, no rebound/guarding  Musculoskeletal: No edema, normal muscle tone, normal gait  Neurologic: Alert and oriented, cranial nerves 2-12 intact  Psychiatric: normal mentation, affect and mood      2019  FINDINGS:   Spleen: Normal.  Spleen length: 9.9 cm. This is likely not significantly changed  compared to a length of approximately 10.5 cm on CT enterography 01/10/2017  accounting for differences in technique.    1/19 Mammo:  ASSESSMENT:  BI-RADS: 1: Negative.      Assessment and Plan:    Trina was seen today for establish care.    Diagnoses and all orders for this visit:    Menopause  -     Dexa hip/pelvis/spine*; Future  -     Vitamin D Deficiency **(6 MO); Future    Laboratory exam ordered as part of routine general medical examination  -     Lipid panel reflex to direct LDL Fasting; Future  -     Comprehensive metabolic panel **(6 MO); Future    Thyroid nodule  -     TSH with free T4 reflex **(6 MO); Future    Screening for hyperlipidemia  See above.    Colitis  Stable on current medications.    Personal history of malignant neoplasm of breast  Breast exam done today. Mammo scheduled this fall.    Polycythemia  -     CBC with platelets differential **(6 MO); Future    Travel  Okay to get covid test. She will message me.    Routine Health Maintenence:  Immunizations (zoster, pneumovax, flu, Tdap, Hep A/B):   There is no immunization history on file for this patient.  Lipids: ordered  Lung Ca Screening (>30 pk age 55-79 or >20 py age 50-79 + RF): not indicated  Colonoscopy (50-75 yrs): 2017, last colo, due 2021  Dexa (>65W or 70M yrs): ordered  Mammogram (40-75 yrs): 1/19, scheduled this fall  Pap (21-65 yrs): last done 8/2019        INTERPRETATION/RESULT NEGATIVE FOR INTRAEPITHELIAL LESION OR MALIGNANCY (NIL) (none)    SPECIMEN ADEQUACY Satisfactory for evaluation  Endocervical component present      HIV/HCV if risk  factors:  Safety/Lifestyle:  Tob/EtOH:  Depression:  Advanced Directive:    Return to clinic: 6 months or prn    Trina Daily MD  Internal Medicine    45 min spent face to face, of which >50% time spent on counseling/coordinating care exclusive of any procedure time                   no

## 2020-09-14 ENCOUNTER — APPOINTMENT (OUTPATIENT)
Dept: ELECTROPHYSIOLOGY | Facility: CLINIC | Age: 70
End: 2020-09-14
Payer: MEDICARE

## 2020-09-14 PROCEDURE — 93295 DEV INTERROG REMOTE 1/2/MLT: CPT

## 2020-09-14 PROCEDURE — 93296 REM INTERROG EVL PM/IDS: CPT

## 2020-12-18 ENCOUNTER — APPOINTMENT (OUTPATIENT)
Dept: ELECTROPHYSIOLOGY | Facility: CLINIC | Age: 70
End: 2020-12-18
Payer: MEDICARE

## 2020-12-18 PROCEDURE — 93296 REM INTERROG EVL PM/IDS: CPT

## 2020-12-18 PROCEDURE — 93295 DEV INTERROG REMOTE 1/2/MLT: CPT

## 2021-03-19 ENCOUNTER — NON-APPOINTMENT (OUTPATIENT)
Age: 71
End: 2021-03-19

## 2021-03-19 ENCOUNTER — APPOINTMENT (OUTPATIENT)
Dept: ELECTROPHYSIOLOGY | Facility: CLINIC | Age: 71
End: 2021-03-19
Payer: MEDICARE

## 2021-03-19 PROCEDURE — 93296 REM INTERROG EVL PM/IDS: CPT

## 2021-03-19 PROCEDURE — 93295 DEV INTERROG REMOTE 1/2/MLT: CPT

## 2021-04-29 ENCOUNTER — APPOINTMENT (OUTPATIENT)
Dept: ELECTROPHYSIOLOGY | Facility: CLINIC | Age: 71
End: 2021-04-29
Payer: MEDICARE

## 2021-04-29 ENCOUNTER — APPOINTMENT (OUTPATIENT)
Dept: ELECTROPHYSIOLOGY | Facility: CLINIC | Age: 71
End: 2021-04-29
Payer: MEDICAID

## 2021-04-29 ENCOUNTER — NON-APPOINTMENT (OUTPATIENT)
Age: 71
End: 2021-04-29

## 2021-04-29 VITALS
RESPIRATION RATE: 16 BRPM | HEIGHT: 65 IN | HEART RATE: 85 BPM | WEIGHT: 137 LBS | DIASTOLIC BLOOD PRESSURE: 70 MMHG | TEMPERATURE: 96.7 F | OXYGEN SATURATION: 99 % | SYSTOLIC BLOOD PRESSURE: 123 MMHG | BODY MASS INDEX: 22.82 KG/M2

## 2021-04-29 PROCEDURE — 99072 ADDL SUPL MATRL&STAF TM PHE: CPT

## 2021-04-29 PROCEDURE — 93290 INTERROG DEV EVAL ICPMS IP: CPT | Mod: 26

## 2021-04-29 PROCEDURE — 99215 OFFICE O/P EST HI 40 MIN: CPT

## 2021-04-29 PROCEDURE — 93282 PRGRMG EVAL IMPLANTABLE DFB: CPT

## 2021-04-29 PROCEDURE — 93000 ELECTROCARDIOGRAM COMPLETE: CPT

## 2021-04-29 RX ORDER — PANTOPRAZOLE 40 MG/1
40 TABLET, DELAYED RELEASE ORAL
Refills: 0 | Status: DISCONTINUED | COMMUNITY
End: 2021-04-29

## 2021-04-29 RX ORDER — FUROSEMIDE 40 MG/1
40 TABLET ORAL DAILY
Qty: 30 | Refills: 3 | Status: DISCONTINUED | COMMUNITY
End: 2021-04-29

## 2021-04-29 NOTE — DISCUSSION/SUMMARY
[FreeTextEntry1] : Dusty Rahman is a 69y/o man with Hx of HTN, HLD, CAD (s/p 3 stents and 3V-CABG - 2002) , PAD(s/p LLE stent), and chronic systolic CHFs/p ICD placement who presents today for routine f/u. \par \par Impression:\par \par 1. ICM/chronic systolic CHF: s/p ICD placement. EKG performed today to assess for presence of pacing and reveals sinus rhythm. ICD checked in office and reveals normal ICD functioning. No VT or ICD shocks. Resume routine device checks as scheduled. Resume OMT as prescribed, encouraged heart healthy diet, daily weight, and regular f/u with Cardiology/Heart failure team as scheduled.\par \par 2. HTN: resume oral antihypertensives as prescribed. Encouraged heart healthy diet, sodium restriction, and weight loss. Continue regular f/u with Cardiologist for further HTN management.\par \par 3. HLD: resume statin therapy as prescribed and regular f/u with Cardiologist for routine lipid monitoring and management.\par \par Will continue f/u with Cardiologist and may RTO as needed or if any new or worsening symptoms or findings occur.

## 2021-04-29 NOTE — PHYSICAL EXAM
Information and recommendations given to patient, states he contacted his podiatrist, and it was decided that should continue with the [address] [Well Developed] : well developed [Well Nourished] : well nourished [No Acute Distress] : no acute distress [Normal Conjunctiva] : normal conjunctiva [Normal Venous Pressure] : normal venous pressure [No Carotid Bruit] : no carotid bruit [Normal S1, S2] : normal S1, S2 [No Murmur] : no murmur [No Rub] : no rub [No Gallop] : no gallop [Clear Lung Fields] : clear lung fields [Good Air Entry] : good air entry [No Respiratory Distress] : no respiratory distress  [Soft] : abdomen soft [Non Tender] : non-tender [No Masses/organomegaly] : no masses/organomegaly [Normal Bowel Sounds] : normal bowel sounds [Normal Gait] : normal gait [No Edema] : no edema [No Cyanosis] : no cyanosis [No Clubbing] : no clubbing [No Varicosities] : no varicosities [No Rash] : no rash [No Skin Lesions] : no skin lesions [Moves all extremities] : moves all extremities [No Focal Deficits] : no focal deficits [Normal Speech] : normal speech [Alert and Oriented] : alert and oriented [Normal memory] : normal memory

## 2021-04-29 NOTE — HISTORY OF PRESENT ILLNESS
[FreeTextEntry1] : Dusty Rahman is a 69y/o man with Hx of HTN, HLD, CAD (s/p 3 stents and 3V-CABG - 2002) , PAD(s/p LLE stent), and chronic systolic CHF s/p ICD placement who presents today for routine f/u. Admits doing well with no issues or complaints. No recent in office ICD checks. Remote monitoring established with no evidence of VT. Denies chest pain, palpitations, SOB, syncope or near syncope.\par

## 2021-05-18 NOTE — ED PROVIDER NOTE - GENITOURINARY NEGATIVE STATEMENT, MLM
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 REFERRING PHYSICIAN: Kaleigh Davison MD MD Orders: Evaluate and Treat   Return MD Appointment: 2021     INITIAL ASSESSMENT:  Ms. Isaías Perez is a 78 y.o. female presenting to physical therapy with complaints of neck pain which began back in 2020. She reports her pain starting after working in her yard spreading mulch. Patient states the pain has gotten some better over time, but continues to be an annoyance and limits her ROM/ mobility. She was given some ROM exercises by the doctor and reports continuing with those daily. She was having some hearing problems and had an MRI for assessment where they looked at her cervical spine as well. Per chart review the MRI demonstrated spondylosis and degenerative changes.  She is scheduled to have another MRI in 6 months for re-assessment. She reports one fall about 6 months ago where she tripped over a curb at the grocery store, but no injury, no other falls, and no feelings of unsteadiness. Her main complaint with her neck pain is her loss of ROM and pain with cervical rotations and a few headaches a week. Patient is eager to improve her pain, ROM, and activity tolerance in order to return to her prior level of independence and function. Patient presents with increased pain, decreased strength, decreased ROM, decreased flexibility, impaired posture, impaired transfer ability, decreased activity tolerance, and overall impaired functional mobility. Patient is a good candidate for skilled physical therapy interventions to include manual therapy, therapeutic exercise, transfer training, postural re-education, body mechanics training, and pain modalities as needed. RECERTIFICATION NOTE (0/67/1411):  Patient has been seen for 17 sessions of physical therapy from 3/19/2021 to 5/18/2021. Pt reports feeling 75% better at this time and reports only has difficulty with driving activities and recreational exercise activities presently. Pt demonstrates significant improvements in cervical spine AROM and strength, as well as posture and functional activity tolerance since last progress report. New PT goal added to progress pt towards return to recreational exercise classes consistent with prior level of function. Patient will benefit from continued skilled physical therapy to address remaining goals and deficits. PROBLEM LIST (Impacting functional limitations):  1. Decreased Strength  2. Decreased ADL/Functional Activities  3. Decreased Transfer Abilities  4. Increased Pain  5. Decreased Activity Tolerance  6. Decreased Pacing Skills  7. Decreased Work Simplification/Energy Conservation Techniques  8.  Decreased Flexibility/Joint Mobility INTERVENTIONS PLANNED: (Treatment may consist of any combination of the following)  1. Bed Mobility  2. Cold  3. Family Education  4. Heat  5. Home Exercise Program (HEP)  6. Manual Therapy  7. Neuromuscular Re-education/Strengthening  8. Range of Motion (ROM)  9. Therapeutic Activites  10. Therapeutic Exercise/Strengthening  11. Transfer Training  12. Ultrasound (US)     GOALS: (Goals have been discussed and agreed upon with patient.)  Short-Term Functional Goals: Time Frame: 3/19/2021 to 4/16/2021  1. Patient demonstrates independence with home exercise program without verbal cueing provided by therapist. Myah Red MET 4/7/2021  2. Patient will report no more than 3/10 neck pain at rest in order to demonstrate improved self pain control and tolerance. GOAL MET 4/7/2021  3. Patient will be educated in and demonstrate improved upright posture including decreased anterior head and thoracic kyphosis to decrease strain on the cervical spine and improve mobility. GOAL MET 4/7/2021  4. Patient will improve bilateral cervical lateral flexion ROM to at least 25 degrees in order to demonstrate improve joint and tissue mobility. GOAL MET 4/19/2021  5. Patient will report less than 2 headaches a week in order to demonstrate decreased tension from the cervical spine causes headaches. GOAL MET 4/7/2021  Discharge Goals: Time Frame: 3/19/2021 to 5/18/2021  9. Patient will improve bilateral cervical rotation ROM to at least 45 degrees with minimal to no complaints of pain in order to improve ability to look over shoulder while driving. ONGOING   10. Patient will be able to sleep through the night without waking due to neck pain in order to improve sleeping pattern for overall health and wellness. GOAL MET 4/7/2021  1. Patient will report 1 headache or less during the week in order to demonstrate decreased tension and return to normal headache frequency prior to neck pain. GOAL MET 4/19/21  2.  Patient will be able to perform work inside and outside of her home with minimal to no complaints of neck pain in order to return to prior level of activities. GOAL MET 5/18/2021   3. Patient will improve Neck Disability Index Score to 12/50 from 15/50. GOAL MET 4/19/2021  4. NEW GOAL 5/18/2021: Pt will return to unrestricted recreational weekly exercise classes consistent with prior level of function with reports neck pain symptoms 3/10 or less. Outcome Measure: Tool Used: Neck Disability Index (NDI)  Score:  Initial: 15/50  Most Recent: 11/50 (Date:4/19/2021)   Interpretation of Score: The Neck Disability Index is a revised form of the Oswestry Low Back Pain Index and is designed to measure the activities of daily living in person's with neck pain. Each section is scored on a 0-5 scale, 5 representing the greatest disability. The scores of each section are added together for a total score of 50. Patient denies any changes in vision, dizziness, vertigo, nausea, drop attacks, black outs, tinnitus, dysphagia, dysarthria, LE symptoms or bowel/bladder dysfunction. Observation/Orthostatic Postural Assessment:          Patient with moderate forward head posturing and thoracic kyphosis. Palpation:          Mild tenderness to palpation of cervical musculature including B UT/LS R>L and suboccipitals muscles. Vertebral-Basilar Screen: Positional Provocation testing is negative per ROM allowance. ROM:          Pain reported in R side of neck with R cervical rotation and L side of neck with L cervical rotation  AROM/ PROM Degrees Degrees 5/18/2021 Degrees  4/19/21   Cervical Flexion 75 75 75   Cervical Extension 20 35 28   Right Rotation 40 52* 32*   Left Rotation 35 60 46   Right Lateral Flexion 17 26 25   Left Lateral Flexion 22 28 26     Strength:          Gross bilateral UE strength 4+/5 to 5/5. Special Tests:          Ligament stress tests performed through upper cervical spine for transverse ligament including Sharp-Poli test is negative, and [place]-Axis shear test is negative. Hydro-Axis side flexion test for integrity of alar ligament is negative. Spurling test is negative. Cervical distraction \"feels good\" with anterior head posturing, but causes discomfort when put in a more neutral spinal posturing. Passive Accessory Motion:         C spine: Moderate to severe hypomobility lower cervical spine C5-7 and CT junction. T spine: Severe hypomobility upper thoracic spine T1-5. Neurological Screen:              Myotomes: Key muscle strength testing through bilateral UE is [facility]. Dermatomes: Sensation to light touch for bilateral UE is intact from C4 to T2. Reflexes: Biceps (C5): 1+ bilaterally         Brachioradialis (C6): 1+ bilaterally        Triceps (C7): 1+ bilaterally  Functional Mobility:         No HA symptoms reported in previous 1 week. Medical Necessity:   · Patient is expected to demonstrate progress in strength, range of motion, coordination and functional technique to improve safety during driving, lifting, reaching, and home activities. · Skilled intervention continues to be required due to increased pain with decreased ROM hindering independence and function. Reason for Services/Other Comments:  · Patient continues to require skilled intervention due to neck pain hindering return to prior level of function and impacting quality of life. Rehabilitation Potential For Stated Goals: Good  Regarding Marisa Killian's therapy, I certify that the treatment plan above will be carried out by a therapist or under their direction. Thank you for this referral,  Erick Boyer PT     Referring Physician Signature:  Zarina Bentley MD                                                       Date: no dysuria, no frequency, and no hematuria.

## 2021-06-12 NOTE — ED PROVIDER NOTE - SYNCOPE/NEAR SYNCOPE
Pt 9 days post partum and fever all day yesterday and continues today, 99 - 102 at 2:00 am and now with Tylenol and Ibuprofen 99. Pt has abdominal pain.  No vomiting. Certified Nurse Midwife paged to patient @ 9:33 am.  Yenny Graves RN, MA  [facility]    Triage Nurse Advisor     
No

## 2021-07-01 ENCOUNTER — INPATIENT (INPATIENT)
Facility: HOSPITAL | Age: 71
LOS: 0 days | Discharge: HOME | End: 2021-07-01
Attending: PHYSICAL MEDICINE & REHABILITATION | Admitting: PHYSICAL MEDICINE & REHABILITATION
Payer: MEDICARE

## 2021-07-01 ENCOUNTER — TRANSCRIPTION ENCOUNTER (OUTPATIENT)
Age: 71
End: 2021-07-01

## 2021-07-01 VITALS
OXYGEN SATURATION: 100 % | RESPIRATION RATE: 19 BRPM | TEMPERATURE: 98 F | SYSTOLIC BLOOD PRESSURE: 169 MMHG | HEART RATE: 92 BPM | DIASTOLIC BLOOD PRESSURE: 94 MMHG

## 2021-07-01 VITALS — HEIGHT: 65 IN | WEIGHT: 139.99 LBS

## 2021-07-01 DIAGNOSIS — Z90.49 ACQUIRED ABSENCE OF OTHER SPECIFIED PARTS OF DIGESTIVE TRACT: Chronic | ICD-10-CM

## 2021-07-01 DIAGNOSIS — I25.10 ATHEROSCLEROTIC HEART DISEASE OF NATIVE CORONARY ARTERY WITHOUT ANGINA PECTORIS: ICD-10-CM

## 2021-07-01 DIAGNOSIS — E78.5 HYPERLIPIDEMIA, UNSPECIFIED: ICD-10-CM

## 2021-07-01 DIAGNOSIS — R07.9 CHEST PAIN, UNSPECIFIED: ICD-10-CM

## 2021-07-01 DIAGNOSIS — Z98.890 OTHER SPECIFIED POSTPROCEDURAL STATES: Chronic | ICD-10-CM

## 2021-07-01 DIAGNOSIS — Z95.1 PRESENCE OF AORTOCORONARY BYPASS GRAFT: Chronic | ICD-10-CM

## 2021-07-01 DIAGNOSIS — I10 ESSENTIAL (PRIMARY) HYPERTENSION: ICD-10-CM

## 2021-07-01 DIAGNOSIS — I25.5 ISCHEMIC CARDIOMYOPATHY: ICD-10-CM

## 2021-07-01 DIAGNOSIS — Z86.79 PERSONAL HISTORY OF OTHER DISEASES OF THE CIRCULATORY SYSTEM: Chronic | ICD-10-CM

## 2021-07-01 LAB
ALBUMIN SERPL ELPH-MCNC: 4.3 G/DL — SIGNIFICANT CHANGE UP (ref 3.3–5)
ALP SERPL-CCNC: 113 U/L — SIGNIFICANT CHANGE UP (ref 40–120)
ALT FLD-CCNC: 16 U/L — SIGNIFICANT CHANGE UP (ref 4–41)
ANION GAP SERPL CALC-SCNC: 9 MMOL/L — SIGNIFICANT CHANGE UP (ref 7–14)
APTT BLD: 38.4 SEC — HIGH (ref 27–36.3)
AST SERPL-CCNC: 18 U/L — SIGNIFICANT CHANGE UP (ref 4–40)
BASOPHILS # BLD AUTO: 0.03 K/UL — SIGNIFICANT CHANGE UP (ref 0–0.2)
BASOPHILS NFR BLD AUTO: 0.7 % — SIGNIFICANT CHANGE UP (ref 0–2)
BILIRUB SERPL-MCNC: 0.8 MG/DL — SIGNIFICANT CHANGE UP (ref 0.2–1.2)
BUN SERPL-MCNC: 17 MG/DL — SIGNIFICANT CHANGE UP (ref 7–23)
CALCIUM SERPL-MCNC: 9.3 MG/DL — SIGNIFICANT CHANGE UP (ref 8.4–10.5)
CHLORIDE SERPL-SCNC: 109 MMOL/L — HIGH (ref 98–107)
CO2 SERPL-SCNC: 23 MMOL/L — SIGNIFICANT CHANGE UP (ref 22–31)
CREAT SERPL-MCNC: 1.13 MG/DL — SIGNIFICANT CHANGE UP (ref 0.5–1.3)
EOSINOPHIL # BLD AUTO: 0.11 K/UL — SIGNIFICANT CHANGE UP (ref 0–0.5)
EOSINOPHIL NFR BLD AUTO: 2.4 % — SIGNIFICANT CHANGE UP (ref 0–6)
GLUCOSE SERPL-MCNC: 111 MG/DL — HIGH (ref 70–99)
HCT VFR BLD CALC: 39.2 % — SIGNIFICANT CHANGE UP (ref 39–50)
HGB BLD-MCNC: 12.5 G/DL — LOW (ref 13–17)
IANC: 2.71 K/UL — SIGNIFICANT CHANGE UP (ref 1.5–8.5)
IMM GRANULOCYTES NFR BLD AUTO: 0.2 % — SIGNIFICANT CHANGE UP (ref 0–1.5)
INR BLD: 1.06 RATIO — SIGNIFICANT CHANGE UP (ref 0.88–1.16)
LYMPHOCYTES # BLD AUTO: 1.32 K/UL — SIGNIFICANT CHANGE UP (ref 1–3.3)
LYMPHOCYTES # BLD AUTO: 29.3 % — SIGNIFICANT CHANGE UP (ref 13–44)
MAGNESIUM SERPL-MCNC: 2.1 MG/DL — SIGNIFICANT CHANGE UP (ref 1.6–2.6)
MCHC RBC-ENTMCNC: 27.5 PG — SIGNIFICANT CHANGE UP (ref 27–34)
MCHC RBC-ENTMCNC: 31.9 GM/DL — LOW (ref 32–36)
MCV RBC AUTO: 86.3 FL — SIGNIFICANT CHANGE UP (ref 80–100)
MONOCYTES # BLD AUTO: 0.33 K/UL — SIGNIFICANT CHANGE UP (ref 0–0.9)
MONOCYTES NFR BLD AUTO: 7.3 % — SIGNIFICANT CHANGE UP (ref 2–14)
NEUTROPHILS # BLD AUTO: 2.71 K/UL — SIGNIFICANT CHANGE UP (ref 1.8–7.4)
NEUTROPHILS NFR BLD AUTO: 60.1 % — SIGNIFICANT CHANGE UP (ref 43–77)
NRBC # BLD: 0 /100 WBCS — SIGNIFICANT CHANGE UP
NRBC # FLD: 0 K/UL — SIGNIFICANT CHANGE UP
NT-PROBNP SERPL-SCNC: 888 PG/ML — HIGH
PLATELET # BLD AUTO: 124 K/UL — LOW (ref 150–400)
POTASSIUM SERPL-MCNC: 3.9 MMOL/L — SIGNIFICANT CHANGE UP (ref 3.5–5.3)
POTASSIUM SERPL-SCNC: 3.9 MMOL/L — SIGNIFICANT CHANGE UP (ref 3.5–5.3)
PROT SERPL-MCNC: 7.1 G/DL — SIGNIFICANT CHANGE UP (ref 6–8.3)
PROTHROM AB SERPL-ACNC: 12 SEC — SIGNIFICANT CHANGE UP (ref 10.6–13.6)
RBC # BLD: 4.54 M/UL — SIGNIFICANT CHANGE UP (ref 4.2–5.8)
RBC # FLD: 13.5 % — SIGNIFICANT CHANGE UP (ref 10.3–14.5)
SARS-COV-2 RNA SPEC QL NAA+PROBE: SIGNIFICANT CHANGE UP
SODIUM SERPL-SCNC: 141 MMOL/L — SIGNIFICANT CHANGE UP (ref 135–145)
TROPONIN T, HIGH SENSITIVITY RESULT: 18 NG/L — SIGNIFICANT CHANGE UP
WBC # BLD: 4.51 K/UL — SIGNIFICANT CHANGE UP (ref 3.8–10.5)
WBC # FLD AUTO: 4.51 K/UL — SIGNIFICANT CHANGE UP (ref 3.8–10.5)

## 2021-07-01 PROCEDURE — 99291 CRITICAL CARE FIRST HOUR: CPT | Mod: 25

## 2021-07-01 PROCEDURE — 93010 ELECTROCARDIOGRAM REPORT: CPT

## 2021-07-01 RX ORDER — METOCLOPRAMIDE HCL 10 MG
1 TABLET ORAL
Qty: 0 | Refills: 0 | DISCHARGE

## 2021-07-01 RX ORDER — CARVEDILOL PHOSPHATE 80 MG/1
1 CAPSULE, EXTENDED RELEASE ORAL
Qty: 0 | Refills: 0 | DISCHARGE

## 2021-07-01 RX ORDER — LORATADINE 10 MG/1
1 TABLET ORAL
Qty: 0 | Refills: 0 | DISCHARGE

## 2021-07-01 RX ORDER — SPIRONOLACTONE 25 MG/1
1 TABLET, FILM COATED ORAL
Qty: 0 | Refills: 0 | DISCHARGE

## 2021-07-01 RX ORDER — PRASUGREL 5 MG/1
1 TABLET, FILM COATED ORAL
Qty: 0 | Refills: 0 | DISCHARGE

## 2021-07-01 RX ORDER — GABAPENTIN 400 MG/1
1 CAPSULE ORAL
Qty: 0 | Refills: 0 | DISCHARGE

## 2021-07-01 RX ORDER — LISINOPRIL 2.5 MG/1
1 TABLET ORAL
Qty: 0 | Refills: 0 | DISCHARGE

## 2021-07-01 NOTE — ED PROVIDER NOTE - OBJECTIVE STATEMENT
69yo male CAD 3 stents (last 2016), AICD, HLD, HTN p/w acute severe non-radiating mid-sternal radiation around 8am while gardening. Aspirin loaded by EMS, took 2 sublingual nitro at home and given 3 nitro sprays with improvement of symptoms, pain still present. Notes mild dyspnea. Denies h/o PE/DVT, leg swelling, numbness, weakness. 69yo male CAD 3 stents (last 2016), AICD, HLD, HTN p/w acute severe non-radiating mid-sternal radiation around 8am while gardening (approximately 45mins prior to arrival). Aspirin 324mg given by EMS, took 2 sublingual nitro at home and given 3 nitro sprays with mild improvement of symptoms, pain still present on arrival. Notes mild dyspnea. Denies h/o PE/DVT, leg swelling, numbness, weakness.

## 2021-07-01 NOTE — H&P CARDIOLOGY - PMH
Constipation, unspecified constipation type    Coronary artery disease involving native coronary artery of native heart, unspecified whether angina present    HTN (hypertension)    Hyperlipidemia    Ischemic cardiomyopathy with implantable cardioverter-defibrillator (ICD)    NSTEMI (non-ST elevated myocardial infarction)  2017 and February 2018 and 2019  PAD (peripheral artery disease)  stent to L lower extremity artery

## 2021-07-01 NOTE — ED ADULT NURSE NOTE - NSIMPLEMENTINTERV_GEN_ALL_ED
Implemented All Universal Safety Interventions:  [place] to call system. Call bell, personal items and telephone within reach. Instruct patient to call for assistance. Room bathroom lighting operational. Non-slip footwear when patient is off stretcher. Physically safe environment: no spills, clutter or unnecessary equipment. Stretcher in lowest position, wheels locked, appropriate side rails in place.

## 2021-07-01 NOTE — DISCHARGE NOTE PROVIDER - NSDCCPCAREPLAN_GEN_ALL_CORE_FT
PRINCIPAL DISCHARGE DIAGNOSIS  Diagnosis: Chest pain  Assessment and Plan of Treatment: s/p C. no intervention. Continue current regimen

## 2021-07-01 NOTE — DISCHARGE NOTE PROVIDER - HOSPITAL COURSE
This is 71 yo male with a PMH of CAD (s/p 3 stents and 3V-CABG in 2002), PAD(s/p LLE stent), HTN, HLD, chronic systolic CHF, s/p  ICD implantation (2019) presented to ED complaining of severe chest pain that started today morning at 8 am  after he lifted heavy garbage can. Aspirin loaded by EMS, took 2 sublingual nitro at home and given 3 nitro sprays with improvement of symptoms, pain resolved.   The patient is s/p LHC via RFA:  SVG->OM closed, LIMA->LAD open. No intervention. Continue current medications.

## 2021-07-01 NOTE — ED PROVIDER NOTE - EKG ADDITIONAL QUESTION - PERFORMED INDEPENDENT VISUALIZATION
"Chief Complaint   Patient presents with     Derm Problem     consult for mole removal       Initial /69 (BP Location: Left arm, Patient Position: Chair, Cuff Size: Adult Regular)   Pulse 59   SpO2 98%  Estimated body mass index is 19.73 kg/m  as calculated from the following:    Height as of 10/3/18: 1.549 m (5' 1\").    Weight as of 10/3/18: 47.4 kg (104 lb 6.4 oz).  Medications and allergies reviewed.    Oleg ALVA CMA    " Yes

## 2021-07-01 NOTE — DISCHARGE NOTE PROVIDER - NSDCQMSTAIRS_GEN_ALL_CORE
Assessment/Plan:    Diagnoses and all orders for this visit:    Pain of joint of left ankle and foot  -     XR ankle 3+ vw left; Future  -     XR foot 3+ vw left; Future    Atraumatic medial ankle pain likely due to hyperpronation  Recommend arch supports, NSAIDs and ice  Patient declines PT    Return if symptoms worsen or fail to improve  Chief Complaint:   left ankle pain    Subjective:   Patient ID: Mirtha Patel is a 38 y o  female  NP presents for left medial ankle pain and swelling, no injury, gradual onset  Using ibuprofen  Review of Systems   Constitutional: Negative for fever  Respiratory: Negative for shortness of breath  Cardiovascular: Negative for chest pain  Gastrointestinal: Negative for abdominal pain  Genitourinary: Negative for difficulty urinating  Musculoskeletal: Positive for arthralgias  Skin: Negative for rash  Neurological: Negative for weakness  Psychiatric/Behavioral: Negative for suicidal ideas  The following portions of the patient's chart were reviewed and updated as appropriate:    Allergy:  No Known Allergies      Past Medical History:   Diagnosis Date    Abdominal pain     EGD today -2016    Abnormality of eyelid     LAST ASSESSED: [id number]    Arthritis     shoulders    Drug abuse ([facility] [address] )     Ganglion cyst of wrist, right     LAST ASSESSED: [id number]    Hepatitis C     dx 15 yrs ago- retested told undetectable    Hepatitis C virus infection     LAST ASSESSED: [id number]    Hordeolum externum left eye, unspecified eyelid     LAST ASSESSED: [id number]    Pyelonephritis     pyelonephritis    Stargardt's disease     Stargardt's disease     Tear of right scapholunate ligament     LAST ASSESSED: [id number]    Torn ligament     R   wrist    Wears glasses     reading       Past Surgical History:   Procedure Laterality Date     SECTION      LAST ASSESSED: [id number]    DENTAL SURGERY      INDUCED       SURCAL TREATMENT FOR     VT ESOPHAGOGASTRODUODENOSCOPY TRANSORAL DIAGNOSTIC N/A 2016    Procedure: ESOPHAGOGASTRODUODENOSCOPY (EGD); Surgeon: Teri Turner MD;  Location: [facility]; Service: Gastroenterology       Social History     Social History    Marital status:      Spouse name: N/A    Number of children: N/A    Years of education: N/A     Occupational History    Not on file  Social History Main Topics    Smoking status: Current Every Day Smoker     Packs/day: 1 00     Years: 25 00     Types: Cigarettes    Smokeless tobacco: Never Used    Alcohol use No      Comment: former ETOH abuse-per pt    Drug use: No      Comment: former addict-per doctor PMH, H/O INTRAVENOUS DRUG USE IN REMISSION    Sexual activity: Not on file     Other Topics Concern    Not on file     Social History Narrative    DAILY [employer], 6-8 SERVINGS A DAY           Family History   Problem Relation Age of Onset    Heart attack Mother     Cancer Mother         LEIOMYOSARCOMA    Seizures Father         EPILEPSY    Diabetes Brother        Medications:    Current Outpatient Prescriptions:     mesalamine (PENTASA) 250 mg CR capsule, Take 500 mg by mouth 4 (four) times a day, Disp: , Rfl:     sertraline (ZOLOFT) 100 mg tablet, Take 1 tablet daily with 50 mg tablet , Disp: 90 tablet, Rfl: 0    sertraline (ZOLOFT) 50 mg tablet, Take 1 tablet daily with 100 mg tablet , Disp: 90 tablet, Rfl: 0    Patient Active Problem List   Diagnosis    Depression       Objective:  Left Ankle Exam   Swelling: mild    Tenderness   The patient is experiencing tenderness in the deltoid  Range of Motion   The patient has normal left ankle ROM  Muscle Strength   The patient has normal left ankle strength      Other   Erythema: absent  Sensation: normal    Comments:  Hyperpronation ankle          Strength/Myotome Testing     Left Ankle/Foot   Normal strength      Physical Exam      Neurologic Exam    Procedures    I have personally reviewed pertinent films in PACS  No

## 2021-07-01 NOTE — DISCHARGE NOTE NURSING/CASE MANAGEMENT/SOCIAL WORK - PATIENT PORTAL LINK FT
You can access the FollowMyHealth Patient Portal offered by [facility] by registering at the following website: [email or web address]. By joining [facility]’s FollowMyHealth portal, you will also be able to view your health information using other applications (apps) compatible with our system.

## 2021-07-01 NOTE — ED ADULT NURSE NOTE - OBJECTIVE STATEMENT
Pt received to trauma C. A&OX3 ambulatory coming in with EMS for chest pain, STEMI called out on the field. Pt was gardening when suddenly started having severe chest pain, not like past pain. PMH Open heart surgery-3 stents placed, defibrillator placed nov 2018. Pt took 2 sublingual nitro at home without any relief. EMS provided him 3 nitro sprays and 324 aspirin. Respirations are even and unlabored. Pulses strong bilaterally. Heart sounds S1 and S2 noted. Pt arrives with 18G IV to the left hand from EMS. 20G IV to the [facility], labs sent. Pt on placed on cardiac monitor- NSR with invert T waves. Pt placed on zoll. Awaiting further orders. Pt received to trauma C. A&OX3 ambulatory coming in with EMS for chest pain, STEMI called out on the field. Pt was gardening when suddenly started having severe non-radiating midsternal sharp chest pain, "not like past pain". PMH Open heart surgery-3 stents placed, defibrillator placed nov 2018. Pt took 2 sublingual nitro at home without any relief. EMS provided him 3 nitro sprays and 324 aspirin provided partial relief. Respirations are even and unlabored. Pulses strong bilaterally. Heart sounds S1 and S2 noted. Pt arrives with 18G IV to the left hand from EMS. 20G IV to the [facility], labs sent. Pt on placed on cardiac monitor- NSR with invert T waves. Pt placed on zoll. Awaiting further orders. Pt received to trauma C. A&OX3 ambulatory coming in with EMS for chest pain, STEMI called out on the field. Pt was gardening when suddenly started having severe non-radiating midsternal sharp chest pain, "not like past pain". PMH Open heart surgery-3 stents placed, defibrillator placed nov 2018. Pt took 2 sublingual nitro at home without any relief. EMS provided him 3 nitro sprays and 324 aspirin provided partial relief. Respirations are even and unlabored. Pulses strong bilaterally. No edema noted. Heart sounds S1 and S2 noted. Pt arrives with 18G IV to the left hand from EMS. 20G IV to the [facility], labs sent. Pt on placed on cardiac monitor- NSR with invert T waves. Pt placed on zoll. Awaiting further orders.

## 2021-07-01 NOTE — CONSULT NOTE ADULT - SUBJECTIVE AND OBJECTIVE BOX
Patient is a 70y old  Male who presents with a chief complaint of Chest pain.      HPI:  69 yo male with a PMH of CAD (s/p 3 stents and 3V-CABG in ), PAD(s/p LLE stent), HTN, HLD, chronic systolic CHF, s/p  ICD implantation () presented to ED complaining of severe chest pain that started today morning at 8 am  after he lifted heavy garbage can. Aspirin loaded by EMS, took 2 sublingual nitro at home and given 3 nitro sprays with improvement of symptoms, pain resolved.   The patient denies SOB, palpitations, dizziness, presyncope, syncope,  headache, visual disturbances, CVA, PE, DVT, SANGEETHA, abdominal pain, N/V/D/C, hematochezia, melena, dysuria, hematuria, fever, chills.  S/P cath .       PAST MEDICAL & SURGICAL HISTORY:  HTN (hypertension)    PAD (peripheral artery disease)  stent to L lower extremity artery    Constipation, unspecified constipation type    Hyperlipidemia    NSTEMI (non-ST elevated myocardial infarction)   and 2018 and     Coronary artery disease involving native coronary artery of native heart, unspecified whether angina present    Ischemic cardiomyopathy with implantable cardioverter-defibrillator (ICD)    S/P CABG (coronary artery bypass graft)  x3; [facility]     History of coronary angiogram  multiple stents placed    History of femoral angiogram  stent placed in E     History of laparoscopic cholecystectomy          Social History: no smoking .    FAMILY HISTORY:  Family history of coronary artery disease in brother (Sibling)  4 brothers with MI/CABG, 1  at 79yo    Family history of stroke  60yo CVA, heart attack 59yo    Family history of coronary artery disease in sister (Sibling)   from MI    Family history of MI (myocardial infarction)  mother,         Allergies    No Known Allergies    Intolerances        REVIEW OF SYSTEMS:    CONSTITUTIONAL: No fever, weight loss, or fatigue  EYES: No eye pain, visual disturbances, or discharge  RESPIRATORY: No cough, wheezing, chills or hemoptysis; No shortness of breath  CARDIOVASCULAR: No chest pain, palpitations, dizziness, or leg swelling  GASTROINTESTINAL: No abdominal or epigastric pain. No nausea, vomiting, or hematemesis; No diarrhea or constipation. No melena or hematochezia.  GENITOURINARY: No dysuria, frequency, hematuria, or incontinence  NEUROLOGICAL: No headaches, memory loss, loss of strength, numbness, or tremors  SKIN: No itching, burning, rashes, or lesions   ENDOCRINE: No heat or cold intolerance; No hair loss  MUSCULOSKELETAL: No joint pain or swelling; No muscle, back, or extremity pain  PSYCHIATRIC: No depression, anxiety, mood swings, or difficulty sleeping      MEDICATIONS  (STANDING):    MEDICATIONS  (PRN):      Vital Signs Last 24 Hrs  T(C): 36.7 (2021 08:50), Max: 36.7 (2021 08:50)  T(F): 98 (2021 08:50), Max: 98 (2021 08:50)  HR: 92 (2021 08:50) (92 - 92)  BP: 169/94 (2021 08:50) (169/94 - 169/94)  BP(mean): --  RR: 19 (2021 08:50) (19 - 19)  SpO2: 100% (2021 08:50) (100% - 100%)    PHYSICAL EXAM:    GENERAL: NAD, well-groomed, well-developed  HEAD:  Atraumatic, Normocephalic  EYES: EOMI, PERRLA, conjunctiva and sclera clear  ENMT: No tonsillar erythema, exudates, or enlargement; Moist mucous membranes, Good dentition, No lesions  NECK: Supple, No JVD, Normal thyroid  NERVOUS SYSTEM:  Alert & Oriented X3, No new  focal sign .  CHEST/LUNG: Air entry good bilaterally; No rales, rhonchi, wheezing, or rubs  HEART: Regular rate and rhythm; No murmurs, rubs, or gallops  ABDOMEN: Soft, Nontender, Nondistended; Bowel sounds present  EXTREMITIES:  2+ Peripheral Pulses, No clubbing, cyanosis, or edema      LABS:                        12.5   4.51  )-----------( 124      ( 2021 09:15 )             39.2     07-    141  |  109<H>  |  17  ----------------------------<  111<H>  3.9   |  23  |  1.13    Ca    9.3      2021 09:15  Mg     2.10     07-    TPro  7.1  /  Alb  4.3  /  TBili  0.8  /  DBili  x   /  AST  18  /  ALT  16  /  AlkPhos  113  07-01    PT/INR - ( 2021 09:15 )   PT: 12.0 sec;   INR: 1.06 ratio         PTT - ( 2021 09:15 )  PTT:38.4 sec        RADIOLOGY & ADDITIONAL STUDIES:

## 2021-07-01 NOTE — H&P CARDIOLOGY - HISTORY OF PRESENT ILLNESS
This is 71 yo male with a PMH of CAD (s/p 3 stents and 3V-CABG in 2002), PAD(s/p LLE stent), HTN, HLD, chronic systolic CHF, s/p  ICD implantation (2019) presented to ED complaining of severe chest pain that started today morning at 8 am  after he lifted heavy garbage can. Aspirin loaded by EMS, took 2 sublingual nitro at home and given 3 nitro sprays with improvement of symptoms, pain resolved.   The patient denies SOB, palpitations, dizziness, presyncope, syncope,  headache, visual disturbances, CVA, PE, DVT, SANGEETHA, abdominal pain, N/V/D/C, hematochezia, melena, dysuria, hematuria, fever, chills.

## 2021-07-01 NOTE — H&P CARDIOLOGY - COMMENTS
s/p LHC  -Native vessels obstructed  -LIMA to LAD patent  -graft to OM chronically occluded  -EF 30% on LV gram

## 2021-07-01 NOTE — ED PROVIDER NOTE - ATTENDING CONTRIBUTION TO CARE
This is 71 yo male with a PMH of CAD (s/p 3 stents and 3V-CABG in 2002), PAD(s/p LLE stent), HTN, HLD, chronic systolic CHF, s/p  ICD implantation (2019) presented to ED complaining of severe chest pain that started today approx 45 mins prior to arrival after he lifted heavy garbage can.  ASA and nitro given by ems prior to arrival. Pt states pain is mildly reduced on presentation with some relief from medications.    -EKG similar to prior studies, however given patients history and complaints STEMI code activated.   Case discussed with cardiology fellow and Dr. Bustillos.  -Will withhold Heparin and Brilinta at this time.  -Pt to go to cath lab  -Admit tele

## 2021-07-01 NOTE — PHARMACOTHERAPY INTERVENTION NOTE - COMMENTS
Medication history is complete. Medication list updated in Outpatient Medication Record (OMR). Medication history obtained from patient at bedside who had physical prescription vials and confirmed with son. Please call spectra [phone] if you have any questions.

## 2021-07-01 NOTE — CONSULT NOTE ADULT - ASSESSMENT
69 yo male with a PMH of CAD (s/p 3 stents and 3V-CABG in 2002), PAD(s/p LLE stent), HTN, HLD, chronic systolic CHF, s/p  ICD implantation (2019) presented to ED complaining of severe chest pain that started today morning at 8 am  after he lifted heavy garbage can. Aspirin loaded by EMS, took 2 sublingual nitro at home and given 3 nitro sprays with improvement of symptoms, pain resolved.   The patient denies SOB, palpitations, dizziness, presyncope, syncope,  headache, visual disturbances, CVA, PE, DVT, SANGEETHA, abdominal pain, N/V/D/C, hematochezia, melena, dysuria, hematuria, fever, chills.  S/P cath .

## 2021-07-01 NOTE — ED PROVIDER NOTE - PSH
History of coronary angiogram  multiple stents placed  History of femoral angiogram  stent placed in LLE 2016  History of laparoscopic cholecystectomy  2016  S/P CABG (coronary artery bypass graft)  x3; [facility] 2002

## 2021-07-01 NOTE — ED ADULT NURSE NOTE - PMH
Constipation, unspecified constipation type    HTN (hypertension)    Hyperlipidemia    NSTEMI (non-ST elevated myocardial infarction)  2017 and February 2018 and 2019  PAD (peripheral artery disease)  stent to L lower extremity artery

## 2021-07-01 NOTE — H&P CARDIOLOGY - FAMILY HISTORY
Family history of stroke, 60yo CVA, heart attack 59yo     Family history of MI (myocardial infarction), mother,      Sibling  Still living? No  Family history of coronary artery disease in brother, Age at diagnosis: Age Unknown  Family history of coronary artery disease in sister, Age at diagnosis: Age Unknown

## 2021-07-01 NOTE — H&P CARDIOLOGY - REVIEW OF SYSTEMS
The patient denies SOB, palpitations, dizziness, presyncope, syncope,  headache, visual disturbances, CVA, PE, DVT, SANGEETHA, abdominal pain, N/V/D/C, hematochezia, melena, dysuria, hematuria, fever, chills.

## 2021-07-01 NOTE — ED PROVIDER NOTE - PROGRESS NOTE DETAILS
Ángela PGY2: cards at bedside Ángela PGY3: spoke to Tevin, to cath patient today, first trop 18, no heparin or brilinta needed at this time.

## 2021-07-01 NOTE — DISCHARGE NOTE PROVIDER - CARE PROVIDER_API CALL
Ulisses Abarca (MD)  Cardiovascular Disease; Internal Medicine  [address] [place] [address]  Phone: [phone]  Fax: [phone]  Follow Up Time:

## 2021-07-01 NOTE — ED PROVIDER NOTE - CLINICAL SUMMARY MEDICAL DECISION MAKING FREE TEXT BOX
71yo male CAD BIBEMs for acute chest pain, EKG similar to prior but concerning for STEMI. Clear lungs, slightly hypertensive, no leg swelling. Cards consult, labs, meds as recommended, CXR and admit.

## 2021-07-01 NOTE — ED PROVIDER NOTE - PHYSICAL EXAMINATION
Physical Exam:  Gen: NAD, AOx3, non-toxic appearing, able to ambulate without assistance  Head: NCAT  HEENT: EOMI, PEERLA, normal conjunctiva, tongue midline, oral mucosa moist  Lung: CTAB, no respiratory distress, no wheezes/rhonchi/rales B/L, speaking in full sentences  CV: RRR, no murmurs, rubs or gallops, distal pulses 2+ b/l  Neuro: No focal sensory or motor deficits  Skin: Warm, well perfused, no rash, no leg swelling  Psych: normal affect, calm

## 2021-07-01 NOTE — DISCHARGE NOTE PROVIDER - NSDCMRMEDTOKEN_GEN_ALL_CORE_FT
aspirin 81 mg oral tablet: 1 tab(s) orally once a day  atorvastatin 80 mg oral tablet: 1 tab(s) orally once a day (at bedtime)  cetirizine 10 mg oral tablet: 1 tab(s) orally once a day, As Needed  Coreg 6.25 mg oral tablet: 1 tab(s) orally 2 times a day  cyclobenzaprine 5 mg oral tablet: 1 tab(s) orally once a day (at bedtime), As Needed  furosemide 40 mg oral tablet: 1 tab(s) orally once a day  gabapentin 300 mg oral capsule: 1 cap(s) orally once a day  Imdur 60 mg oral tablet, extended release: 1 tab(s) orally once a day (in the morning)  lisinopril 20 mg oral tablet: 1 tab(s) orally once a day  metoclopramide 5 mg oral tablet: 1 tab(s) orally once a day  nitroglycerin 0.4 mg sublingual tablet: 1 tab(s) sublingual every 5 minutes, As Needed  pantoprazole 40 mg oral delayed release tablet: 1 tab(s) orally once a day

## 2021-07-01 NOTE — H&P CARDIOLOGY - PSH
History of coronary angiogram  multiple stents placed  History of femoral angiogram  stent placed in LLE 2016  History of laparoscopic cholecystectomy  2016  S/P CABG (coronary artery bypass graft)  x3; [facility] 2002

## 2021-07-30 ENCOUNTER — NON-APPOINTMENT (OUTPATIENT)
Age: 71
End: 2021-07-30

## 2021-07-30 ENCOUNTER — APPOINTMENT (OUTPATIENT)
Dept: ELECTROPHYSIOLOGY | Facility: CLINIC | Age: 71
End: 2021-07-30
Payer: MEDICARE

## 2021-07-30 PROCEDURE — 93296 REM INTERROG EVL PM/IDS: CPT

## 2021-07-30 PROCEDURE — 93295 DEV INTERROG REMOTE 1/2/MLT: CPT

## 2021-08-19 PROBLEM — I25.5 ISCHEMIC CARDIOMYOPATHY: Chronic | Status: ACTIVE | Noted: 2021-07-01

## 2021-08-19 PROBLEM — I25.10 ATHEROSCLEROTIC HEART DISEASE OF NATIVE CORONARY ARTERY WITHOUT ANGINA PECTORIS: Chronic | Status: ACTIVE | Noted: 2021-07-01

## 2021-10-23 ENCOUNTER — INPATIENT (INPATIENT)
Facility: HOSPITAL | Age: 71
LOS: 4 days | Discharge: HOME | End: 2021-10-28
Attending: INTERNAL MEDICINE | Admitting: INTERNAL MEDICINE
Payer: MEDICARE

## 2021-10-23 VITALS
SYSTOLIC BLOOD PRESSURE: 163 MMHG | HEART RATE: 84 BPM | OXYGEN SATURATION: 100 % | RESPIRATION RATE: 22 BRPM | DIASTOLIC BLOOD PRESSURE: 95 MMHG | HEIGHT: 65 IN | TEMPERATURE: 98 F

## 2021-10-23 DIAGNOSIS — Z90.49 ACQUIRED ABSENCE OF OTHER SPECIFIED PARTS OF DIGESTIVE TRACT: Chronic | ICD-10-CM

## 2021-10-23 DIAGNOSIS — Z95.1 PRESENCE OF AORTOCORONARY BYPASS GRAFT: Chronic | ICD-10-CM

## 2021-10-23 DIAGNOSIS — Z98.890 OTHER SPECIFIED POSTPROCEDURAL STATES: Chronic | ICD-10-CM

## 2021-10-23 DIAGNOSIS — I10 ESSENTIAL (PRIMARY) HYPERTENSION: ICD-10-CM

## 2021-10-23 DIAGNOSIS — E78.5 HYPERLIPIDEMIA, UNSPECIFIED: ICD-10-CM

## 2021-10-23 DIAGNOSIS — R06.02 SHORTNESS OF BREATH: ICD-10-CM

## 2021-10-23 DIAGNOSIS — Z86.79 PERSONAL HISTORY OF OTHER DISEASES OF THE CIRCULATORY SYSTEM: Chronic | ICD-10-CM

## 2021-10-23 DIAGNOSIS — R07.9 CHEST PAIN, UNSPECIFIED: ICD-10-CM

## 2021-10-23 DIAGNOSIS — I25.10 ATHEROSCLEROTIC HEART DISEASE OF NATIVE CORONARY ARTERY WITHOUT ANGINA PECTORIS: ICD-10-CM

## 2021-10-23 DIAGNOSIS — I21.4 NON-ST ELEVATION (NSTEMI) MYOCARDIAL INFARCTION: ICD-10-CM

## 2021-10-23 LAB
ALBUMIN SERPL ELPH-MCNC: 4.1 G/DL — SIGNIFICANT CHANGE UP (ref 3.3–5)
ALP SERPL-CCNC: 132 U/L — HIGH (ref 40–120)
ALT FLD-CCNC: 23 U/L — SIGNIFICANT CHANGE UP (ref 4–41)
ANION GAP SERPL CALC-SCNC: 13 MMOL/L — SIGNIFICANT CHANGE UP (ref 7–14)
APTT BLD: 38.4 SEC — HIGH (ref 27–36.3)
AST SERPL-CCNC: 24 U/L — SIGNIFICANT CHANGE UP (ref 4–40)
BASOPHILS # BLD AUTO: 0.04 K/UL — SIGNIFICANT CHANGE UP (ref 0–0.2)
BASOPHILS NFR BLD AUTO: 0.7 % — SIGNIFICANT CHANGE UP (ref 0–2)
BILIRUB SERPL-MCNC: 1 MG/DL — SIGNIFICANT CHANGE UP (ref 0.2–1.2)
BUN SERPL-MCNC: 13 MG/DL — SIGNIFICANT CHANGE UP (ref 7–23)
CALCIUM SERPL-MCNC: 9.1 MG/DL — SIGNIFICANT CHANGE UP (ref 8.4–10.5)
CHLORIDE SERPL-SCNC: 106 MMOL/L — SIGNIFICANT CHANGE UP (ref 98–107)
CO2 SERPL-SCNC: 20 MMOL/L — LOW (ref 22–31)
CREAT SERPL-MCNC: 1.02 MG/DL — SIGNIFICANT CHANGE UP (ref 0.5–1.3)
EOSINOPHIL # BLD AUTO: 0.22 K/UL — SIGNIFICANT CHANGE UP (ref 0–0.5)
EOSINOPHIL NFR BLD AUTO: 4 % — SIGNIFICANT CHANGE UP (ref 0–6)
FLUAV AG NPH QL: SIGNIFICANT CHANGE UP
FLUBV AG NPH QL: SIGNIFICANT CHANGE UP
GLUCOSE SERPL-MCNC: 110 MG/DL — HIGH (ref 70–99)
HCT VFR BLD CALC: 38.1 % — LOW (ref 39–50)
HGB BLD-MCNC: 12.6 G/DL — LOW (ref 13–17)
IANC: 3.78 K/UL — SIGNIFICANT CHANGE UP (ref 1.5–8.5)
IMM GRANULOCYTES NFR BLD AUTO: 0.2 % — SIGNIFICANT CHANGE UP (ref 0–1.5)
INR BLD: 1.1 RATIO — SIGNIFICANT CHANGE UP (ref 0.88–1.16)
LYMPHOCYTES # BLD AUTO: 1.19 K/UL — SIGNIFICANT CHANGE UP (ref 1–3.3)
LYMPHOCYTES # BLD AUTO: 21.6 % — SIGNIFICANT CHANGE UP (ref 13–44)
MCHC RBC-ENTMCNC: 27.6 PG — SIGNIFICANT CHANGE UP (ref 27–34)
MCHC RBC-ENTMCNC: 33.1 GM/DL — SIGNIFICANT CHANGE UP (ref 32–36)
MCV RBC AUTO: 83.6 FL — SIGNIFICANT CHANGE UP (ref 80–100)
MONOCYTES # BLD AUTO: 0.26 K/UL — SIGNIFICANT CHANGE UP (ref 0–0.9)
MONOCYTES NFR BLD AUTO: 4.7 % — SIGNIFICANT CHANGE UP (ref 2–14)
NEUTROPHILS # BLD AUTO: 3.78 K/UL — SIGNIFICANT CHANGE UP (ref 1.8–7.4)
NEUTROPHILS NFR BLD AUTO: 68.8 % — SIGNIFICANT CHANGE UP (ref 43–77)
NRBC # BLD: 0 /100 WBCS — SIGNIFICANT CHANGE UP
NRBC # FLD: 0 K/UL — SIGNIFICANT CHANGE UP
NT-PROBNP SERPL-SCNC: 3014 PG/ML — HIGH
PLATELET # BLD AUTO: 123 K/UL — LOW (ref 150–400)
POTASSIUM SERPL-MCNC: 3.3 MMOL/L — LOW (ref 3.5–5.3)
POTASSIUM SERPL-SCNC: 3.3 MMOL/L — LOW (ref 3.5–5.3)
PROT SERPL-MCNC: 6.9 G/DL — SIGNIFICANT CHANGE UP (ref 6–8.3)
PROTHROM AB SERPL-ACNC: 12.5 SEC — SIGNIFICANT CHANGE UP (ref 10.6–13.6)
RBC # BLD: 4.56 M/UL — SIGNIFICANT CHANGE UP (ref 4.2–5.8)
RBC # FLD: 14.8 % — HIGH (ref 10.3–14.5)
RSV RNA NPH QL NAA+NON-PROBE: SIGNIFICANT CHANGE UP
SARS-COV-2 RNA SPEC QL NAA+PROBE: SIGNIFICANT CHANGE UP
SODIUM SERPL-SCNC: 139 MMOL/L — SIGNIFICANT CHANGE UP (ref 135–145)
TROPONIN T, HIGH SENSITIVITY RESULT: 40 NG/L — SIGNIFICANT CHANGE UP
TROPONIN T, HIGH SENSITIVITY RESULT: 44 NG/L — SIGNIFICANT CHANGE UP
WBC # BLD: 5.5 K/UL — SIGNIFICANT CHANGE UP (ref 3.8–10.5)
WBC # FLD AUTO: 5.5 K/UL — SIGNIFICANT CHANGE UP (ref 3.8–10.5)

## 2021-10-23 PROCEDURE — 93010 ELECTROCARDIOGRAM REPORT: CPT

## 2021-10-23 PROCEDURE — 71045 X-RAY EXAM CHEST 1 VIEW: CPT | Mod: 26

## 2021-10-23 PROCEDURE — 99223 1ST HOSP IP/OBS HIGH 75: CPT

## 2021-10-23 PROCEDURE — 99285 EMERGENCY DEPT VISIT HI MDM: CPT | Mod: 25

## 2021-10-23 RX ORDER — CARVEDILOL PHOSPHATE 80 MG/1
1 CAPSULE, EXTENDED RELEASE ORAL
Qty: 0 | Refills: 0 | DISCHARGE

## 2021-10-23 RX ORDER — GABAPENTIN 400 MG/1
1 CAPSULE ORAL
Qty: 0 | Refills: 0 | DISCHARGE

## 2021-10-23 RX ORDER — LANOLIN ALCOHOL/MO/W.PET/CERES
3 CREAM (GRAM) TOPICAL AT BEDTIME
Refills: 0 | Status: DISCONTINUED | OUTPATIENT
Start: 2021-10-23 | End: 2021-10-28

## 2021-10-23 RX ORDER — GABAPENTIN 400 MG/1
300 CAPSULE ORAL DAILY
Refills: 0 | Status: DISCONTINUED | OUTPATIENT
Start: 2021-10-23 | End: 2021-10-28

## 2021-10-23 RX ORDER — FUROSEMIDE 40 MG
40 TABLET ORAL DAILY
Refills: 0 | Status: DISCONTINUED | OUTPATIENT
Start: 2021-10-24 | End: 2021-10-27

## 2021-10-23 RX ORDER — FUROSEMIDE 40 MG
40 TABLET ORAL DAILY
Refills: 0 | Status: DISCONTINUED | OUTPATIENT
Start: 2021-10-23 | End: 2021-10-23

## 2021-10-23 RX ORDER — SENNA PLUS 8.6 MG/1
2 TABLET ORAL AT BEDTIME
Refills: 0 | Status: DISCONTINUED | OUTPATIENT
Start: 2021-10-23 | End: 2021-10-28

## 2021-10-23 RX ORDER — POLYETHYLENE GLYCOL 3350 17 G/17G
17 POWDER, FOR SOLUTION ORAL DAILY
Refills: 0 | Status: DISCONTINUED | OUTPATIENT
Start: 2021-10-23 | End: 2021-10-28

## 2021-10-23 RX ORDER — LISINOPRIL 2.5 MG/1
20 TABLET ORAL DAILY
Refills: 0 | Status: DISCONTINUED | OUTPATIENT
Start: 2021-10-23 | End: 2021-10-28

## 2021-10-23 RX ORDER — CARVEDILOL PHOSPHATE 80 MG/1
6.25 CAPSULE, EXTENDED RELEASE ORAL EVERY 12 HOURS
Refills: 0 | Status: DISCONTINUED | OUTPATIENT
Start: 2021-10-23 | End: 2021-10-28

## 2021-10-23 RX ORDER — POTASSIUM CHLORIDE 20 MEQ
40 PACKET (EA) ORAL ONCE
Refills: 0 | Status: COMPLETED | OUTPATIENT
Start: 2021-10-23 | End: 2021-10-23

## 2021-10-23 RX ORDER — CYCLOBENZAPRINE HYDROCHLORIDE 10 MG/1
5 TABLET, FILM COATED ORAL THREE TIMES A DAY
Refills: 0 | Status: DISCONTINUED | OUTPATIENT
Start: 2021-10-23 | End: 2021-10-28

## 2021-10-23 RX ORDER — ENOXAPARIN SODIUM 100 MG/ML
40 INJECTION SUBCUTANEOUS DAILY
Refills: 0 | Status: DISCONTINUED | OUTPATIENT
Start: 2021-10-23 | End: 2021-10-28

## 2021-10-23 RX ORDER — NITROGLYCERIN 6.5 MG
1 CAPSULE, EXTENDED RELEASE ORAL
Qty: 0 | Refills: 0 | DISCHARGE

## 2021-10-23 RX ORDER — ISOSORBIDE MONONITRATE 60 MG/1
60 TABLET, EXTENDED RELEASE ORAL DAILY
Refills: 0 | Status: DISCONTINUED | OUTPATIENT
Start: 2021-10-23 | End: 2021-10-28

## 2021-10-23 RX ORDER — ATORVASTATIN CALCIUM 80 MG/1
80 TABLET, FILM COATED ORAL AT BEDTIME
Refills: 0 | Status: DISCONTINUED | OUTPATIENT
Start: 2021-10-23 | End: 2021-10-28

## 2021-10-23 RX ORDER — FUROSEMIDE 40 MG
40 TABLET ORAL ONCE
Refills: 0 | Status: COMPLETED | OUTPATIENT
Start: 2021-10-23 | End: 2021-10-23

## 2021-10-23 RX ORDER — PANTOPRAZOLE SODIUM 20 MG/1
40 TABLET, DELAYED RELEASE ORAL
Refills: 0 | Status: DISCONTINUED | OUTPATIENT
Start: 2021-10-23 | End: 2021-10-28

## 2021-10-23 RX ORDER — ONDANSETRON 8 MG/1
4 TABLET, FILM COATED ORAL EVERY 8 HOURS
Refills: 0 | Status: DISCONTINUED | OUTPATIENT
Start: 2021-10-23 | End: 2021-10-28

## 2021-10-23 RX ORDER — ASPIRIN/CALCIUM CARB/MAGNESIUM 324 MG
81 TABLET ORAL DAILY
Refills: 0 | Status: DISCONTINUED | OUTPATIENT
Start: 2021-10-23 | End: 2021-10-28

## 2021-10-23 RX ORDER — CETIRIZINE HYDROCHLORIDE 10 MG/1
1 TABLET ORAL
Qty: 0 | Refills: 0 | DISCHARGE

## 2021-10-23 RX ORDER — ACETAMINOPHEN 500 MG
650 TABLET ORAL EVERY 6 HOURS
Refills: 0 | Status: DISCONTINUED | OUTPATIENT
Start: 2021-10-23 | End: 2021-10-28

## 2021-10-23 RX ORDER — NITROGLYCERIN 6.5 MG
0.4 CAPSULE, EXTENDED RELEASE ORAL
Refills: 0 | Status: DISCONTINUED | OUTPATIENT
Start: 2021-10-23 | End: 2021-10-28

## 2021-10-23 RX ADMIN — SENNA PLUS 2 TABLET(S): 8.6 TABLET ORAL at 21:30

## 2021-10-23 RX ADMIN — ATORVASTATIN CALCIUM 80 MILLIGRAM(S): 80 TABLET, FILM COATED ORAL at 21:29

## 2021-10-23 RX ADMIN — GABAPENTIN 300 MILLIGRAM(S): 400 CAPSULE ORAL at 18:00

## 2021-10-23 RX ADMIN — ENOXAPARIN SODIUM 40 MILLIGRAM(S): 100 INJECTION SUBCUTANEOUS at 18:00

## 2021-10-23 RX ADMIN — Medication 81 MILLIGRAM(S): at 17:59

## 2021-10-23 RX ADMIN — CARVEDILOL PHOSPHATE 6.25 MILLIGRAM(S): 80 CAPSULE, EXTENDED RELEASE ORAL at 18:00

## 2021-10-23 RX ADMIN — Medication 40 MILLIGRAM(S): at 13:30

## 2021-10-23 RX ADMIN — Medication 40 MILLIEQUIVALENT(S): at 13:30

## 2021-10-23 RX ADMIN — POLYETHYLENE GLYCOL 3350 17 GRAM(S): 17 POWDER, FOR SOLUTION ORAL at 17:59

## 2021-10-23 RX ADMIN — ISOSORBIDE MONONITRATE 60 MILLIGRAM(S): 60 TABLET, EXTENDED RELEASE ORAL at 17:59

## 2021-10-23 RX ADMIN — Medication 650 MILLIGRAM(S): at 18:00

## 2021-10-23 NOTE — ED PROVIDER NOTE - ATTENDING CONTRIBUTION TO CARE
I have personally performed a face to face medical and diagnostic evaluation of the patient. I have discussed with and reviewed the Resident's note and agree with the History, ROS, Physical Exam and MDM unless otherwise indicated. A brief summary of my personal evaluation and impression can be found below.    Khurram ODONNELL: Please see the HPI, ROS, PE and MDM as authored by me.

## 2021-10-23 NOTE — H&P ADULT - NSHPREVIEWOFSYSTEMS_GEN_ALL_CORE
REVIEW OF SYSTEMS:    CONSTITUTIONAL: No weakness, fevers or chills  EYES/ENT: No visual changes;  no throat pain   NECK: No pain or stiffness  RESPIRATORY: No cough, wheezing, hemoptysis; No shortness of breath  CARDIOVASCULAR: No chest pain or palpitations  GASTROINTESTINAL: No abdominal or epigastric pain. No nausea, vomiting, or hematemesis; No diarrhea or constipation. No melena or hematochezia.  GENITOURINARY: No dysuria, change in frequency or hematuria  NEUROLOGICAL: No numbness or weakness  SKIN: No itching, burning, rashes, or lesions   Psych: No depression   All other review of systems is negative unless indicated above. REVIEW OF SYSTEMS:    CONSTITUTIONAL: No weakness, fevers or chills  EYES/ENT: No visual changes;  no throat pain   NECK: No pain or stiffness  RESPIRATORY: No cough, wheezing, hemoptysis; ++  shortness of breath  CARDIOVASCULAR: ++  chest pain No palpitations  GASTROINTESTINAL: ++  abdominal no epigastric pain. No nausea, vomiting, or hematemesis; No diarrhea or constipation. No melena or hematochezia.  GENITOURINARY: No dysuria, change in frequency or hematuria  NEUROLOGICAL: No numbness or weakness  SKIN: No itching, burning, rashes, or lesions   Psych: No depression   All other review of systems is negative unless indicated above.

## 2021-10-23 NOTE — ED PROVIDER NOTE - NSICDXFAMILYHX_GEN_ALL_CORE_FT
FAMILY HISTORY:  Family history of MI (myocardial infarction), mother,   Family history of stroke, 58yo CVA, heart attack 61yo    Sibling  Still living? No  Family history of coronary artery disease in brother, Age at diagnosis: Age Unknown  Family history of coronary artery disease in sister, Age at diagnosis: Age Unknown

## 2021-10-23 NOTE — H&P ADULT - NSHPLABSRESULTS_GEN_ALL_CORE
LABS:  CAPILLARY BLOOD GLUCOSE                                12.6   5.50  )-----------( 123      ( 23 Oct 2021 11:29 )             38.1     10-23    139  |  106  |  13  ----------------------------<  110<H>  3.3<L>   |  20<L>  |  1.02    Ca    9.1      23 Oct 2021 11:27  Mg     2.10     10-23    TPro  6.9  /  Alb  4.1  /  TBili  1.0  /  DBili  x   /  AST  24  /  ALT  23  /  AlkPhos  132<H>  10-23    PT/INR - ( 23 Oct 2021 11:23 )   PT: 12.5 sec;   INR: 1.10 ratio         PTT - ( 23 Oct 2021 11:23 )  PTT:38.4 sec            RADIOLOGY & ADDITIONAL TESTS:    Telemetry Personally Reviewed:    ECG Personally Reviewed:    Imaging Personally Reviewed:    Imaging Reviewed:     Consultant(s) Notes Reviewed:      Care Discussed with Consultants/Other Providers:

## 2021-10-23 NOTE — ED ADULT NURSE NOTE - NSICDXPASTSURGICALHX_GEN_ALL_CORE_FT
PAST SURGICAL HISTORY:  History of coronary angiogram multiple stents placed    History of femoral angiogram stent placed in LLE 2016    History of laparoscopic cholecystectomy 2016    S/P CABG (coronary artery bypass graft) x3; [facility] 2002

## 2021-10-23 NOTE — ED PROVIDER NOTE - CLINICAL SUMMARY MEDICAL DECISION MAKING FREE TEXT BOX
Khurram ODONNELL: 71M hx CAD s/p stents, ICM, HTN HLD presents with a cc of x1 week of CP tightness a/w SOB and worsening LANZA, took ASA prior to ED arrival, no fever, CP non radiating, thinks has worsening LE edema at night, +sweaty, took NTG prior to ED arrival during week which made him feel better. exam vss non toxic PE as above ddx c/f acs given pt hx and high risk check labs cardiacs bnp already got asa, discuss w cards likely cdu/admit.

## 2021-10-23 NOTE — ED PROVIDER NOTE - PHYSICAL EXAMINATION
Khurram ODONNELL:  VITALS: Initial triage and subsequent vitals have been reviewed by me.  GEN APPEARANCE: WDWN, alert and cooperative, non-toxic appearing and in NAD  HEAD: Atraumatic, normocephalic   EYES: PERRLa, EOMI, vision grossly intact.   EARS: Gross hearing intact.   NOSE: No nasal discharge, no external evidence of epistaxis.   NECK: Supple  CV: RRR, S1S2, no c/r/m/g. No cyanosis or pallor. Extremities warm, well perfused. Cap refill <2 seconds. No bruits.   LUNGS: trace crackles LLL  ABDOMEN: Soft, NTND. No guarding or rebound. No masses.   MSK/EXT: Spine appears normal, no spine point tenderness. No CVA ttp. Normal muscular development. Pelvis stable. No obvious joint or bony deformity, no peripheral edema.   NEURO: Alert, follows commands. Weight bearing normal. Speech normal. Sensation and motor normal x4 extremities.   SKIN: Normal color for race, warm, dry and intact. No evidence of rash.  PSYCH: Normal mood and affect.

## 2021-10-23 NOTE — ED PROVIDER NOTE - PROGRESS NOTE DETAILS
Alexander, PGY3: spoke with Dr. Ulisses Abarca, pts Cards, states he will admit pt after labs are back, pt already received ASA 324mg today by EMS, EKG similar to prior, will f/u biomarkers

## 2021-10-23 NOTE — H&P ADULT - NSHPPHYSICALEXAM_GEN_ALL_CORE
GENERAL: Middle age man in NAD  HEENT: EOMI, MMM, no oropharyngeal lesions or erythema appreciated  Pulm: normal work of breathing, CTABL  CV: RRR, S1&S2+, no m/r/g appreciated  ABDOMEN: soft, nt, nd, no hepatosplenomegaly  MSK: nl ROM  EXTREMITIES:  no appreciable edema in b/l LE  Neuro: A&Ox3, no focal deficits  SKIN: warm and dry, no visible rash

## 2021-10-23 NOTE — ED ADULT TRIAGE NOTE - CHIEF COMPLAINT QUOTE
reports ch pressure x past wk, increasing x past 2 days. reports diff breathing.  ntg x 3 by ems with relief

## 2021-10-23 NOTE — CONSULT NOTE ADULT - ASSESSMENT
71M office patient PMH CAD s/p stents, ICM, HTN HLD presents with complaints of chest pressure and SOB.     EKG: NSR no acute changes    1. Chest pressure/SOB  -pressure is midsternal and constant  -EKG with no ischemic changes  -f/u CXR and trops  -has hx of ICM, will get echo  -s/p cardiac angiogram performed 7/2021 which showed  patent stent to common left common illiac. SVG to OM is closed at ostium looks like chronically closed. SVG to RCA known to be closed. LIMA TO LAD supplied RCA territory via collaterals  -if cardiac w/u negative will consider GI c/s as patient also with c/o of abd pain and bloating    2. ICM  -will get echo  -c/w lisinopril, aldactone and coreg    3. HTN  -slightly elevated  -resume home BP meds  -continue to monitor BP 71M office patient PMH CAD s/p stents, ICM, HTN HLD presents with complaints of chest pressure and SOB.     EKG: NSR no acute changes    1. Chest pressure/SOB  -pressure is midsternal and constant  -EKG with no ischemic changes  -f/u CXR and trops. may require dose of IV lasix  -has hx of ICM, will get echo  -s/p cardiac angiogram performed 7/2021 which showed  patent stent to common left common illiac. SVG to OM is closed at ostium looks like chronically closed. SVG to RCA known to be closed. LIMA TO LAD supplied RCA territory via collaterals  -if cardiac w/u negative will consider GI c/s as patient also with c/o of abd pain and bloating    2. ICM  -will get echo  -c/w lisinopril and coreg      3. HTN  -slightly elevated  -resume home BP meds  -continue to monitor BP

## 2021-10-23 NOTE — ED PROVIDER NOTE - OBJECTIVE STATEMENT
Khurram ODONNELL: 71M hx CAD s/p stents, ICM, HTN HLD presents with a cc of x1 week of CP tightness a/w SOB and worsening LANZA, took ASA prior to ED arrival, no fever, CP non radiating, thinks has worsening LE edema at night, +sweaty, took NTG prior to ED arrival during week which made him feel better. Denies n//f/c/. Denies headache, syncope, lightheadedness, dizziness. Denies chest palpitations, abdominal pain. Denies edema. Denies dysuria, hematuria, BRBPR, tarry stools, diarrhea, constipation. +fam hx.

## 2021-10-23 NOTE — ED ADULT NURSE NOTE - NSICDXFAMILYHX_GEN_ALL_CORE_FT
FAMILY HISTORY:  Family history of MI (myocardial infarction), mother,   Family history of stroke, 60yo CVA, heart attack 61yo    Sibling  Still living? No  Family history of coronary artery disease in brother, Age at diagnosis: Age Unknown  Family history of coronary artery disease in sister, Age at diagnosis: Age Unknown

## 2021-10-23 NOTE — H&P ADULT - ASSESSMENT
71 male with history of CAD s/p stents and CABG, ICM, HTN,  HLD presents with complaints of chest pressure and SOB for one week. pt found to SOb and chest pressure elevated BNP 3014, indeterminant troponin, unremarkable chest xray, admitted for further management.

## 2021-10-23 NOTE — ED ADULT NURSE NOTE - OBJECTIVE STATEMENT
Pt presenting to room 5 AxOx4 ambulatory at baseline with c.o chest pressure, SOB, LANZA x 1 week. PMH HTN, cardiomyopathy, NSTEMI, defibrillator. Pt states he took 1 tablet of nitro SL last night 9PM when symptoms were worse, and received 324 ASA en route with EMS this AM. On arrival to ED pt's breathing is even and unlabored. Pt tachypneic with positional changes, and orthopneic. Palor/diaphoresis not noted. Pt denies fever, cough, N/V, chills. IV established with 20g in RAC. Labs drawn and sent. Pt NSR on cardiac monitor. will continue to monitor.

## 2021-10-23 NOTE — CONSULT NOTE ADULT - SUBJECTIVE AND OBJECTIVE BOX
Ulisses Abarca MD  Interventional Cardiology / Advance Heart Failure and Cardiac Transplant Specialist  [place] Office : [address] [place] [address] [place] [address]  Tel:   [place] Office : [address] [facility] [place] [address]  Tel: [phone]  Cell : [phone]    HISTORY OF PRESENTING ILLNESS:  71M office patient PMH CAD s/p stents, ICM, HTN HLD presents with complaints of chest pressure and SOB. Patient states he has been experiencing midsternal chest tightness constantly for one week. Does not worsen with exertion Took NTG at home with some improvement. Took ASA prior to ED arrival. Also endorses abd pain and bloating at times. States also has SOB when abd is bloated. Has never had an EGD or GI eval. Denies n//f/c/. Denies headache, syncope, lightheadedness, dizziness. N/V/D. Recently cardiac angiogram performed 2021 which showed  patent stent to common left common illiac. SVG to OM is closed at ostium looks like chronically closed. SVG to RCA known to be closed. LIMA TO LAD supplied RCA territory via collaterals      MEDICATIONS:                  PAST MEDICAL/SURGICAL HISTORY  PAST MEDICAL & SURGICAL HISTORY:  HTN (hypertension)    PAD (peripheral artery disease)  stent to L lower extremity artery    Constipation, unspecified constipation type    Hyperlipidemia    NSTEMI (non-ST elevated myocardial infarction)   and 2018 and 2019    Coronary artery disease involving native coronary artery of native heart, unspecified whether angina present    Ischemic cardiomyopathy with implantable cardioverter-defibrillator (ICD)    S/P CABG (coronary artery bypass graft)  x3; [facility] 2002    History of coronary angiogram  multiple stents placed    History of femoral angiogram  stent placed in LLE 2016    History of laparoscopic cholecystectomy  2016        SOCIAL HISTORY: Substance Use (street drugs): ( x ) never used  (  ) other:    FAMILY HISTORY:  Family history of coronary artery disease in brother (Sibling)  4 brothers with MI/CABG, 1  at 79yo    Family history of stroke  58yo CVA, heart attack 63yo    Family history of coronary artery disease in sister (Sibling)   from MI    Family history of MI (myocardial infarction)  mother,         REVIEW OF SYSTEMS:  CONSTITUTIONAL: No fever, weight loss, or fatigue  EYES: No eye pain, visual disturbances, or discharge  ENMT:  No difficulty hearing, tinnitus, vertigo; No sinus or throat pain  BREASTS: No pain, masses, or nipple discharge  GASTROINTESTINAL: No abdominal or epigastric pain. No nausea, vomiting, or hematemesis; No diarrhea or constipation. No melena or hematochezia.  GENITOURINARY: No dysuria, frequency, hematuria, or incontinence  NEUROLOGICAL: No headaches, memory loss, loss of strength, numbness, or tremors  ENDOCRINE: No heat or cold intolerance; No hair loss  MUSCULOSKELETAL: No joint pain or swelling; No muscle, back, or extremity pain  PSYCHIATRIC: No depression, anxiety, mood swings, or difficulty sleeping  HEME/LYMPH: No easy bruising, or bleeding gums  All others negative    PHYSICAL EXAM:  T(C): 36.8 (10-23-21 @ 10:50), Max: 36.8 (10-23-21 @ 10:50)  HR: 87 (10-23-21 @ 11:06) (84 - 87)  BP: 160/90 (10-23-21 @ 11:06) (160/90 - 163/95)  RR: 20 (10-23-21 @ 11:06) (20 - 22)  SpO2: 98% (10-23-21 @ 11:06) (98% - 100%)  Wt(kg): --  I&O's Summary    Height (cm): 165.1 (10-23 @ 10:50)    GENERAL: NAD  EYES: EOMI, PERRLA, conjunctiva and sclera clear  ENMT: No tonsillar erythema, exudates, or enlargement  Cardiovascular: Normal S1 S2, No JVD, No murmurs, No edema  Respiratory: Lungs clear to auscultation	  Gastrointestinal:  Soft, Non-tender, + BS	  Extremities: No edema                                      12.6   5.50  )-----------( 123      ( 23 Oct 2021 11:29 )             38.1     10-23    139  |  106  |  13  ----------------------------<  110<H>  3.3<L>   |  20<L>  |  1.02    Ca    9.1      23 Oct 2021 11:27  Mg     2.10     10-23    TPro  6.9  /  Alb  4.1  /  TBili  1.0  /  DBili  x   /  AST  24  /  ALT  23  /  AlkPhos  132<H>  10-23    proBNP: Serum Pro-Brain Natriuretic Peptide: 3014 pg/mL (10-23 @ 11:27)    Lipid Profile:   HgA1c:   TSH:     Consultant(s) Notes Reviewed:  [x ] YES  [ ] NO    Care Discussed with Consultants/Other Providers [ x] YES  [ ] NO    Imaging Personally Reviewed independently:  [x] YES  [ ] NO    All labs, radiologic studies, vitals, orders and medications list reviewed. Patient is seen and examined at bedside. Case discussed with medical team.         Ulisses Abarca MD  Interventional Cardiology / Advance Heart Failure and Cardiac Transplant Specialist  [place] Office : [address] [place] [address] [place] [address]  Tel:   [place] Office : [address] [facility] [place] [address]  Tel: [phone]  Cell : [phone]    HISTORY OF PRESENTING ILLNESS:  71M office patient PMH CAD s/p stents and CABG, ICM, HTN HLD presents with complaints of chest pressure and SOB. Patient states he has been experiencing midsternal chest tightness constantly for one week. Does not worsen with exertion Took NTG at home with some improvement. Took ASA prior to ED arrival. Also endorses abd pain and bloating at times. States also has SOB when abd is bloated. Has never had an EGD or GI eval. Denies n//f/c/. Denies headache, syncope, lightheadedness, dizziness. N/V/D. Recently cardiac angiogram performed 2021 which showed  patent stent to common left common illiac. SVG to OM is closed at ostium looks like chronically closed. SVG to RCA known to be closed. LIMA TO LAD supplied RCA territory via collaterals      MEDICATIONS:                  PAST MEDICAL/SURGICAL HISTORY  PAST MEDICAL & SURGICAL HISTORY:  HTN (hypertension)    PAD (peripheral artery disease)  stent to L lower extremity artery    Constipation, unspecified constipation type    Hyperlipidemia    NSTEMI (non-ST elevated myocardial infarction)   and 2018 and 2019    Coronary artery disease involving native coronary artery of native heart, unspecified whether angina present    Ischemic cardiomyopathy with implantable cardioverter-defibrillator (ICD)    S/P CABG (coronary artery bypass graft)  x3; [facility] 2002    History of coronary angiogram  multiple stents placed    History of femoral angiogram  stent placed in LLE 2016    History of laparoscopic cholecystectomy  2016        SOCIAL HISTORY: Substance Use (street drugs): ( x ) never used  (  ) other:    FAMILY HISTORY:  Family history of coronary artery disease in brother (Sibling)  4 brothers with MI/CABG, 1  at 79yo    Family history of stroke  58yo CVA, heart attack 63yo    Family history of coronary artery disease in sister (Sibling)   from MI    Family history of MI (myocardial infarction)  mother,         REVIEW OF SYSTEMS:  CONSTITUTIONAL: No fever, weight loss, or fatigue  EYES: No eye pain, visual disturbances, or discharge  ENMT:  No difficulty hearing, tinnitus, vertigo; No sinus or throat pain  BREASTS: No pain, masses, or nipple discharge  GASTROINTESTINAL: No abdominal or epigastric pain. No nausea, vomiting, or hematemesis; No diarrhea or constipation. No melena or hematochezia.  GENITOURINARY: No dysuria, frequency, hematuria, or incontinence  NEUROLOGICAL: No headaches, memory loss, loss of strength, numbness, or tremors  ENDOCRINE: No heat or cold intolerance; No hair loss  MUSCULOSKELETAL: No joint pain or swelling; No muscle, back, or extremity pain  PSYCHIATRIC: No depression, anxiety, mood swings, or difficulty sleeping  HEME/LYMPH: No easy bruising, or bleeding gums  All others negative    PHYSICAL EXAM:  T(C): 36.8 (10-23-21 @ 10:50), Max: 36.8 (10-23-21 @ 10:50)  HR: 87 (10-23-21 @ 11:06) (84 - 87)  BP: 160/90 (10-23-21 @ 11:06) (160/90 - 163/95)  RR: 20 (10-23-21 @ 11:06) (20 - 22)  SpO2: 98% (10-23-21 @ 11:06) (98% - 100%)  Wt(kg): --  I&O's Summary    Height (cm): 165.1 (10-23 @ 10:50)    GENERAL: NAD  EYES: EOMI, PERRLA, conjunctiva and sclera clear  ENMT: No tonsillar erythema, exudates, or enlargement  Cardiovascular: Normal S1 S2, No JVD, No murmurs, No edema  Respiratory: Lungs clear to auscultation	  Gastrointestinal:  Soft, Non-tender, + BS	  Extremities: No edema                                      12.6   5.50  )-----------( 123      ( 23 Oct 2021 11:29 )             38.1     10-23    139  |  106  |  13  ----------------------------<  110<H>  3.3<L>   |  20<L>  |  1.02    Ca    9.1      23 Oct 2021 11:27  Mg     2.10     10-23    TPro  6.9  /  Alb  4.1  /  TBili  1.0  /  DBili  x   /  AST  24  /  ALT  23  /  AlkPhos  132<H>  10-23    proBNP: Serum Pro-Brain Natriuretic Peptide: 3014 pg/mL (10-23 @ 11:27)    Lipid Profile:   HgA1c:   TSH:     Consultant(s) Notes Reviewed:  [x ] YES  [ ] NO    Care Discussed with Consultants/Other Providers [ x] YES  [ ] NO    Imaging Personally Reviewed independently:  [x] YES  [ ] NO    All labs, radiologic studies, vitals, orders and medications list reviewed. Patient is seen and examined at bedside. Case discussed with medical team.

## 2021-10-23 NOTE — ED PROVIDER NOTE - NSICDXPASTSURGICALHX_GEN_ALL_CORE_FT
PAST SURGICAL HISTORY:  History of coronary angiogram multiple stents placed    History of femoral angiogram stent placed in LLE 2016    History of laparoscopic cholecystectomy 2016    S/P CABG (coronary artery bypass graft) x3; [facility] 2002

## 2021-10-23 NOTE — H&P ADULT - PROBLEM SELECTOR PLAN 1
- TTE - F/U TTE  - continue aspirin   - imdur and coreg   - Cardiology following, appreciate recs Patient with SOb and chest pressure elevated BNP 3014, indeterminant troponin, SOB and orthopnea, concerning for ADHF vs unstable angina   - s/p Lasix 40mg IV in the ED, continue lasix 40mg   - F/U TTE  - continue aspirin   - Continue imdur and coreg for anti-anginal   - Trend troponin until peak   - Cardiology following, appreciate recs

## 2021-10-23 NOTE — ED PROVIDER NOTE - NSICDXPASTMEDICALHX_GEN_ALL_CORE_FT
PAST MEDICAL HISTORY:  Constipation, unspecified constipation type     Coronary artery disease involving native coronary artery of native heart, unspecified whether angina present     HTN (hypertension)     Hyperlipidemia     Ischemic cardiomyopathy with implantable cardioverter-defibrillator (ICD)     NSTEMI (non-ST elevated myocardial infarction) 2017 and February 2018 and 2019    PAD (peripheral artery disease) stent to L lower extremity artery

## 2021-10-23 NOTE — H&P ADULT - HISTORY OF PRESENT ILLNESS
71 male with history of CAD s/p stents and CABG, ICM, HTN,  HLD presents with complaints of chest pressure and SOB for one week. Patient states he has been experiencing midsternal chest tightness constantly for one week. Does not worsen with exertion Took NTG at home with some improvement. Took ASA prior to ED arrival. Also endorses abd pain and bloating at times. States also has SOB when abd is bloated. On ROS, he  denies fever, chills, headache, syncope, lightheadedness, dizziness. N/V/D.  Patient was last hospitalized July 2021, he had a  cardiac angiogram performed 7/2021 which showed  patent stent to common left common illiac. SVG to OM is closed at ostium looks like chronically closed. SVG to RCA known to be closed. LIMA TO LAD supplied RCA territory via collaterals   71 male with history of CAD s/p stents and CABG, ICM, HTN, HLD presents with complaints of chest pressure and SOB for one week. Patient states he has been experiencing midsternal chest tightness constantly for one week. He reports he feels it at rest.  Does not worsen with exertion. He reports orthopnea.  Relieving factors include NTG, with some improvement when taken at home.   On ROS, endorse, generalized abdominal discomfort  and bloating at times. Otherwise,  he  denies fever, chills, headache, syncope, lightheadedness, dizziness. N/V/D.  Patient was last hospitalized July 2021, he had a  cardiac angiogram performed 7/2021 which showed  patent stent to common left common illiac. SVG to OM is closed at ostium looks like chronically closed. SVG to RCA known to be closed. LIMA TO LAD supplied RCA territory via collaterals.    Patient found to have elevated BNP was given lasix 40mg IV  and admitted to medicine for further management.

## 2021-10-23 NOTE — H&P ADULT - NSICDXPASTSURGICALHX_GEN_ALL_CORE_FT
PAST SURGICAL HISTORY:  History of coronary angiogram multiple stents placed    History of femoral angiogram stent placed in LLE 2016    History of laparoscopic cholecystectomy 2016    S/P CABG (coronary artery bypass graft) x3; [facility] 2002

## 2021-10-24 LAB
ALBUMIN SERPL ELPH-MCNC: 4.1 G/DL — SIGNIFICANT CHANGE UP (ref 3.3–5)
ALP SERPL-CCNC: 136 U/L — HIGH (ref 40–120)
ALT FLD-CCNC: 24 U/L — SIGNIFICANT CHANGE UP (ref 4–41)
ANION GAP SERPL CALC-SCNC: 10 MMOL/L — SIGNIFICANT CHANGE UP (ref 7–14)
AST SERPL-CCNC: 24 U/L — SIGNIFICANT CHANGE UP (ref 4–40)
BILIRUB SERPL-MCNC: 1 MG/DL — SIGNIFICANT CHANGE UP (ref 0.2–1.2)
BUN SERPL-MCNC: 19 MG/DL — SIGNIFICANT CHANGE UP (ref 7–23)
CALCIUM SERPL-MCNC: 9.2 MG/DL — SIGNIFICANT CHANGE UP (ref 8.4–10.5)
CHLORIDE SERPL-SCNC: 105 MMOL/L — SIGNIFICANT CHANGE UP (ref 98–107)
CO2 SERPL-SCNC: 24 MMOL/L — SIGNIFICANT CHANGE UP (ref 22–31)
COVID-19 SPIKE DOMAIN AB INTERP: POSITIVE
COVID-19 SPIKE DOMAIN ANTIBODY RESULT: >250 U/ML — HIGH
CREAT SERPL-MCNC: 1.27 MG/DL — SIGNIFICANT CHANGE UP (ref 0.5–1.3)
GLUCOSE SERPL-MCNC: 125 MG/DL — HIGH (ref 70–99)
HCT VFR BLD CALC: 39.6 % — SIGNIFICANT CHANGE UP (ref 39–50)
HGB BLD-MCNC: 13 G/DL — SIGNIFICANT CHANGE UP (ref 13–17)
MCHC RBC-ENTMCNC: 27.7 PG — SIGNIFICANT CHANGE UP (ref 27–34)
MCHC RBC-ENTMCNC: 32.8 GM/DL — SIGNIFICANT CHANGE UP (ref 32–36)
MCV RBC AUTO: 84.4 FL — SIGNIFICANT CHANGE UP (ref 80–100)
NRBC # BLD: 0 /100 WBCS — SIGNIFICANT CHANGE UP
NRBC # FLD: 0 K/UL — SIGNIFICANT CHANGE UP
PLATELET # BLD AUTO: 125 K/UL — LOW (ref 150–400)
POTASSIUM SERPL-MCNC: 3.8 MMOL/L — SIGNIFICANT CHANGE UP (ref 3.5–5.3)
POTASSIUM SERPL-SCNC: 3.8 MMOL/L — SIGNIFICANT CHANGE UP (ref 3.5–5.3)
PROT SERPL-MCNC: 7.6 G/DL — SIGNIFICANT CHANGE UP (ref 6–8.3)
RBC # BLD: 4.69 M/UL — SIGNIFICANT CHANGE UP (ref 4.2–5.8)
RBC # FLD: 15 % — HIGH (ref 10.3–14.5)
SARS-COV-2 IGG+IGM SERPL QL IA: >250 U/ML — HIGH
SARS-COV-2 IGG+IGM SERPL QL IA: POSITIVE
SODIUM SERPL-SCNC: 139 MMOL/L — SIGNIFICANT CHANGE UP (ref 135–145)
WBC # BLD: 5.12 K/UL — SIGNIFICANT CHANGE UP (ref 3.8–10.5)
WBC # FLD AUTO: 5.12 K/UL — SIGNIFICANT CHANGE UP (ref 3.8–10.5)

## 2021-10-24 RX ADMIN — POLYETHYLENE GLYCOL 3350 17 GRAM(S): 17 POWDER, FOR SOLUTION ORAL at 11:43

## 2021-10-24 RX ADMIN — SENNA PLUS 2 TABLET(S): 8.6 TABLET ORAL at 21:10

## 2021-10-24 RX ADMIN — Medication 81 MILLIGRAM(S): at 11:43

## 2021-10-24 RX ADMIN — ATORVASTATIN CALCIUM 80 MILLIGRAM(S): 80 TABLET, FILM COATED ORAL at 21:10

## 2021-10-24 RX ADMIN — ENOXAPARIN SODIUM 40 MILLIGRAM(S): 100 INJECTION SUBCUTANEOUS at 11:43

## 2021-10-24 RX ADMIN — Medication 40 MILLIGRAM(S): at 18:01

## 2021-10-24 RX ADMIN — GABAPENTIN 300 MILLIGRAM(S): 400 CAPSULE ORAL at 11:43

## 2021-10-24 RX ADMIN — ISOSORBIDE MONONITRATE 60 MILLIGRAM(S): 60 TABLET, EXTENDED RELEASE ORAL at 11:44

## 2021-10-24 RX ADMIN — PANTOPRAZOLE SODIUM 40 MILLIGRAM(S): 20 TABLET, DELAYED RELEASE ORAL at 05:27

## 2021-10-24 RX ADMIN — CARVEDILOL PHOSPHATE 6.25 MILLIGRAM(S): 80 CAPSULE, EXTENDED RELEASE ORAL at 18:02

## 2021-10-25 DIAGNOSIS — E87.6 HYPOKALEMIA: ICD-10-CM

## 2021-10-25 DIAGNOSIS — I10 ESSENTIAL (PRIMARY) HYPERTENSION: ICD-10-CM

## 2021-10-25 DIAGNOSIS — R07.9 CHEST PAIN, UNSPECIFIED: ICD-10-CM

## 2021-10-25 DIAGNOSIS — I42.9 CARDIOMYOPATHY, UNSPECIFIED: ICD-10-CM

## 2021-10-25 DIAGNOSIS — D69.6 THROMBOCYTOPENIA, UNSPECIFIED: ICD-10-CM

## 2021-10-25 LAB
ALBUMIN SERPL ELPH-MCNC: 4.3 G/DL — SIGNIFICANT CHANGE UP (ref 3.3–5)
ALP SERPL-CCNC: 137 U/L — HIGH (ref 40–120)
ALT FLD-CCNC: 30 U/L — SIGNIFICANT CHANGE UP (ref 4–41)
ANION GAP SERPL CALC-SCNC: 15 MMOL/L — HIGH (ref 7–14)
AST SERPL-CCNC: 30 U/L — SIGNIFICANT CHANGE UP (ref 4–40)
BILIRUB SERPL-MCNC: 0.8 MG/DL — SIGNIFICANT CHANGE UP (ref 0.2–1.2)
BUN SERPL-MCNC: 17 MG/DL — SIGNIFICANT CHANGE UP (ref 7–23)
CALCIUM SERPL-MCNC: 9.4 MG/DL — SIGNIFICANT CHANGE UP (ref 8.4–10.5)
CHLORIDE SERPL-SCNC: 102 MMOL/L — SIGNIFICANT CHANGE UP (ref 98–107)
CO2 SERPL-SCNC: 22 MMOL/L — SIGNIFICANT CHANGE UP (ref 22–31)
CREAT SERPL-MCNC: 0.99 MG/DL — SIGNIFICANT CHANGE UP (ref 0.5–1.3)
GLUCOSE SERPL-MCNC: 113 MG/DL — HIGH (ref 70–99)
HCT VFR BLD CALC: 40.1 % — SIGNIFICANT CHANGE UP (ref 39–50)
HGB BLD-MCNC: 13.4 G/DL — SIGNIFICANT CHANGE UP (ref 13–17)
MAGNESIUM SERPL-MCNC: 2.1 MG/DL — SIGNIFICANT CHANGE UP (ref 1.6–2.6)
MCHC RBC-ENTMCNC: 27.1 PG — SIGNIFICANT CHANGE UP (ref 27–34)
MCHC RBC-ENTMCNC: 33.4 GM/DL — SIGNIFICANT CHANGE UP (ref 32–36)
MCV RBC AUTO: 81.2 FL — SIGNIFICANT CHANGE UP (ref 80–100)
NRBC # BLD: 0 /100 WBCS — SIGNIFICANT CHANGE UP
NRBC # FLD: 0 K/UL — SIGNIFICANT CHANGE UP
PHOSPHATE SERPL-MCNC: 4.2 MG/DL — SIGNIFICANT CHANGE UP (ref 2.5–4.5)
PLATELET # BLD AUTO: 129 K/UL — LOW (ref 150–400)
POTASSIUM SERPL-MCNC: 3.2 MMOL/L — LOW (ref 3.5–5.3)
POTASSIUM SERPL-SCNC: 3.2 MMOL/L — LOW (ref 3.5–5.3)
PROT SERPL-MCNC: 7.9 G/DL — SIGNIFICANT CHANGE UP (ref 6–8.3)
RBC # BLD: 4.94 M/UL — SIGNIFICANT CHANGE UP (ref 4.2–5.8)
RBC # FLD: 14.8 % — HIGH (ref 10.3–14.5)
SODIUM SERPL-SCNC: 139 MMOL/L — SIGNIFICANT CHANGE UP (ref 135–145)
WBC # BLD: 4.79 K/UL — SIGNIFICANT CHANGE UP (ref 3.8–10.5)
WBC # FLD AUTO: 4.79 K/UL — SIGNIFICANT CHANGE UP (ref 3.8–10.5)

## 2021-10-25 RX ORDER — POTASSIUM CHLORIDE 20 MEQ
20 PACKET (EA) ORAL
Refills: 0 | Status: COMPLETED | OUTPATIENT
Start: 2021-10-25 | End: 2021-10-25

## 2021-10-25 RX ADMIN — Medication 40 MILLIGRAM(S): at 05:47

## 2021-10-25 RX ADMIN — Medication 81 MILLIGRAM(S): at 11:25

## 2021-10-25 RX ADMIN — CYCLOBENZAPRINE HYDROCHLORIDE 5 MILLIGRAM(S): 10 TABLET, FILM COATED ORAL at 00:34

## 2021-10-25 RX ADMIN — ENOXAPARIN SODIUM 40 MILLIGRAM(S): 100 INJECTION SUBCUTANEOUS at 11:25

## 2021-10-25 RX ADMIN — CARVEDILOL PHOSPHATE 6.25 MILLIGRAM(S): 80 CAPSULE, EXTENDED RELEASE ORAL at 17:32

## 2021-10-25 RX ADMIN — SENNA PLUS 2 TABLET(S): 8.6 TABLET ORAL at 21:29

## 2021-10-25 RX ADMIN — Medication 20 MILLIEQUIVALENT(S): at 21:32

## 2021-10-25 RX ADMIN — ATORVASTATIN CALCIUM 80 MILLIGRAM(S): 80 TABLET, FILM COATED ORAL at 21:29

## 2021-10-25 RX ADMIN — POLYETHYLENE GLYCOL 3350 17 GRAM(S): 17 POWDER, FOR SOLUTION ORAL at 11:26

## 2021-10-25 RX ADMIN — CARVEDILOL PHOSPHATE 6.25 MILLIGRAM(S): 80 CAPSULE, EXTENDED RELEASE ORAL at 05:47

## 2021-10-25 RX ADMIN — ISOSORBIDE MONONITRATE 60 MILLIGRAM(S): 60 TABLET, EXTENDED RELEASE ORAL at 11:25

## 2021-10-25 RX ADMIN — LISINOPRIL 20 MILLIGRAM(S): 2.5 TABLET ORAL at 05:47

## 2021-10-25 RX ADMIN — Medication 20 MILLIEQUIVALENT(S): at 17:32

## 2021-10-25 RX ADMIN — Medication 20 MILLIEQUIVALENT(S): at 16:58

## 2021-10-25 RX ADMIN — GABAPENTIN 300 MILLIGRAM(S): 400 CAPSULE ORAL at 11:25

## 2021-10-25 NOTE — CONSULT NOTE ADULT - SUBJECTIVE AND OBJECTIVE BOX
Patient is a 71y old  Male who presents with a chief complaint of chest pain/SOB      HPI:  71 male with history of CAD s/p stents and CABG, ICM, HTN, HLD presents with complaints of chest pressure and SOB for one week. Patient states he has been experiencing midsternal chest tightness constantly for one week. He reports he feels it at rest.  Does not worsen with exertion. He reports orthopnea.  Relieving factors include NTG, with some improvement when taken at home.   On ROS, endorse, generalized abdominal discomfort  and bloating at times. Otherwise,  he  denies fever, chills, headache, syncope, lightheadedness, dizziness. N/V/D.  Patient was last hospitalized 2021, he had a  cardiac angiogram performed 2021 which showed  patent stent to common left common illiac. SVG to OM is closed at ostium looks like chronically closed. SVG to RCA known to be closed. LIMA TO LAD supplied RCA territory via collaterals. Patient found to have elevated BNP was given lasix 40mg IV  and admitted to medicine for further management.   I feel better .       PAST MEDICAL & SURGICAL HISTORY:  HTN (hypertension)    PAD (peripheral artery disease)  stent to L lower extremity artery    Constipation, unspecified constipation type    Hyperlipidemia    NSTEMI (non-ST elevated myocardial infarction)   and 2018 and 2019    Coronary artery disease involving native coronary artery of native heart, unspecified whether angina present    Ischemic cardiomyopathy with implantable cardioverter-defibrillator (ICD)    S/P CABG (coronary artery bypass graft)  x3; [facility] 2002    History of coronary angiogram  multiple stents placed    History of femoral angiogram  stent placed in E 2016    History of laparoscopic cholecystectomy  2016        Social History: No smoking etc .     FAMILY HISTORY:  Family history of coronary artery disease in brother (Sibling)  4 brothers with MI/CABG, 1  at 77yo    Family history of stroke  60yo CVA, heart attack 61yo    Family history of coronary artery disease in sister (Sibling)   from MI    Family history of MI (myocardial infarction)  mother,         Allergies    No Known Allergies    Intolerances        REVIEW OF SYSTEMS:    CONSTITUTIONAL: No fever, weight loss, or fatigue  EYES: No eye pain, visual disturbances, or discharge  RESPIRATORY: No cough, wheezing, chills or hemoptysis; No shortness of breath  CARDIOVASCULAR: No chest pain, palpitations, dizziness, or leg swelling  GASTROINTESTINAL: No abdominal or epigastric pain. No nausea, vomiting, or hematemesis; No diarrhea or constipation. No melena or hematochezia.  GENITOURINARY: No dysuria, frequency, hematuria, or incontinence  NEUROLOGICAL: No headaches, memory loss, loss of strength, numbness, or tremors  SKIN: No itching, burning, rashes, or lesions   ENDOCRINE: No heat or cold intolerance; No hair loss  MUSCULOSKELETAL: No joint pain or swelling; No muscle, back, or extremity pain  PSYCHIATRIC: No depression, anxiety, mood swings, or difficulty sleeping      MEDICATIONS  (STANDING):  aspirin enteric coated 81 milliGRAM(s) Oral daily  atorvastatin 80 milliGRAM(s) Oral at bedtime  carvedilol 6.25 milliGRAM(s) Oral every 12 hours  enoxaparin Injectable 40 milliGRAM(s) SubCutaneous daily  furosemide   Injectable 40 milliGRAM(s) IV Push daily  gabapentin 300 milliGRAM(s) Oral daily  isosorbide   mononitrate ER Tablet (IMDUR) 60 milliGRAM(s) Oral daily  lisinopril 20 milliGRAM(s) Oral daily  pantoprazole    Tablet 40 milliGRAM(s) Oral before breakfast  polyethylene glycol 3350 17 Gram(s) Oral daily  potassium chloride    Tablet ER 20 milliEquivalent(s) Oral every 2 hours  senna 2 Tablet(s) Oral at bedtime    MEDICATIONS  (PRN):  acetaminophen     Tablet .. 650 milliGRAM(s) Oral every 6 hours PRN Temp greater or equal to 38C (100.4F), Mild Pain (1 - 3)  aluminum hydroxide/magnesium hydroxide/simethicone Suspension 30 milliLiter(s) Oral every 4 hours PRN Dyspepsia  cyclobenzaprine 5 milliGRAM(s) Oral three times a day PRN Muscle Spasm  melatonin 3 milliGRAM(s) Oral at bedtime PRN Insomnia  nitroglycerin     SubLingual 0.4 milliGRAM(s) SubLingual every 5 minutes PRN Chest Pain  ondansetron Injectable 4 milliGRAM(s) IV Push every 8 hours PRN Nausea and/or Vomiting      Vital Signs Last 24 Hrs  T(C): 36.5 (25 Oct 2021 14:22), Max: 36.8 (24 Oct 2021 18:00)  T(F): 97.7 (25 Oct 2021 14:22), Max: 98.3 (24 Oct 2021 18:00)  HR: 73 (25 Oct 2021 14:22) (70 - 91)  BP: 120/70 (25 Oct 2021 14:22) (120/70 - 131/85)  BP(mean): --  RR: 17 (25 Oct 2021 14:22) (17 - 19)  SpO2: 96% (25 Oct 2021 14:22) (96% - 100%)    PHYSICAL EXAM:    GENERAL: NAD, well-groomed, well-developed  HEAD:  Atraumatic, Normocephalic  EYES: EOMI, PERRLA, conjunctiva and sclera clear  ENMT: No tonsillar erythema, exudates, or enlargement; Moist mucous membranes, Good dentition, No lesions  NECK: Supple, No JVD, Normal thyroid  NERVOUS SYSTEM:  Alert & Oriented X3, No new  focal sign .  CHEST/LUNG: Air entry good bilaterally; No rales, rhonchi, wheezing, or rubs  HEART: Regular rate and rhythm; No murmurs, rubs, or gallops  ABDOMEN: Soft, Nontender, Nondistended; Bowel sounds present  EXTREMITIES:  2+ Peripheral Pulses, No clubbing, cyanosis, or edema      LABS:                        13.4   4.79  )-----------( 129      ( 25 Oct 2021 07:35 )             40.1     10-    139  |  102  |  17  ----------------------------<  113<H>  3.2<L>   |  22  |  0.99    Ca    9.4      25 Oct 2021 07:35  Phos  4.2     10-25  Mg     2.10     10-25    TPro  7.9  /  Alb  4.3  /  TBili  0.8  /  DBili  x   /  AST  30  /  ALT  30  /  AlkPhos  137<H>  10-25            RADIOLOGY & ADDITIONAL STUDIES:

## 2021-10-25 NOTE — CONSULT NOTE ADULT - ASSESSMENT
71 male with history of CAD s/p stents and CABG, ICM, HTN, HLD presents with complaints of chest pressure and SOB for one week. Patient states he has been experiencing midsternal chest tightness constantly for one week. He reports he feels it at rest.  Does not worsen with exertion. He reports orthopnea.  Relieving factors include NTG, with some improvement when taken at home.   On ROS, endorse, generalized abdominal discomfort  and bloating at times. Otherwise,  he  denies fever, chills, headache, syncope, lightheadedness, dizziness. N/V/D.  Patient was last hospitalized July 2021, he had a  cardiac angiogram performed 7/2021 which showed  patent stent to common left common illiac. SVG to OM is closed at ostium looks like chronically closed. SVG to RCA known to be closed. LIMA TO LAD supplied RCA territory via collaterals. Patient found to have elevated BNP was given lasix 40mg IV  and admitted to medicine for further management.   I feel better .

## 2021-10-26 LAB
ALBUMIN SERPL ELPH-MCNC: 3.9 G/DL — SIGNIFICANT CHANGE UP (ref 3.3–5)
ALP SERPL-CCNC: 120 U/L — SIGNIFICANT CHANGE UP (ref 40–120)
ALT FLD-CCNC: 45 U/L — HIGH (ref 4–41)
ANION GAP SERPL CALC-SCNC: 11 MMOL/L — SIGNIFICANT CHANGE UP (ref 7–14)
AST SERPL-CCNC: 46 U/L — HIGH (ref 4–40)
BILIRUB SERPL-MCNC: 0.9 MG/DL — SIGNIFICANT CHANGE UP (ref 0.2–1.2)
BUN SERPL-MCNC: 19 MG/DL — SIGNIFICANT CHANGE UP (ref 7–23)
CALCIUM SERPL-MCNC: 9.6 MG/DL — SIGNIFICANT CHANGE UP (ref 8.4–10.5)
CHLORIDE SERPL-SCNC: 102 MMOL/L — SIGNIFICANT CHANGE UP (ref 98–107)
CO2 SERPL-SCNC: 24 MMOL/L — SIGNIFICANT CHANGE UP (ref 22–31)
CREAT SERPL-MCNC: 1.09 MG/DL — SIGNIFICANT CHANGE UP (ref 0.5–1.3)
GLUCOSE SERPL-MCNC: 96 MG/DL — SIGNIFICANT CHANGE UP (ref 70–99)
HCT VFR BLD CALC: 37.8 % — LOW (ref 39–50)
HGB BLD-MCNC: 12.8 G/DL — LOW (ref 13–17)
MAGNESIUM SERPL-MCNC: 2.2 MG/DL — SIGNIFICANT CHANGE UP (ref 1.6–2.6)
MCHC RBC-ENTMCNC: 27.6 PG — SIGNIFICANT CHANGE UP (ref 27–34)
MCHC RBC-ENTMCNC: 33.9 GM/DL — SIGNIFICANT CHANGE UP (ref 32–36)
MCV RBC AUTO: 81.5 FL — SIGNIFICANT CHANGE UP (ref 80–100)
NRBC # BLD: 0 /100 WBCS — SIGNIFICANT CHANGE UP
NRBC # FLD: 0 K/UL — SIGNIFICANT CHANGE UP
PHOSPHATE SERPL-MCNC: 3.9 MG/DL — SIGNIFICANT CHANGE UP (ref 2.5–4.5)
PLATELET # BLD AUTO: 125 K/UL — LOW (ref 150–400)
POTASSIUM SERPL-MCNC: 4 MMOL/L — SIGNIFICANT CHANGE UP (ref 3.5–5.3)
POTASSIUM SERPL-SCNC: 4 MMOL/L — SIGNIFICANT CHANGE UP (ref 3.5–5.3)
PROT SERPL-MCNC: 6.8 G/DL — SIGNIFICANT CHANGE UP (ref 6–8.3)
RBC # BLD: 4.64 M/UL — SIGNIFICANT CHANGE UP (ref 4.2–5.8)
RBC # FLD: 14.8 % — HIGH (ref 10.3–14.5)
SODIUM SERPL-SCNC: 137 MMOL/L — SIGNIFICANT CHANGE UP (ref 135–145)
WBC # BLD: 4.45 K/UL — SIGNIFICANT CHANGE UP (ref 3.8–10.5)
WBC # FLD AUTO: 4.45 K/UL — SIGNIFICANT CHANGE UP (ref 3.8–10.5)

## 2021-10-26 PROCEDURE — 93306 TTE W/DOPPLER COMPLETE: CPT | Mod: 26

## 2021-10-26 RX ADMIN — Medication 40 MILLIGRAM(S): at 05:20

## 2021-10-26 RX ADMIN — PANTOPRAZOLE SODIUM 40 MILLIGRAM(S): 20 TABLET, DELAYED RELEASE ORAL at 05:20

## 2021-10-26 RX ADMIN — LISINOPRIL 20 MILLIGRAM(S): 2.5 TABLET ORAL at 05:20

## 2021-10-26 RX ADMIN — CARVEDILOL PHOSPHATE 6.25 MILLIGRAM(S): 80 CAPSULE, EXTENDED RELEASE ORAL at 18:55

## 2021-10-26 RX ADMIN — ISOSORBIDE MONONITRATE 60 MILLIGRAM(S): 60 TABLET, EXTENDED RELEASE ORAL at 12:08

## 2021-10-26 RX ADMIN — GABAPENTIN 300 MILLIGRAM(S): 400 CAPSULE ORAL at 12:08

## 2021-10-26 RX ADMIN — ENOXAPARIN SODIUM 40 MILLIGRAM(S): 100 INJECTION SUBCUTANEOUS at 12:08

## 2021-10-26 RX ADMIN — CARVEDILOL PHOSPHATE 6.25 MILLIGRAM(S): 80 CAPSULE, EXTENDED RELEASE ORAL at 05:20

## 2021-10-26 RX ADMIN — ATORVASTATIN CALCIUM 80 MILLIGRAM(S): 80 TABLET, FILM COATED ORAL at 21:16

## 2021-10-26 RX ADMIN — Medication 81 MILLIGRAM(S): at 12:07

## 2021-10-26 RX ADMIN — SENNA PLUS 2 TABLET(S): 8.6 TABLET ORAL at 21:51

## 2021-10-27 ENCOUNTER — TRANSCRIPTION ENCOUNTER (OUTPATIENT)
Age: 71
End: 2021-10-27

## 2021-10-27 LAB
ALBUMIN SERPL ELPH-MCNC: 4.1 G/DL — SIGNIFICANT CHANGE UP (ref 3.3–5)
ALP SERPL-CCNC: 133 U/L — HIGH (ref 40–120)
ALT FLD-CCNC: 75 U/L — HIGH (ref 4–41)
ANION GAP SERPL CALC-SCNC: 11 MMOL/L — SIGNIFICANT CHANGE UP (ref 7–14)
AST SERPL-CCNC: 71 U/L — HIGH (ref 4–40)
BILIRUB SERPL-MCNC: 1.1 MG/DL — SIGNIFICANT CHANGE UP (ref 0.2–1.2)
BUN SERPL-MCNC: 16 MG/DL — SIGNIFICANT CHANGE UP (ref 7–23)
CALCIUM SERPL-MCNC: 9.9 MG/DL — SIGNIFICANT CHANGE UP (ref 8.4–10.5)
CHLORIDE SERPL-SCNC: 103 MMOL/L — SIGNIFICANT CHANGE UP (ref 98–107)
CO2 SERPL-SCNC: 24 MMOL/L — SIGNIFICANT CHANGE UP (ref 22–31)
CREAT SERPL-MCNC: 1.04 MG/DL — SIGNIFICANT CHANGE UP (ref 0.5–1.3)
GLUCOSE SERPL-MCNC: 100 MG/DL — HIGH (ref 70–99)
HCT VFR BLD CALC: 40.9 % — SIGNIFICANT CHANGE UP (ref 39–50)
HGB BLD-MCNC: 13.5 G/DL — SIGNIFICANT CHANGE UP (ref 13–17)
MAGNESIUM SERPL-MCNC: 2.2 MG/DL — SIGNIFICANT CHANGE UP (ref 1.6–2.6)
MCHC RBC-ENTMCNC: 27.4 PG — SIGNIFICANT CHANGE UP (ref 27–34)
MCHC RBC-ENTMCNC: 33 GM/DL — SIGNIFICANT CHANGE UP (ref 32–36)
MCV RBC AUTO: 83.1 FL — SIGNIFICANT CHANGE UP (ref 80–100)
NRBC # BLD: 0 /100 WBCS — SIGNIFICANT CHANGE UP
NRBC # FLD: 0 K/UL — SIGNIFICANT CHANGE UP
PHOSPHATE SERPL-MCNC: 3.5 MG/DL — SIGNIFICANT CHANGE UP (ref 2.5–4.5)
PLATELET # BLD AUTO: 133 K/UL — LOW (ref 150–400)
POTASSIUM SERPL-MCNC: 3.9 MMOL/L — SIGNIFICANT CHANGE UP (ref 3.5–5.3)
POTASSIUM SERPL-SCNC: 3.9 MMOL/L — SIGNIFICANT CHANGE UP (ref 3.5–5.3)
PROT SERPL-MCNC: 7.1 G/DL — SIGNIFICANT CHANGE UP (ref 6–8.3)
RBC # BLD: 4.92 M/UL — SIGNIFICANT CHANGE UP (ref 4.2–5.8)
RBC # FLD: 14.7 % — HIGH (ref 10.3–14.5)
SODIUM SERPL-SCNC: 138 MMOL/L — SIGNIFICANT CHANGE UP (ref 135–145)
WBC # BLD: 5.21 K/UL — SIGNIFICANT CHANGE UP (ref 3.8–10.5)
WBC # FLD AUTO: 5.21 K/UL — SIGNIFICANT CHANGE UP (ref 3.8–10.5)

## 2021-10-27 PROCEDURE — 93282 PRGRMG EVAL IMPLANTABLE DFB: CPT | Mod: 26

## 2021-10-27 RX ORDER — FUROSEMIDE 40 MG
40 TABLET ORAL DAILY
Refills: 0 | Status: DISCONTINUED | OUTPATIENT
Start: 2021-10-27 | End: 2021-10-28

## 2021-10-27 RX ADMIN — SENNA PLUS 2 TABLET(S): 8.6 TABLET ORAL at 21:05

## 2021-10-27 RX ADMIN — ISOSORBIDE MONONITRATE 60 MILLIGRAM(S): 60 TABLET, EXTENDED RELEASE ORAL at 12:11

## 2021-10-27 RX ADMIN — Medication 81 MILLIGRAM(S): at 12:12

## 2021-10-27 RX ADMIN — CARVEDILOL PHOSPHATE 6.25 MILLIGRAM(S): 80 CAPSULE, EXTENDED RELEASE ORAL at 06:32

## 2021-10-27 RX ADMIN — POLYETHYLENE GLYCOL 3350 17 GRAM(S): 17 POWDER, FOR SOLUTION ORAL at 12:12

## 2021-10-27 RX ADMIN — Medication 40 MILLIGRAM(S): at 06:31

## 2021-10-27 RX ADMIN — ATORVASTATIN CALCIUM 80 MILLIGRAM(S): 80 TABLET, FILM COATED ORAL at 21:05

## 2021-10-27 RX ADMIN — PANTOPRAZOLE SODIUM 40 MILLIGRAM(S): 20 TABLET, DELAYED RELEASE ORAL at 06:32

## 2021-10-27 RX ADMIN — CARVEDILOL PHOSPHATE 6.25 MILLIGRAM(S): 80 CAPSULE, EXTENDED RELEASE ORAL at 17:56

## 2021-10-27 RX ADMIN — Medication 200 MILLIGRAM(S): at 02:06

## 2021-10-27 RX ADMIN — LISINOPRIL 20 MILLIGRAM(S): 2.5 TABLET ORAL at 06:32

## 2021-10-27 RX ADMIN — GABAPENTIN 300 MILLIGRAM(S): 400 CAPSULE ORAL at 12:12

## 2021-10-27 RX ADMIN — Medication 40 MILLIGRAM(S): at 17:56

## 2021-10-27 RX ADMIN — Medication 200 MILLIGRAM(S): at 21:06

## 2021-10-27 RX ADMIN — ENOXAPARIN SODIUM 40 MILLIGRAM(S): 100 INJECTION SUBCUTANEOUS at 12:11

## 2021-10-27 NOTE — DISCHARGE NOTE PROVIDER - CARE PROVIDER_API CALL
Pelon Madera  PCP  Phone: [phone]  Fax: (   )    -  Follow Up Time: 2 weeks   Pelon Madera  PCP  Phone: [phone]  Fax: (   )    -  Follow Up Time: 2 weeks    Ulisses Abarca  CARDIOVASCULAR DISEASE  [address] [place] [address]  Phone: [phone]  Fax: [phone]  Follow Up Time: 2 weeks

## 2021-10-27 NOTE — PROCEDURE NOTE - ADDITIONAL PROCEDURE DETAILS
Appropriate pacing and sensing. Capture threshold tested via iterative testing. No events corresponding to this admission. No reprogramming done

## 2021-10-27 NOTE — DISCHARGE NOTE PROVIDER - PROVIDER TOKENS
FREE:[LAST:[],FIRST:[Pelon],PHONE:[phone]],FAX:[(   )    -],ADDRESS:[PCP],FOLLOWUP:[2 weeks]] FREE:[LAST:[],FIRST:[Pelon],PHONE:[phone]],FAX:[(   )    -],ADDRESS:[PCP],FOLLOWUP:[2 weeks]],PROVIDER:[TOKEN:[[id number]],FOLLOWUP:[2 weeks]]

## 2021-10-27 NOTE — DISCHARGE NOTE PROVIDER - HOSPITAL COURSE
71M office patient PMH CAD s/p stents, ICM, HTN HLD presents with complaints of chest pressure and SOB.     EKG: NSR no acute changes    1.Chest pressure/SOB  -pressure is midsternal and constant  -EKG with no ischemic changes  -switched to Lasix 40 po daily   -has hx of ICM, echo unchanged with severe LV dysfunction  -s/p cardiac angiogram performed 7/2021 which showed  patent stent to common left common illiac. SVG to OM is closed at ostium looks like chronically closed. SVG to RCA known to be closed. LIMA TO LAD supplied RCA territory via collaterals  -  2. ICM  -Will get echo  -S/p ICD. please obtain interrogation  -continued on lisinopril and coreg    3. HTN  -improved  -continued coreg, lisinopril and imdur  -continued to monitor BP      Patient seen and evaluated. Reviewed discharge medications with patient and attending. All new medications requiring new prescriptions were sent to the pharmacy of patient's choice. Reviewed need for prescription for previous home medications and new prescriptions sent if requested. Medically cleared/stable for discharge as per Dr. Abarca with appropriate follow up. Patient understands and agrees with plan of care.      71M office patient PMH CAD s/p stents, ICM, HTN HLD presents with complaints of chest pressure and SOB.       1.Chest pressure/SOB  -pressure is midsternal and constant  -EKG with no ischemic changes  -switched to Lasix 40 po daily   -has hx of ICM, echo unchanged with severe LV dysfunction  -s/p cardiac angiogram performed 7/2021 which showed  patent stent to common left common illiac. SVG to OM is closed at ostium looks like chronically closed. SVG to RCA known to be closed. LIMA TO LAD supplied RCA territory via collaterals  -  2. ICM  -TTE:- Severe global left ventricular systolic dysfunction, mild-mod MR, mild AR, mild diastolic dysfunction,   -S/p ICD. no evenets on interrogation on 10/27  -continued on lisinopril and coreg    3. HTN  -improved  -continued coreg, lisinopril and imdur  -continued to monitor BP      Patient seen and evaluated. Reviewed discharge medications with patient and attending. All new medications requiring new prescriptions were sent to the pharmacy of patient's choice. Reviewed need for prescription for previous home medications and new prescriptions sent if requested. Medically cleared/stable for discharge as per Dr. Abarca with appropriate follow up. Patient understands and agrees with plan of care.

## 2021-10-27 NOTE — DISCHARGE NOTE PROVIDER - NSDCCPCAREPLAN_GEN_ALL_CORE_FT
PRINCIPAL DISCHARGE DIAGNOSIS  Diagnosis: Chest pain  Assessment and Plan of Treatment: You came into the hospital because you      SECONDARY DISCHARGE DIAGNOSES  Diagnosis: HTN (hypertension)  Assessment and Plan of Treatment: Continue blood pressure medication regimen as directed. Monitor for any visual changes, headaches or dizziness.  Monitor blood pressure regularly.  Follow up with your primary care provider for further management for high blood pressure.     PRINCIPAL DISCHARGE DIAGNOSIS  Diagnosis: Chest pain  Assessment and Plan of Treatment: You came into the hospital because you were short of breath and you had chest pressure. You were seen by cardiology , an Ultrasound of you heart was done which showed heart failure. You were given diuretics. You will need to follow up with your Cardiologist as an outpatient.      SECONDARY DISCHARGE DIAGNOSES  Diagnosis: Hyperlipidemia  Assessment and Plan of Treatment: Continued on home medications.    Diagnosis: NSTEMI (non-ST elevated myocardial infarction)  Assessment and Plan of Treatment: 2017 and February 2018 and 2019    Diagnosis: HTN (hypertension)  Assessment and Plan of Treatment: Continue blood pressure medication regimen as directed. Monitor for any visual changes, headaches or dizziness.  Monitor blood pressure regularly.  Follow up with your primary care provider for further management for high blood pressure.

## 2021-10-28 ENCOUNTER — TRANSCRIPTION ENCOUNTER (OUTPATIENT)
Age: 71
End: 2021-10-28

## 2021-10-28 VITALS
DIASTOLIC BLOOD PRESSURE: 64 MMHG | TEMPERATURE: 98 F | SYSTOLIC BLOOD PRESSURE: 105 MMHG | OXYGEN SATURATION: 98 % | RESPIRATION RATE: 18 BRPM | HEART RATE: 87 BPM

## 2021-10-28 LAB
ANION GAP SERPL CALC-SCNC: 12 MMOL/L — SIGNIFICANT CHANGE UP (ref 7–14)
BUN SERPL-MCNC: 20 MG/DL — SIGNIFICANT CHANGE UP (ref 7–23)
CALCIUM SERPL-MCNC: 9.9 MG/DL — SIGNIFICANT CHANGE UP (ref 8.4–10.5)
CHLORIDE SERPL-SCNC: 103 MMOL/L — SIGNIFICANT CHANGE UP (ref 98–107)
CO2 SERPL-SCNC: 25 MMOL/L — SIGNIFICANT CHANGE UP (ref 22–31)
CREAT SERPL-MCNC: 1.22 MG/DL — SIGNIFICANT CHANGE UP (ref 0.5–1.3)
GLUCOSE SERPL-MCNC: 102 MG/DL — HIGH (ref 70–99)
HCT VFR BLD CALC: 42.1 % — SIGNIFICANT CHANGE UP (ref 39–50)
HGB BLD-MCNC: 14.2 G/DL — SIGNIFICANT CHANGE UP (ref 13–17)
MAGNESIUM SERPL-MCNC: 2.2 MG/DL — SIGNIFICANT CHANGE UP (ref 1.6–2.6)
MCHC RBC-ENTMCNC: 28.3 PG — SIGNIFICANT CHANGE UP (ref 27–34)
MCHC RBC-ENTMCNC: 33.7 GM/DL — SIGNIFICANT CHANGE UP (ref 32–36)
MCV RBC AUTO: 83.9 FL — SIGNIFICANT CHANGE UP (ref 80–100)
NRBC # BLD: 0 /100 WBCS — SIGNIFICANT CHANGE UP
NRBC # FLD: 0 K/UL — SIGNIFICANT CHANGE UP
PHOSPHATE SERPL-MCNC: 4.2 MG/DL — SIGNIFICANT CHANGE UP (ref 2.5–4.5)
PLATELET # BLD AUTO: 146 K/UL — LOW (ref 150–400)
POTASSIUM SERPL-MCNC: 4 MMOL/L — SIGNIFICANT CHANGE UP (ref 3.5–5.3)
POTASSIUM SERPL-SCNC: 4 MMOL/L — SIGNIFICANT CHANGE UP (ref 3.5–5.3)
RBC # BLD: 5.02 M/UL — SIGNIFICANT CHANGE UP (ref 4.2–5.8)
RBC # FLD: 14.8 % — HIGH (ref 10.3–14.5)
SODIUM SERPL-SCNC: 140 MMOL/L — SIGNIFICANT CHANGE UP (ref 135–145)
WBC # BLD: 6.43 K/UL — SIGNIFICANT CHANGE UP (ref 3.8–10.5)
WBC # FLD AUTO: 6.43 K/UL — SIGNIFICANT CHANGE UP (ref 3.8–10.5)

## 2021-10-28 RX ADMIN — CARVEDILOL PHOSPHATE 6.25 MILLIGRAM(S): 80 CAPSULE, EXTENDED RELEASE ORAL at 06:22

## 2021-10-28 RX ADMIN — Medication 81 MILLIGRAM(S): at 12:27

## 2021-10-28 RX ADMIN — Medication 40 MILLIGRAM(S): at 06:21

## 2021-10-28 RX ADMIN — PANTOPRAZOLE SODIUM 40 MILLIGRAM(S): 20 TABLET, DELAYED RELEASE ORAL at 06:22

## 2021-10-28 RX ADMIN — LISINOPRIL 20 MILLIGRAM(S): 2.5 TABLET ORAL at 06:21

## 2021-10-28 RX ADMIN — ENOXAPARIN SODIUM 40 MILLIGRAM(S): 100 INJECTION SUBCUTANEOUS at 12:27

## 2021-10-28 RX ADMIN — GABAPENTIN 300 MILLIGRAM(S): 400 CAPSULE ORAL at 12:27

## 2021-10-28 RX ADMIN — ISOSORBIDE MONONITRATE 60 MILLIGRAM(S): 60 TABLET, EXTENDED RELEASE ORAL at 12:28

## 2021-10-28 NOTE — PROGRESS NOTE ADULT - SUBJECTIVE AND OBJECTIVE BOX
Ulisses Abarca MD  Interventional Cardiology / Endovascular Specialist  [place] Office : [address] [place] [address] [place] [address]  Tel:   [place] Office : [address] [facility] [place] [address]  Tel: [phone]  Cell : [phone]    Subjective/Overnight events: Patient lying in bed  	  MEDICATIONS:  aspirin enteric coated 81 milliGRAM(s) Oral daily  carvedilol 6.25 milliGRAM(s) Oral every 12 hours  enoxaparin Injectable 40 milliGRAM(s) SubCutaneous daily  furosemide    Tablet 40 milliGRAM(s) Oral daily  isosorbide   mononitrate ER Tablet (IMDUR) 60 milliGRAM(s) Oral daily  lisinopril 20 milliGRAM(s) Oral daily  nitroglycerin     SubLingual 0.4 milliGRAM(s) SubLingual every 5 minutes PRN      benzonatate 200 milliGRAM(s) Oral three times a day PRN    acetaminophen     Tablet .. 650 milliGRAM(s) Oral every 6 hours PRN  cyclobenzaprine 5 milliGRAM(s) Oral three times a day PRN  gabapentin 300 milliGRAM(s) Oral daily  melatonin 3 milliGRAM(s) Oral at bedtime PRN  ondansetron Injectable 4 milliGRAM(s) IV Push every 8 hours PRN    aluminum hydroxide/magnesium hydroxide/simethicone Suspension 30 milliLiter(s) Oral every 4 hours PRN  pantoprazole    Tablet 40 milliGRAM(s) Oral before breakfast  polyethylene glycol 3350 17 Gram(s) Oral daily  senna 2 Tablet(s) Oral at bedtime    atorvastatin 80 milliGRAM(s) Oral at bedtime        PAST MEDICAL/SURGICAL HISTORY  PAST MEDICAL & SURGICAL HISTORY:  HTN (hypertension)    PAD (peripheral artery disease)  stent to L lower extremity artery    Constipation, unspecified constipation type    Hyperlipidemia    NSTEMI (non-ST elevated myocardial infarction)   and 2018 and 2019    Coronary artery disease involving native coronary artery of native heart, unspecified whether angina present    Ischemic cardiomyopathy with implantable cardioverter-defibrillator (ICD)    S/P CABG (coronary artery bypass graft)  x3; [facility]     History of coronary angiogram  multiple stents placed    History of femoral angiogram  stent placed in E 2016    History of laparoscopic cholecystectomy  2016        SOCIAL HISTORY: Substance Use (street drugs): ( x ) never used  (  ) other:    FAMILY HISTORY:  Family history of coronary artery disease in brother (Sibling)  4 brothers with MI/CABG, 1  at 79yo    Family history of stroke  58yo CVA, heart attack 59yo    Family history of coronary artery disease in sister (Sibling)   from MI    Family history of MI (myocardial infarction)  mother,         PHYSICAL EXAM:  T(C): 36.6 (10-27-21 @ 10:00), Max: 36.8 (10-26-21 @ 21:00)  HR: 75 (10-27-21 @ 12:30) (75 - 80)  BP: 115/59 (10-27-21 @ 12:30) (100/55 - 120/69)  RR: 20 (10-27-21 @ 10:00) (18 - 20)  SpO2: 97% (10-27-21 @ 10:00) (96% - 99%)  Wt(kg): --  I&O's Summary      GENERAL: NAD  EYES: EOMI, PERRLA, conjunctiva and sclera clear  ENMT: No tonsillar erythema, exudates, or enlargement  Cardiovascular: Normal S1 S2, No JVD, No murmurs, No edema  Respiratory: Lungs clear to auscultation	  Gastrointestinal:  Soft, Non-tender, + BS	  Extremities: No edema                                  13.5   5.21  )-----------( 133      ( 27 Oct 2021 06:48 )             40.9     10-27    138  |  103  |  16  ----------------------------<  100<H>  3.9   |  24  |  1.04    Ca    9.9      27 Oct 2021 06:48  Phos  3.5     10-27  Mg     2.20     10-27    TPro  7.1  /  Alb  4.1  /  TBili  1.1  /  DBili  x   /  AST  71<H>  /  ALT  75<H>  /  AlkPhos  133<H>  10-27    proBNP:   Lipid Profile:   HgA1c:   TSH:     Consultant(s) Notes Reviewed:  [x ] YES  [ ] NO    Care Discussed with Consultants/Other Providers [ x] YES  [ ] NO    Imaging Personally Reviewed independently:  [x] YES  [ ] NO    All labs, radiologic studies, vitals, orders and medications list reviewed. Patient is seen and examined at bedside. Case discussed with medical team.              
  Ulisses Abarca MD  Interventional Cardiology / Endovascular Specialist  [place] Office : [address] [place] [address] [place] [address]  Tel:   [place] Office : [address] [facility] [place] [address]  Tel: [phone]  Cell : [phone]    Subjective/Overnight events: Patient lying in bed comfortably. No acute distress. Denies chest pain, SOB or palpitations  	  MEDICATIONS:  aspirin enteric coated 81 milliGRAM(s) Oral daily  carvedilol 6.25 milliGRAM(s) Oral every 12 hours  enoxaparin Injectable 40 milliGRAM(s) SubCutaneous daily  furosemide    Tablet 40 milliGRAM(s) Oral daily  isosorbide   mononitrate ER Tablet (IMDUR) 60 milliGRAM(s) Oral daily  lisinopril 20 milliGRAM(s) Oral daily  nitroglycerin     SubLingual 0.4 milliGRAM(s) SubLingual every 5 minutes PRN      benzonatate 200 milliGRAM(s) Oral three times a day PRN    acetaminophen     Tablet .. 650 milliGRAM(s) Oral every 6 hours PRN  cyclobenzaprine 5 milliGRAM(s) Oral three times a day PRN  gabapentin 300 milliGRAM(s) Oral daily  melatonin 3 milliGRAM(s) Oral at bedtime PRN  ondansetron Injectable 4 milliGRAM(s) IV Push every 8 hours PRN    aluminum hydroxide/magnesium hydroxide/simethicone Suspension 30 milliLiter(s) Oral every 4 hours PRN  pantoprazole    Tablet 40 milliGRAM(s) Oral before breakfast  polyethylene glycol 3350 17 Gram(s) Oral daily  senna 2 Tablet(s) Oral at bedtime    atorvastatin 80 milliGRAM(s) Oral at bedtime        PAST MEDICAL/SURGICAL HISTORY  PAST MEDICAL & SURGICAL HISTORY:  HTN (hypertension)    PAD (peripheral artery disease)  stent to L lower extremity artery    Constipation, unspecified constipation type    Hyperlipidemia    NSTEMI (non-ST elevated myocardial infarction)   and 2018 and 2019    Coronary artery disease involving native coronary artery of native heart, unspecified whether angina present    Ischemic cardiomyopathy with implantable cardioverter-defibrillator (ICD)    S/P CABG (coronary artery bypass graft)  x3; [facility]     History of coronary angiogram  multiple stents placed    History of femoral angiogram  stent placed in E 2016    History of laparoscopic cholecystectomy  2016        SOCIAL HISTORY: Substance Use (street drugs): ( x ) never used  (  ) other:    FAMILY HISTORY:  Family history of coronary artery disease in brother (Sibling)  4 brothers with MI/CABG, 1  at 79yo    Family history of stroke  58yo CVA, heart attack 63yo    Family history of coronary artery disease in sister (Sibling)   from MI    Family history of MI (myocardial infarction)  mother,             PHYSICAL EXAM:  T(C): 36.6 (10-28-21 @ 06:00), Max: 37 (10-27-21 @ 18:00)  HR: 87 (10-28-21 @ 06:00) (57 - 87)  BP: 105/64 (10-28-21 @ 06:00) (105/64 - 119/71)  RR: 18 (10-28-21 @ 06:00) (16 - 18)  SpO2: 98% (10-28-21 @ 06:00) (97% - 99%)  Wt(kg): --  I&O's Summary          GENERAL: NAD  EYES: EOMI, PERRLA, conjunctiva and sclera clear  ENMT: No tonsillar erythema, exudates, or enlargement  Cardiovascular: Normal S1 S2, No JVD, No murmurs, No edema  Respiratory: Lungs clear to auscultation	  Gastrointestinal:  Soft, Non-tender, + BS	  Extremities: No edema                                      14.2   6.43  )-----------( 146      ( 28 Oct 2021 07:55 )             42.1     10-28    140  |  103  |  20  ----------------------------<  102<H>  4.0   |  25  |  1.22    Ca    9.9      28 Oct 2021 07:55  Phos  4.2     10-28  Mg     2.20     10-28    TPro  7.1  /  Alb  4.1  /  TBili  1.1  /  DBili  x   /  AST  71<H>  /  ALT  75<H>  /  AlkPhos  133<H>  10-27    proBNP:   Lipid Profile:   HgA1c:   TSH:     Consultant(s) Notes Reviewed:  [x ] YES  [ ] NO    Care Discussed with Consultants/Other Providers [ x] YES  [ ] NO    Imaging Personally Reviewed independently:  [x] YES  [ ] NO    All labs, radiologic studies, vitals, orders and medications list reviewed. Patient is seen and examined at bedside. Case discussed with medical team.              
  Ulisses Abarca MD  Interventional Cardiology / Endovascular Specialist  [place] Office : [address] [place] [address] [place] [address]  Tel:   [place] Office : [address] [facility] [place] [address]  Tel: [phone]  Cell : [phone]    Subjective/Overnight events: Patient lying in bed comfortably. No acute distress. Denies chest pain, SOB or palpitations  	  MEDICATIONS:  aspirin enteric coated 81 milliGRAM(s) Oral daily  carvedilol 6.25 milliGRAM(s) Oral every 12 hours  enoxaparin Injectable 40 milliGRAM(s) SubCutaneous daily  furosemide   Injectable 40 milliGRAM(s) IV Push daily  isosorbide   mononitrate ER Tablet (IMDUR) 60 milliGRAM(s) Oral daily  lisinopril 20 milliGRAM(s) Oral daily  nitroglycerin     SubLingual 0.4 milliGRAM(s) SubLingual every 5 minutes PRN        acetaminophen     Tablet .. 650 milliGRAM(s) Oral every 6 hours PRN  cyclobenzaprine 5 milliGRAM(s) Oral three times a day PRN  gabapentin 300 milliGRAM(s) Oral daily  melatonin 3 milliGRAM(s) Oral at bedtime PRN  ondansetron Injectable 4 milliGRAM(s) IV Push every 8 hours PRN    aluminum hydroxide/magnesium hydroxide/simethicone Suspension 30 milliLiter(s) Oral every 4 hours PRN  pantoprazole    Tablet 40 milliGRAM(s) Oral before breakfast  polyethylene glycol 3350 17 Gram(s) Oral daily  senna 2 Tablet(s) Oral at bedtime    atorvastatin 80 milliGRAM(s) Oral at bedtime        PAST MEDICAL/SURGICAL HISTORY  PAST MEDICAL & SURGICAL HISTORY:  HTN (hypertension)    PAD (peripheral artery disease)  stent to L lower extremity artery    Constipation, unspecified constipation type    Hyperlipidemia    NSTEMI (non-ST elevated myocardial infarction)   and 2018 and 2019    Coronary artery disease involving native coronary artery of native heart, unspecified whether angina present    Ischemic cardiomyopathy with implantable cardioverter-defibrillator (ICD)    S/P CABG (coronary artery bypass graft)  x3; [facility]     History of coronary angiogram  multiple stents placed    History of femoral angiogram  stent placed in E 2016    History of laparoscopic cholecystectomy  2016        SOCIAL HISTORY: Substance Use (street drugs): ( x ) never used  (  ) other:    FAMILY HISTORY:  Family history of coronary artery disease in brother (Sibling)  4 brothers with MI/CABG, 1  at 77yo    Family history of stroke  60yo CVA, heart attack 61yo    Family history of coronary artery disease in sister (Sibling)   from MI    Family history of MI (myocardial infarction)  mother,         REVIEW OF SYSTEMS:  CONSTITUTIONAL: No fever, weight loss, or fatigue  EYES: No eye pain, visual disturbances, or discharge  ENMT:  No difficulty hearing, tinnitus, vertigo; No sinus or throat pain  BREASTS: No pain, masses, or nipple discharge  GASTROINTESTINAL: No abdominal or epigastric pain. No nausea, vomiting, or hematemesis; No diarrhea or constipation. No melena or hematochezia.  GENITOURINARY: No dysuria, frequency, hematuria, or incontinence  NEUROLOGICAL: No headaches, memory loss, loss of strength, numbness, or tremors  ENDOCRINE: No heat or cold intolerance; No hair loss  MUSCULOSKELETAL: No joint pain or swelling; No muscle, back, or extremity pain  PSYCHIATRIC: No depression, anxiety, mood swings, or difficulty sleeping  HEME/LYMPH: No easy bruising, or bleeding gums  All others negative    PHYSICAL EXAM:  T(C): 36.4 (10-26-21 @ 08:20), Max: 36.8 (10-25-21 @ 17:25)  HR: 74 (10-26-21 @ 08:20) (68 - 88)  BP: 118/72 (10-26-21 @ 08:20) (101/59 - 120/70)  RR: 18 (10-26-21 @ 08:20) (17 - 18)  SpO2: 97% (10-26-21 @ 08:20) (96% - 99%)  Wt(kg): --  I&O's Summary      GENERAL: NAD  EYES: EOMI, PERRLA, conjunctiva and sclera clear  ENMT: No tonsillar erythema, exudates, or enlargement  Cardiovascular: Normal S1 S2, No JVD, No murmurs, No edema  Respiratory: Lungs clear to auscultation	  Gastrointestinal:  Soft, Non-tender, + BS	  Extremities: No edema                                12.8   4.45  )-----------( 125      ( 26 Oct 2021 05:50 )             37.8     10-26    137  |  102  |  19  ----------------------------<  96  4.0   |  24  |  1.09    Ca    9.6      26 Oct 2021 05:50  Phos  3.9     10-26  Mg     2.20     10-26    TPro  6.8  /  Alb  3.9  /  TBili  0.9  /  DBili  x   /  AST  46<H>  /  ALT  45<H>  /  AlkPhos  120  10-26    proBNP:   Lipid Profile:   HgA1c:   TSH:     Consultant(s) Notes Reviewed:  [x ] YES  [ ] NO    Care Discussed with Consultants/Other Providers [ x] YES  [ ] NO    Imaging Personally Reviewed independently:  [x] YES  [ ] NO    All labs, radiologic studies, vitals, orders and medications list reviewed. Patient is seen and examined at bedside. Case discussed with medical team.              
Date of Service  : 10-28-21     INTERVAL HPI/OVERNIGHT EVENTS: No new concerns.   Vital Signs Last 24 Hrs  T(C): 36.6 (28 Oct 2021 06:00), Max: 37 (27 Oct 2021 18:00)  T(F): 97.8 (28 Oct 2021 06:00), Max: 98.6 (27 Oct 2021 18:00)  HR: 87 (28 Oct 2021 06:00) (57 - 87)  BP: 105/64 (28 Oct 2021 06:00) (105/64 - 119/71)  BP(mean): 77 (28 Oct 2021 06:00) (77 - 84)  RR: 18 (28 Oct 2021 06:00) (16 - 18)  SpO2: 98% (28 Oct 2021 06:00) (97% - 99%)  I&O's Summary    MEDICATIONS  (STANDING):  aspirin enteric coated 81 milliGRAM(s) Oral daily  atorvastatin 80 milliGRAM(s) Oral at bedtime  carvedilol 6.25 milliGRAM(s) Oral every 12 hours  enoxaparin Injectable 40 milliGRAM(s) SubCutaneous daily  furosemide    Tablet 40 milliGRAM(s) Oral daily  gabapentin 300 milliGRAM(s) Oral daily  isosorbide   mononitrate ER Tablet (IMDUR) 60 milliGRAM(s) Oral daily  lisinopril 20 milliGRAM(s) Oral daily  pantoprazole    Tablet 40 milliGRAM(s) Oral before breakfast  polyethylene glycol 3350 17 Gram(s) Oral daily  senna 2 Tablet(s) Oral at bedtime    MEDICATIONS  (PRN):  acetaminophen     Tablet .. 650 milliGRAM(s) Oral every 6 hours PRN Temp greater or equal to 38C (100.4F), Mild Pain (1 - 3)  aluminum hydroxide/magnesium hydroxide/simethicone Suspension 30 milliLiter(s) Oral every 4 hours PRN Dyspepsia  benzonatate 200 milliGRAM(s) Oral three times a day PRN Cough  cyclobenzaprine 5 milliGRAM(s) Oral three times a day PRN Muscle Spasm  melatonin 3 milliGRAM(s) Oral at bedtime PRN Insomnia  nitroglycerin     SubLingual 0.4 milliGRAM(s) SubLingual every 5 minutes PRN Chest Pain  ondansetron Injectable 4 milliGRAM(s) IV Push every 8 hours PRN Nausea and/or Vomiting    LABS:                        14.2   6.43  )-----------( 146      ( 28 Oct 2021 07:55 )             42.1     10-28    140  |  103  |  20  ----------------------------<  102<H>  4.0   |  25  |  1.22    Ca    9.9      28 Oct 2021 07:55  Phos  4.2     10-28  Mg     2.20     10-28    TPro  7.1  /  Alb  4.1  /  TBili  1.1  /  DBili  x   /  AST  71<H>  /  ALT  75<H>  /  AlkPhos  133<H>  10-27        CAPILLARY BLOOD GLUCOSE              REVIEW OF SYSTEMS:  CONSTITUTIONAL: No fever, weight loss, or fatigue  EYES: No eye pain, visual disturbances, or discharge  ENMT:  No difficulty hearing, tinnitus, vertigo; No sinus or throat pain  NECK: No pain or stiffness  RESPIRATORY: No cough, wheezing, chills or hemoptysis; No shortness of breath  CARDIOVASCULAR: No chest pain, palpitations, dizziness, or leg swelling  GASTROINTESTINAL: No abdominal or epigastric pain. No nausea, vomiting, or hematemesis; No diarrhea or constipation. No melena or hematochezia.  GENITOURINARY: No dysuria, frequency, hematuria, or incontinence  NEUROLOGICAL: No headaches, memory loss, loss of strength, numbness, or tremors  SKIN: No itching, burning, rashes, or lesions   ENDOCRINE: No heat or cold intolerance; No hair loss  MUSCULOSKELETAL: No joint pain or swelling; No muscle, back, or extremity pain  PSYCHIATRIC: No depression, anxiety, mood swings, or difficulty sleeping  HEME/LYMPH: No easy bruising, or bleeding gums  ALLERY AND IMMUNOLOGIC: No hives or eczema    RADIOLOGY & ADDITIONAL TESTS:    Consultant(s) Notes Reviewed:  [x ] YES  [ ] NO    PHYSICAL EXAM:  GENERAL: NAD, well-groomed, well-developed,not in any distress ,  HEAD:  Atraumatic, Normocephalic  EYES: EOMI, PERRLA, conjunctiva and sclera clear  ENMT: No tonsillar erythema, exudates, or enlargement; Moist mucous membranes, Good dentition, No lesions  NECK: Supple, No JVD, Normal thyroid  NERVOUS SYSTEM:  Alert & Oriented X3, No focal deficit   CHEST/LUNG: Good air entry bilateral with no  rales, rhonchi, wheezing, or rubs  HEART: Regular rate and rhythm; No murmurs, rubs, or gallops  ABDOMEN: Soft, Nontender, Nondistended; Bowel sounds present  EXTREMITIES:  2+ Peripheral Pulses, No clubbing, cyanosis, or edema  SKIN: No rashes or lesions    Care Discussed with Consultants/Other Providers [ x] YES  [ ] NO
  Ulisses Abarca MD  Interventional Cardiology / Endovascular Specialist  [place] Office : [address] [place] [address] [place] [address]  Tel:   [place] Office : [address] [facility] [place] [address]  Tel: [phone]  Cell : [phone]    Subjective/Overnight events: Patient lying in bed comfortably. No acute distress. Denies chest pain, SOB or palpitations  	  MEDICATIONS:  aspirin enteric coated 81 milliGRAM(s) Oral daily  carvedilol 6.25 milliGRAM(s) Oral every 12 hours  enoxaparin Injectable 40 milliGRAM(s) SubCutaneous daily  furosemide   Injectable 40 milliGRAM(s) IV Push daily  isosorbide   mononitrate ER Tablet (IMDUR) 60 milliGRAM(s) Oral daily  lisinopril 20 milliGRAM(s) Oral daily  nitroglycerin     SubLingual 0.4 milliGRAM(s) SubLingual every 5 minutes PRN        acetaminophen     Tablet .. 650 milliGRAM(s) Oral every 6 hours PRN  cyclobenzaprine 5 milliGRAM(s) Oral three times a day PRN  gabapentin 300 milliGRAM(s) Oral daily  melatonin 3 milliGRAM(s) Oral at bedtime PRN  ondansetron Injectable 4 milliGRAM(s) IV Push every 8 hours PRN    aluminum hydroxide/magnesium hydroxide/simethicone Suspension 30 milliLiter(s) Oral every 4 hours PRN  pantoprazole    Tablet 40 milliGRAM(s) Oral before breakfast  polyethylene glycol 3350 17 Gram(s) Oral daily  senna 2 Tablet(s) Oral at bedtime    atorvastatin 80 milliGRAM(s) Oral at bedtime        PAST MEDICAL/SURGICAL HISTORY  PAST MEDICAL & SURGICAL HISTORY:  HTN (hypertension)    PAD (peripheral artery disease)  stent to L lower extremity artery    Constipation, unspecified constipation type    Hyperlipidemia    NSTEMI (non-ST elevated myocardial infarction)   and 2018 and 2019    Coronary artery disease involving native coronary artery of native heart, unspecified whether angina present    Ischemic cardiomyopathy with implantable cardioverter-defibrillator (ICD)    S/P CABG (coronary artery bypass graft)  x3; [facility]     History of coronary angiogram  multiple stents placed    History of femoral angiogram  stent placed in E 2016    History of laparoscopic cholecystectomy  2016        SOCIAL HISTORY: Substance Use (street drugs): ( x ) never used  (  ) other:    FAMILY HISTORY:  Family history of coronary artery disease in brother (Sibling)  4 brothers with MI/CABG, 1  at 79yo    Family history of stroke  58yo CVA, heart attack 63yo    Family history of coronary artery disease in sister (Sibling)   from MI    Family history of MI (myocardial infarction)  mother,           PHYSICAL EXAM:  T(C): 36.8 (10-25-21 @ 11:54), Max: 36.8 (10-24-21 @ 18:00)  HR: 70 (10-25-21 @ 11:54) (70 - 91)  BP: 125/67 (10-25-21 @ 11:54) (125/67 - 131/85)  RR: 18 (10-25-21 @ 11:54) (18 - 19)  SpO2: 100% (10-25-21 @ 11:54) (97% - 100%)  Wt(kg): --  I&O's Summary      GENERAL: NAD  EYES: EOMI, PERRLA, conjunctiva and sclera clear  ENMT: No tonsillar erythema, exudates, or enlargement  Cardiovascular: Normal S1 S2, No JVD, No murmurs, No edema  Respiratory: Lungs clear to auscultation	  Gastrointestinal:  Soft, Non-tender, + BS	  Extremities: No edema                                  13.4   4.79  )-----------( 129      ( 25 Oct 2021 07:35 )             40.1     10-    139  |  102  |  17  ----------------------------<  113<H>  3.2<L>   |  22  |  0.99    Ca    9.4      25 Oct 2021 07:35  Phos  4.2     10-  Mg     2.10     10-    TPro  7.9  /  Alb  4.3  /  TBili  0.8  /  DBili  x   /  AST  30  /  ALT  30  /  AlkPhos  137<H>  10-25    proBNP:   Lipid Profile:   HgA1c:   TSH:     Consultant(s) Notes Reviewed:  [x ] YES  [ ] NO    Care Discussed with Consultants/Other Providers [ x] YES  [ ] NO    Imaging Personally Reviewed independently:  [x] YES  [ ] NO    All labs, radiologic studies, vitals, orders and medications list reviewed. Patient is seen and examined at bedside. Case discussed with medical team.              
Date of Service  : 10-26-21    INTERVAL HPI/OVERNIGHT EVENTS: I feel fine.   Vital Signs Last 24 Hrs  T(C): 36.6 (26 Oct 2021 15:13), Max: 36.6 (26 Oct 2021 01:57)  T(F): 97.9 (26 Oct 2021 15:13), Max: 97.9 (26 Oct 2021 15:13)  HR: 80 (26 Oct 2021 15:13) (68 - 80)  BP: 100/55 (26 Oct 2021 15:13) (100/55 - 118/72)  BP(mean): 81 (26 Oct 2021 05:16) (81 - 81)  RR: 18 (26 Oct 2021 15:13) (18 - 18)  SpO2: 96% (26 Oct 2021 15:13) (96% - 98%)  I&O's Summary    MEDICATIONS  (STANDING):  aspirin enteric coated 81 milliGRAM(s) Oral daily  atorvastatin 80 milliGRAM(s) Oral at bedtime  carvedilol 6.25 milliGRAM(s) Oral every 12 hours  enoxaparin Injectable 40 milliGRAM(s) SubCutaneous daily  furosemide   Injectable 40 milliGRAM(s) IV Push daily  gabapentin 300 milliGRAM(s) Oral daily  isosorbide   mononitrate ER Tablet (IMDUR) 60 milliGRAM(s) Oral daily  lisinopril 20 milliGRAM(s) Oral daily  pantoprazole    Tablet 40 milliGRAM(s) Oral before breakfast  polyethylene glycol 3350 17 Gram(s) Oral daily  senna 2 Tablet(s) Oral at bedtime    MEDICATIONS  (PRN):  acetaminophen     Tablet .. 650 milliGRAM(s) Oral every 6 hours PRN Temp greater or equal to 38C (100.4F), Mild Pain (1 - 3)  aluminum hydroxide/magnesium hydroxide/simethicone Suspension 30 milliLiter(s) Oral every 4 hours PRN Dyspepsia  cyclobenzaprine 5 milliGRAM(s) Oral three times a day PRN Muscle Spasm  melatonin 3 milliGRAM(s) Oral at bedtime PRN Insomnia  nitroglycerin     SubLingual 0.4 milliGRAM(s) SubLingual every 5 minutes PRN Chest Pain  ondansetron Injectable 4 milliGRAM(s) IV Push every 8 hours PRN Nausea and/or Vomiting    LABS:                        12.8   4.45  )-----------( 125      ( 26 Oct 2021 05:50 )             37.8     10-26    137  |  102  |  19  ----------------------------<  96  4.0   |  24  |  1.09    Ca    9.6      26 Oct 2021 05:50  Phos  3.9     10-26  Mg     2.20     10-26    TPro  6.8  /  Alb  3.9  /  TBili  0.9  /  DBili  x   /  AST  46<H>  /  ALT  45<H>  /  AlkPhos  120  10-26        CAPILLARY BLOOD GLUCOSE              REVIEW OF SYSTEMS:  CONSTITUTIONAL: No fever, weight loss, or fatigue  EYES: No eye pain, visual disturbances, or discharge  ENMT:  No difficulty hearing, tinnitus, vertigo; No sinus or throat pain  NECK: No pain or stiffness  RESPIRATORY: No cough, wheezing, chills or hemoptysis; No shortness of breath  CARDIOVASCULAR: No chest pain, palpitations, dizziness, or leg swelling  GASTROINTESTINAL: No abdominal or epigastric pain. No nausea, vomiting, or hematemesis; No diarrhea or constipation. No melena or hematochezia.  GENITOURINARY: No dysuria, frequency, hematuria, or incontinence  NEUROLOGICAL: No headaches, memory loss, loss of strength, numbness, or tremors      Consultant(s) Notes Reviewed:  [x ] YES  [ ] NO    PHYSICAL EXAM:  GENERAL: NAD, well-groomed, well-developed,not in any distress ,  HEAD:  Atraumatic, Normocephalic  EYES: EOMI, PERRLA, conjunctiva and sclera clear  ENMT: No tonsillar erythema, exudates, or enlargement; Moist mucous membranes, Good dentition, No lesions  NECK: Supple, No JVD, Normal thyroid  NERVOUS SYSTEM:  Alert & Oriented X3, No focal deficit   CHEST/LUNG: Good air entry bilateral with no  rales, rhonchi, wheezing, or rubs  HEART: Regular rate and rhythm; No murmurs, rubs, or gallops  ABDOMEN: Soft, Nontender, Nondistended; Bowel sounds present  EXTREMITIES:  2+ Peripheral Pulses, No clubbing, cyanosis, or edema  SKIN: No rashes or lesions    Care Discussed with Consultants/Other Providers [ x] YES  [ ] NO
Date of Service  : 10-27-21     INTERVAL HPI/OVERNIGHT EVENTS: No new concerns.   Vital Signs Last 24 Hrs  T(C): 36.6 (27 Oct 2021 10:00), Max: 36.8 (26 Oct 2021 21:00)  T(F): 97.9 (27 Oct 2021 10:00), Max: 98.3 (26 Oct 2021 21:00)  HR: 75 (27 Oct 2021 12:30) (75 - 78)  BP: 115/59 (27 Oct 2021 12:30) (102/53 - 120/69)  BP(mean): 70 (27 Oct 2021 12:30) (67 - 78)  RR: 20 (27 Oct 2021 10:00) (18 - 20)  SpO2: 97% (27 Oct 2021 10:00) (97% - 99%)  I&O's Summary    MEDICATIONS  (STANDING):  aspirin enteric coated 81 milliGRAM(s) Oral daily  atorvastatin 80 milliGRAM(s) Oral at bedtime  carvedilol 6.25 milliGRAM(s) Oral every 12 hours  enoxaparin Injectable 40 milliGRAM(s) SubCutaneous daily  furosemide    Tablet 40 milliGRAM(s) Oral daily  gabapentin 300 milliGRAM(s) Oral daily  isosorbide   mononitrate ER Tablet (IMDUR) 60 milliGRAM(s) Oral daily  lisinopril 20 milliGRAM(s) Oral daily  pantoprazole    Tablet 40 milliGRAM(s) Oral before breakfast  polyethylene glycol 3350 17 Gram(s) Oral daily  senna 2 Tablet(s) Oral at bedtime    MEDICATIONS  (PRN):  acetaminophen     Tablet .. 650 milliGRAM(s) Oral every 6 hours PRN Temp greater or equal to 38C (100.4F), Mild Pain (1 - 3)  aluminum hydroxide/magnesium hydroxide/simethicone Suspension 30 milliLiter(s) Oral every 4 hours PRN Dyspepsia  benzonatate 200 milliGRAM(s) Oral three times a day PRN Cough  cyclobenzaprine 5 milliGRAM(s) Oral three times a day PRN Muscle Spasm  melatonin 3 milliGRAM(s) Oral at bedtime PRN Insomnia  nitroglycerin     SubLingual 0.4 milliGRAM(s) SubLingual every 5 minutes PRN Chest Pain  ondansetron Injectable 4 milliGRAM(s) IV Push every 8 hours PRN Nausea and/or Vomiting    LABS:                        13.5   5.21  )-----------( 133      ( 27 Oct 2021 06:48 )             40.9     10-27    138  |  103  |  16  ----------------------------<  100<H>  3.9   |  24  |  1.04    Ca    9.9      27 Oct 2021 06:48  Phos  3.5     10-27  Mg     2.20     10-27    TPro  7.1  /  Alb  4.1  /  TBili  1.1  /  DBili  x   /  AST  71<H>  /  ALT  75<H>  /  AlkPhos  133<H>  10-27        CAPILLARY BLOOD GLUCOSE              REVIEW OF SYSTEMS:  CONSTITUTIONAL: No fever, weight loss, or fatigue  EYES: No eye pain, visual disturbances, or discharge  ENMT:  No difficulty hearing, tinnitus, vertigo; No sinus or throat pain  NECK: No pain or stiffness  RESPIRATORY: No cough, wheezing, chills or hemoptysis; No shortness of breath  CARDIOVASCULAR: No chest pain, palpitations, dizziness, or leg swelling  GASTROINTESTINAL: No abdominal or epigastric pain. No nausea, vomiting, or hematemesis; No diarrhea or constipation. No melena or hematochezia.  GENITOURINARY: No dysuria, frequency, hematuria, or incontinence  NEUROLOGICAL: No headaches, memory loss, loss of strength, numbness, or tremors      Consultant(s) Notes Reviewed:  [x ] YES  [ ] NO    PHYSICAL EXAM:  GENERAL: NAD, well-groomed, well-developed, not in any distress ,  HEAD:  Atraumatic, Normocephalic  EYES: EOMI, PERRLA, conjunctiva and sclera clear  ENMT: No tonsillar erythema, exudates, or enlargement; Moist mucous membranes, Good dentition, No lesions  NECK: Supple, No JVD, Normal thyroid  NERVOUS SYSTEM:  Alert & Oriented X3, No focal deficit   CHEST/LUNG: Good air entry bilateral with no  rales, rhonchi, wheezing, or rubs  HEART: Regular rate and rhythm; No murmurs, rubs, or gallops  ABDOMEN: Soft, Nontender, Nondistended; Bowel sounds present  EXTREMITIES:  2+ Peripheral Pulses, No clubbing, cyanosis, or edema    Care Discussed with Consultants/Other Providers [ x] YES  [ ] NO
Ulisses Abarca MD  Interventional Cardiology / Advance Heart Failure and Cardiac Transplant Specialist  [place] Office : [phone] [place] [address] [place]. [address]  Tel:   [place] Office : [phone] [facility] [place] [address]  Tel: [phone]  Cell : [phone]    Subjective/Overnight events: Pt is lying in bed   	  MEDICATIONS:  aspirin enteric coated 81 milliGRAM(s) Oral daily  carvedilol 6.25 milliGRAM(s) Oral every 12 hours  enoxaparin Injectable 40 milliGRAM(s) SubCutaneous daily  furosemide   Injectable 40 milliGRAM(s) IV Push daily  isosorbide   mononitrate ER Tablet (IMDUR) 60 milliGRAM(s) Oral daily  lisinopril 20 milliGRAM(s) Oral daily  nitroglycerin     SubLingual 0.4 milliGRAM(s) SubLingual every 5 minutes PRN        acetaminophen     Tablet .. 650 milliGRAM(s) Oral every 6 hours PRN  cyclobenzaprine 5 milliGRAM(s) Oral three times a day PRN  gabapentin 300 milliGRAM(s) Oral daily  melatonin 3 milliGRAM(s) Oral at bedtime PRN  ondansetron Injectable 4 milliGRAM(s) IV Push every 8 hours PRN    aluminum hydroxide/magnesium hydroxide/simethicone Suspension 30 milliLiter(s) Oral every 4 hours PRN  pantoprazole    Tablet 40 milliGRAM(s) Oral before breakfast  polyethylene glycol 3350 17 Gram(s) Oral daily  senna 2 Tablet(s) Oral at bedtime    atorvastatin 80 milliGRAM(s) Oral at bedtime        PAST MEDICAL/SURGICAL HISTORY  PAST MEDICAL & SURGICAL HISTORY:  HTN (hypertension)    PAD (peripheral artery disease)  stent to L lower extremity artery    Constipation, unspecified constipation type    Hyperlipidemia    NSTEMI (non-ST elevated myocardial infarction)   and 2018 and 2019    Coronary artery disease involving native coronary artery of native heart, unspecified whether angina present    Ischemic cardiomyopathy with implantable cardioverter-defibrillator (ICD)    S/P CABG (coronary artery bypass graft)  x3; [facility]     History of coronary angiogram  multiple stents placed    History of femoral angiogram  stent placed in E 2016    History of laparoscopic cholecystectomy  2016        SOCIAL HISTORY: Substance Use (street drugs): ( x ) never used  (  ) other:    FAMILY HISTORY:  Family history of coronary artery disease in brother (Sibling)  4 brothers with MI/CABG, 1  at 79yo    Family history of stroke  60yo CVA, heart attack 59yo    Family history of coronary artery disease in sister (Sibling)   from MI    Family history of MI (myocardial infarction)  mother,           PHYSICAL EXAM:  T(C): 36.4 (10-24-21 @ 12:17), Max: 36.9 (10-23-21 @ 22:00)  HR: 80 (10-24-21 @ 12:17) (61 - 89)  BP: 119/79 (10-24-21 @ 12:17) (101/65 - 172/98)  RR: 18 (10-24-21 @ 12:17) (18 - 18)  SpO2: 98% (10-24-21 @ 12:17) (98% - 99%)  Wt(kg): --  I&O's Summary        GENERAL: NAD  EYES: EOMI, PERRLA, conjunctiva and sclera clear  ENMT: No tonsillar erythema, exudates, or enlargement  Cardiovascular: Normal S1 S2, No JVD, No murmurs, No edema  Respiratory: Lungs clear to auscultation	  Gastrointestinal:  Soft, Non-tender, + BS	  Extremities: No edema                              13.0   5.12  )-----------( 125      ( 24 Oct 2021 07:32 )             39.6     10-24    139  |  105  |  19  ----------------------------<  125<H>  3.8   |  24  |  1.27    Ca    9.2      24 Oct 2021 07:30  Mg     2.10     10-23    TPro  7.6  /  Alb  4.1  /  TBili  1.0  /  DBili  x   /  AST  24  /  ALT  24  /  AlkPhos  136<H>  10-    proBNP:   Lipid Profile:   HgA1c:   TSH:     Consultant(s) Notes Reviewed:  [x ] YES  [ ] NO    Care Discussed with Consultants/Other Providers [ x] YES  [ ] NO    Imaging Personally Reviewed independently:  [x] YES  [ ] NO    All labs, radiologic studies, vitals, orders and medications list reviewed. Patient is seen and examined at bedside. Case discussed with medical team.

## 2021-10-28 NOTE — PROGRESS NOTE ADULT - REASON FOR ADMISSION
chest pain/SOB

## 2021-10-28 NOTE — PROGRESS NOTE ADULT - ASSESSMENT
71M office patient PMH CAD s/p stents, ICM, HTN HLD presents with complaints of chest pressure and SOB.     EKG: NSR no acute changes    1. Chest pressure/SOB  -pressure is midsternal and constant  -EKG with no ischemic changes  -c/w IV lasix daily  -has hx of ICM, will get echo  -s/p cardiac angiogram performed 7/2021 which showed  patent stent to common left common illiac. SVG to OM is closed at ostium looks like chronically closed. SVG to RCA known to be closed. LIMA TO LAD supplied RCA territory via collaterals  -if cardiac w/u negative will consider GI c/s as patient also with c/o of abd pain and bloating    2. ICM  -will get echo  -s/p ICD  -c/w lisinopril and coreg      3. HTN  -improved  -c/w coreg, lisinopril and imdur  -continue to monitor BP
71 male with history of CAD s/p stents and CABG, ICM, HTN, HLD presents with complaints of chest pressure and SOB for one week. Patient states he has been experiencing midsternal chest tightness constantly for one week. He reports he feels it at rest.  Does not worsen with exertion. He reports orthopnea.  Relieving factors include NTG, with some improvement when taken at home.   On ROS, endorse, generalized abdominal discomfort  and bloating at times. Otherwise,  he  denies fever, chills, headache, syncope, lightheadedness, dizziness. N/V/D.  Patient was last hospitalized July 2021, he had a  cardiac angiogram performed 7/2021 which showed  patent stent to common left common illiac. SVG to OM is closed at ostium looks like chronically closed. SVG to RCA known to be closed. LIMA TO LAD supplied RCA territory via collaterals. Patient found to have elevated BNP was given lasix 40mg IV  and admitted to medicine for further management.   I feel better .      Problem/Recommendation - 1:  ·  Problem: Chest pain.   ·  Recommendation: with SOB and H/O CAD . Now Symptom free .   Cards helping .  < from: Transthoracic Echocardiogram (10.26.21 @ 12:16) >  CONCLUSIONS:  1. Mitral annular calcification, otherwise normal mitral  valve. Mild-moderatemitral regurgitation.  2. Calcified trileaflet aortic valve with normal opening.  Mild aortic regurgitation.  3. Mild left ventricular enlargement.  4. Severe global left ventricular systolic dysfunction.  5. Mild diastolic dysfunction (Stage I).  6.Normal right ventricular size and function.   A device  wire is noted in the right heart.    < end of copied text >     Problem/Recommendation - 2:  ·  Problem: Hypokalemia.   ·  Recommendation: Replacing.     Problem/Recommendation - 3:  ·  Problem: Cardiomyopathy with low EF .   ·  Recommendation: TTE noted.      Problem/Recommendation - 4:  ·  Problem: HTN (hypertension).   ·  Recommendation: BP meds with hold parameters.     Problem/Recommendation - 5:  ·  Problem: Thrombocytopenia.   ·  Recommendation: trending CBC.    
71M office patient PMH CAD s/p stents, ICM, HTN HLD presents with complaints of chest pressure and SOB.     EKG: NSR no acute changes    1. Chest pressure/SOB  -pressure is midsternal and constant  -EKG with no ischemic changes  -c/w IV lasix daily  -has hx of ICM, will get echo  -s/p cardiac angiogram performed 7/2021 which showed  patent stent to common left common illiac. SVG to OM is closed at ostium looks like chronically closed. SVG to RCA known to be closed. LIMA TO LAD supplied RCA territory via collaterals  -if cardiac w/u negative will consider GI c/s as patient also with c/o of abd pain and bloating    2. ICM  -will get echo  -s/p ICD  -c/w lisinopril and coreg    3. HTN  -improved  -c/w coreg, lisinopril and imdur  -continue to monitor BP  
71M office patient PMH CAD s/p stents, ICM, HTN HLD presents with complaints of chest pressure and SOB.     EKG: NSR no acute changes    1. Chest pressure/SOB  -pressure is midsternal and constant  -EKG with no ischemic changes  -switch to lasix 40 po daily   -has hx of ICM, echo unchanged with severe LV dysfunction  -s/p cardiac angiogram performed 7/2021 which showed  patent stent to common left common illiac. SVG to OM is closed at ostium looks like chronically closed. SVG to RCA known to be closed. LIMA TO LAD supplied RCA territory via collaterals  -  2. ICM  -s/p ICD. interrogation with no events   -c/w lisinopril and coreg    3. HTN  -improved  -c/w coreg, lisinopril and imdur  -continue to monitor BP
71M office patient PMH CAD s/p stents, ICM, HTN HLD presents with complaints of chest pressure and SOB.     EKG: NSR no acute changes    1. Chest pressure/SOB  -pressure is midsternal and constant  -EKG with no ischemic changes  -switch to lasix 40 po daily from tomorrow   -has hx of ICM, will get echo  -s/p cardiac angiogram performed 7/2021 which showed  patent stent to common left common illiac. SVG to OM is closed at ostium looks like chronically closed. SVG to RCA known to be closed. LIMA TO LAD supplied RCA territory via collaterals  -if cardiac w/u negative will consider GI c/s as patient also with c/o of abd pain and bloating    2. ICM  -will get echo  -s/p ICD  -c/w lisinopril and coreg    3. HTN  -improved  -c/w coreg, lisinopril and imdur  -continue to monitor BP
71 male with history of CAD s/p stents and CABG, ICM, HTN, HLD presents with complaints of chest pressure and SOB for one week. Patient states he has been experiencing midsternal chest tightness constantly for one week. He reports he feels it at rest.  Does not worsen with exertion. He reports orthopnea.  Relieving factors include NTG, with some improvement when taken at home.   On ROS, endorse, generalized abdominal discomfort  and bloating at times. Otherwise,  he  denies fever, chills, headache, syncope, lightheadedness, dizziness. N/V/D.  Patient was last hospitalized July 2021, he had a  cardiac angiogram performed 7/2021 which showed  patent stent to common left common illiac. SVG to OM is closed at ostium looks like chronically closed. SVG to RCA known to be closed. LIMA TO LAD supplied RCA territory via collaterals. Patient found to have elevated BNP was given lasix 40mg IV  and admitted to medicine for further management.   I feel better .      Problem/Recommendation - 1:  ·  Problem: Chest pain.   ·  Recommendation: with SOB and H/O CAD . Now Symptom free .   Cards helping .  < from: Transthoracic Echocardiogram (10.26.21 @ 12:16) >  CONCLUSIONS:  1. Mitral annular calcification, otherwise normal mitral  valve. Mild-moderatemitral regurgitation.  2. Calcified trileaflet aortic valve with normal opening.  Mild aortic regurgitation.  3. Mild left ventricular enlargement.  4. Severe global left ventricular systolic dysfunction.  5. Mild diastolic dysfunction (Stage I).  6.Normal right ventricular size and function.   A device  wire is noted in the right heart.    < end of copied text >     Problem/Recommendation - 2:  ·  Problem: Hypokalemia.   ·  Recommendation: Replacing.     Problem/Recommendation - 3:  ·  Problem: Cardiomyopathy with low EF .   ·  Recommendation: TTE noted.      Problem/Recommendation - 4:  ·  Problem: HTN (hypertension).   ·  Recommendation: BP meds with hold parameters.     Problem/Recommendation - 5:  ·  Problem: Thrombocytopenia.   ·  Recommendation: trending CBC.  
71 male with history of CAD s/p stents and CABG, ICM, HTN, HLD presents with complaints of chest pressure and SOB for one week. Patient states he has been experiencing midsternal chest tightness constantly for one week. He reports he feels it at rest.  Does not worsen with exertion. He reports orthopnea.  Relieving factors include NTG, with some improvement when taken at home.   On ROS, endorse, generalized abdominal discomfort  and bloating at times. Otherwise,  he  denies fever, chills, headache, syncope, lightheadedness, dizziness. N/V/D.  Patient was last hospitalized July 2021, he had a  cardiac angiogram performed 7/2021 which showed  patent stent to common left common illiac. SVG to OM is closed at ostium looks like chronically closed. SVG to RCA known to be closed. LIMA TO LAD supplied RCA territory via collaterals. Patient found to have elevated BNP was given lasix 40mg IV  and admitted to medicine for further management.   I feel better .      Problem/Recommendation - 1:  ·  Problem: Chest pain.   ·  Recommendation: with SOB and H/O CAD . Now Symptom free .   Cards helping .  < from: Transthoracic Echocardiogram (10.26.21 @ 12:16) >  CONCLUSIONS:  1. Mitral annular calcification, otherwise normal mitral  valve. Mild-moderatemitral regurgitation.  2. Calcified trileaflet aortic valve with normal opening.  Mild aortic regurgitation.  3. Mild left ventricular enlargement.  4. Severe global left ventricular systolic dysfunction.  5. Mild diastolic dysfunction (Stage I).  6.Normal right ventricular size and function.   A device  wire is noted in the right heart.    < end of copied text >     Problem/Recommendation - 2:  ·  Problem: Hypokalemia.   ·  Recommendation: Replacing.     Problem/Recommendation - 3:  ·  Problem: Cardiomyopathy with low EF .   ·  Recommendation: TTE noted.      Problem/Recommendation - 4:  ·  Problem: HTN (hypertension).   ·  Recommendation: BP meds with hold parameters.     Problem/Recommendation - 5:  ·  Problem: Thrombocytopenia.   ·  Recommendation: trending CBC.      
71M office patient PMH CAD s/p stents, ICM, HTN HLD presents with complaints of chest pressure and SOB.     EKG: NSR no acute changes    1. Chest pressure/SOB  -pressure is midsternal and constant  -EKG with no ischemic changes  -switch to lasix 40 po daily   -has hx of ICM, echo unchanged with severe LV dysfunction  -s/p cardiac angiogram performed 7/2021 which showed  patent stent to common left common illiac. SVG to OM is closed at ostium looks like chronically closed. SVG to RCA known to be closed. LIMA TO LAD supplied RCA territory via collaterals  -  2. ICM  -will get echo  -s/p ICD. please obtain interrogation  -c/w lisinopril and coreg    3. HTN  -improved  -c/w coreg, lisinopril and imdur  -continue to monitor BP

## 2021-10-28 NOTE — PROGRESS NOTE ADULT - ATTENDING COMMENTS

## 2021-10-28 NOTE — DISCHARGE NOTE NURSING/CASE MANAGEMENT/SOCIAL WORK - PATIENT PORTAL LINK FT
You can access the FollowMyHealth Patient Portal offered by [facility] by registering at the following website: [email or web address]. By joining [employer]’s FollowMyHealth portal, you will also be able to view your health information using other applications (apps) compatible with our system.

## 2021-10-28 NOTE — PROGRESS NOTE ADULT - PROVIDER SPECIALTY LIST ADULT
Internal Medicine
Cardiology

## 2021-10-29 ENCOUNTER — APPOINTMENT (OUTPATIENT)
Dept: ELECTROPHYSIOLOGY | Facility: CLINIC | Age: 71
End: 2021-10-29
Payer: MEDICARE

## 2021-10-29 ENCOUNTER — NON-APPOINTMENT (OUTPATIENT)
Age: 71
End: 2021-10-29

## 2021-10-29 PROCEDURE — 93296 REM INTERROG EVL PM/IDS: CPT | Mod: NC

## 2021-10-29 PROCEDURE — 93295 DEV INTERROG REMOTE 1/2/MLT: CPT

## 2022-01-28 ENCOUNTER — NON-APPOINTMENT (OUTPATIENT)
Age: 72
End: 2022-01-28

## 2022-01-28 ENCOUNTER — APPOINTMENT (OUTPATIENT)
Dept: ELECTROPHYSIOLOGY | Facility: CLINIC | Age: 72
End: 2022-01-28
Payer: MEDICARE

## 2022-01-28 PROCEDURE — 93296 REM INTERROG EVL PM/IDS: CPT

## 2022-01-28 PROCEDURE — 93295 DEV INTERROG REMOTE 1/2/MLT: CPT

## 2022-04-15 NOTE — ED PROVIDER NOTE - NS ED ROS FT
Gen: + malaise. Negative for fevers, chills, weight loss, weight gain, fatigue  Eyes: no blurred vision or lacrimation  ENT: + vertigo. No tinnitus, or decreased hearing  Resp: no wheezing, dyspnea, pleuritic chest pain, hemoptysis, or orthopnea  CV: + chest tightness. No chest pain, dyspnea on exertion, or palpitations  GI: + nausea, vomiting. No abdominal pain, diarrhea, constipation, melena, or hematochezia  : no dysuria, hematuria, discharge, or incontinence  MSK: no arthralgias, joint swelling, or myalgias  Neuro: + dizziness. No focal deficits, confusion, weakness, tremors, or seizures  Skin: no rash, lesions, or edema home

## 2022-04-28 ENCOUNTER — APPOINTMENT (OUTPATIENT)
Dept: ELECTROPHYSIOLOGY | Facility: CLINIC | Age: 72
End: 2022-04-28

## 2022-04-29 ENCOUNTER — APPOINTMENT (OUTPATIENT)
Dept: ELECTROPHYSIOLOGY | Facility: CLINIC | Age: 72
End: 2022-04-29
Payer: MEDICARE

## 2022-04-29 ENCOUNTER — APPOINTMENT (OUTPATIENT)
Dept: ORTHOPEDIC SURGERY | Facility: CLINIC | Age: 72
End: 2022-04-29
Payer: MEDICARE

## 2022-04-29 ENCOUNTER — NON-APPOINTMENT (OUTPATIENT)
Age: 72
End: 2022-04-29

## 2022-04-29 DIAGNOSIS — M75.42 IMPINGEMENT SYNDROME OF LEFT SHOULDER: ICD-10-CM

## 2022-04-29 PROCEDURE — 93295 DEV INTERROG REMOTE 1/2/MLT: CPT

## 2022-04-29 PROCEDURE — 99204 OFFICE O/P NEW MOD 45 MIN: CPT | Mod: 25

## 2022-04-29 PROCEDURE — 93296 REM INTERROG EVL PM/IDS: CPT

## 2022-04-29 PROCEDURE — 20610 DRAIN/INJ JOINT/BURSA W/O US: CPT | Mod: LT

## 2022-04-29 NOTE — PROCEDURE
[de-identified] : Injection: Left  Shoulder Subacromial Space.\par Indication: Impingement.\par \par A discussion was had with the patient regarding this procedure and all questions were answered. All risks, benefits and alternatives were discussed. These included but were not limited to bleeding, infection, and allergic reaction.  A timeout was done to ensure correct side and patient agreed to the procedure.  A [place] amanda was created on the skin utilizing a plastic needle cap to amanda the anticipated point of entry. Alcohol was used to clean the skin, and Betadine was used to sterilize and prep the area in the posterior aspect of the shoulder. Ethyl chloride spray was then used as a topical anesthetic. A 22-gauge 1.5" needle was used to inject 2cc of 0.25% bupivacaine without epinephrine and 1cc of 40mg/ml methylprednisolone into the subacromial space. A sterile bandage was then applied. The patient tolerated the procedure well and there were no complications.\par \par

## 2022-04-29 NOTE — HISTORY OF PRESENT ILLNESS
[de-identified] : Patient is here for left shoulder pain.  Patient has been having 4-5 months of atraumatic left shoulder pain.  He does not recall any injury or trauma at the onset.  He states that is just been gradually getting worse.  He has pain with overhead activity, pain with reaching behind him, and pain when lying on his side at night.  The pain is described as dull and achy in nature, rated 6/10 at its worst.  He feels it into the upper arm, no symptoms beyond the elbow.  No numbness/tingling, no radiation of pain.  Patient was seen by his PCP who recommended orthopedic evaluation.  His PCP sent him for x-rays which the patient reports as normal, he does not bring any images or reports with him today.\par \par The patient's past medical history, past surgical history, medications and allergies were reviewed by me today and documented accordingly. In addition, the patient's family and social history, which were noncontributory to this visit, were reviewed also. Intake form was reviewed. The patient has no family history of arthritis.

## 2022-04-29 NOTE — DISCUSSION/SUMMARY
[de-identified] : Discussed findings of today's exam and possible causes of patient's pain.  Educated patient on their most probable diagnosis of chronic left shoulder pain with recent atraumatic exacerbation due to subacromial impingement.  Reviewed possible courses of treatment, and we collaboratively decided best course of treatment at this time will include conservative management.  We discussed various treatment options as well as associated risk/benefits/alternatives and patient elected to proceed with cortisone injection today (see procedure note).  Informed the patient that the numbing medicine in today's injection will last for about 4-6 hours. The steroid that was injected will start to work in 1 to 2 days, peak at 1-2 weeks, and may last up to 1-2 months.  Patient will be started on a course of physical therapy to restore normal range of motion and strength as tolerated.  Follow up as needed.  Patient and spouse appreciate and agree with current plan.\par \par I work as part of an academic orthopedic group and routinely have a physician in training (resident / fellow) working with me.  Any part of the history and physical exam performed by the physician in training was either directly reviewed and/or replicated by myself.  Any procedure performed by the physician in training was performed under my direct supervision and with the consent of the patient.\par \par This note was generated using dragon medical dictation software.  A reasonable effort has been made for proofreading its contents, but typos may still remain.  If there are any questions or points of clarification needed please notify my office.

## 2022-04-29 NOTE — PHYSICAL EXAM
[de-identified] : Constitutional: Well-nourished, well-developed, No acute distress\par Respiratory:  Good respiratory effort, no SOB\par Psychiatric: Pleasant and normal affect, alert and oriented x3\par Skin: Clean dry and intact B/L UE\par Musculoskeletal: normal except where as noted in regional exam\par \par \par Right Shoulder:\par APPEARANCE: no marked deformities, no swelling or malalignment\par POSITIVE TENDERNESS: none\par NONTENDER: supraspinatus, infraspinatus, teres minor, LH biceps, anterior and posterior capsule, AC joint\par ROM: full & painless, no scapular winging or dyskinesia present\par RESISTIVE TESTING: painless 5/5 resisted flex/ext, empty can/ER/IR, horizontal abd/add \par SPECIAL TESTS: neg Drop Arm, neg Empty Can, neg Ortega/Neers, neg Pritchett's, neg Speeds, neg Apprehension, neg cross arm adduction, neg apley's scratch test\par \par Left Shoulder:\par APPEARANCE: no marked deformities, no swelling or malalignment\par POSITIVE TENDERNESS: supraspinatus, long head biceps tendon\par NONTENDER:  infraspinatus, teres minor. biceps. anterior and posterior capsule. AC joint. \par ROM: full with mild painful arc past 60 degrees, no scapular winging or dyskinesia present\par RESISTIVE TESTING: MMT 4+/5 ER, Flexion and Empty can, 5/5 IR. painless 5/5 resisted ext, horizontal abd/add \par SPECIAL TESTS: + Ortega and Neers, mildly + cross arm adduction, + Speeds, neg Pritchett's, neg Drop Arm, neg Apprehension. neg apley's scratch test\par \par

## 2022-04-29 NOTE — CONSULT LETTER
[Dear  ___] : Dear  [unfilled], [Consult Letter:] : I had the pleasure of evaluating your patient, [unfilled]. [Please see my note below.] : Please see my note below. [Sincerely,] : Sincerely, [FreeTextEntry2] : PCP - Dr. Bell Kendall\par [phone] [place] [id number] [FreeTextEntry3] : Cory Medrano DO, ATC\par Primary Care Sports Medicine\par [facility]

## 2022-06-06 ENCOUNTER — NON-APPOINTMENT (OUTPATIENT)
Age: 72
End: 2022-06-06

## 2022-06-06 ENCOUNTER — APPOINTMENT (OUTPATIENT)
Dept: ELECTROPHYSIOLOGY | Facility: CLINIC | Age: 72
End: 2022-06-06
Payer: COMMERCIAL

## 2022-06-06 ENCOUNTER — APPOINTMENT (OUTPATIENT)
Dept: ELECTROPHYSIOLOGY | Facility: CLINIC | Age: 72
End: 2022-06-06

## 2022-06-06 VITALS
WEIGHT: 137 LBS | TEMPERATURE: 97 F | HEIGHT: 65 IN | BODY MASS INDEX: 22.82 KG/M2 | SYSTOLIC BLOOD PRESSURE: 115 MMHG | OXYGEN SATURATION: 99 % | DIASTOLIC BLOOD PRESSURE: 66 MMHG | RESPIRATION RATE: 16 BRPM | HEART RATE: 80 BPM

## 2022-06-06 DIAGNOSIS — E78.5 HYPERLIPIDEMIA, UNSPECIFIED: ICD-10-CM

## 2022-06-06 DIAGNOSIS — I50.20 UNSPECIFIED SYSTOLIC (CONGESTIVE) HEART FAILURE: ICD-10-CM

## 2022-06-06 DIAGNOSIS — Z95.810 PRESENCE OF AUTOMATIC (IMPLANTABLE) CARDIAC DEFIBRILLATOR: ICD-10-CM

## 2022-06-06 PROCEDURE — 99072 ADDL SUPL MATRL&STAF TM PHE: CPT

## 2022-06-06 PROCEDURE — 99215 OFFICE O/P EST HI 40 MIN: CPT

## 2022-06-06 PROCEDURE — XXXXX: CPT

## 2022-06-06 PROCEDURE — 93282 PRGRMG EVAL IMPLANTABLE DFB: CPT

## 2022-06-06 RX ORDER — LISINOPRIL 10 MG/1
10 TABLET ORAL DAILY
Qty: 90 | Refills: 1 | Status: DISCONTINUED | COMMUNITY
End: 2022-06-06

## 2022-06-06 RX ORDER — ISOSORBIDE MONONITRATE 60 MG/1
60 TABLET, EXTENDED RELEASE ORAL
Refills: 0 | Status: DISCONTINUED | COMMUNITY
End: 2022-06-06

## 2022-06-06 NOTE — DISCUSSION/SUMMARY
[FreeTextEntry1] : Impression:\par \par 1. ICM/chronic systolic CHF: s/p ICD placement. EKG performed today to assess for presence of pacing and reveals sinus rhythm. ICD checked in office and reveals normal ICD functioning. No VT or ICD shocks. Last ECHO with LVEF 25%. Resume routine device checks as scheduled. Resume OMT as prescribed, encouraged heart healthy diet, daily weight, and regular f/u with Cardiology/Heart failure team as scheduled.\par \par 2. HTN: resume oral antihypertensives as prescribed. Encouraged heart healthy diet, sodium restriction, and weight loss. Continue regular f/u with Cardiologist for further HTN management.\par \par 3. HLD: resume statin therapy as prescribed and regular f/u with Cardiologist for routine lipid monitoring and management.\par \par Will continue f/u with Cardiologist and may RTO as needed or if any new or worsening symptoms or findings occur.

## 2022-06-06 NOTE — HISTORY OF PRESENT ILLNESS
[FreeTextEntry1] : Dusty Rahman is a 72y/o man with Hx of HTN, HLD, CAD (s/p 3 stents and 3V-CABG - 2002) , PAD(s/p LLE stent), and chronic systolic CHF s/p ICD placement who presents today for routine f/u. Admits doing well with no issues or complaints. No recent in office ICD checks, will be transition caring to us today. Remote monitoring established with no evidence of VT. Denies chest pain, palpitations, SOB, syncope or near syncope.
Yes

## 2022-07-19 ENCOUNTER — APPOINTMENT (OUTPATIENT)
Dept: CARDIOLOGY | Facility: CLINIC | Age: 72
End: 2022-07-19

## 2022-07-19 ENCOUNTER — NON-APPOINTMENT (OUTPATIENT)
Age: 72
End: 2022-07-19

## 2022-07-19 VITALS
WEIGHT: 136 LBS | SYSTOLIC BLOOD PRESSURE: 158 MMHG | BODY MASS INDEX: 23.22 KG/M2 | HEIGHT: 64 IN | HEART RATE: 73 BPM | OXYGEN SATURATION: 99 % | DIASTOLIC BLOOD PRESSURE: 79 MMHG

## 2022-07-19 DIAGNOSIS — I10 ESSENTIAL (PRIMARY) HYPERTENSION: ICD-10-CM

## 2022-07-19 DIAGNOSIS — I73.9 PERIPHERAL VASCULAR DISEASE, UNSPECIFIED: ICD-10-CM

## 2022-07-19 PROCEDURE — 93000 ELECTROCARDIOGRAM COMPLETE: CPT

## 2022-07-19 PROCEDURE — 99072 ADDL SUPL MATRL&STAF TM PHE: CPT

## 2022-07-19 PROCEDURE — 99204 OFFICE O/P NEW MOD 45 MIN: CPT

## 2022-07-19 RX ORDER — NITROGLYCERIN 0.4 MG/1
0.4 TABLET SUBLINGUAL
Refills: 0 | Status: ACTIVE | COMMUNITY

## 2022-07-19 RX ORDER — SPIRONOLACTONE 25 MG/1
25 TABLET ORAL DAILY
Qty: 90 | Refills: 1 | Status: ACTIVE | COMMUNITY
Start: 2022-07-19 | End: 1900-01-01

## 2022-07-19 RX ORDER — OMEPRAZOLE 40 MG/1
40 CAPSULE, DELAYED RELEASE ORAL DAILY
Refills: 0 | Status: ACTIVE | COMMUNITY

## 2022-07-19 RX ORDER — ATORVASTATIN CALCIUM 80 MG/1
80 TABLET, FILM COATED ORAL DAILY
Refills: 0 | Status: DISCONTINUED | COMMUNITY
End: 2022-07-19

## 2022-07-19 RX ORDER — CETIRIZINE HYDROCHLORIDE 10 MG/1
10 TABLET, COATED ORAL DAILY
Refills: 0 | Status: ACTIVE | COMMUNITY

## 2022-07-29 ENCOUNTER — APPOINTMENT (OUTPATIENT)
Dept: ELECTROPHYSIOLOGY | Facility: CLINIC | Age: 72
End: 2022-07-29

## 2022-08-20 PROBLEM — I10 BENIGN ESSENTIAL HTN: Status: ACTIVE | Noted: 2019-10-24

## 2022-08-20 PROBLEM — I73.9 PERIPHERAL ARTERY DISEASE: Status: ACTIVE | Noted: 2022-08-20

## 2022-08-20 RX ORDER — SPIRONOLACTONE 25 MG/1
25 TABLET ORAL DAILY
Refills: 0 | Status: DISCONTINUED | COMMUNITY
End: 2022-08-20

## 2022-08-23 ENCOUNTER — APPOINTMENT (OUTPATIENT)
Dept: CARDIOLOGY | Facility: CLINIC | Age: 72
End: 2022-08-23

## 2022-08-23 VITALS
WEIGHT: 138 LBS | BODY MASS INDEX: 23.56 KG/M2 | OXYGEN SATURATION: 98 % | TEMPERATURE: 97 F | DIASTOLIC BLOOD PRESSURE: 69 MMHG | HEIGHT: 64 IN | HEART RATE: 69 BPM | SYSTOLIC BLOOD PRESSURE: 136 MMHG

## 2022-08-23 PROCEDURE — 93000 ELECTROCARDIOGRAM COMPLETE: CPT

## 2022-08-23 PROCEDURE — 99214 OFFICE O/P EST MOD 30 MIN: CPT

## 2022-08-23 PROCEDURE — 99072 ADDL SUPL MATRL&STAF TM PHE: CPT

## 2022-08-23 RX ORDER — ROSUVASTATIN CALCIUM 40 MG/1
40 TABLET, FILM COATED ORAL
Qty: 90 | Refills: 1 | Status: ACTIVE | COMMUNITY
Start: 1900-01-01 | End: 1900-01-01

## 2022-08-23 RX ORDER — CARVEDILOL 6.25 MG/1
6.25 TABLET, FILM COATED ORAL
Qty: 180 | Refills: 1 | Status: ACTIVE | COMMUNITY
Start: 1900-01-01 | End: 1900-01-01

## 2022-08-23 RX ORDER — LISINOPRIL 20 MG/1
20 TABLET ORAL
Qty: 90 | Refills: 1 | Status: ACTIVE | COMMUNITY
Start: 2022-05-22 | End: 1900-01-01

## 2022-08-23 RX ORDER — KRILL/OM-3/DHA/EPA/PHOSPHO/AST 1000-230MG
81 CAPSULE ORAL
Qty: 90 | Refills: 1 | Status: ACTIVE | COMMUNITY
Start: 2022-08-23 | End: 1900-01-01

## 2022-08-23 RX ORDER — PRASUGREL 10 MG/1
10 TABLET, FILM COATED ORAL DAILY
Qty: 90 | Refills: 1 | Status: ACTIVE | COMMUNITY
Start: 1900-01-01 | End: 1900-01-01

## 2022-08-23 RX ORDER — FUROSEMIDE 20 MG/1
20 TABLET ORAL
Qty: 90 | Refills: 1 | Status: ACTIVE | COMMUNITY
Start: 2021-04-29 | End: 1900-01-01

## 2022-08-31 NOTE — PHYSICAL EXAM
[No Carotid Bruit] : no carotid bruit [No Cyanosis] : no cyanosis [No Clubbing] : no clubbing [No Varicosities] : no varicosities [No Skin Lesions] : no skin lesions [Normal memory] : normal memory [Well Developed] : well developed [Well Nourished] : well nourished [No Acute Distress] : no acute distress [Normal Conjunctiva] : normal conjunctiva [Normal Venous Pressure] : normal venous pressure [Normal S1, S2] : normal S1, S2 [No Murmur] : no murmur [No Rub] : no rub [No Gallop] : no gallop [Clear Lung Fields] : clear lung fields [Good Air Entry] : good air entry [No Respiratory Distress] : no respiratory distress  [Soft] : abdomen soft [Non Tender] : non-tender [No Masses/organomegaly] : no masses/organomegaly [Normal Bowel Sounds] : normal bowel sounds [Normal Gait] : normal gait [No Edema] : no edema [No Rash] : no rash [Moves all extremities] : moves all extremities [No Focal Deficits] : no focal deficits [Normal Speech] : normal speech [Alert and Oriented] : alert and oriented

## 2022-08-31 NOTE — CARDIOLOGY SUMMARY
[de-identified] : 7/19/22 NSR, LVH, ST depression [de-identified] : CONCLUSIONS:\par 1. Mitral annular calcification, otherwise normal mitral\par valve. Mild-moderate mitral regurgitation.\par 2. Calcified trileaflet aortic valve with normal opening.\par Mild aortic regurgitation.\par 3. Mild left ventricular enlargement.\par 4. Severe global left ventricular systolic dysfunction.\par 5. Mild diastolic dysfunction (Stage I).\par 6. Normal right ventricular size and function.   A device\par wire is noted in the right heart.\par ------------------------------------------------------------------------\par Confirmed on  10/26/2021 - 18:02:34 by Cleve Bueno M.D.,\par MAXWELL, TARYN\par  [de-identified] : DIAGNOSTIC RECOMMENDATIONS: patent stent to common left common illiac\par SVG to OM is closed at ostium looks like chronically closed\par SVG to RCA known to be closed\par RIDER TO LAD supplied RCA territory via collaterals\par Prepared and signed by\par Ulisses Abarca M.D.\par Signed 07/22/2021 09:57:02\par

## 2022-08-31 NOTE — DISCUSSION/SUMMARY
[FreeTextEntry1] : 73 yo M with PMH of HTN, HLD, CAD (s/p 3 stents and 3V-CABG - 2002 with closed OM and RCA SVG) , PAD with L common IIiac stent), and chronic systolic CHF s/p ICD placement \par \par ICM/chronic systolic CHF: s/p ICD placement. Normal functioning ICD shocks. No VT or ICD shocks. Last ECHO with LVEF 25%. Chronically occluded SVG's, patient has been taking Effient, will discuss with Interventional Cardiology likely for Peripheral stent\par \par BP elevated today, will continue current medications, needed refills, advised to check his BPs at home\par \par Continue Rosuvastatin, will check Lipids next visit \par \par Advised to discuss Gabapentin dose and Neuropathy with his PCP\par \par  [EKG obtained to assist in diagnosis and management of assessed problem(s)] : EKG obtained to assist in diagnosis and management of assessed problem(s)

## 2022-08-31 NOTE — HISTORY OF PRESENT ILLNESS
[FreeTextEntry1] : Dusty Rahman is a 71 yo M with PMH of HTN, HLD, CAD (s/p 3 stents and 3V-CABG - 2002) , PAD(s/p LLE stent), and chronic systolic CHF s/p ICD placement who presents today to follow up. He has restarted his Spironolactone and overall feels well. He does not that he feels a cramping pain in his lower extremities which is significant if he doesn't take his Gabapentin. He has no chest pain, edema, palpitations or shortness of breath.

## 2022-10-28 ENCOUNTER — NON-APPOINTMENT (OUTPATIENT)
Age: 72
End: 2022-10-28

## 2022-10-28 ENCOUNTER — APPOINTMENT (OUTPATIENT)
Dept: ELECTROPHYSIOLOGY | Facility: CLINIC | Age: 72
End: 2022-10-28

## 2022-10-28 PROCEDURE — 93295 DEV INTERROG REMOTE 1/2/MLT: CPT

## 2022-10-28 PROCEDURE — 93296 REM INTERROG EVL PM/IDS: CPT

## 2022-11-06 ENCOUNTER — INPATIENT (INPATIENT)
Facility: HOSPITAL | Age: 72
LOS: 3 days | Discharge: HOME | End: 2022-11-10
Attending: INTERNAL MEDICINE | Admitting: INTERNAL MEDICINE

## 2022-11-06 VITALS
HEART RATE: 78 BPM | RESPIRATION RATE: 18 BRPM | OXYGEN SATURATION: 99 % | SYSTOLIC BLOOD PRESSURE: 118 MMHG | DIASTOLIC BLOOD PRESSURE: 63 MMHG | TEMPERATURE: 98 F

## 2022-11-06 DIAGNOSIS — K21.9 GASTRO-ESOPHAGEAL REFLUX DISEASE WITHOUT ESOPHAGITIS: ICD-10-CM

## 2022-11-06 DIAGNOSIS — I25.10 ATHEROSCLEROTIC HEART DISEASE OF NATIVE CORONARY ARTERY WITHOUT ANGINA PECTORIS: ICD-10-CM

## 2022-11-06 DIAGNOSIS — I21.4 NON-ST ELEVATION (NSTEMI) MYOCARDIAL INFARCTION: ICD-10-CM

## 2022-11-06 DIAGNOSIS — R07.9 CHEST PAIN, UNSPECIFIED: ICD-10-CM

## 2022-11-06 DIAGNOSIS — Z98.890 OTHER SPECIFIED POSTPROCEDURAL STATES: Chronic | ICD-10-CM

## 2022-11-06 DIAGNOSIS — I10 ESSENTIAL (PRIMARY) HYPERTENSION: ICD-10-CM

## 2022-11-06 DIAGNOSIS — E78.5 HYPERLIPIDEMIA, UNSPECIFIED: ICD-10-CM

## 2022-11-06 DIAGNOSIS — R63.8 OTHER SYMPTOMS AND SIGNS CONCERNING FOOD AND FLUID INTAKE: ICD-10-CM

## 2022-11-06 DIAGNOSIS — D69.6 THROMBOCYTOPENIA, UNSPECIFIED: ICD-10-CM

## 2022-11-06 DIAGNOSIS — I73.9 PERIPHERAL VASCULAR DISEASE, UNSPECIFIED: ICD-10-CM

## 2022-11-06 DIAGNOSIS — Z86.79 PERSONAL HISTORY OF OTHER DISEASES OF THE CIRCULATORY SYSTEM: Chronic | ICD-10-CM

## 2022-11-06 DIAGNOSIS — Z90.49 ACQUIRED ABSENCE OF OTHER SPECIFIED PARTS OF DIGESTIVE TRACT: Chronic | ICD-10-CM

## 2022-11-06 DIAGNOSIS — I50.22 CHRONIC SYSTOLIC (CONGESTIVE) HEART FAILURE: ICD-10-CM

## 2022-11-06 DIAGNOSIS — Z95.1 PRESENCE OF AORTOCORONARY BYPASS GRAFT: Chronic | ICD-10-CM

## 2022-11-06 LAB
ALBUMIN SERPL ELPH-MCNC: 4.4 G/DL — SIGNIFICANT CHANGE UP (ref 3.3–5)
ALP SERPL-CCNC: 115 U/L — SIGNIFICANT CHANGE UP (ref 40–120)
ALT FLD-CCNC: 21 U/L — SIGNIFICANT CHANGE UP (ref 4–41)
ANION GAP SERPL CALC-SCNC: 10 MMOL/L — SIGNIFICANT CHANGE UP (ref 7–14)
APTT BLD: 36.5 SEC — HIGH (ref 27–36.3)
AST SERPL-CCNC: 22 U/L — SIGNIFICANT CHANGE UP (ref 4–40)
BASE EXCESS BLDV CALC-SCNC: -1.5 MMOL/L — SIGNIFICANT CHANGE UP (ref -2–3)
BASOPHILS # BLD AUTO: 0.03 K/UL — SIGNIFICANT CHANGE UP (ref 0–0.2)
BASOPHILS NFR BLD AUTO: 0.6 % — SIGNIFICANT CHANGE UP (ref 0–2)
BILIRUB SERPL-MCNC: 1 MG/DL — SIGNIFICANT CHANGE UP (ref 0.2–1.2)
BLD GP AB SCN SERPL QL: NEGATIVE — SIGNIFICANT CHANGE UP
BLOOD GAS VENOUS COMPREHENSIVE RESULT: SIGNIFICANT CHANGE UP
BUN SERPL-MCNC: 18 MG/DL — SIGNIFICANT CHANGE UP (ref 7–23)
CALCIUM SERPL-MCNC: 9.8 MG/DL — SIGNIFICANT CHANGE UP (ref 8.4–10.5)
CHLORIDE BLDV-SCNC: 103 MMOL/L — SIGNIFICANT CHANGE UP (ref 96–108)
CHLORIDE SERPL-SCNC: 104 MMOL/L — SIGNIFICANT CHANGE UP (ref 98–107)
CO2 BLDV-SCNC: 26.7 MMOL/L — HIGH (ref 22–26)
CO2 SERPL-SCNC: 24 MMOL/L — SIGNIFICANT CHANGE UP (ref 22–31)
CREAT SERPL-MCNC: 1.3 MG/DL — SIGNIFICANT CHANGE UP (ref 0.5–1.3)
EGFR: 59 ML/MIN/1.73M2 — LOW
EOSINOPHIL # BLD AUTO: 0.12 K/UL — SIGNIFICANT CHANGE UP (ref 0–0.5)
EOSINOPHIL NFR BLD AUTO: 2.4 % — SIGNIFICANT CHANGE UP (ref 0–6)
FLUAV AG NPH QL: SIGNIFICANT CHANGE UP
FLUBV AG NPH QL: SIGNIFICANT CHANGE UP
GAS PNL BLDV: 134 MMOL/L — LOW (ref 136–145)
GLUCOSE BLDV-MCNC: 104 MG/DL — HIGH (ref 70–99)
GLUCOSE SERPL-MCNC: 104 MG/DL — HIGH (ref 70–99)
HCO3 BLDV-SCNC: 25 MMOL/L — SIGNIFICANT CHANGE UP (ref 22–29)
HCT VFR BLD CALC: 42.6 % — SIGNIFICANT CHANGE UP (ref 39–50)
HCT VFR BLDA CALC: 44 % — SIGNIFICANT CHANGE UP (ref 39–51)
HGB BLD CALC-MCNC: 14.7 G/DL — SIGNIFICANT CHANGE UP (ref 13–17)
HGB BLD-MCNC: 14.2 G/DL — SIGNIFICANT CHANGE UP (ref 13–17)
IANC: 3.49 K/UL — SIGNIFICANT CHANGE UP (ref 1.8–7.4)
IMM GRANULOCYTES NFR BLD AUTO: 0.4 % — SIGNIFICANT CHANGE UP (ref 0–0.9)
INR BLD: 1.08 RATIO — SIGNIFICANT CHANGE UP (ref 0.88–1.16)
LACTATE BLDV-MCNC: 1.4 MMOL/L — SIGNIFICANT CHANGE UP (ref 0.5–2)
LIDOCAIN IGE QN: 28 U/L — SIGNIFICANT CHANGE UP (ref 7–60)
LYMPHOCYTES # BLD AUTO: 0.97 K/UL — LOW (ref 1–3.3)
LYMPHOCYTES # BLD AUTO: 19.6 % — SIGNIFICANT CHANGE UP (ref 13–44)
MCHC RBC-ENTMCNC: 28.5 PG — SIGNIFICANT CHANGE UP (ref 27–34)
MCHC RBC-ENTMCNC: 33.3 GM/DL — SIGNIFICANT CHANGE UP (ref 32–36)
MCV RBC AUTO: 85.5 FL — SIGNIFICANT CHANGE UP (ref 80–100)
MONOCYTES # BLD AUTO: 0.31 K/UL — SIGNIFICANT CHANGE UP (ref 0–0.9)
MONOCYTES NFR BLD AUTO: 6.3 % — SIGNIFICANT CHANGE UP (ref 2–14)
NEUTROPHILS # BLD AUTO: 3.49 K/UL — SIGNIFICANT CHANGE UP (ref 1.8–7.4)
NEUTROPHILS NFR BLD AUTO: 70.7 % — SIGNIFICANT CHANGE UP (ref 43–77)
NRBC # BLD: 0 /100 WBCS — SIGNIFICANT CHANGE UP (ref 0–0)
NRBC # FLD: 0 K/UL — SIGNIFICANT CHANGE UP (ref 0–0)
PCO2 BLDV: 49 MMHG — SIGNIFICANT CHANGE UP (ref 42–55)
PH BLDV: 7.32 — SIGNIFICANT CHANGE UP (ref 7.32–7.43)
PLATELET # BLD AUTO: 122 K/UL — LOW (ref 150–400)
PO2 BLDV: 30 MMHG — SIGNIFICANT CHANGE UP
POTASSIUM BLDV-SCNC: 3.9 MMOL/L — SIGNIFICANT CHANGE UP (ref 3.5–5.1)
POTASSIUM SERPL-MCNC: 4.1 MMOL/L — SIGNIFICANT CHANGE UP (ref 3.5–5.3)
POTASSIUM SERPL-SCNC: 4.1 MMOL/L — SIGNIFICANT CHANGE UP (ref 3.5–5.3)
PROT SERPL-MCNC: 7.7 G/DL — SIGNIFICANT CHANGE UP (ref 6–8.3)
PROTHROM AB SERPL-ACNC: 12.5 SEC — SIGNIFICANT CHANGE UP (ref 10.5–13.4)
RBC # BLD: 4.98 M/UL — SIGNIFICANT CHANGE UP (ref 4.2–5.8)
RBC # FLD: 14.3 % — SIGNIFICANT CHANGE UP (ref 10.3–14.5)
RH IG SCN BLD-IMP: POSITIVE — SIGNIFICANT CHANGE UP
RSV RNA NPH QL NAA+NON-PROBE: SIGNIFICANT CHANGE UP
SAO2 % BLDV: 46.9 % — SIGNIFICANT CHANGE UP
SARS-COV-2 RNA SPEC QL NAA+PROBE: SIGNIFICANT CHANGE UP
SODIUM SERPL-SCNC: 138 MMOL/L — SIGNIFICANT CHANGE UP (ref 135–145)
TROPONIN T, HIGH SENSITIVITY RESULT: 25 NG/L — SIGNIFICANT CHANGE UP
TROPONIN T, HIGH SENSITIVITY RESULT: 26 NG/L — SIGNIFICANT CHANGE UP
WBC # BLD: 4.94 K/UL — SIGNIFICANT CHANGE UP (ref 3.8–10.5)
WBC # FLD AUTO: 4.94 K/UL — SIGNIFICANT CHANGE UP (ref 3.8–10.5)

## 2022-11-06 PROCEDURE — 71275 CT ANGIOGRAPHY CHEST: CPT | Mod: 26,MG

## 2022-11-06 PROCEDURE — 99285 EMERGENCY DEPT VISIT HI MDM: CPT

## 2022-11-06 PROCEDURE — 74174 CTA ABD&PLVS W/CONTRAST: CPT | Mod: 26,MG

## 2022-11-06 PROCEDURE — G1004: CPT

## 2022-11-06 PROCEDURE — 71046 X-RAY EXAM CHEST 2 VIEWS: CPT | Mod: 26

## 2022-11-06 PROCEDURE — 99223 1ST HOSP IP/OBS HIGH 75: CPT

## 2022-11-06 PROCEDURE — 93010 ELECTROCARDIOGRAM REPORT: CPT

## 2022-11-06 RX ORDER — SPIRONOLACTONE 25 MG/1
25 TABLET, FILM COATED ORAL DAILY
Refills: 0 | Status: ACTIVE | OUTPATIENT
Start: 2022-11-07 | End: 2023-10-06

## 2022-11-06 RX ORDER — PRASUGREL 5 MG/1
10 TABLET, FILM COATED ORAL DAILY
Refills: 0 | Status: DISCONTINUED | OUTPATIENT
Start: 2022-11-07 | End: 2022-11-10

## 2022-11-06 RX ORDER — LANOLIN ALCOHOL/MO/W.PET/CERES
3 CREAM (GRAM) TOPICAL AT BEDTIME
Refills: 0 | Status: DISCONTINUED | OUTPATIENT
Start: 2022-11-06 | End: 2022-11-10

## 2022-11-06 RX ORDER — FUROSEMIDE 40 MG
20 TABLET ORAL DAILY
Refills: 0 | Status: DISCONTINUED | OUTPATIENT
Start: 2022-11-06 | End: 2022-11-09

## 2022-11-06 RX ORDER — CARVEDILOL PHOSPHATE 80 MG/1
6.25 CAPSULE, EXTENDED RELEASE ORAL EVERY 12 HOURS
Refills: 0 | Status: DISCONTINUED | OUTPATIENT
Start: 2022-11-06 | End: 2022-11-10

## 2022-11-06 RX ORDER — GABAPENTIN 400 MG/1
300 CAPSULE ORAL AT BEDTIME
Refills: 0 | Status: DISCONTINUED | OUTPATIENT
Start: 2022-11-06 | End: 2022-11-10

## 2022-11-06 RX ORDER — CYCLOBENZAPRINE HYDROCHLORIDE 10 MG/1
1 TABLET, FILM COATED ORAL
Qty: 0 | Refills: 0 | DISCHARGE

## 2022-11-06 RX ORDER — FUROSEMIDE 40 MG
1 TABLET ORAL
Qty: 0 | Refills: 0 | DISCHARGE

## 2022-11-06 RX ORDER — LISINOPRIL 2.5 MG/1
20 TABLET ORAL DAILY
Refills: 0 | Status: ACTIVE | OUTPATIENT
Start: 2022-11-07 | End: 2023-10-06

## 2022-11-06 RX ORDER — METOCLOPRAMIDE HCL 10 MG
1 TABLET ORAL
Qty: 0 | Refills: 0 | DISCHARGE

## 2022-11-06 RX ORDER — ATORVASTATIN CALCIUM 80 MG/1
80 TABLET, FILM COATED ORAL AT BEDTIME
Refills: 0 | Status: DISCONTINUED | OUTPATIENT
Start: 2022-11-06 | End: 2022-11-10

## 2022-11-06 RX ORDER — LORATADINE 10 MG/1
10 TABLET ORAL DAILY
Refills: 0 | Status: DISCONTINUED | OUTPATIENT
Start: 2022-11-06 | End: 2022-11-10

## 2022-11-06 RX ORDER — ASPIRIN/CALCIUM CARB/MAGNESIUM 324 MG
81 TABLET ORAL DAILY
Refills: 0 | Status: DISCONTINUED | OUTPATIENT
Start: 2022-11-07 | End: 2022-11-10

## 2022-11-06 RX ORDER — PRASUGREL 5 MG/1
1 TABLET, FILM COATED ORAL
Qty: 0 | Refills: 0 | DISCHARGE

## 2022-11-06 RX ORDER — PANTOPRAZOLE SODIUM 20 MG/1
40 TABLET, DELAYED RELEASE ORAL
Refills: 0 | Status: DISCONTINUED | OUTPATIENT
Start: 2022-11-06 | End: 2022-11-10

## 2022-11-06 RX ORDER — ACETAMINOPHEN 500 MG
650 TABLET ORAL EVERY 6 HOURS
Refills: 0 | Status: DISCONTINUED | OUTPATIENT
Start: 2022-11-06 | End: 2022-11-10

## 2022-11-06 RX ORDER — ISOSORBIDE MONONITRATE 60 MG/1
1 TABLET, EXTENDED RELEASE ORAL
Qty: 0 | Refills: 0 | DISCHARGE

## 2022-11-06 RX ORDER — ROSUVASTATIN CALCIUM 5 MG/1
1 TABLET ORAL
Qty: 0 | Refills: 0 | DISCHARGE

## 2022-11-06 RX ORDER — INFLUENZA VIRUS VACCINE 15; 15; 15; 15 UG/.5ML; UG/.5ML; UG/.5ML; UG/.5ML
0.7 SUSPENSION INTRAMUSCULAR ONCE
Refills: 0 | Status: DISCONTINUED | OUTPATIENT
Start: 2022-11-06 | End: 2022-11-10

## 2022-11-06 RX ORDER — ENOXAPARIN SODIUM 100 MG/ML
40 INJECTION SUBCUTANEOUS EVERY 24 HOURS
Refills: 0 | Status: DISCONTINUED | OUTPATIENT
Start: 2022-11-06 | End: 2022-11-10

## 2022-11-06 RX ADMIN — CARVEDILOL PHOSPHATE 6.25 MILLIGRAM(S): 80 CAPSULE, EXTENDED RELEASE ORAL at 17:53

## 2022-11-06 RX ADMIN — GABAPENTIN 300 MILLIGRAM(S): 400 CAPSULE ORAL at 21:29

## 2022-11-06 RX ADMIN — ATORVASTATIN CALCIUM 80 MILLIGRAM(S): 80 TABLET, FILM COATED ORAL at 21:28

## 2022-11-06 RX ADMIN — Medication 3 MILLIGRAM(S): at 21:28

## 2022-11-06 RX ADMIN — LORATADINE 10 MILLIGRAM(S): 10 TABLET ORAL at 17:52

## 2022-11-06 RX ADMIN — Medication 1 TABLET(S): at 17:53

## 2022-11-06 RX ADMIN — ENOXAPARIN SODIUM 40 MILLIGRAM(S): 100 INJECTION SUBCUTANEOUS at 21:29

## 2022-11-06 NOTE — H&P ADULT - PROBLEM SELECTOR PLAN 7
- home meds: coreg 6.25mg BID, lisinopril 20mg daily, lasix 20mg daily, spironolactone 25mg daily  - euvolemic on exam  -    - 7/2022 TTE: mild-mod MR, mild AR, mild LVH, sev global ventricular systolic dysfunction, mild diastolic dysfunction  - repeat TTE  - c/w home meds

## 2022-11-06 NOTE — H&P ADULT - PROBLEM SELECTOR PLAN 1
- pt p/w 1d abd pain radiating to chest and back, similar to prior episodes requiring PCI; took ASA load and NTG at home prior to arrival  - 7/2022 EKG NSR, LVH, ST depression  - hemodynamically stable on arrival  - trop 26--25  - EKG NSR with PVCs, ST depressions I, II, aVL, aVF, V3-6  - CTA c/a/p neg dissection  - c/w home meds as below  - f/u TTE  - consider ischemic eval while admitted  - cards consulted, f/u recs  - repeat cardiac enzymes and EKG for repeat chest pain

## 2022-11-06 NOTE — H&P ADULT - PROBLEM SELECTOR PLAN 4
- home meds: ASA 81mg and prasugrel 10mg daily  - c/w home meds  - also takes gabapentin 300mg at bedtime for "leg cramps" -- c/w home med

## 2022-11-06 NOTE — ED PROVIDER NOTE - MUSCULOSKELETAL, MLM
Spine appears normal, range of motion is not limited, no muscle or joint tenderness. no leg swelling b/l, no calf tenderness b/l, no palpable cords b/l. 2+ pulses b/l

## 2022-11-06 NOTE — H&P ADULT - NSHPREVIEWOFSYSTEMS_GEN_ALL_CORE
CONSTITUTIONAL: No weakness, fevers or chills.   EYES/ENT: No visual changes;  No vertigo or throat pain   NECK: No pain or stiffness  RESPIRATORY: No cough, wheezing, hemoptysis; No shortness of breath  CARDIOVASCULAR: No chest pain or palpitations  GASTROINTESTINAL: No abdominal or epigastric pain. No nausea, vomiting, or hematemesis; No diarrhea or constipation. No melena or hematochezia.  GENITOURINARY: No dysuria, frequency or hematuria  NEUROLOGICAL: No numbness or weakness  SKIN: No itching, burning, rashes, or lesions   All other review of systems is negative unless indicated above.

## 2022-11-06 NOTE — H&P ADULT - ASSESSMENT
Pt is a 73 yo M with PMH CAD (s/p CABG, 3x stents), CVA (2020), PAD (s/p LLE stent), HTN, HLD, and CHF (s/p ICD) p/w 1d chest pain since waking up; initially started as abdominal pain which then began radiating to chest and back. EKG with ST depressions I, II, aVL, aVF, V3-6 without troponinemia. CTA c/a/p neg dissection. Cardiology following with plan for echo and possible ischemic eval. Admitted to medicine for further observation and management.  Pt is a 71 yo M with PMH CAD (s/p CABG, 3x stents), CVA (2020), PAD (s/p LLE stent), HTN, HLD, and CHF (s/p ICD) p/w 1d chest pain since waking up; initially started as abdominal pain which then began radiating to chest and back. EKG with ST depressions I, II, aVL, aVF, V3-6 without troponinemia. CTA c/a/p neg dissection. Cardiology following with plan for echo and possible ischemic eval. Admitted to medicine for further observation and management.

## 2022-11-06 NOTE — ED ADULT TRIAGE NOTE - CHIEF COMPLAINT QUOTE
Pt with co abdominal pain that radiates into  chest pain since 6am. pt states pain radiates into back . Pt denies sob. pt with  HX cardiac disease, 3 cardiac stents. cva 2020 no residual.  Pt took his own nitro s/l prior to arrival asa 324mg .

## 2022-11-06 NOTE — ED PROVIDER NOTE - OBJECTIVE STATEMENT
73 yo male with a PMH of CAD (s/p 3 stents and 3V-CABG in 2002), PAD(s/p LLE stent), HTN, HLD, chronic systolic CHF, s/p ICD implantation (2019) presented to ED complaining of chest pain radiating to the back that started today morning at 6 am. Reports he took nitroglycerin, followed by 4 baby aspirin.   Pt denies any associated symptoms, including f/c//v, near syncope, palpitation, diaphoresis, cough, sob, weakness, numbness, any recent trauma or injury to chest, lower extremity edema. Denies any prior history of DVT/PE, hx of malignancy or known hypercoagulable state.

## 2022-11-06 NOTE — ED ADULT NURSE NOTE - NSICDXPASTSURGICALHX_GEN_ALL_CORE_FT
PAST SURGICAL HISTORY:  History of coronary angiogram multiple stents placed    History of femoral angiogram stent placed in [facility] 2016    History of laparoscopic cholecystectomy 2016    S/P CABG (coronary artery bypass graft) x3; [facility] 2002

## 2022-11-06 NOTE — H&P ADULT - NSHPPHYSICALEXAM_GEN_ALL_CORE
VITAL SIGNS:  T(C): 36.9 (11-06-22 @ 15:58), Max: 36.9 (11-06-22 @ 15:58)  T(F): 98.4 (11-06-22 @ 15:58), Max: 98.4 (11-06-22 @ 15:58)  HR: 84 (11-06-22 @ 15:58) (78 - 84)  BP: 119/53 (11-06-22 @ 15:58) (118/63 - 119/53)  BP(mean): --  RR: 18 (11-06-22 @ 15:58) (18 - 18)  SpO2: 99% (11-06-22 @ 15:58) (99% - 99%)  Wt(kg): --    PHYSICAL EXAM:  Constitutional: WDWN resting comfortably in bed; NAD  Head: NC/AT  Eyes: PERRL, EOMI, anicteric sclera  ENT: no nasal discharge; MMM  Neck: supple; no JVD  Respiratory: CTA B/L; no W/R/R  Cardiac: +S1/S2; RRR; no M/R/G  Gastrointestinal: soft, NT/ND; no rebound or guarding; +BSx4  Extremities: WWP, no clubbing or cyanosis; no peripheral edema  Musculoskeletal: NROM x4; no joint swelling, tenderness or erythema  Vascular: 2+ radial, DP/PT pulses B/L  Dermatologic: skin warm, dry and intact; no rashes, wounds, or scars  Neurologic: AAOx3; CNII-XII grossly intact; no focal deficits  Psychiatric: affect and characteristics of appearance, verbalizations, behaviors are appropriate

## 2022-11-06 NOTE — ED PROVIDER NOTE - ATTENDING APP SHARED VISIT CONTRIBUTION OF CARE
73yo ho CAD, CABG, ICM with ICD, htn, hld, pw chest pain that woke him from sleep at 6am. started in epigastrium and radiated to chest and back, took nitro and aspirin and eventually had improvement, then called ems and got another 2 asa, currently pain improved, was assoc with nause,a no vomiting, no sob, no diaphoresis, no cough or fevers, no leg swelling,   vitals wnl, cv rrr, lung clear, abd nontender  pending stat ekg, labs, trop, CTA ro dissecting, admit to dr Ulisses Abarca

## 2022-11-06 NOTE — H&P ADULT - PROBLEM SELECTOR PLAN 2
- on arrival, plt 122  - no known hx platelet abnormality   - continue to trend  - no s/s bleeding or bruising

## 2022-11-06 NOTE — ED PROVIDER NOTE - NSICDXPASTSURGICALHX_GEN_ALL_CORE_FT
PAST SURGICAL HISTORY:  History of coronary angiogram multiple stents placed    History of femoral angiogram stent placed in [facility] 2016    History of laparoscopic cholecystectomy 2016    S/P CABG (coronary artery bypass graft) x3; [facility] 2002

## 2022-11-06 NOTE — ED PROVIDER NOTE - NS ED ATTENDING STATEMENT MOD
This was a shared visit with the SAVITA. I reviewed and verified the documentation and independently performed the documented:

## 2022-11-06 NOTE — H&P ADULT - NSICDXPASTSURGICALHX_GEN_ALL_CORE_FT
PAST SURGICAL HISTORY:  History of coronary angiogram multiple stents placed    History of femoral angiogram stent placed in L[facility] 2016    History of laparoscopic cholecystectomy 2016    S/P CABG (coronary artery bypass graft) x3; [facility] 2002

## 2022-11-06 NOTE — H&P ADULT - NSICDXFAMILYHX_GEN_ALL_CORE_FT
FAMILY HISTORY:  Family history of MI (myocardial infarction), mother,   Family history of stroke, 60yo CVA, heart attack 59yo    Sibling  Still living? No  Family history of coronary artery disease in brother, Age at diagnosis: Age Unknown  Family history of coronary artery disease in sister, Age at diagnosis: Age Unknown

## 2022-11-06 NOTE — ED ADULT NURSE NOTE - OBJECTIVE STATEMENT
Pt awake and alert, ambulatory PMH of CAD (s/p 3 stents and 3V-CABG in 2002), PAD(s/p LLE stent), HTN, HLD, chronic systolic CHF, s/p ICD implantation (2019) presented to ED complaining of chest pain radiating to the back that started today at 6 am. Reports he took nitroglycerin, followed by 4 baby aspirin, with relief.   Pt denies any associated symptoms, including f/c//v, near syncope, palpitation, diaphoresis, cough, sob, weakness, numbness, any recent trauma or injury to chest, lower extremity edema. 20g IV placed in RT AC, labs sent. NSR on monitor.

## 2022-11-06 NOTE — CONSULT NOTE ADULT - ASSESSMENT
71 yo male with a PMH of CAD (s/p 3 stents and 3V-CABG in 2002), PAD(s/p LLE stent), HTN, HLD, chronic systolic CHF, s/p ICD implantation (2019) presented to ED complaining of chest pain    EKG: NSR lateral TWI PVCs  -[facility] 7/2021 which showed  patent stent to common left common illiac. SVG to OM is closed at ostium looks like chronically closed. SVG to RCA known to be closed. LIMA TO LAD supplied RCA territory via collaterals    1. Chest pain  -f/u CE   -CTA C/A/P pending to r/o dissection  -obtain echo  -will consider ischemic eval    2. ICM  -s/p ICD  -c/w lisinopril and coreg    3. HTN  -improved  -c/w coreg, lisinopril and imdur  -continue to monitor B

## 2022-11-06 NOTE — ED PROVIDER NOTE - CLINICAL SUMMARY MEDICAL DECISION MAKING FREE TEXT BOX
73 yo male with a PMH of CAD (s/p 3 stents and 3V-CABG in 2002), PAD(s/p LLE stent), HTN, HLD, chronic systolic CHF, s/p ICD implantation (2019) presented to ED complaining of chest pain radiating to the back that started today morning at 6 am. EKG with ST depression, new in V3, V4, V5. R/o dissection. Concern for ACS. Plan: labs, lipase, trop, CTA chest/A/P to r/o dissection, and admit.

## 2022-11-06 NOTE — CONSULT NOTE ADULT - SUBJECTIVE AND OBJECTIVE BOX
Ulisses Abarca MD  Interventional Cardiology / Advance Heart Failure and Cardiac Transplant Specialist  [place] Office : [phone] [place] [address] [place] [address]  Tel:   [place] Office : [address] [facility] [place] [address]  Tel: [phone]       HISTORY OF PRESENTING ILLNESS:   71 yo male with a PMH of CAD (s/p 3 stents and 3V-CABG in ), PAD(s/p LLE stent), HTN, HLD, chronic systolic CHF, s/p ICD implantation (2019) presented to ED complaining of chest pain radiating to the back that started today morning at 6 am. Reports he took nitroglycerin, followed by 4 baby aspirin.   Pt denies any associated symptoms, including f/c//v, near syncope, palpitation, diaphoresis, cough, sob, weakness, numbness, any recent trauma or injury to chest, lower extremity edema. Denies any prior history of DVT/PE, hx of malignancy or known hypercoagulable state.    MEDICATIONS:                  PAST MEDICAL/SURGICAL HISTORY  PAST MEDICAL & SURGICAL HISTORY:  HTN (hypertension)      PAD (peripheral artery disease)  stent to L lower extremity artery      Constipation, unspecified constipation type      Hyperlipidemia      NSTEMI (non-ST elevated myocardial infarction)   and 2018 and       Coronary artery disease involving native coronary artery of native heart, unspecified whether angina present      Ischemic cardiomyopathy with implantable cardioverter-defibrillator (ICD)      S/P CABG (coronary artery bypass graft)  x3; [facility]       History of coronary angiogram  multiple stents placed      History of femoral angiogram  stent placed in [facility] 2016      History of laparoscopic cholecystectomy  2016          SOCIAL HISTORY: Substance Use (street drugs): ( x ) never used  (  ) other:    FAMILY HISTORY:  Family history of coronary artery disease in brother (Sibling)  4 brothers with MI/CABG, 1  at 79yo    Family history of stroke  60yo CVA, heart attack 61yo    Family history of coronary artery disease in sister (Sibling)   from MI    Family history of MI (myocardial infarction)  mother,         REVIEW OF SYSTEMS:  CONSTITUTIONAL: No fever, weight loss, or fatigue  EYES: No eye pain, visual disturbances, or discharge  ENMT:  No difficulty hearing, tinnitus, vertigo; No sinus or throat pain  BREASTS: No pain, masses, or nipple discharge  GASTROINTESTINAL: No abdominal or epigastric pain. No nausea, vomiting, or hematemesis; No diarrhea or constipation. No melena or hematochezia.  GENITOURINARY: No dysuria, frequency, hematuria, or incontinence  NEUROLOGICAL: No headaches, memory loss, loss of strength, numbness, or tremors  ENDOCRINE: No heat or cold intolerance; No hair loss  MUSCULOSKELETAL: No joint pain or swelling; No muscle, back, or extremity pain  PSYCHIATRIC: No depression, anxiety, mood swings, or difficulty sleeping  HEME/LYMPH: No easy bruising, or bleeding gums       PHYSICAL EXAM:  T(C): 36.8 (22 @ 11:07), Max: 36.8 (22 @ 11:07)  HR: 78 (22 @ 11:07) (78 - 78)  BP: 118/63 (22 @ 11:07) (118/63 - 118/63)  RR: 18 (22 @ 11:07) (18 - 18)  SpO2: 99% (22 @ 11:07) (99% - 99%)  Wt(kg): --  I&O's Summary        GENERAL: NAD  EYES:   PERRLA   ENMT:   Moist mucous membranes, Good dentition, No lesions  Cardiovascular: Normal S1 S2, No JVD, No murmurs, No edema  Respiratory: Lungs clear to auscultation	  Gastrointestinal:  Soft, Non-tender, + BS	  Extremities: no edema                      proBNP:   Lipid Profile:   HgA1c:   TSH:     Consultant(s) Notes Reviewed:  [x ] YES  [ ] NO    Care Discussed with Consultants/Other Providers [ x] YES  [ ] NO    Imaging Personally Reviewed independently:  [x] YES  [ ] NO    All labs, radiologic studies, vitals, orders and medications list reviewed. Patient is seen and examined at bedside. Case discussed with medical team.

## 2022-11-06 NOTE — H&P ADULT - PROBLEM SELECTOR PLAN 3
- hx CABG and stents x3  - home meds: rosuvastatin 40mg daily, ASA 81mg daily, coreg 6.25mg BID, lisinopril 20mg daily  - c/w home meds  - 7/2022 cardiac cath: patent stent to common left common illiac, SVG to OM is closed at ostium looks like chronically closed, SVG to RCA known to be closed  RIDER TO LAD supplied RCA territory via collaterals  - consider ischemic evaluation on this admission

## 2022-11-06 NOTE — ED PROVIDER NOTE - PROGRESS NOTE DETAILS
LIZ SINGH: pt seen by Dr. Bustillos at bedside, admit to his service after results. Will continue to monitor and reassess. PA FRANCISCO: Patient reassessed, sitting comfortably in bed in NAD, denies any complaints. States feeling better, symptoms improved, no pain at this time. trop 26, pro . spoke with CT tech, will send again for CT scan. Will continue to monitor and reassess. PA FRANCISCO: CTA chest/A/P negative for dissection. spoke with Dr. Abarca, states to admit to Dr. Fontenot. Spoke with Dr. Fontenot, accepted pt for admission. Pt admitted for chest on telemetry.

## 2022-11-06 NOTE — H&P ADULT - HISTORY OF PRESENT ILLNESS
Pt is a 73 yo M with PMH CAD (s/p CABG, 3x stents), CVA (), PAD (s/p LLE stent), HTN, HLD, and CHF (s/p ICD) p/w 1d chest pain since waking up; initially started as abdominal pain which then began radiating to chest and back. Similar to prior episodes prompting need for LHC and stenting. Took NTG and ASA load at home and came to ER for eval. No recent illnesses or changes to activity. Notices increased pain in legs when he doesn't take gabapentin. Otherwise is compliant with meds. Prior to this had been in usual state of health. Last saw cardiologist 2022 and had EKG, ECHO, and cardiac cath at that time. No other concerns or complaints.    On arrival, T 98.2, HR 78, /63, RR 18 O2sat 99% RA. EKG NSR with PVCs, ST depression I, II, aVL, aVF, and V3-6. Labs with plt 122, trop 26--25, . CTA c/a/p neg dissection. CXR with mild stable cardiomegaly. Evaluated by cardiology with plan for TTE and consideration of ischemic eval. Admitted to medicine for further observation and management.     EC22 NSR, LVH, ST depression     2022  Echo: CONCLUSIONS:  1. Mitral annular calcification, otherwise normal mitral  valve. Mild-moderate mitral regurgitation.  2. Calcified trileaflet aortic valve with normal opening.  Mild aortic regurgitation.  3. Mild left ventricular enlargement.  4. Severe global left ventricular systolic dysfunction.  5. Mild diastolic dysfunction (Stage I).  6. Normal right ventricular size and function. A device  wire is noted in the right heart.    2022  Cardiac Cath/PCI: DIAGNOSTIC RECOMMENDATIONS: patent stent to common left common illiac  SVG to OM is closed at ostium looks like chronically closed  SVG to RCA known to be closed  RIEDR TO LAD supplied RCA territory via collaterals

## 2022-11-06 NOTE — PATIENT PROFILE ADULT - FALL HARM RISK - HARM RISK INTERVENTIONS

## 2022-11-06 NOTE — H&P ADULT - NSHPLABSRESULTS_GEN_ALL_CORE
LABS:                        14.2   4.94  )-----------( 122      ( 06 Nov 2022 11:55 )             42.6     11-06    138  |  104  |  18  ----------------------------<  104<H>  4.1   |  24  |  1.30    Ca    9.8      06 Nov 2022 11:55    TPro  7.7  /  Alb  4.4  /  TBili  1.0  /  DBili  x   /  AST  22  /  ALT  21  /  AlkPhos  115  11-06    PT/INR - ( 06 Nov 2022 11:55 )   PT: 12.5 sec;   INR: 1.08 ratio         PTT - ( 06 Nov 2022 11:55 )  PTT:36.5 sec    CAPILLARY BLOOD GLUCOSE          RADIOLOGY & ADDITIONAL TESTS: Reviewed.

## 2022-11-07 LAB
A1C WITH ESTIMATED AVERAGE GLUCOSE RESULT: 5.7 % — HIGH (ref 4–5.6)
ALBUMIN SERPL ELPH-MCNC: 4.1 G/DL — SIGNIFICANT CHANGE UP (ref 3.3–5)
ALP SERPL-CCNC: 111 U/L — SIGNIFICANT CHANGE UP (ref 40–120)
ALT FLD-CCNC: 20 U/L — SIGNIFICANT CHANGE UP (ref 4–41)
ANION GAP SERPL CALC-SCNC: 14 MMOL/L — SIGNIFICANT CHANGE UP (ref 7–14)
AST SERPL-CCNC: 18 U/L — SIGNIFICANT CHANGE UP (ref 4–40)
BASOPHILS # BLD AUTO: 0.05 K/UL — SIGNIFICANT CHANGE UP (ref 0–0.2)
BASOPHILS NFR BLD AUTO: 1 % — SIGNIFICANT CHANGE UP (ref 0–2)
BILIRUB SERPL-MCNC: 0.8 MG/DL — SIGNIFICANT CHANGE UP (ref 0.2–1.2)
BUN SERPL-MCNC: 24 MG/DL — HIGH (ref 7–23)
CALCIUM SERPL-MCNC: 9.9 MG/DL — SIGNIFICANT CHANGE UP (ref 8.4–10.5)
CHLORIDE SERPL-SCNC: 104 MMOL/L — SIGNIFICANT CHANGE UP (ref 98–107)
CHOLEST SERPL-MCNC: 147 MG/DL — SIGNIFICANT CHANGE UP
CO2 SERPL-SCNC: 20 MMOL/L — LOW (ref 22–31)
CREAT SERPL-MCNC: 1.38 MG/DL — HIGH (ref 0.5–1.3)
EGFR: 55 ML/MIN/1.73M2 — LOW
EOSINOPHIL # BLD AUTO: 0.19 K/UL — SIGNIFICANT CHANGE UP (ref 0–0.5)
EOSINOPHIL NFR BLD AUTO: 3.8 % — SIGNIFICANT CHANGE UP (ref 0–6)
ESTIMATED AVERAGE GLUCOSE: 117 — SIGNIFICANT CHANGE UP
GLUCOSE SERPL-MCNC: 87 MG/DL — SIGNIFICANT CHANGE UP (ref 70–99)
HCT VFR BLD CALC: 40.4 % — SIGNIFICANT CHANGE UP (ref 39–50)
HDLC SERPL-MCNC: 26 MG/DL — LOW
HGB BLD-MCNC: 13.6 G/DL — SIGNIFICANT CHANGE UP (ref 13–17)
IANC: 3.21 K/UL — SIGNIFICANT CHANGE UP (ref 1.8–7.4)
IMM GRANULOCYTES NFR BLD AUTO: 0.2 % — SIGNIFICANT CHANGE UP (ref 0–0.9)
LIPID PNL WITH DIRECT LDL SERPL: 99 MG/DL — SIGNIFICANT CHANGE UP
LYMPHOCYTES # BLD AUTO: 1.09 K/UL — SIGNIFICANT CHANGE UP (ref 1–3.3)
LYMPHOCYTES # BLD AUTO: 21.9 % — SIGNIFICANT CHANGE UP (ref 13–44)
MAGNESIUM SERPL-MCNC: 2.3 MG/DL — SIGNIFICANT CHANGE UP (ref 1.6–2.6)
MCHC RBC-ENTMCNC: 28.6 PG — SIGNIFICANT CHANGE UP (ref 27–34)
MCHC RBC-ENTMCNC: 33.7 GM/DL — SIGNIFICANT CHANGE UP (ref 32–36)
MCV RBC AUTO: 85.1 FL — SIGNIFICANT CHANGE UP (ref 80–100)
MONOCYTES # BLD AUTO: 0.42 K/UL — SIGNIFICANT CHANGE UP (ref 0–0.9)
MONOCYTES NFR BLD AUTO: 8.5 % — SIGNIFICANT CHANGE UP (ref 2–14)
NEUTROPHILS # BLD AUTO: 3.21 K/UL — SIGNIFICANT CHANGE UP (ref 1.8–7.4)
NEUTROPHILS NFR BLD AUTO: 64.6 % — SIGNIFICANT CHANGE UP (ref 43–77)
NON HDL CHOLESTEROL: 121 MG/DL — SIGNIFICANT CHANGE UP
NRBC # BLD: 0 /100 WBCS — SIGNIFICANT CHANGE UP (ref 0–0)
NRBC # FLD: 0 K/UL — SIGNIFICANT CHANGE UP (ref 0–0)
PHOSPHATE SERPL-MCNC: 3.2 MG/DL — SIGNIFICANT CHANGE UP (ref 2.5–4.5)
PLATELET # BLD AUTO: 110 K/UL — LOW (ref 150–400)
POTASSIUM SERPL-MCNC: 4.1 MMOL/L — SIGNIFICANT CHANGE UP (ref 3.5–5.3)
POTASSIUM SERPL-SCNC: 4.1 MMOL/L — SIGNIFICANT CHANGE UP (ref 3.5–5.3)
PROT SERPL-MCNC: 7.2 G/DL — SIGNIFICANT CHANGE UP (ref 6–8.3)
RBC # BLD: 4.75 M/UL — SIGNIFICANT CHANGE UP (ref 4.2–5.8)
RBC # FLD: 14.3 % — SIGNIFICANT CHANGE UP (ref 10.3–14.5)
SODIUM SERPL-SCNC: 138 MMOL/L — SIGNIFICANT CHANGE UP (ref 135–145)
TRIGL SERPL-MCNC: 109 MG/DL — SIGNIFICANT CHANGE UP
TSH SERPL-MCNC: 0.94 UIU/ML — SIGNIFICANT CHANGE UP (ref 0.27–4.2)
WBC # BLD: 4.97 K/UL — SIGNIFICANT CHANGE UP (ref 3.8–10.5)
WBC # FLD AUTO: 4.97 K/UL — SIGNIFICANT CHANGE UP (ref 3.8–10.5)

## 2022-11-07 PROCEDURE — 93306 TTE W/DOPPLER COMPLETE: CPT | Mod: 26

## 2022-11-07 RX ORDER — SODIUM CHLORIDE 9 MG/ML
250 INJECTION INTRAMUSCULAR; INTRAVENOUS; SUBCUTANEOUS
Refills: 0 | Status: DISCONTINUED | OUTPATIENT
Start: 2022-11-07 | End: 2022-11-07

## 2022-11-07 RX ADMIN — GABAPENTIN 300 MILLIGRAM(S): 400 CAPSULE ORAL at 21:49

## 2022-11-07 RX ADMIN — PANTOPRAZOLE SODIUM 40 MILLIGRAM(S): 20 TABLET, DELAYED RELEASE ORAL at 05:22

## 2022-11-07 RX ADMIN — Medication 3 MILLIGRAM(S): at 21:48

## 2022-11-07 RX ADMIN — CARVEDILOL PHOSPHATE 6.25 MILLIGRAM(S): 80 CAPSULE, EXTENDED RELEASE ORAL at 17:38

## 2022-11-07 RX ADMIN — ENOXAPARIN SODIUM 40 MILLIGRAM(S): 100 INJECTION SUBCUTANEOUS at 21:48

## 2022-11-07 RX ADMIN — Medication 81 MILLIGRAM(S): at 11:53

## 2022-11-07 RX ADMIN — LORATADINE 10 MILLIGRAM(S): 10 TABLET ORAL at 17:38

## 2022-11-07 RX ADMIN — Medication 1 TABLET(S): at 17:38

## 2022-11-07 RX ADMIN — PRASUGREL 10 MILLIGRAM(S): 5 TABLET, FILM COATED ORAL at 11:53

## 2022-11-07 RX ADMIN — SPIRONOLACTONE 25 MILLIGRAM(S): 25 TABLET, FILM COATED ORAL at 05:22

## 2022-11-07 RX ADMIN — Medication 20 MILLIGRAM(S): at 05:22

## 2022-11-07 RX ADMIN — LISINOPRIL 20 MILLIGRAM(S): 2.5 TABLET ORAL at 05:23

## 2022-11-07 RX ADMIN — ATORVASTATIN CALCIUM 80 MILLIGRAM(S): 80 TABLET, FILM COATED ORAL at 21:48

## 2022-11-07 RX ADMIN — CARVEDILOL PHOSPHATE 6.25 MILLIGRAM(S): 80 CAPSULE, EXTENDED RELEASE ORAL at 05:22

## 2022-11-07 NOTE — PROGRESS NOTE ADULT - ASSESSMENT
71 yo male with a PMH of CAD (s/p 3 stents and 3V-CABG in 2002), PAD(s/p LLE stent), HTN, HLD, chronic systolic CHF, s/p ICD implantation (2019) presented to ED complaining of chest pain    EKG: NSR lateral TWI PVCs  -[facility] 7/2021 which showed  patent stent to common left common illiac. SVG to OM is closed at ostium looks like chronically closed. SVG to RCA known to be closed. LIMA TO LAD supplied RCA territory via collaterals    1. Chest pain  -f/u CE   -CTA C/A/P no dissection  -obtain echo  -will consider ischemic eval if cp recurrs    2. ICM  -s/p ICD  -c/w lisinopril and coreg    3. HTN  -improved  -c/w coreg, lisinopril and imdur  -continue to monitor BP

## 2022-11-07 NOTE — PROGRESS NOTE ADULT - ASSESSMENT
71 yo male with a PMH of CAD (s/p 3 stents and 3V-CABG in 2002), PAD(s/p LLE stent), HTN, HLD, chronic systolic CHF, s/p ICD implantation (2019) presented to ED complaining of chest pain    EKG: NSR lateral TWI PVCs  -[facility] 7/2021 which showed  patent stent to common left common illiac. SVG to OM is closed at ostium looks like chronically closed. SVG to RCA known to be closed. LIMA TO LAD supplied RCA territory via collaterals    1. Chest pain  -f/u CE   -CTA C/A/P no dissection  -obtain echo  -will consider ischemic eval    2. ICM  -s/p ICD  -c/w lisinopril and coreg    3. HTN  -improved  -c/w coreg, lisinopril and imdur  -continue to monitor BP  73 yo male with a PMH of CAD (s/p 3 stents and 3V-CABG in 2002), PAD(s/p LLE stent), HTN, HLD, chronic systolic CHF, s/p ICD implantation (2019) presented to ED complaining of chest pain    EKG: NSR lateral TWI PVCs  -[facility] 7/2021 which showed  patent stent to common left common illiac. SVG to OM is closed at ostium looks like chronically closed. SVG to RCA known to be closed. LIMA TO LAD supplied RCA territory via collaterals    1. Chest pain  -f/u CE   -CTA C/A/P no dissection  -obtain echo  -will consider ischemic eval if cp recurrs    2. ICM  -s/p ICD  -c/w lisinopril and coreg    3. HTN  -improved  -c/w coreg, lisinopril and imdur  -continue to monitor BP

## 2022-11-08 LAB
ALBUMIN SERPL ELPH-MCNC: 4.1 G/DL — SIGNIFICANT CHANGE UP (ref 3.3–5)
ALP SERPL-CCNC: 108 U/L — SIGNIFICANT CHANGE UP (ref 40–120)
ALT FLD-CCNC: 15 U/L — SIGNIFICANT CHANGE UP (ref 4–41)
ANION GAP SERPL CALC-SCNC: 12 MMOL/L — SIGNIFICANT CHANGE UP (ref 7–14)
AST SERPL-CCNC: 19 U/L — SIGNIFICANT CHANGE UP (ref 4–40)
BASOPHILS # BLD AUTO: 0.02 K/UL — SIGNIFICANT CHANGE UP (ref 0–0.2)
BASOPHILS NFR BLD AUTO: 0.3 % — SIGNIFICANT CHANGE UP (ref 0–2)
BILIRUB SERPL-MCNC: 0.8 MG/DL — SIGNIFICANT CHANGE UP (ref 0.2–1.2)
BUN SERPL-MCNC: 31 MG/DL — HIGH (ref 7–23)
CALCIUM SERPL-MCNC: 9.8 MG/DL — SIGNIFICANT CHANGE UP (ref 8.4–10.5)
CHLORIDE SERPL-SCNC: 102 MMOL/L — SIGNIFICANT CHANGE UP (ref 98–107)
CO2 SERPL-SCNC: 22 MMOL/L — SIGNIFICANT CHANGE UP (ref 22–31)
CREAT ?TM UR-MCNC: 135 MG/DL — SIGNIFICANT CHANGE UP
CREAT SERPL-MCNC: 1.64 MG/DL — HIGH (ref 0.5–1.3)
EGFR: 45 ML/MIN/1.73M2 — LOW
EOSINOPHIL # BLD AUTO: 0.11 K/UL — SIGNIFICANT CHANGE UP (ref 0–0.5)
EOSINOPHIL NFR BLD AUTO: 1.9 % — SIGNIFICANT CHANGE UP (ref 0–6)
GLUCOSE SERPL-MCNC: 113 MG/DL — HIGH (ref 70–99)
HCT VFR BLD CALC: 41.8 % — SIGNIFICANT CHANGE UP (ref 39–50)
HGB BLD-MCNC: 13.9 G/DL — SIGNIFICANT CHANGE UP (ref 13–17)
IANC: 4.23 K/UL — SIGNIFICANT CHANGE UP (ref 1.8–7.4)
IMM GRANULOCYTES NFR BLD AUTO: 0.2 % — SIGNIFICANT CHANGE UP (ref 0–0.9)
LYMPHOCYTES # BLD AUTO: 1.03 K/UL — SIGNIFICANT CHANGE UP (ref 1–3.3)
LYMPHOCYTES # BLD AUTO: 17.6 % — SIGNIFICANT CHANGE UP (ref 13–44)
MAGNESIUM SERPL-MCNC: 2.1 MG/DL — SIGNIFICANT CHANGE UP (ref 1.6–2.6)
MCHC RBC-ENTMCNC: 28.3 PG — SIGNIFICANT CHANGE UP (ref 27–34)
MCHC RBC-ENTMCNC: 33.3 GM/DL — SIGNIFICANT CHANGE UP (ref 32–36)
MCV RBC AUTO: 85.1 FL — SIGNIFICANT CHANGE UP (ref 80–100)
MONOCYTES # BLD AUTO: 0.46 K/UL — SIGNIFICANT CHANGE UP (ref 0–0.9)
MONOCYTES NFR BLD AUTO: 7.8 % — SIGNIFICANT CHANGE UP (ref 2–14)
NEUTROPHILS # BLD AUTO: 4.23 K/UL — SIGNIFICANT CHANGE UP (ref 1.8–7.4)
NEUTROPHILS NFR BLD AUTO: 72.2 % — SIGNIFICANT CHANGE UP (ref 43–77)
NRBC # BLD: 0 /100 WBCS — SIGNIFICANT CHANGE UP (ref 0–0)
NRBC # FLD: 0 K/UL — SIGNIFICANT CHANGE UP (ref 0–0)
PHOSPHATE SERPL-MCNC: 3.8 MG/DL — SIGNIFICANT CHANGE UP (ref 2.5–4.5)
PLATELET # BLD AUTO: 108 K/UL — LOW (ref 150–400)
POTASSIUM SERPL-MCNC: 3.9 MMOL/L — SIGNIFICANT CHANGE UP (ref 3.5–5.3)
POTASSIUM SERPL-SCNC: 3.9 MMOL/L — SIGNIFICANT CHANGE UP (ref 3.5–5.3)
PROT ?TM UR-MCNC: 52 MG/DL — SIGNIFICANT CHANGE UP
PROT SERPL-MCNC: 7.5 G/DL — SIGNIFICANT CHANGE UP (ref 6–8.3)
PROT/CREAT UR-RTO: 0.4 RATIO — HIGH (ref 0–0.2)
RBC # BLD: 4.91 M/UL — SIGNIFICANT CHANGE UP (ref 4.2–5.8)
RBC # FLD: 14.3 % — SIGNIFICANT CHANGE UP (ref 10.3–14.5)
SODIUM SERPL-SCNC: 136 MMOL/L — SIGNIFICANT CHANGE UP (ref 135–145)
UUN UR-MCNC: 568 MG/DL — SIGNIFICANT CHANGE UP
WBC # BLD: 5.86 K/UL — SIGNIFICANT CHANGE UP (ref 3.8–10.5)
WBC # FLD AUTO: 5.86 K/UL — SIGNIFICANT CHANGE UP (ref 3.8–10.5)

## 2022-11-08 PROCEDURE — 76770 US EXAM ABDO BACK WALL COMP: CPT | Mod: 26

## 2022-11-08 RX ADMIN — LISINOPRIL 20 MILLIGRAM(S): 2.5 TABLET ORAL at 06:04

## 2022-11-08 RX ADMIN — Medication 100 MILLIGRAM(S): at 21:42

## 2022-11-08 RX ADMIN — ENOXAPARIN SODIUM 40 MILLIGRAM(S): 100 INJECTION SUBCUTANEOUS at 21:43

## 2022-11-08 RX ADMIN — CARVEDILOL PHOSPHATE 6.25 MILLIGRAM(S): 80 CAPSULE, EXTENDED RELEASE ORAL at 05:16

## 2022-11-08 RX ADMIN — ATORVASTATIN CALCIUM 80 MILLIGRAM(S): 80 TABLET, FILM COATED ORAL at 21:43

## 2022-11-08 RX ADMIN — PANTOPRAZOLE SODIUM 40 MILLIGRAM(S): 20 TABLET, DELAYED RELEASE ORAL at 05:16

## 2022-11-08 RX ADMIN — Medication 3 MILLIGRAM(S): at 21:43

## 2022-11-08 RX ADMIN — GABAPENTIN 300 MILLIGRAM(S): 400 CAPSULE ORAL at 21:43

## 2022-11-08 RX ADMIN — CARVEDILOL PHOSPHATE 6.25 MILLIGRAM(S): 80 CAPSULE, EXTENDED RELEASE ORAL at 16:45

## 2022-11-08 RX ADMIN — Medication 20 MILLIGRAM(S): at 05:16

## 2022-11-08 RX ADMIN — SPIRONOLACTONE 25 MILLIGRAM(S): 25 TABLET, FILM COATED ORAL at 05:16

## 2022-11-08 NOTE — CONSULT NOTE ADULT - SUBJECTIVE AND OBJECTIVE BOX
[facility]   MD CHRISTINE SWEENEY MD KRISTINE SOLTANPOUR, DO ANGELA WONG, PA        TEL:  OFFICE: [phone]  From 5pm-7am answering service [phone]    --- INITIAL RENAL CONSULT NOTE ---date of service 22 @ 10:46    HPI:  73 yo M with PMH CAD (s/p CABG, 3x stents), CVA (), PAD (s/p LLE stent), HTN, HLD, and CHF (s/p ICD) p/w 1d chest pain since waking up; initially started as abdominal pain which then began radiating to chest and back. Similar to prior episodes prompting need for LHC and stenting. Took NTG and ASA load at home and came to ER for eval. No recent illnesses or changes to activity. Notices increased pain in legs when he doesn't take gabapentin. Otherwise is compliant with meds. Prior to this had been in usual state of health. Last saw cardiologist 2022 and had EKG, ECHO, and cardiac cath at that time. No other concerns or complaints.  nephrology consulted for worsen renal function        Allergies:  No Known Allergies      PAST MEDICAL & SURGICAL HISTORY:  HTN (hypertension)      PAD (peripheral artery disease)  stent to L lower extremity artery      Constipation, unspecified constipation type      Hyperlipidemia      NSTEMI (non-ST elevated myocardial infarction)   and 2018 and       Coronary artery disease involving native coronary artery of native heart, unspecified whether angina present      Ischemic cardiomyopathy with implantable cardioverter-defibrillator (ICD)      S/P CABG (coronary artery bypass graft)  x3; [facility] 2002      History of coronary angiogram  multiple stents placed      History of femoral angiogram  stent placed in E 2016      History of laparoscopic cholecystectomy  2016          Home Medications Reviewed    Hospital Medications:   MEDICATIONS  (STANDING):  aspirin  chewable 81 milliGRAM(s) Oral daily  atorvastatin 80 milliGRAM(s) Oral at bedtime  carvedilol 6.25 milliGRAM(s) Oral every 12 hours  enoxaparin Injectable 40 milliGRAM(s) SubCutaneous every 24 hours  furosemide    Tablet 20 milliGRAM(s) Oral daily  gabapentin 300 milliGRAM(s) Oral at bedtime  influenza  Vaccine (HIGH DOSE) 0.7 milliLiter(s) IntraMuscular once  lisinopril 20 milliGRAM(s) Oral daily  loratadine 10 milliGRAM(s) Oral daily  melatonin 3 milliGRAM(s) Oral at bedtime  multivitamin 1 Tablet(s) Oral daily  pantoprazole    Tablet 40 milliGRAM(s) Oral before breakfast  prasugrel 10 milliGRAM(s) Oral daily  spironolactone 25 milliGRAM(s) Oral daily      SOCIAL HISTORY:  Denies ETOh, Smoking,     FAMILY HISTORY:  Family history of coronary artery disease in brother (Sibling)  4 brothers with MI/CABG, 1  at 77yo    Family history of stroke  60yo CVA, heart attack 61yo    Family history of coronary artery disease in sister (Sibling)   from MI    Family history of MI (myocardial infarction)  mother,         REVIEW OF SYSTEMS:  CONSTITUTIONAL: No weakness, fevers or chills  EYES/ENT: No visual changes;  No vertigo or throat pain   NECK: No pain or stiffness  RESPIRATORY: No cough, wheezing, hemoptysis; No shortness of breath  CARDIOVASCULAR: see hpi  GASTROINTESTINAL: No abdominal or epigastric pain. No nausea, vomiting, or hematemesis; No diarrhea or constipation. No melena or hematochezia.  GENITOURINARY: No dysuria, frequency, foamy urine, urinary urgency, incontinence or hematuria  NEUROLOGICAL: No numbness or weakness  SKIN: No itching, burning, rashes, or lesions   VASCULAR: No bilateral lower extremity edema.   All other review of systems is negative unless indicated above.    VITALS:  T(F): 98 (22 @ 05:15), Max: 98.4 (22 @ 17:30)  HR: 79 (22 @ 05:15)  BP: 106/67 (22 @ 05:15)  RR: 18 (22 @ 05:15)  SpO2: 98% (22 @ 05:15)  Wt(kg): --        PHYSICAL EXAM:  General: NAD  HEENT: anicteric sclera, oropharynx clear, MMM  Neck: No JVD  Respiratory: CTAB, no wheezes, rales or rhonchi  Cardiovascular: S1, S2, RRR  Gastrointestinal: BS+, soft, NT/ND  Extremities: No cyanosis or clubbing. No peripheral edema  Neurological: A/O x 3, no focal deficits  Psychiatric: Normal mood, normal affect  : No CVA tenderness. No nieto.   Skin: No rashes      LABS:      136  |  102  |  31<H>  ----------------------------<  113<H>  3.9   |  22  |  1.64<H>    Ca    9.8      2022 07:18  Phos  3.8       Mg     2.10         TPro  7.5  /  Alb  4.1  /  TBili  0.8  /  DBili      /  AST  19  /  ALT  15  /  AlkPhos  108      Creatinine Trend: 1.64 <--, 1.38 <--, 1.30 <--                        13.9   5.86  )-----------( 108      ( 2022 07:18 )             41.8     Urine Studies:        RADIOLOGY & ADDITIONAL STUDIES:                 [facility]   MD CHRISTINE SWEENEY MD KRISTINE SOLTANPOUR, DO ANGELA WONG, PA        TEL:  OFFICE: [phone]  From 5pm-7am answering service [phone]    --- INITIAL RENAL CONSULT NOTE ---date of service 22 @ 10:46    HPI:  71 yo M with PMH CAD (s/p CABG, 3x stents), CVA (), PAD (s/p LLE stent), HTN, HLD, and CHF (s/p ICD) p/w 1d chest pain since waking up; initially started as abdominal pain which then began radiating to chest and back. Similar to prior episodes prompting need for LHC and stenting. Took NTG and ASA load at home and came to ER for eval. No recent illnesses or changes to activity. Notices increased pain in legs when he doesn't take gabapentin. Otherwise is compliant with meds. Prior to this had been in usual state of health. Last saw cardiologist 2022 and had EKG, ECHO, and cardiac cath at that time. No other concerns or complaints.  Nephrology consulted for worsening kidney function.        Allergies:  No Known Allergies      PAST MEDICAL & SURGICAL HISTORY:  HTN (hypertension)  PAD (peripheral artery disease)  stent to L lower extremity artery  Constipation, unspecified constipation type  Hyperlipidemia  NSTEMI (non-ST elevated myocardial infarction)   and 2018 and   Coronary artery disease involving native coronary artery of native heart, unspecified whether angina present  Ischemic cardiomyopathy with implantable cardioverter-defibrillator (ICD)  S/P CABG (coronary artery bypass graft)  x3; [facility]   History of coronary angiogram multiple stents placed  History of femoral angiogram stent placed in LLE 2016  History of laparoscopic cholecystectomy 2016        Home Medications:  aspirin 81 mg oral tablet: 1 tab(s) orally once a day (23 Oct 2021 14:52)  cetirizine 10 mg oral tablet: 1 tab(s) orally once a day, As Needed (23 Oct 2021 14:52)  Coreg 6.25 mg oral tablet: 1 tab(s) orally 2 times a day (23 Oct 2021 14:52)  furosemide 20 mg oral tablet: 1 tab(s) orally once a day (2022 17:12)  gabapentin 300 mg oral capsule: 1 cap(s) orally once a day (23 Oct 2021 14:52)  lisinopril 20 mg oral tablet: 1 tab(s) orally once a day (23 Oct 2021 14:52)  Multiple Vitamins oral capsule: 1 cap(s) orally once a day (2022 17:13)  nitroglycerin 0.4 mg sublingual tablet: 1 tab(s) sublingual every 5 minutes, As Needed (23 Oct 2021 14:52)  pantoprazole 40 mg oral delayed release tablet: 1 tab(s) orally once a day (23 Oct 2021 14:52)  prasugrel 10 mg oral tablet: 1 tab(s) orally once a day (2022 17:13)  rosuvastatin 40 mg oral tablet: 1 tab(s) orally once a day (2022 17:13)  spironolactone 25 mg oral tablet: 1 tab(s) orally once a day (2022 17:13)    Home Medications Reviewed    Hospital Medications:   MEDICATIONS  (STANDING):  aspirin  chewable 81 milliGRAM(s) Oral daily  atorvastatin 80 milliGRAM(s) Oral at bedtime  carvedilol 6.25 milliGRAM(s) Oral every 12 hours  enoxaparin Injectable 40 milliGRAM(s) SubCutaneous every 24 hours  furosemide    Tablet 20 milliGRAM(s) Oral daily  gabapentin 300 milliGRAM(s) Oral at bedtime  influenza  Vaccine (HIGH DOSE) 0.7 milliLiter(s) IntraMuscular once  lisinopril 20 milliGRAM(s) Oral daily  loratadine 10 milliGRAM(s) Oral daily  melatonin 3 milliGRAM(s) Oral at bedtime  multivitamin 1 Tablet(s) Oral daily  pantoprazole    Tablet 40 milliGRAM(s) Oral before breakfast  prasugrel 10 milliGRAM(s) Oral daily  spironolactone 25 milliGRAM(s) Oral daily      SOCIAL HISTORY:  Denies ETOh, Smoking,     FAMILY HISTORY:  Family history of coronary artery disease in brother (Sibling)  4 brothers with MI/CABG, 1  at 77yo    Family history of stroke  58yo CVA, heart attack 59yo    Family history of coronary artery disease in sister (Sibling)   from MI    Family history of MI (myocardial infarction)  mother,         REVIEW OF SYSTEMS:  CONSTITUTIONAL: No weakness, fevers or chills  EYES/ENT: No visual changes;  No vertigo or throat pain   NECK: No pain or stiffness  RESPIRATORY: No cough, wheezing, hemoptysis; No shortness of breath  CARDIOVASCULAR: see hpi  GASTROINTESTINAL: No abdominal or epigastric pain. No nausea, vomiting, or hematemesis; No diarrhea or constipation. No melena or hematochezia.  GENITOURINARY: No dysuria, frequency, foamy urine, urinary urgency, incontinence or hematuria  NEUROLOGICAL: No numbness or weakness  SKIN: No itching, burning, rashes, or lesions   VASCULAR: No bilateral lower extremity edema.   All other review of systems is negative unless indicated above.    VITALS:  T(F): 98 (22 @ 05:15), Max: 98.4 (22 @ 17:30)  HR: 79 (22 @ 05:15)  BP: 106/67 (22 @ 05:15)  RR: 18 (22 @ 05:15)  SpO2: 98% (22 @ 05:15)  Wt(kg): --        PHYSICAL EXAM:  General: NAD  HEENT: anicteric sclera, oropharynx clear, MMM  Neck: No JVD  Respiratory: CTAB, no wheezes, rales or rhonchi  Cardiovascular: S1, S2, RRR  Gastrointestinal: BS+, soft, NT/ND  Extremities: No cyanosis or clubbing. No peripheral edema  Neurological: A/O x 3, no focal deficits  Psychiatric: Normal mood, normal affect  : No CVA tenderness. No nieto.   Skin: No rashes      LABS:      136  |  102  |  31<H>  ----------------------------<  113<H>  3.9   |  22  |  1.64<H>    Ca    9.8      2022 07:18  Phos  3.8       Mg     2.10         TPro  7.5  /  Alb  4.1  /  TBili  0.8  /  DBili      /  AST  19  /  ALT  15  /  AlkPhos  108      Creatinine Trend: 1.64 <--, 1.38 <--, 1.30 <--                        13.9   5.86  )-----------( 108      ( 2022 07:18 )             41.8     Urine Studies:        RADIOLOGY & ADDITIONAL STUDIES:  ACC: [id number] EXAM:  CT ANGIO CHEST AORTA [facility]                          PROCEDURE DATE:  2022          INTERPRETATION:  Clinical information: Chest pain. Evaluate for aortic   dissection.    CT angiogram of the chest, abdomen and pelvis was obtained following   administration of intravenous contrast.  Approximately 90 cc of Omnipaque   350 was administered and 10 cc was discarded.  Noncontrast images of the   chest were obtained. Coronal, sagittal and MIP images were submitted for   review.    No hilar and or mediastinal adenopathy is noted.    Heart is enlarged in size. Patient is status post CABG. An AICD is noted   in place. No pericardial effusion is noted. No aortic dissection and/or   intramural hematoma are noted.    No endobronchial lesions are noted. Lungs are clear. No pleural effusions   are noted.    Liver, spleen, pancreas and both adrenal glands are unremarkable. Stones   are noted within the gallbladder.    Both kidneys enhance with contrast. 3.2 cm left renal cyst is noted. No   hydronephrosis is noted.    No retroperitoneal adenopathy is noted.    Stool is noted within the large bowel.    Examination of the pelvis demonstrate no free fluid.    Degenerative changes of the spine are noted.    IMPRESSION: No aortic dissection and/or intramural hematoma are noted.    --- End of Report ---            CRISTÓBAL WATKINS MD; Attending Radiologist  This document has been electronically signed. 2022  2:16PM  ACC: [id number] EXAM:  US KIDNEYS AND BLADDER                          PROCEDURE DATE:  2022          INTERPRETATION:  CLINICAL INFORMATION: Acute kidney injury.    COMPARISON: CT chest, abdomen, and pelvis 2021.    TECHNIQUE: Sonography of the kidneys and bladder.    FINDINGS:  Right kidney: 10.0 cm. No hydronephrosis. No renal mass or calculus   visualized.    Left kidney: 10.4 cm. No hydronephrosis. Cyst in the mid kidney measuring   2.7 x 2.9 x 2.5 cm. No renal calculus visualized.    Urinary bladder: Within normal limits.    IMPRESSION:  No hydronephrosis.        --- End of Report ---            ALEX BIRD MD; Attending Radiologist  This document has been electronically signed. 2022  1:05PM

## 2022-11-08 NOTE — PROGRESS NOTE ADULT - ASSESSMENT
71 yo male with a PMH of CAD (s/p 3 stents and 3V-CABG in 2002), PAD(s/p LLE stent), HTN, HLD, chronic systolic CHF, s/p ICD implantation (2019) presented to ED complaining of chest pain    EKG: NSR lateral TWI PVCs  -[facility] 7/2021 which showed  patent stent to common left common illiac. SVG to OM is closed at ostium looks like chronically closed. SVG to RCA known to be closed. LIMA TO LAD supplied RCA territory via collaterals    1. Chest pain  -f/u CE   -CTA C/A/P no dissection  -echo with severe LV dysfunction  -will consider ischemic eval if cp recurrs    2. ICM  -s/p ICD  -c/w lisinopril and coreg  -on furosemide 20mg daily    3. HTN  -improved  -c/w coreg, lisinopril and imdur  -continue to monitor BP    4. ANGIE  -f/u renal recs

## 2022-11-08 NOTE — CONSULT NOTE ADULT - NS ATTEND AMEND GEN_ALL_CORE FT
Kidney US showed no hydronephrosis. Has Cyst. Also he had contrast on 11/06. NEED a UA.   No pain, no shortness of breath    Review of systems: All 10 points ROS was obtained except as above.     acetaminophen     Tablet .. 650 milliGRAM(s) Oral every 6 hours PRN  aspirin  chewable 81 milliGRAM(s) Oral daily  atorvastatin 80 milliGRAM(s) Oral at bedtime  carvedilol 6.25 milliGRAM(s) Oral every 12 hours  enoxaparin Injectable 40 milliGRAM(s) SubCutaneous every 24 hours  furosemide    Tablet 20 milliGRAM(s) Oral daily  gabapentin 300 milliGRAM(s) Oral at bedtime  influenza  Vaccine (HIGH DOSE) 0.7 milliLiter(s) IntraMuscular once  loratadine 10 milliGRAM(s) Oral daily  melatonin 3 milliGRAM(s) Oral at bedtime  multivitamin 1 Tablet(s) Oral daily  pantoprazole    Tablet 40 milliGRAM(s) Oral before breakfast  prasugrel 10 milliGRAM(s) Oral daily      VITAL:  T(C): , Max: 36.8 (11-08-22 @ 11:29)  T(F): , Max: 98.2 (11-08-22 @ 11:29)  HR: 85 (11-08-22 @ 16:45)  BP: 104/62 (11-08-22 @ 16:45)  BP(mean): --  RR: 18 (11-08-22 @ 16:45)  SpO2: 100% (11-08-22 @ 16:45)  Wt(kg): --      PHYSICAL EXAM:  General: NAD; Alert  HEENT:  NCAT; PERRLA  Neck: No JVD; supple  Respiratory: CTA-b/l  Cardiac: RRR s1s2  Gastrointestinal: BS+, soft, NT/ND  Urologic: No nieto  Extremities: No peripheral edema  Access:     LABS:                          13.9   5.86  )-----------( 108      ( 08 Nov 2022 07:18 )             41.8     Na(136)/K(3.9)/Cl(102)/HCO3(22)/BUN(31)/Cr(1.64)Glu(113)/Ca(9.8)/Mg(2.10)/PO4(3.8)    11-08 @ 07:18  Na(138)/K(4.1)/Cl(104)/HCO3(20)/BUN(24)/Cr(1.38)Glu(87)/Ca(9.9)/Mg(2.30)/PO4(3.2)    11-07 @ 06:01  Na(138)/K(4.1)/Cl(104)/HCO3(24)/BUN(18)/Cr(1.30)Glu(104)/Ca(9.8)/Mg(--)/PO4(--)    11-06 @ 11:55      Creatinine, Random Urine: 135 mg/dL (11-08 @ 15:45)  Protein/Creatinine Ratio Calculation: 0.4 Ratio (11-08 @ 15:45) Kidney US showed no hydronephrosis. Has Cyst. Also he had contrast on 11/06. NEED a UA.   No pain, no shortness of breath    Review of systems: All 10 points ROS was obtained except as above.     acetaminophen     Tablet .. 650 milliGRAM(s) Oral every 6 hours PRN  aspirin  chewable 81 milliGRAM(s) Oral daily  atorvastatin 80 milliGRAM(s) Oral at bedtime  carvedilol 6.25 milliGRAM(s) Oral every 12 hours  enoxaparin Injectable 40 milliGRAM(s) SubCutaneous every 24 hours  furosemide    Tablet 20 milliGRAM(s) Oral daily  gabapentin 300 milliGRAM(s) Oral at bedtime  influenza  Vaccine (HIGH DOSE) 0.7 milliLiter(s) IntraMuscular once  loratadine 10 milliGRAM(s) Oral daily  melatonin 3 milliGRAM(s) Oral at bedtime  multivitamin 1 Tablet(s) Oral daily  pantoprazole    Tablet 40 milliGRAM(s) Oral before breakfast  prasugrel 10 milliGRAM(s) Oral daily      VITAL:  T(C): , Max: 36.8 (11-08-22 @ 11:29)  T(F): , Max: 98.2 (11-08-22 @ 11:29)  HR: 85 (11-08-22 @ 16:45)  BP: 104/62 (11-08-22 @ 16:45)  BP(mean): --  RR: 18 (11-08-22 @ 16:45)  SpO2: 100% (11-08-22 @ 16:45)  Wt(kg): --      PHYSICAL EXAM:  General: NAD; Alert  HEENT:  NCAT; PERRLA  Neck: No JVD; supple  Respiratory: CTA-b/l  Cardiac: RRR s1s2  Gastrointestinal: BS+, soft, NT/ND  Urologic: No nieto  Extremities: No peripheral edema  Access:     LABS:                          13.9   5.86  )-----------( 108      ( 08 Nov 2022 07:18 )             41.8     Na(136)/K(3.9)/Cl(102)/HCO3(22)/BUN(31)/Cr(1.64)Glu(113)/Ca(9.8)/Mg(2.10)/PO4(3.8)    11-08 @ 07:18  Na(138)/K(4.1)/Cl(104)/HCO3(20)/BUN(24)/Cr(1.38)Glu(87)/Ca(9.9)/Mg(2.30)/PO4(3.2)    11-07 @ 06:01  Na(138)/K(4.1)/Cl(104)/HCO3(24)/BUN(18)/Cr(1.30)Glu(104)/Ca(9.8)/Mg(--)/PO4(--)    11-06 @ 11:55      Creatinine, Random Urine: 135 mg/dL (11-08 @ 15:45)  Protein/Creatinine Ratio Calculation: 0.4 Ratio (11-08 @ 15:45)    Has proteinuria of 0.4 grams.

## 2022-11-08 NOTE — PROGRESS NOTE ADULT - ASSESSMENT
73 yo male with a PMH of CAD (s/p 3 stents and 3V-CABG in 2002), PAD(s/p LLE stent), HTN, HLD, chronic systolic CHF, s/p ICD implantation (2019) presented to ED complaining of chest pain    EKG: NSR lateral TWI PVCs  -[facility] 7/2021 which showed  patent stent to common left common illiac. SVG to OM is closed at ostium looks like chronically closed. SVG to RCA known to be closed. LIMA TO LAD supplied RCA territory via collaterals    1. Chest pain  -f/u CE   -CTA C/A/P no dissection  -echo with severe LV dysfunction  -will consider ischemic eval if cp recurrs    2. ICM  -s/p ICD  -c/w lisinopril and coreg  -on furosemide 20mg daily    3. HTN  -improved  -c/w coreg, lisinopril and imdur  -continue to monitor BP    4. ANGIE  -f/u renal recs

## 2022-11-08 NOTE — CONSULT NOTE ADULT - ASSESSMENT
73 yo M with PMH CAD (s/p CABG, 3x stents), CVA (2020), PAD (s/p LLE stent), HTN, HLD, and CHF (s/p ICD) p/w 1d chest pain since waking up; initially started as abdominal pain which then began radiating to chest and back. Similar to prior episodes prompting need for LHC and stenting. Took NTG and ASA load at home and came to ER for eval. No recent illnesses or changes to activity. Notices increased pain in legs when he doesn't take gabapentin. Otherwise is compliant with meds. Prior to this had been in usual state of health. Last saw cardiologist 7/2022 and had EKG, ECHO, and cardiac cath at that time. No other concerns or complaints.  nephrology consulted for worsen renal function    ANGIE  unclear baseline  denied hx of ckd  admitted with scr 1.3 now worsening   ? etiology possible ABILIO vs ATN  s/p ct with contrast 11/6  check urine cr, urea, ua  check renal us  monitor bmp  hold lisinopril and spironolactone until renal function improves  avoid nephrotoxic agents  monitor     htn  controlled  hold lisinopril and spironolactone  monitor    chest pain  f/u cardio     71 yo M with PMH CAD (s/p CABG, 3x stents), CVA (2020), PAD (s/p LLE stent), HTN, HLD, and CHF (s/p ICD) p/w 1d chest pain since waking up; initially started as abdominal pain which then began radiating to chest and back. Similar to prior episodes prompting need for LHC and stenting. Took NTG and ASA load at home and came to ER for eval. No recent illnesses or changes to activity. Notices increased pain in legs when he doesn't take gabapentin. Otherwise is compliant with meds. Prior to this had been in usual state of health. Last saw cardiologist 7/2022 and had EKG, ECHO, and cardiac cath at that time. No other concerns or complaints.  nephrology consulted for worsen renal function    ANGIE  unclear baseline  denied hx of ckd  admitted with scr 1.3 now worsening   ? etiology possible ABILIO vs ATN  s/p ct with contrast 11/6  check urine cr, urea, ua  check renal us  monitor bmp  hold lisinopril and spironolactone until renal function improves  avoid nephrotoxic agents  monitor     htn  controlled  hold lisinopril and spironolactone  monitor    chest pain  f/u cardio    chf  echo with EF 27%  clinically compensated on exam  hold spironolactone sec to ANGIE  monitor i/o, daily weight  f/u cardio   71 yo M with PMH CAD (s/p CABG, 3x stents), CVA (2020), PAD (s/p LLE stent), HTN, HLD, and CHF (s/p ICD) p/w 1d chest pain since waking up; initially started as abdominal pain which then began radiating to chest and back. Similar to prior episodes prompting need for LHC and stenting. Took NTG and ASA load at home and came to ER for eval. No recent illnesses or changes to activity. Notices increased pain in legs when he doesn't take gabapentin. Otherwise is compliant with meds. Prior to this had been in usual state of health. Last saw cardiologist 7/2022 and had EKG, ECHO, and cardiac cath at that time. No other concerns or complaints.  Nephrology consulted for worsen renal function.    ANGIE  unclear baseline  denied hx of ckd  admitted with scr 1.3 now worsening   ? etiology possible ABILIO vs ATN  s/p CT with contrast 11/6  check urine cr, urea, ua  Kidney US showed no echogenicity or cysts  monitor bmp  hold lisinopril and spironolactone until renal function improves  avoid nephrotoxic agents  monitor     HTN  controlled  hold lisinopril and spironolactone  monitor    chest pain  f/u cardio    HFrEF  echo with EF 27%  clinically compensated on exam  hold spironolactone sec to ANGIE  monitor i/o, daily weight  f/u cardio   73 yo M with PMH CAD (s/p CABG, 3x stents), CVA (2020), PAD (s/p LLE stent), HTN, HLD, and CHF (s/p ICD) p/w 1d chest pain since waking up; initially started as abdominal pain which then began radiating to chest and back. Similar to prior episodes prompting need for LHC and stenting. Took NTG and ASA load at home and came to ER for eval. No recent illnesses or changes to activity. Notices increased pain in legs when he doesn't take gabapentin. Otherwise is compliant with meds. Prior to this had been in usual state of health. Last saw cardiologist 7/2022 and had EKG, ECHO, and cardiac cath at that time. No other concerns or complaints.  Nephrology consulted for worsen renal function.    ANGIE  unclear baseline  denied hx of ckd  admitted with scr 1.3 now worsening   ? etiology possible ABILIO vs ATN  s/p CT with contrast 11/6  check urine cr, urea, ua  Kidney US showed no echogenicity or cysts  monitor bmp  hold lisinopril and spironolactone until renal function improves  avoid nephrotoxic agents  monitor     HTN  controlled  hold lisinopril and spironolactone  monitor    chest pain  f/u cardio    HFrEF  echo with EF 27%  clinically compensated on exam  hold spironolactone sec to ANGIE  monitor i/o, daily weight  f/u cardio    Proteinuria  Has proteinuria of 0.4 grams.

## 2022-11-09 LAB
ALBUMIN SERPL ELPH-MCNC: 4.1 G/DL — SIGNIFICANT CHANGE UP (ref 3.3–5)
ALP SERPL-CCNC: 102 U/L — SIGNIFICANT CHANGE UP (ref 40–120)
ALT FLD-CCNC: 16 U/L — SIGNIFICANT CHANGE UP (ref 4–41)
ANION GAP SERPL CALC-SCNC: 14 MMOL/L — SIGNIFICANT CHANGE UP (ref 7–14)
AST SERPL-CCNC: 22 U/L — SIGNIFICANT CHANGE UP (ref 4–40)
BASOPHILS # BLD AUTO: 0.02 K/UL — SIGNIFICANT CHANGE UP (ref 0–0.2)
BASOPHILS NFR BLD AUTO: 0.2 % — SIGNIFICANT CHANGE UP (ref 0–2)
BILIRUB SERPL-MCNC: 0.9 MG/DL — SIGNIFICANT CHANGE UP (ref 0.2–1.2)
BUN SERPL-MCNC: 36 MG/DL — HIGH (ref 7–23)
CALCIUM SERPL-MCNC: 9.7 MG/DL — SIGNIFICANT CHANGE UP (ref 8.4–10.5)
CHLORIDE SERPL-SCNC: 101 MMOL/L — SIGNIFICANT CHANGE UP (ref 98–107)
CO2 SERPL-SCNC: 21 MMOL/L — LOW (ref 22–31)
CREAT SERPL-MCNC: 1.76 MG/DL — HIGH (ref 0.5–1.3)
EGFR: 41 ML/MIN/1.73M2 — LOW
EOSINOPHIL # BLD AUTO: 0.23 K/UL — SIGNIFICANT CHANGE UP (ref 0–0.5)
EOSINOPHIL NFR BLD AUTO: 2.8 % — SIGNIFICANT CHANGE UP (ref 0–6)
GLUCOSE SERPL-MCNC: 110 MG/DL — HIGH (ref 70–99)
HCT VFR BLD CALC: 41.7 % — SIGNIFICANT CHANGE UP (ref 39–50)
HGB BLD-MCNC: 14 G/DL — SIGNIFICANT CHANGE UP (ref 13–17)
IANC: 5.62 K/UL — SIGNIFICANT CHANGE UP (ref 1.8–7.4)
IMM GRANULOCYTES NFR BLD AUTO: 0.2 % — SIGNIFICANT CHANGE UP (ref 0–0.9)
LYMPHOCYTES # BLD AUTO: 1.74 K/UL — SIGNIFICANT CHANGE UP (ref 1–3.3)
LYMPHOCYTES # BLD AUTO: 20.8 % — SIGNIFICANT CHANGE UP (ref 13–44)
MAGNESIUM SERPL-MCNC: 2.1 MG/DL — SIGNIFICANT CHANGE UP (ref 1.6–2.6)
MCHC RBC-ENTMCNC: 28.6 PG — SIGNIFICANT CHANGE UP (ref 27–34)
MCHC RBC-ENTMCNC: 33.6 GM/DL — SIGNIFICANT CHANGE UP (ref 32–36)
MCV RBC AUTO: 85.3 FL — SIGNIFICANT CHANGE UP (ref 80–100)
MONOCYTES # BLD AUTO: 0.73 K/UL — SIGNIFICANT CHANGE UP (ref 0–0.9)
MONOCYTES NFR BLD AUTO: 8.7 % — SIGNIFICANT CHANGE UP (ref 2–14)
NEUTROPHILS # BLD AUTO: 5.62 K/UL — SIGNIFICANT CHANGE UP (ref 1.8–7.4)
NEUTROPHILS NFR BLD AUTO: 67.3 % — SIGNIFICANT CHANGE UP (ref 43–77)
NRBC # BLD: 0 /100 WBCS — SIGNIFICANT CHANGE UP (ref 0–0)
NRBC # FLD: 0 K/UL — SIGNIFICANT CHANGE UP (ref 0–0)
PHOSPHATE SERPL-MCNC: 3.8 MG/DL — SIGNIFICANT CHANGE UP (ref 2.5–4.5)
PLATELET # BLD AUTO: 109 K/UL — LOW (ref 150–400)
POTASSIUM SERPL-MCNC: 4 MMOL/L — SIGNIFICANT CHANGE UP (ref 3.5–5.3)
POTASSIUM SERPL-SCNC: 4 MMOL/L — SIGNIFICANT CHANGE UP (ref 3.5–5.3)
PROT SERPL-MCNC: 7.6 G/DL — SIGNIFICANT CHANGE UP (ref 6–8.3)
RBC # BLD: 4.89 M/UL — SIGNIFICANT CHANGE UP (ref 4.2–5.8)
RBC # FLD: 14.6 % — HIGH (ref 10.3–14.5)
SODIUM SERPL-SCNC: 136 MMOL/L — SIGNIFICANT CHANGE UP (ref 135–145)
WBC # BLD: 8.36 K/UL — SIGNIFICANT CHANGE UP (ref 3.8–10.5)
WBC # FLD AUTO: 8.36 K/UL — SIGNIFICANT CHANGE UP (ref 3.8–10.5)

## 2022-11-09 RX ADMIN — CARVEDILOL PHOSPHATE 6.25 MILLIGRAM(S): 80 CAPSULE, EXTENDED RELEASE ORAL at 17:19

## 2022-11-09 RX ADMIN — Medication 1 TABLET(S): at 12:31

## 2022-11-09 RX ADMIN — Medication 100 MILLIGRAM(S): at 21:19

## 2022-11-09 RX ADMIN — PANTOPRAZOLE SODIUM 40 MILLIGRAM(S): 20 TABLET, DELAYED RELEASE ORAL at 06:23

## 2022-11-09 RX ADMIN — CARVEDILOL PHOSPHATE 6.25 MILLIGRAM(S): 80 CAPSULE, EXTENDED RELEASE ORAL at 05:28

## 2022-11-09 RX ADMIN — LORATADINE 10 MILLIGRAM(S): 10 TABLET ORAL at 12:31

## 2022-11-09 RX ADMIN — Medication 20 MILLIGRAM(S): at 05:27

## 2022-11-09 RX ADMIN — GABAPENTIN 300 MILLIGRAM(S): 400 CAPSULE ORAL at 21:18

## 2022-11-09 RX ADMIN — ATORVASTATIN CALCIUM 80 MILLIGRAM(S): 80 TABLET, FILM COATED ORAL at 21:19

## 2022-11-09 RX ADMIN — PRASUGREL 10 MILLIGRAM(S): 5 TABLET, FILM COATED ORAL at 12:31

## 2022-11-09 RX ADMIN — Medication 81 MILLIGRAM(S): at 12:31

## 2022-11-09 RX ADMIN — ENOXAPARIN SODIUM 40 MILLIGRAM(S): 100 INJECTION SUBCUTANEOUS at 21:18

## 2022-11-09 RX ADMIN — Medication 100 MILLIGRAM(S): at 14:31

## 2022-11-09 RX ADMIN — Medication 3 MILLIGRAM(S): at 21:18

## 2022-11-09 RX ADMIN — Medication 100 MILLIGRAM(S): at 06:01

## 2022-11-09 NOTE — PROVIDER CONTACT NOTE (OTHER) - ACTION/TREATMENT ORDERED:
no new orders at this time. continuous monitoring in place
As per ACP Manda, will continue to monitor vitals as ordered and notify ACP if patient becomes symptomatic
acp aware  awaiting order

## 2022-11-09 NOTE — PROGRESS NOTE ADULT - ASSESSMENT
71 yo M with PMH CAD (s/p CABG, 3x stents), CVA (2020), PAD (s/p LLE stent), HTN, HLD, and CHF (s/p ICD) p/w 1d chest pain since waking up; initially started as abdominal pain which then began radiating to chest and back. Similar to prior episodes prompting need for LHC and stenting. Took NTG and ASA load at home and came to ER for eval. No recent illnesses or changes to activity. Notices increased pain in legs when he doesn't take gabapentin. Otherwise is compliant with meds. Prior to this had been in usual state of health. Last saw cardiologist 7/2022 and had EKG, ECHO, and cardiac cath at that time. No other concerns or complaints.  Nephrology consulted for worsen renal function.    ANGIE  unclear baseline  denied hx of ckd  admitted with scr 1.3 now worsening   ? etiology possible ABILIO   s/p CT with contrast 11/6  FEurea<35% not suggested of ATN  seems euvolemic on exam. spironolactone and ACEI on hold (last dose 11/8)  Kidney US showed no echogenicity or cysts  monitor bmp  avoid nephrotoxic agents  monitor     HTN  controlled  hold lisinopril and spironolactone  monitor    chest pain  f/u cardio    HFrEF  echo with EF 27%  clinically compensated on exam  hold spironolactone sec to ANGIE  on low dose lasix   monitor i/o, daily weight  f/u cardio    Proteinuria  Has proteinuria of 0.4 grams.   likely sec to htn  can be work up outpatient

## 2022-11-09 NOTE — PROGRESS NOTE ADULT - ASSESSMENT
73 yo male with a PMH of CAD (s/p 3 stents and 3V-CABG in 2002), PAD(s/p LLE stent), HTN, HLD, chronic systolic CHF, s/p ICD implantation (2019) presented to ED complaining of chest pain    EKG: NSR lateral TWI PVCs  -[facility] 7/2021 which showed  patent stent to common left common illiac. SVG to OM is closed at ostium looks like chronically closed. SVG to RCA known to be closed. LIMA TO LAD supplied RCA territory via collaterals    1. Chest pain  -f/u CE   -CTA C/A/P no dissection  -echo with severe LV dysfunction  -will consider ischemic eval if cp recurrs    2. ICM  -s/p ICD  -c/w coreg  -lisinopril, aldactone and lasix discontinued 2/2 ANGIE    3. HTN  -improved  -c/w coreg and imdur  -continue to monitor BP    4. ANGIE  -f/u renal recs

## 2022-11-09 NOTE — PROVIDER CONTACT NOTE (OTHER) - ASSESSMENT
pt asymptomatic
bp 100/48 hr 82  repeat 87/50 hr 67  PT denies chest pain or shortness of breath. said he had a headache of 2 from 0-10. Stated he did not want pain medication now
Patient complains of feeling weak and tired. Patient resting in bed comfortably. Bed alarm on, all safety maintained

## 2022-11-09 NOTE — PROVIDER CONTACT NOTE (OTHER) - BACKGROUND
Patient admitted for chest pain. Pt has pmh of NSTEMI, HTN and CAD
pt admitted for chest pain
Patient admitted for chest pain. Pt has pmh of NSTEMI, HTN and CAD

## 2022-11-09 NOTE — PROVIDER CONTACT NOTE (OTHER) - SITUATION
Patients BP is 101/43
bp 100/48 hr 82  repeat 87/50 hr 67  PT denies chest pain or shortness of breath. said he had a headache of 2 from 0-10. Stated he did not want pain medication now
pt bp 116/49 hr 80

## 2022-11-09 NOTE — PROGRESS NOTE ADULT - ASSESSMENT
71 yo male with a PMH of CAD (s/p 3 stents and 3V-CABG in 2002), PAD(s/p LLE stent), HTN, HLD, chronic systolic CHF, s/p ICD implantation (2019) presented to ED complaining of chest pain    EKG: NSR lateral TWI PVCs  -[facility] 7/2021 which showed  patent stent to common left common illiac. SVG to OM is closed at ostium looks like chronically closed. SVG to RCA known to be closed. LIMA TO LAD supplied RCA territory via collaterals    1. Chest pain  -f/u CE   -CTA C/A/P no dissection  -echo with severe LV dysfunction  -will consider ischemic eval if cp recurrs    2. ICM  -s/p ICD  -c/w lisinopril and coreg  -on furosemide 20mg daily    3. HTN  -improved  -c/w coreg, lisinopril and imdur  -continue to monitor BP    4. ANGIE  -f/u renal recs

## 2022-11-10 ENCOUNTER — TRANSCRIPTION ENCOUNTER (OUTPATIENT)
Age: 72
End: 2022-11-10

## 2022-11-10 VITALS
RESPIRATION RATE: 14 BRPM | HEART RATE: 67 BPM | OXYGEN SATURATION: 100 % | DIASTOLIC BLOOD PRESSURE: 70 MMHG | TEMPERATURE: 98 F | SYSTOLIC BLOOD PRESSURE: 140 MMHG

## 2022-11-10 LAB
ANION GAP SERPL CALC-SCNC: 10 MMOL/L — SIGNIFICANT CHANGE UP (ref 7–14)
ANISOCYTOSIS BLD QL: SLIGHT — SIGNIFICANT CHANGE UP
BUN SERPL-MCNC: 27 MG/DL — HIGH (ref 7–23)
CALCIUM SERPL-MCNC: 9.6 MG/DL — SIGNIFICANT CHANGE UP (ref 8.4–10.5)
CHLORIDE SERPL-SCNC: 99 MMOL/L — SIGNIFICANT CHANGE UP (ref 98–107)
CO2 SERPL-SCNC: 23 MMOL/L — SIGNIFICANT CHANGE UP (ref 22–31)
CREAT SERPL-MCNC: 1.42 MG/DL — HIGH (ref 0.5–1.3)
EGFR: 53 ML/MIN/1.73M2 — LOW
GIANT PLATELETS BLD QL SMEAR: PRESENT — SIGNIFICANT CHANGE UP
GLUCOSE SERPL-MCNC: 102 MG/DL — HIGH (ref 70–99)
HCT VFR BLD CALC: 37.5 % — LOW (ref 39–50)
HGB BLD-MCNC: 12.8 G/DL — LOW (ref 13–17)
MAGNESIUM SERPL-MCNC: 2.1 MG/DL — SIGNIFICANT CHANGE UP (ref 1.6–2.6)
MANUAL SMEAR VERIFICATION: SIGNIFICANT CHANGE UP
MCHC RBC-ENTMCNC: 29 PG — SIGNIFICANT CHANGE UP (ref 27–34)
MCHC RBC-ENTMCNC: 34.1 GM/DL — SIGNIFICANT CHANGE UP (ref 32–36)
MCV RBC AUTO: 84.8 FL — SIGNIFICANT CHANGE UP (ref 80–100)
NRBC # BLD: 0 /100 WBCS — SIGNIFICANT CHANGE UP (ref 0–0)
NRBC # FLD: 0 K/UL — SIGNIFICANT CHANGE UP (ref 0–0)
PHOSPHATE SERPL-MCNC: 3.2 MG/DL — SIGNIFICANT CHANGE UP (ref 2.5–4.5)
PLAT MORPH BLD: ABNORMAL
PLATELET # BLD AUTO: 87 K/UL — LOW (ref 150–400)
PLATELET COUNT - ESTIMATE: ABNORMAL
POIKILOCYTOSIS BLD QL AUTO: SLIGHT — SIGNIFICANT CHANGE UP
POTASSIUM SERPL-MCNC: 4.1 MMOL/L — SIGNIFICANT CHANGE UP (ref 3.5–5.3)
POTASSIUM SERPL-SCNC: 4.1 MMOL/L — SIGNIFICANT CHANGE UP (ref 3.5–5.3)
RBC # BLD: 4.42 M/UL — SIGNIFICANT CHANGE UP (ref 4.2–5.8)
RBC # FLD: 14.4 % — SIGNIFICANT CHANGE UP (ref 10.3–14.5)
RBC BLD AUTO: ABNORMAL
SODIUM SERPL-SCNC: 132 MMOL/L — LOW (ref 135–145)
WBC # BLD: 7.61 K/UL — SIGNIFICANT CHANGE UP (ref 3.8–10.5)
WBC # FLD AUTO: 7.61 K/UL — SIGNIFICANT CHANGE UP (ref 3.8–10.5)

## 2022-11-10 RX ORDER — SPIRONOLACTONE 25 MG/1
1 TABLET, FILM COATED ORAL
Qty: 0 | Refills: 0 | DISCHARGE

## 2022-11-10 RX ORDER — LISINOPRIL 2.5 MG/1
1 TABLET ORAL
Qty: 0 | Refills: 0 | DISCHARGE

## 2022-11-10 RX ADMIN — Medication 81 MILLIGRAM(S): at 11:59

## 2022-11-10 RX ADMIN — LORATADINE 10 MILLIGRAM(S): 10 TABLET ORAL at 11:59

## 2022-11-10 RX ADMIN — Medication 1 TABLET(S): at 11:59

## 2022-11-10 RX ADMIN — PRASUGREL 10 MILLIGRAM(S): 5 TABLET, FILM COATED ORAL at 12:00

## 2022-11-10 RX ADMIN — PANTOPRAZOLE SODIUM 40 MILLIGRAM(S): 20 TABLET, DELAYED RELEASE ORAL at 05:09

## 2022-11-10 RX ADMIN — Medication 100 MILLIGRAM(S): at 05:13

## 2022-11-10 RX ADMIN — CARVEDILOL PHOSPHATE 6.25 MILLIGRAM(S): 80 CAPSULE, EXTENDED RELEASE ORAL at 05:09

## 2022-11-10 NOTE — DISCHARGE NOTE PROVIDER - NSDCFUADDAPPT_GEN_ALL_CORE_FT
Please see Dr Abarca in one week for repeat blood test and follow up care. It is important to have a follow up visit to monitor your kidney function and to re-evaluate your heart medications.     Follow up with PCP in 1-2 weeks.

## 2022-11-10 NOTE — DISCHARGE NOTE PROVIDER - HOSPITAL COURSE
73 y/o male with a PMH of CAD (s/p 3 stents and 3V-CABG in 2002), PAD (s/p LLE stent), HTN, HLD, chronic systolic CHF, s/p ICD implantation (2019) presented to ED complaining of chest pain.    EKG: NSR lateral TWI PVCs  - [facility] 7/2021 which showed  patent stent to common left common illiac. SVG to OM is closed at ostium looks like chronically closed. SVG to RCA known to be closed. LIMA TO LAD supplied RCA territory via collaterals    Hospital Course:    Chest pain  - trended cardiac enzymes   - CTA C/A/P no dissection  - echo with severe LV dysfunction  - resolved   - outpatient follow up with Dr Abarca     ICM  - s/p ICD  - C/w coreg  - holding lisinopril and spironolactone I/s/o ANGIE  - on furosemide 20mg daily    HTN  - improved  - c/w coreg and imdur  - holding lisinopril and spironolactone I/s/o ANGIE  - continue to monitor BP    ANGIE  - unclear baseline  - denied hx of ckd  - admitted with scr 1.3 now worsening   - ? etiology possible ABILIO   - s/p CT with contrast 11/6  - FEurea<35% not suggested of ATN  - seems euvolemic on exam. spironolactone and ACEI on hold (last dose 11/8)  - Kidney US showed no echogenicity or cysts  - monitored bmp  - avoid nephrotoxic agents  - Cr improved  - lasix resumed   - outpatient follow up in one week with Dr Abarca     On 11/10/22 this case was reviewed with Dr. Fontenot and Dr Abarca, the patient is medically stable and optimized for discharge. All medications were reviewed and prescriptions were sent to mutually agreed upon pharmacy.

## 2022-11-10 NOTE — DISCHARGE NOTE PROVIDER - NSDCFUSCHEDAPPT_GEN_ALL_CORE_FT
Brigida Cowan  [facility]  CARDIOLOGY [id number]  Scheduled Appointment: 11/22/2022    [facility]  ELECTROPH [id number]  Scheduled Appointment: 01/27/2023

## 2022-11-10 NOTE — PROGRESS NOTE ADULT - ASSESSMENT
73 yo male with a PMH of CAD (s/p 3 stents and 3V-CABG in 2002), PAD(s/p LLE stent), HTN, HLD, chronic systolic CHF, s/p ICD implantation (2019) presented to ED complaining of chest pain    EKG: NSR lateral TWI PVCs  -[facility] 7/2021 which showed  patent stent to common left common illiac. SVG to OM is closed at ostium looks like chronically closed. SVG to RCA known to be closed. LIMA TO LAD supplied RCA territory via collaterals    1. Chest pain  -f/u CE   -CTA C/A/P no dissection  -echo with severe LV dysfunction  -resolved    2. ICM  -s/p ICD  -c/w coreg  -lisinopril, aldactone and lasix discontinued 2/2 ANGIE    3. HTN  -improved  -c/w coreg and imdur  -continue to monitor BP    4. ANGIE  -f/u renal recs

## 2022-11-10 NOTE — DISCHARGE NOTE PROVIDER - CARE PROVIDER_API CALL
Ulisses Abarca (MD)  Cardiovascular Disease; Internal Medicine  [address] [place] [address]  Phone: [phone]  Fax: [phone]  Established Patient  Follow Up Time:

## 2022-11-10 NOTE — PROGRESS NOTE ADULT - ASSESSMENT
71 yo M with PMH CAD (s/p CABG, 3x stents), CVA (2020), PAD (s/p LLE stent), HTN, HLD, and CHF (s/p ICD) p/w 1d chest pain since waking up; initially started as abdominal pain which then began radiating to chest and back. Similar to prior episodes prompting need for LHC and stenting. Took NTG and ASA load at home and came to ER for eval. No recent illnesses or changes to activity. Notices increased pain in legs when he doesn't take gabapentin. Otherwise is compliant with meds. Prior to this had been in usual state of health. Last saw cardiologist 7/2022 and had EKG, ECHO, and cardiac cath at that time. No other concerns or complaints.  Nephrology consulted for worsen renal function.    ANGIE  unclear baseline  denied hx of ckd  scr peaked 1.76 improving today  ANGIE likely ABILIO   s/p CT with contrast 11/6  FEurea<35% not suggested of ATN  seems euvolemic on exam. spironolactone and ACEI on hold (last dose 11/8)  lasix held 11/9 but received am dose  pt noted to have wheeze and dry cough today. recommend to resume diuretic, consider repeat chest xray  Kidney US showed no echogenicity or cysts  monitor bmp  avoid nephrotoxic agents  monitor     HTN  controlled  hold lisinopril and spironolactone  monitor    hyponatremia  likely sec to CHF   free water restriction <1L/day  consider resume diuetic  monitor    chest pain  f/u cardio    HFrEF  echo with EF 27%  consider resuming diuretic   monitor i/o, daily weight  f/u cardio    Proteinuria  Has proteinuria of 0.4 grams.   likely sec to htn  can be work up outpatient

## 2022-11-10 NOTE — PROGRESS NOTE ADULT - SUBJECTIVE AND OBJECTIVE BOX
SUBJECTIVE / OVERNIGHT EVENTS:pt seen and examined  11-08-22     MEDICATIONS  (STANDING):  aspirin  chewable 81 milliGRAM(s) Oral daily  atorvastatin 80 milliGRAM(s) Oral at bedtime  carvedilol 6.25 milliGRAM(s) Oral every 12 hours  enoxaparin Injectable 40 milliGRAM(s) SubCutaneous every 24 hours  furosemide    Tablet 20 milliGRAM(s) Oral daily  gabapentin 300 milliGRAM(s) Oral at bedtime  influenza  Vaccine (HIGH DOSE) 0.7 milliLiter(s) IntraMuscular once  loratadine 10 milliGRAM(s) Oral daily  melatonin 3 milliGRAM(s) Oral at bedtime  multivitamin 1 Tablet(s) Oral daily  pantoprazole    Tablet 40 milliGRAM(s) Oral before breakfast  prasugrel 10 milliGRAM(s) Oral daily    MEDICATIONS  (PRN):  acetaminophen     Tablet .. 650 milliGRAM(s) Oral every 6 hours PRN Temp greater or equal to 38C (100.4F), Mild Pain (1 - 3)  benzonatate 100 milliGRAM(s) Oral every 8 hours PRN Cough    T(C): 37 (11-08-22 @ 21:40), Max: 37 (11-08-22 @ 21:40)  HR: 80 (11-08-22 @ 21:40) (79 - 85)  BP: 103/45 (11-08-22 @ 21:40) (100/48 - 106/67)  RR: 18 (11-08-22 @ 21:40) (18 - 18)  SpO2: 100% (11-08-22 @ 21:40) (98% - 100%)    CAPILLARY BLOOD GLUCOSE        I&O's Summary      Constitutional: No fever, fatigue  Skin: No rash.  Eyes: No recent vision problems or eye pain.  ENT: No congestion, ear pain, or sore throat.  Cardiovascular: No chest pain or palpation.  Respiratory: No cough, shortness of breath, congestion, or wheezing.  Gastrointestinal: No abdominal pain, nausea, vomiting, or diarrhea.  Genitourinary: No dysuria.  Musculoskeletal: No joint swelling.  Neurologic: No headache.    PHYSICAL EXAM:  GENERAL: NAD  EYES: EOMI, PERRLA  NECK: Supple, No JVD  CHEST/LUNG: dec breath sounds at bases  HEART:  S1 , S2 +  ABDOMEN: soft , bs+  EXTREMITIES:  no edema  NEUROLOGY:alert awake oriented       LABS:                        13.9   5.86  )-----------( 108      ( 08 Nov 2022 07:18 )             41.8     11-08    136  |  102  |  31<H>  ----------------------------<  113<H>  3.9   |  22  |  1.64<H>    Ca    9.8      08 Nov 2022 07:18  Phos  3.8     11-08  Mg     2.10     11-08    TPro  7.5  /  Alb  4.1  /  TBili  0.8  /  DBili  x   /  AST  19  /  ALT  15  /  AlkPhos  108  11-08              RADIOLOGY & ADDITIONAL TESTS:    Imaging Personally Reviewed:    Consultant(s) Notes Reviewed:      Care Discussed with Consultants/Other Providers:  
  Ulisses Abarca MD  Interventional Cardiology / Endovascular Specialist  [place] Office : [address] [place] [address] [place] [address]  Tel:   [place] Office : [facility] [place] [address]  Tel: [phone]      Subjective/Overnight events: Patient lying in bed comfortably. No acute distress. Denies chest pain, SOB or palpitations  	  MEDICATIONS:  aspirin  chewable 81 milliGRAM(s) Oral daily  carvedilol 6.25 milliGRAM(s) Oral every 12 hours  enoxaparin Injectable 40 milliGRAM(s) SubCutaneous every 24 hours  prasugrel 10 milliGRAM(s) Oral daily      benzonatate 100 milliGRAM(s) Oral every 8 hours PRN  loratadine 10 milliGRAM(s) Oral daily    acetaminophen     Tablet .. 650 milliGRAM(s) Oral every 6 hours PRN  gabapentin 300 milliGRAM(s) Oral at bedtime  melatonin 3 milliGRAM(s) Oral at bedtime    pantoprazole    Tablet 40 milliGRAM(s) Oral before breakfast    atorvastatin 80 milliGRAM(s) Oral at bedtime    influenza  Vaccine (HIGH DOSE) 0.7 milliLiter(s) IntraMuscular once  multivitamin 1 Tablet(s) Oral daily      PAST MEDICAL/SURGICAL HISTORY  PAST MEDICAL & SURGICAL HISTORY:  HTN (hypertension)      PAD (peripheral artery disease)  stent to L lower extremity artery      Constipation, unspecified constipation type      Hyperlipidemia      NSTEMI (non-ST elevated myocardial infarction)   and 2018 and 2019      Coronary artery disease involving native coronary artery of native heart, unspecified whether angina present      Ischemic cardiomyopathy with implantable cardioverter-defibrillator (ICD)      S/P CABG (coronary artery bypass graft)  x3; [facility] 2002      History of coronary angiogram  multiple stents placed      History of femoral angiogram  stent placed in [facility] 2016      History of laparoscopic cholecystectomy  2016          SOCIAL HISTORY: Substance Use (street drugs): ( x ) never used  (  ) other:    FAMILY HISTORY:  Family history of coronary artery disease in brother (Sibling)  4 brothers with MI/CABG, 1  at 79yo    Family history of stroke  60yo CVA, heart attack 61yo    Family history of coronary artery disease in sister (Sibling)   from MI    Family history of MI (myocardial infarction)  mother,           PHYSICAL EXAM:  T(C): 36.8 (22 @ 12:04), Max: 37 (22 @ 21:40)  HR: 84 (22 @ 12:04) (80 - 85)  BP: 105/65 (22 @ 12:04) (104/62 - 110/61)  RR: 18 (22 @ 12:04) (17 - 18)  SpO2: 98% (22 @ 12:04) (97% - 100%)  Wt(kg): --  I&O's Summary        GENERAL: NAD  EYES:   PERRLA   ENMT:   Moist mucous membranes, Good dentition, No lesions  Cardiovascular: Normal S1 S2, No JVD, No murmurs, No edema  Respiratory: Lungs clear to auscultation	  Gastrointestinal:  Soft, Non-tender, + BS	  Extremities: no edema                                      14.0   8.36  )-----------( 109      ( 2022 06:20 )             41.7         136  |  101  |  36<H>  ----------------------------<  110<H>  4.0   |  21<L>  |  1.76<H>    Ca    9.7      2022 06:20  Phos  3.8       Mg     2.10         TPro  7.6  /  Alb  4.1  /  TBili  0.9  /  DBili  x   /  AST  22  /  ALT  16  /  AlkPhos  102      proBNP:   Lipid Profile:   HgA1c:   TSH:     Consultant(s) Notes Reviewed:  [x ] YES  [ ] NO    Care Discussed with Consultants/Other Providers [ x] YES  [ ] NO    Imaging Personally Reviewed independently:  [x] YES  [ ] NO    All labs, radiologic studies, vitals, orders and medications list reviewed. Patient is seen and examined at bedside. Case discussed with medical team.              
  Ulisses Abarca MD  Interventional Cardiology / Endovascular Specialist  [place] Office : [address] [place] [address] [place] [address]  Tel:   [place] Office : [address] [facility] [place] [address]  Tel: [phone]      Subjective/Overnight events: Patient lying in bed comfortably. No acute distress. Denies chest pain, SOB or palpitations  	  MEDICATIONS:  aspirin  chewable 81 milliGRAM(s) Oral daily  carvedilol 6.25 milliGRAM(s) Oral every 12 hours  enoxaparin Injectable 40 milliGRAM(s) SubCutaneous every 24 hours  furosemide    Tablet 20 milliGRAM(s) Oral daily  prasugrel 10 milliGRAM(s) Oral daily      loratadine 10 milliGRAM(s) Oral daily    acetaminophen     Tablet .. 650 milliGRAM(s) Oral every 6 hours PRN  gabapentin 300 milliGRAM(s) Oral at bedtime  melatonin 3 milliGRAM(s) Oral at bedtime    pantoprazole    Tablet 40 milliGRAM(s) Oral before breakfast    atorvastatin 80 milliGRAM(s) Oral at bedtime    influenza  Vaccine (HIGH DOSE) 0.7 milliLiter(s) IntraMuscular once  multivitamin 1 Tablet(s) Oral daily      PAST MEDICAL/SURGICAL HISTORY  PAST MEDICAL & SURGICAL HISTORY:  HTN (hypertension)      PAD (peripheral artery disease)  stent to L lower extremity artery      Constipation, unspecified constipation type      Hyperlipidemia      NSTEMI (non-ST elevated myocardial infarction)   and 2018 and 2019      Coronary artery disease involving native coronary artery of native heart, unspecified whether angina present      Ischemic cardiomyopathy with implantable cardioverter-defibrillator (ICD)      S/P CABG (coronary artery bypass graft)  x3; [facility] 2002      History of coronary angiogram  multiple stents placed      History of femoral angiogram  stent placed in [facility] 2016      History of laparoscopic cholecystectomy  2016          SOCIAL HISTORY: Substance Use (street drugs): ( x ) never used  (  ) other:    FAMILY HISTORY:  Family history of coronary artery disease in brother (Sibling)  4 brothers with MI/CABG, 1  at 77yo    Family history of stroke  58yo CVA, heart attack 61yo    Family history of coronary artery disease in sister (Sibling)   from MI    Family history of MI (myocardial infarction)  mother,         PHYSICAL EXAM:  T(C): 36.8 (22 @ 11:29), Max: 36.9 (22 @ 17:30)  HR: 82 (22 @ 11:29) (70 - 82)  BP: 100/48 (22 @ 11:29) (100/48 - 123/68)  RR: 18 (22 @ 11:29) (16 - 18)  SpO2: 99% (22 @ 11:29) (98% - 100%)  Wt(kg): --  I&O's Summary          GENERAL: NAD  EYES:   PERRLA   ENMT:   Moist mucous membranes, Good dentition, No lesions  Cardiovascular: Normal S1 S2, No JVD, No murmurs, No edema  Respiratory: Lungs clear to auscultation	  Gastrointestinal:  Soft, Non-tender, + BS	  Extremities: no edema                                13.9   5.86  )-----------( 108      ( 2022 07:18 )             41.8         136  |  102  |  31<H>  ----------------------------<  113<H>  3.9   |  22  |  1.64<H>    Ca    9.8      2022 07:18  Phos  3.8     11-08  Mg     2.10     -08    TPro  7.5  /  Alb  4.1  /  TBili  0.8  /  DBili  x   /  AST  19  /  ALT  15  /  AlkPhos  108  -08    proBNP:   Lipid Profile:   HgA1c:   TSH:     Consultant(s) Notes Reviewed:  [x ] YES  [ ] NO    Care Discussed with Consultants/Other Providers [ x] YES  [ ] NO    Imaging Personally Reviewed independently:  [x] YES  [ ] NO    All labs, radiologic studies, vitals, orders and medications list reviewed. Patient is seen and examined at bedside. Case discussed with medical team.              
  Ulisses Abarca MD  Interventional Cardiology / Endovascular Specialist  [place] Office : [address] [place] [address] [place] [address]  Tel:   [place] Office : [address] [facility] [place] [address]  Tel: [phone]      Subjective/Overnight events: Patient lying in bed comfortably. No acute distress. Denies chest pain, SOB or palpitations  	  MEDICATIONS:  aspirin  chewable 81 milliGRAM(s) Oral daily  carvedilol 6.25 milliGRAM(s) Oral every 12 hours  enoxaparin Injectable 40 milliGRAM(s) SubCutaneous every 24 hours  prasugrel 10 milliGRAM(s) Oral daily      benzonatate 100 milliGRAM(s) Oral every 8 hours PRN  guaiFENesin Oral Liquid (Sugar-Free) 100 milliGRAM(s) Oral every 6 hours PRN  loratadine 10 milliGRAM(s) Oral daily    acetaminophen     Tablet .. 650 milliGRAM(s) Oral every 6 hours PRN  gabapentin 300 milliGRAM(s) Oral at bedtime  melatonin 3 milliGRAM(s) Oral at bedtime    pantoprazole    Tablet 40 milliGRAM(s) Oral before breakfast    atorvastatin 80 milliGRAM(s) Oral at bedtime    influenza  Vaccine (HIGH DOSE) 0.7 milliLiter(s) IntraMuscular once  multivitamin 1 Tablet(s) Oral daily      PAST MEDICAL/SURGICAL HISTORY  PAST MEDICAL & SURGICAL HISTORY:  HTN (hypertension)      PAD (peripheral artery disease)  stent to L lower extremity artery      Constipation, unspecified constipation type      Hyperlipidemia      NSTEMI (non-ST elevated myocardial infarction)   and 2018 and 2019      Coronary artery disease involving native coronary artery of native heart, unspecified whether angina present      Ischemic cardiomyopathy with implantable cardioverter-defibrillator (ICD)      S/P CABG (coronary artery bypass graft)  x3; [facility] 2002      History of coronary angiogram  multiple stents placed      History of femoral angiogram  stent placed in [facility] 2016      History of laparoscopic cholecystectomy  2016          SOCIAL HISTORY: Substance Use (street drugs): ( x ) never used  (  ) other:    FAMILY HISTORY:  Family history of coronary artery disease in brother (Sibling)  4 brothers with MI/CABG, 1  at 77yo    Family history of stroke  58yo CVA, heart attack 61yo    Family history of coronary artery disease in sister (Sibling)   from MI    Family history of MI (myocardial infarction)  mother,         PHYSICAL EXAM:  T(C): 36.7 (11-10-22 @ 12:00), Max: 37.1 (11-10-22 @ 05:04)  HR: 67 (11-10-22 @ 12:00) (67 - 86)  BP: 140/70 (11-10-22 @ 12:00) (105/52 - 143/73)  RR: 14 (11-10-22 @ 12:00) (14 - 18)  SpO2: 100% (11-10-22 @ 12:00) (97% - 100%)  Wt(kg): --  I&O's Summary      GENERAL: NAD  EYES:   PERRLA   ENMT:   Moist mucous membranes, Good dentition, No lesions  Cardiovascular: Normal S1 S2, No JVD, No murmurs, No edema  Respiratory: Lungs clear to auscultation	  Gastrointestinal:  Soft, Non-tender, + BS	  Extremities: no edema                              12.8   7.61  )-----------( 87       ( 10 Nov 2022 06:55 )             37.5     11-10    132<L>  |  99  |  27<H>  ----------------------------<  102<H>  4.1   |  23  |  1.42<H>    Ca    9.6      10 Nov 2022 06:55  Phos  3.2     11-10  Mg     2.10     -10    TPro  7.6  /  Alb  4.1  /  TBili  0.9  /  DBili  x   /  AST  22  /  ALT  16  /  AlkPhos  102  11-09    proBNP:   Lipid Profile:   HgA1c:   TSH:     Consultant(s) Notes Reviewed:  [x ] YES  [ ] NO    Care Discussed with Consultants/Other Providers [ x] YES  [ ] NO    Imaging Personally Reviewed independently:  [x] YES  [ ] NO    All labs, radiologic studies, vitals, orders and medications list reviewed. Patient is seen and examined at bedside. Case discussed with medical team.              
[facility] [place] NEPHROLOGY PRACTICE   MD CHRISTINE SWEENEY MD KRISTINE SOLTANPOUR, DO ANGELA WONG, PA    TEL:  OFFICE: [phone]  From 5pm-7am Answering Service [phone]    -- RENAL FOLLOW UP NOTE ---Date of Service 11-10-22 @ 09:50    Patient is a 72y old  Male who presents with a chief complaint of Chest pain (09 Nov 2022 12:21)      Patient seen and examined at bedside. c/o dry cough    VITALS:  T(F): 98.7 (11-10-22 @ 05:04), Max: 98.7 (11-10-22 @ 05:04)  HR: 86 (11-10-22 @ 05:04)  BP: 105/52 (11-10-22 @ 05:04)  RR: 18 (11-10-22 @ 05:04)  SpO2: 98% (11-10-22 @ 05:04)  Wt(kg): --        PHYSICAL EXAM:  General: NAD  Neck: No JVD  Respiratory: +wheeze   Cardiovascular: S1, S2, RRR  Gastrointestinal: BS+, soft, NT/ND  Extremities: No peripheral edema    Hospital Medications:   MEDICATIONS  (STANDING):  aspirin  chewable 81 milliGRAM(s) Oral daily  atorvastatin 80 milliGRAM(s) Oral at bedtime  carvedilol 6.25 milliGRAM(s) Oral every 12 hours  enoxaparin Injectable 40 milliGRAM(s) SubCutaneous every 24 hours  gabapentin 300 milliGRAM(s) Oral at bedtime  influenza  Vaccine (HIGH DOSE) 0.7 milliLiter(s) IntraMuscular once  loratadine 10 milliGRAM(s) Oral daily  melatonin 3 milliGRAM(s) Oral at bedtime  multivitamin 1 Tablet(s) Oral daily  pantoprazole    Tablet 40 milliGRAM(s) Oral before breakfast  prasugrel 10 milliGRAM(s) Oral daily      LABS:  11-10    132<L>  |  99  |  27<H>  ----------------------------<  102<H>  4.1   |  23  |  1.42<H>    Ca    9.6      10 Nov 2022 06:55  Phos  3.2     11-10  Mg     2.10     11-10    TPro  7.6  /  Alb  4.1  /  TBili  0.9  /  DBili      /  AST  22  /  ALT  16  /  AlkPhos  102  11-09    Creatinine Trend: 1.42 <--, 1.76 <--, 1.64 <--, 1.38 <--, 1.30 <--    Phosphorus Level, Serum: 3.2 mg/dL (11-10 @ 06:55)                              12.8   7.61  )-----------( 87       ( 10 Nov 2022 06:55 )             37.5     Urine Studies:    Urine Creatinine 135      [11-08-22 @ 15:45]  Urine Protein 52      [11-08-22 @ 15:45]  Urine Urea Nitrogen 568.0      [11-08-22 @ 15:45]    HbA1c 5.7      [07-01-19 @ 05:30]  TSH 0.94      [11-07-22 @ 06:01]  Lipid: chol 147, , HDL 26, LDL --      [11-07-22 @ 06:01]        RADIOLOGY & ADDITIONAL STUDIES:  
[facility] NEPHROLOGY PRACTICE   MD CHRISTINE SWEENEY MD KRISTINE SOLTANPOUR, DO ANGELA WONG, PA    TEL:  OFFICE: [phone]  From 5pm-7am Answering Service [phone]    -- RENAL FOLLOW UP NOTE ---Date of Service 11-09-22 @ 10:52    Patient is a 72y old  Male who presents with a chief complaint of Chest pain (08 Nov 2022 14:57)      Patient seen and examined at bedside. No chest pain/sob    VITALS:  T(F): 98.4 (11-09-22 @ 06:36), Max: 98.6 (11-08-22 @ 21:40)  HR: 83 (11-09-22 @ 06:36)  BP: 107/57 (11-09-22 @ 06:36)  RR: 17 (11-09-22 @ 06:36)  SpO2: 97% (11-09-22 @ 06:36)  Wt(kg): --        PHYSICAL EXAM:  General: NAD  Neck: No JVD  Respiratory: CTAB, no wheezes, rales or rhonchi  Cardiovascular: S1, S2, RRR  Gastrointestinal: BS+, soft, NT/ND  Extremities: No peripheral edema    Hospital Medications:   MEDICATIONS  (STANDING):  aspirin  chewable 81 milliGRAM(s) Oral daily  atorvastatin 80 milliGRAM(s) Oral at bedtime  carvedilol 6.25 milliGRAM(s) Oral every 12 hours  enoxaparin Injectable 40 milliGRAM(s) SubCutaneous every 24 hours  furosemide    Tablet 20 milliGRAM(s) Oral daily  gabapentin 300 milliGRAM(s) Oral at bedtime  influenza  Vaccine (HIGH DOSE) 0.7 milliLiter(s) IntraMuscular once  loratadine 10 milliGRAM(s) Oral daily  melatonin 3 milliGRAM(s) Oral at bedtime  multivitamin 1 Tablet(s) Oral daily  pantoprazole    Tablet 40 milliGRAM(s) Oral before breakfast  prasugrel 10 milliGRAM(s) Oral daily      LABS:  11-09    136  |  101  |  36<H>  ----------------------------<  110<H>  4.0   |  21<L>  |  1.76<H>    Ca    9.7      09 Nov 2022 06:20  Phos  3.8     11-09  Mg     2.10     11-09    TPro  7.6  /  Alb  4.1  /  TBili  0.9  /  DBili      /  AST  22  /  ALT  16  /  AlkPhos  102  11-09    Creatinine Trend: 1.76 <--, 1.64 <--, 1.38 <--, 1.30 <--    Albumin, Serum: 4.1 g/dL (11-09 @ 06:20)  Phosphorus Level, Serum: 3.8 mg/dL (11-09 @ 06:20)                              14.0   8.36  )-----------( 109      ( 09 Nov 2022 06:20 )             41.7     Urine Studies:    Urine Creatinine 135      [11-08-22 @ 15:45]  Urine Protein 52      [11-08-22 @ 15:45]  Urine Urea Nitrogen 568.0      [11-08-22 @ 15:45]    HbA1c 5.7      [07-01-19 @ 05:30]  TSH 0.94      [11-07-22 @ 06:01]  Lipid: chol 147, , HDL 26, LDL --      [11-07-22 @ 06:01]        RADIOLOGY & ADDITIONAL STUDIES:  
      SUBJECTIVE / OVERNIGHT EVENTS:pt seen and examined  11-09-22     MEDICATIONS  (STANDING):  aspirin  chewable 81 milliGRAM(s) Oral daily  atorvastatin 80 milliGRAM(s) Oral at bedtime  carvedilol 6.25 milliGRAM(s) Oral every 12 hours  enoxaparin Injectable 40 milliGRAM(s) SubCutaneous every 24 hours  gabapentin 300 milliGRAM(s) Oral at bedtime  influenza  Vaccine (HIGH DOSE) 0.7 milliLiter(s) IntraMuscular once  loratadine 10 milliGRAM(s) Oral daily  melatonin 3 milliGRAM(s) Oral at bedtime  multivitamin 1 Tablet(s) Oral daily  pantoprazole    Tablet 40 milliGRAM(s) Oral before breakfast  prasugrel 10 milliGRAM(s) Oral daily    MEDICATIONS  (PRN):  acetaminophen     Tablet .. 650 milliGRAM(s) Oral every 6 hours PRN Temp greater or equal to 38C (100.4F), Mild Pain (1 - 3)  benzonatate 100 milliGRAM(s) Oral every 8 hours PRN Cough    Vital Signs Last 24 Hrs  T(C): 36.8 (11-09-22 @ 17:00), Max: 37 (11-08-22 @ 21:40)  T(F): 98.2 (11-09-22 @ 17:00), Max: 98.6 (11-08-22 @ 21:40)  HR: 85 (11-09-22 @ 17:00) (80 - 85)  BP: 143/73 (11-09-22 @ 17:00) (105/65 - 143/73)  BP(mean): --  RR: 18 (11-09-22 @ 17:00) (17 - 18)  SpO2: 98% (11-09-22 @ 17:00) (97% - 100%)          Constitutional: No fever, fatigue  Skin: No rash.  Eyes: No recent vision problems or eye pain.  ENT: No congestion, ear pain, or sore throat.  Cardiovascular: No chest pain or palpation.  Respiratory: No cough, shortness of breath, congestion, or wheezing.  Gastrointestinal: No abdominal pain, nausea, vomiting, or diarrhea.  Genitourinary: No dysuria.  Musculoskeletal: No joint swelling.  Neurologic: No headache.    PHYSICAL EXAM:  GENERAL: NAD  EYES: EOMI, PERRLA  NECK: Supple, No JVD  CHEST/LUNG: dec breath sounds at bases  HEART:  S1 , S2 +  ABDOMEN: soft , bs+  EXTREMITIES:  no edema  NEUROLOGY:alert awake oriented     LABS:  11-09    136  |  101  |  36<H>  ----------------------------<  110<H>  4.0   |  21<L>  |  1.76<H>    Ca    9.7      09 Nov 2022 06:20  Phos  3.8     11-09  Mg     2.10     11-09    TPro  7.6  /  Alb  4.1  /  TBili  0.9  /  DBili      /  AST  22  /  ALT  16  /  AlkPhos  102  11-09    Creatinine Trend: 1.76 <--, 1.64 <--, 1.38 <--, 1.30 <--                        14.0   8.36  )-----------( 109      ( 09 Nov 2022 06:20 )             41.7     Urine Studies:    Creatinine, Random Urine: 135 mg/dL (11-08 @ 15:45)  Protein/Creatinine Ratio Calculation: 0.4 Ratio (11-08 @ 15:45)          LIVER FUNCTIONS - ( 09 Nov 2022 06:20 )  Alb: 4.1 g/dL / Pro: 7.6 g/dL / ALK PHOS: 102 U/L / ALT: 16 U/L / AST: 22 U/L / GGT: x                 RADIOLOGY & ADDITIONAL TESTS:    Imaging Personally Reviewed:yes    Consultant(s) Notes Reviewed:  yes    Care Discussed with Consultants/Other Providers:yes  
  Ulisses Abarca MD  Interventional Cardiology / Endovascular Specialist  [place] Office : [address] [place] [address] [place] [address]  Tel:   [place] Office : [address] [facility] [place] [address]  Tel: [phone]      Subjective/Overnight events: Patient lying in bed comfortably. No acute distress.   	  MEDICATIONS:  aspirin  chewable 81 milliGRAM(s) Oral daily  carvedilol 6.25 milliGRAM(s) Oral every 12 hours  enoxaparin Injectable 40 milliGRAM(s) SubCutaneous every 24 hours  furosemide    Tablet 20 milliGRAM(s) Oral daily  lisinopril 20 milliGRAM(s) Oral daily  prasugrel 10 milliGRAM(s) Oral daily  spironolactone 25 milliGRAM(s) Oral daily      loratadine 10 milliGRAM(s) Oral daily    acetaminophen     Tablet .. 650 milliGRAM(s) Oral every 6 hours PRN  gabapentin 300 milliGRAM(s) Oral at bedtime  melatonin 3 milliGRAM(s) Oral at bedtime    pantoprazole    Tablet 40 milliGRAM(s) Oral before breakfast    atorvastatin 80 milliGRAM(s) Oral at bedtime    influenza  Vaccine (HIGH DOSE) 0.7 milliLiter(s) IntraMuscular once  multivitamin 1 Tablet(s) Oral daily      PAST MEDICAL/SURGICAL HISTORY  PAST MEDICAL & SURGICAL HISTORY:  HTN (hypertension)      PAD (peripheral artery disease)  stent to L lower extremity artery      Constipation, unspecified constipation type      Hyperlipidemia      NSTEMI (non-ST elevated myocardial infarction)   and 2018 and 2019      Coronary artery disease involving native coronary artery of native heart, unspecified whether angina present      Ischemic cardiomyopathy with implantable cardioverter-defibrillator (ICD)      S/P CABG (coronary artery bypass graft)  x3; [facility] 2002      History of coronary angiogram  multiple stents placed      History of femoral angiogram  stent placed in [facility] 2016      History of laparoscopic cholecystectomy  2016          SOCIAL HISTORY: Substance Use (street drugs): ( x ) never used  (  ) other:    FAMILY HISTORY:  Family history of coronary artery disease in brother (Sibling)  4 brothers with MI/CABG, 1  at 77yo    Family history of stroke  58yo CVA, heart attack 59yo    Family history of coronary artery disease in sister (Sibling)   from MI    Family history of MI (myocardial infarction)  mother,       PHYSICAL EXAM:  T(C): 36.6 (22 @ 13:00), Max: 36.9 (22 @ 15:58)  HR: 107 (22 @ 13:00) (62 - 107)  BP: 101/43 (22 @ 13:00) (101/43 - 140/75)  RR: 17 (22 @ 13:00) (17 - 18)  SpO2: 98% (22 @ 13:00) (98% - 100%)  Wt(kg): --  I&O's Summary    2022 07:01  -  2022 07:00  --------------------------------------------------------  IN: 125 mL / OUT: 0 mL / NET: 125 mL      Height (cm): 165.1 ( 19:13)  Weight (kg): 61.1 ( 19:13)  BMI (kg/m2): 22.4 ( 19:13)  BSA (m2): 1.67 ( 19:13)      GENERAL: NAD  EYES:   PERRLA   ENMT:   Moist mucous membranes, Good dentition, No lesions  Cardiovascular: Normal S1 S2, No JVD, No murmurs, No edema  Respiratory: Lungs clear to auscultation	  Gastrointestinal:  Soft, Non-tender, + BS	  Extremities: no edema                                    13.6   4.97  )-----------( 110      ( 2022 06:01 )             40.4         138  |  104  |  24<H>  ----------------------------<  87  4.1   |  20<L>  |  1.38<H>    Ca    9.9      2022 06:01  Phos  3.2       Mg     2.30         TPro  7.2  /  Alb  4.1  /  TBili  0.8  /  DBili  x   /  AST  18  /  ALT  20  /  AlkPhos  111      proBNP:   Lipid Profile:   HgA1c:   TSH: Thyroid Stimulating Hormone, Serum: 0.94 uIU/mL ( @ 06:01)      Consultant(s) Notes Reviewed:  [x ] YES  [ ] NO    Care Discussed with Consultants/Other Providers [ x] YES  [ ] NO    Imaging Personally Reviewed independently:  [x] YES  [ ] NO    All labs, radiologic studies, vitals, orders and medications list reviewed. Patient is seen and examined at bedside. Case discussed with medical team.              
    Subjective/Overnight events: Patient lying in bed comfortably. No acute distress.   	  MEDICATIONS:  aspirin  chewable 81 milliGRAM(s) Oral daily  carvedilol 6.25 milliGRAM(s) Oral every 12 hours  enoxaparin Injectable 40 milliGRAM(s) SubCutaneous every 24 hours  furosemide    Tablet 20 milliGRAM(s) Oral daily  lisinopril 20 milliGRAM(s) Oral daily  prasugrel 10 milliGRAM(s) Oral daily  spironolactone 25 milliGRAM(s) Oral daily      loratadine 10 milliGRAM(s) Oral daily    acetaminophen     Tablet .. 650 milliGRAM(s) Oral every 6 hours PRN  gabapentin 300 milliGRAM(s) Oral at bedtime  melatonin 3 milliGRAM(s) Oral at bedtime    pantoprazole    Tablet 40 milliGRAM(s) Oral before breakfast    atorvastatin 80 milliGRAM(s) Oral at bedtime    influenza  Vaccine (HIGH DOSE) 0.7 milliLiter(s) IntraMuscular once  multivitamin 1 Tablet(s) Oral daily      PAST MEDICAL/SURGICAL HISTORY  PAST MEDICAL & SURGICAL HISTORY:  HTN (hypertension)      PAD (peripheral artery disease)  stent to L lower extremity artery      Constipation, unspecified constipation type      Hyperlipidemia      NSTEMI (non-ST elevated myocardial infarction)   and 2018 and 2019      Coronary artery disease involving native coronary artery of native heart, unspecified whether angina present      Ischemic cardiomyopathy with implantable cardioverter-defibrillator (ICD)      S/P CABG (coronary artery bypass graft)  x3; [facility] 2002      History of coronary angiogram  multiple stents placed      History of femoral angiogram  stent placed in [facility] 2016      History of laparoscopic cholecystectomy  2016          SOCIAL HISTORY: Substance Use (street drugs): ( x ) never used  (  ) other:    FAMILY HISTORY:  Family history of coronary artery disease in brother (Sibling)  4 brothers with MI/CABG, 1  at 77yo    Family history of stroke  58yo CVA, heart attack 61yo    Family history of coronary artery disease in sister (Sibling)   from MI    Family history of MI (myocardial infarction)  mother,       PHYSICAL EXAM:  T(C): 36.6 (22 @ 13:00), Max: 36.9 (22 @ 15:58)  HR: 107 (22 @ 13:00) (62 - 107)  BP: 101/43 (22 @ 13:00) (101/43 - 140/75)  RR: 17 (22 @ 13:00) (17 - 18)  SpO2: 98% (22 @ 13:00) (98% - 100%)  Wt(kg): --  I&O's Summary    2022 07:01  -  2022 07:00  --------------------------------------------------------  IN: 125 mL / OUT: 0 mL / NET: 125 mL      Height (cm): 165.1 ( @ 19:13)  Weight (kg): 61.1 ( @ :13)  BMI (kg/m2): 22.4 ( @ :13)  BSA (m2): 1.67 (:13)      GENERAL: NAD  EYES:   PERRLA   ENMT:   Moist mucous membranes, Good dentition, No lesions  Cardiovascular: Normal S1 S2, No JVD, No murmurs, No edema  Respiratory: Lungs clear to auscultation	  Gastrointestinal:  Soft, Non-tender, + BS	  Extremities: no edema                                    13.6   4.97  )-----------( 110      ( 2022 06:01 )             40.4         138  |  104  |  24<H>  ----------------------------<  87  4.1   |  20<L>  |  1.38<H>    Ca    9.9      2022 06:01  Phos  3.2       Mg     2.30         TPro  7.2  /  Alb  4.1  /  TBili  0.8  /  DBili  x   /  AST  18  /  ALT  20  /  AlkPhos  111      proBNP:   Lipid Profile:   HgA1c:   TSH: Thyroid Stimulating Hormone, Serum: 0.94 uIU/mL ( @ 06:01)      Consultant(s) Notes Reviewed:  [x ] YES  [ ] NO    Care Discussed with Consultants/Other Providers [ x] YES  [ ] NO    Imaging Personally Reviewed independently:  [x] YES  [ ] NO    All labs, radiologic studies, vitals, orders and medications list reviewed. Patient is seen and examined at bedside.

## 2022-11-10 NOTE — DISCHARGE NOTE NURSING/CASE MANAGEMENT/SOCIAL WORK - NSDCPEFALRISK_GEN_ALL_CORE
For information on Fall & Injury Prevention, visit: [email or web address] OR  [email or web address] OR  https://www.cdc.gov/steadi/patient.html

## 2022-11-10 NOTE — DISCHARGE NOTE PROVIDER - NSDCMRMEDTOKEN_GEN_ALL_CORE_FT
aspirin 81 mg oral tablet: 1 tab(s) orally once a day  cetirizine 10 mg oral tablet: 1 tab(s) orally once a day, As Needed  Coreg 6.25 mg oral tablet: 1 tab(s) orally 2 times a day  furosemide 20 mg oral tablet: 1 tab(s) orally once a day  gabapentin 300 mg oral capsule: 1 cap(s) orally once a day  Multiple Vitamins oral capsule: 1 cap(s) orally once a day  nitroglycerin 0.4 mg sublingual tablet: 1 tab(s) sublingual every 5 minutes, As Needed  pantoprazole 40 mg oral delayed release tablet: 1 tab(s) orally once a day  prasugrel 10 mg oral tablet: 1 tab(s) orally once a day  predniSONE 20 mg oral tablet: 1 tab(s) orally once a day   rosuvastatin 40 mg oral tablet: 1 tab(s) orally once a day

## 2022-11-10 NOTE — PROGRESS NOTE ADULT - PROVIDER SPECIALTY LIST ADULT
Cardiology
Cardiology
Internal Medicine
Nephrology
Internal Medicine
Nephrology
Cardiology
Cardiology
Internal Medicine

## 2022-11-10 NOTE — DISCHARGE NOTE PROVIDER - NSDCCPCAREPLAN_GEN_ALL_CORE_FT
PRINCIPAL DISCHARGE DIAGNOSIS  Diagnosis: NSTEMI (non-ST elevation myocardial infarction)  Assessment and Plan of Treatment: You were seen in the hospital for chest pain and found to have elevated cardiac markers. You were seen by Cardiologist who trended your cardiac enzymes and managed your medications. You had an echocardiogram which showed severe left ventricle dyfunsction, consistent with heart failure. Please follow up with Dr Abarca in one week.      SECONDARY DISCHARGE DIAGNOSES  Diagnosis: HTN (hypertension)  Assessment and Plan of Treatment: Please discontinue Lisinopril as your kidney function is currently elevated. Follow up with Dr Abarca in one week to resume. Monitor for any visual changes, headaches or dizziness.  Monitor blood pressure regularly.  Follow up with your primary care provider for further management for high blood pressure.    Diagnosis: HLD (hyperlipidemia)  Assessment and Plan of Treatment: Continue prescribed medications to control your cholesterol levels and a DASH (Low fat/salt) diet. Follow up with your primary care provider upon discharge for further management and monitoring of cholesterol levels.      Diagnosis: Chronic systolic congestive heart failure  Assessment and Plan of Treatment: You had an echocardiogram which showed heart failure. Please continue with your current medication regimen and discontinue Lisinopril as your kidney function is currently elevated. Follow up with Dr Abarca in one week.   Continue recommended medication regimen, fluid restriction (Less than 1.5 Liters per day). Monitor for signs/symptoms of fluid overload and electrolyte abnormalities, such as, shortness of breath, cough, swelling, chest discomfort, changes in heart rate, dizziness, fainting, or changes in mental status. Keep track of your weight and call your outpatient physician if there are abrupt changes in weight.   Follow-up with your outpatient provider after you've been discharged from the hospital for further care/recommendations.      Diagnosis: Thrombocytopenia  Assessment and Plan of Treatment: Your platelets were found to be low. Please follow up with your PCP for outpatient monitoring of your blood counts.    Diagnosis: CAD (coronary artery disease)  Assessment and Plan of Treatment: Please continue current medication regimen and follow up with your PCP in 1 week.    Diagnosis: ANGIE (acute kidney injury)  Assessment and Plan of Treatment: Please discontinue Lisinopril and Spironolactone as your kidney function is currently increased. Please avoid medications which may hurt your kidneys such as NSAIDS (Ibuprofen/Motrin/Advil). Please follow up with Dr Abarca in one week for repeat blood test of your kidney function.    Diagnosis: Wheeze  Assessment and Plan of Treatment: You were noted to have slight wheezing on exam. You did not have any shortness of breath or chest discomfort. A short dose of steroids was sent to your pharmacy. Please take as prescribed and follow up with Dr Abarca in one week for re-evaluation.     PRINCIPAL DISCHARGE DIAGNOSIS  Diagnosis: Chest pain  Assessment and Plan of Treatment: You were seen in the hospital for chest pain. You were seen by Cardiologist who trended your cardiac enzymes and managed your medications. You had an echocardiogram which showed severe left ventricle dyfunsction, consistent with heart failure. Please follow up with Dr Abarca in one week.      SECONDARY DISCHARGE DIAGNOSES  Diagnosis: HTN (hypertension)  Assessment and Plan of Treatment: Please discontinue Lisinopril as your kidney function is currently elevated. Follow up with Dr Abarca in one week to resume. Monitor for any visual changes, headaches or dizziness.  Monitor blood pressure regularly.  Follow up with your primary care provider for further management for high blood pressure.    Diagnosis: HLD (hyperlipidemia)  Assessment and Plan of Treatment: Continue prescribed medications to control your cholesterol levels and a DASH (Low fat/salt) diet. Follow up with your primary care provider upon discharge for further management and monitoring of cholesterol levels.      Diagnosis: Chronic systolic congestive heart failure  Assessment and Plan of Treatment: You had an echocardiogram which showed heart failure. Please continue with your current medication regimen and discontinue Lisinopril as your kidney function is currently elevated. Follow up with Dr Abarca in one week.   Continue recommended medication regimen, fluid restriction (Less than 1.5 Liters per day). Monitor for signs/symptoms of fluid overload and electrolyte abnormalities, such as, shortness of breath, cough, swelling, chest discomfort, changes in heart rate, dizziness, fainting, or changes in mental status. Keep track of your weight and call your outpatient physician if there are abrupt changes in weight.   Follow-up with your outpatient provider after you've been discharged from the hospital for further care/recommendations.      Diagnosis: Thrombocytopenia  Assessment and Plan of Treatment: Your platelets were found to be low. Please follow up with your PCP for outpatient monitoring of your blood counts.    Diagnosis: CAD (coronary artery disease)  Assessment and Plan of Treatment: Please continue current medication regimen and follow up with your PCP in 1 week.    Diagnosis: ANGIE (acute kidney injury)  Assessment and Plan of Treatment: Please discontinue Lisinopril and Spironolactone as your kidney function is currently increased. Please avoid medications which may hurt your kidneys such as NSAIDS (Ibuprofen/Motrin/Advil). Please follow up with Dr Abarca in one week for repeat blood test of your kidney function.    Diagnosis: Wheeze  Assessment and Plan of Treatment: You were noted to have slight wheezing on exam. You did not have any shortness of breath or chest discomfort. A short dose of steroids was sent to your pharmacy. Please take as prescribed and follow up with Dr Abarca in one week for re-evaluation.    Diagnosis: Chest pain  Assessment and Plan of Treatment: You were seen in the hospital for chest pain and found to have elevated cardiac markers. You were seen by Cardiologist who trended your cardiac enzymes and managed your medications. You had an echocardiogram which showed severe left ventricle dyfunsction, consistent with heart failure. Please follow up with Dr Abarca in one week.

## 2022-11-10 NOTE — DISCHARGE NOTE NURSING/CASE MANAGEMENT/SOCIAL WORK - PATIENT PORTAL LINK FT
You can access the FollowMyHealth Patient Portal offered by [facility] by registering at the following website: [email or web address]. By joining [employer]’s FollowMyHealth portal, you will also be able to view your health information using other applications (apps) compatible with our system.

## 2022-11-22 ENCOUNTER — APPOINTMENT (OUTPATIENT)
Dept: CARDIOLOGY | Facility: CLINIC | Age: 72
End: 2022-11-22

## 2022-12-06 NOTE — PATIENT PROFILE ADULT - NSTOBACCONEVERSMOKERY/N_GEN_A
Chronic issue, stable with Celexa 20mg daily. Continue medication. ED for SI/HI.  
Chronic issue, worsening.  Patient states that he is compliant with hydrochlorothiazide 25 mg daily, we will add amlodipine 10 mg to better control patient's blood pressure.  
No

## 2023-01-27 ENCOUNTER — NON-APPOINTMENT (OUTPATIENT)
Age: 73
End: 2023-01-27

## 2023-01-27 ENCOUNTER — APPOINTMENT (OUTPATIENT)
Dept: ELECTROPHYSIOLOGY | Facility: CLINIC | Age: 73
End: 2023-01-27
Payer: MEDICARE

## 2023-01-27 PROCEDURE — 93296 REM INTERROG EVL PM/IDS: CPT

## 2023-01-27 PROCEDURE — 93295 DEV INTERROG REMOTE 1/2/MLT: CPT

## 2023-02-20 NOTE — H&P ADULT - GENITOURINARY
Nutrition Assessment   Reason for Consult/Assessment: Follow up---          Pertinent Nutrition History: hx of diabetes, non healing decubitus ulcer    Pertinent Nutrition Information: patient tolerating diet well, intakes continues with good appetite consuming 75- 100% of most meals, patient accepting and tolerating Pj wound supplement; When able  is bringing in Premier high protein supplement.      Diet Order: Consistent carbohydrate                  Diet tolerance: Tolerating with good appetite/intakes recordedconsuming 75 - 100% for the majority of meals       Demographic/Anthropometrics Information  Gender: female   Patient Age: 68 year old  Height:   Ht Readings from Last 1 Encounters:   01/13/23 5' 5\" (1.651 m)      Weight:   Wt Readings from Last 1 Encounters:   02/06/23 (!) 145.8 kg      BMI:   BMI Readings from Last 1 Encounters:   02/06/23 53.49 kg/m²                      Estimated Needs:  Calculated Energy Needs: 1762-6613  kcal               Calculated protein needs: 113-142  g    Calculated Fluid Needs: 1ml/kcal              NFPE  Nutrition Focused Physical Exam  Physical Exam Completed: Yes  Body Fat  Overall Body Fat: Normal  Muscle Mass  Overall Muscle Mass: Normal  Skin Assessment/Wounds  Wounds Present: Wounds present  Edema: Mild to moderate pitting, slight swelling of the extremity, indentation subsides quickly (0-30 sec)             TREATMENT PLAN: Monitoring & Interventions   Intervention: Nutrition supplement therapy, Nutrition education, Meals and snacks         Meals & snacks: continue consistent carbohydrate moderate diet; encouraged intake of high protein foods      Nutrition supplement therapy: continue Pj supplement daily to promote wound healing  Nutrition education: patient visited today: we reviewed nutrition goals and wound healing, patient dislikes gelatin cup and declines alternative high protein supplements her  will bring Premier shakes (30 grams  protein/each) . Patient is receptive to trying Pj supplement. 1/24/23    Goal: Improve skin integrity, Meet >/equal 75% estimated needs   Intervention goal status: Goal achieved  Time frame to achieve goal: Ongoing     Dietitian will monitor: Food, beverage, and nutrient intake, Biochemical data, medical tests, procedures      Nutrition Diagnosis / PES  Nutrition Diagnosis: Increased nutrient needs  Related to: Increased nutritional demands for healing   As evidenced by: Calculated needs      Primary Nutrition Diagnosis status: Active nutrition diagnosis                  details…

## 2023-04-28 ENCOUNTER — NON-APPOINTMENT (OUTPATIENT)
Age: 73
End: 2023-04-28

## 2023-04-28 ENCOUNTER — APPOINTMENT (OUTPATIENT)
Dept: ELECTROPHYSIOLOGY | Facility: CLINIC | Age: 73
End: 2023-04-28
Payer: MEDICARE

## 2023-04-28 PROCEDURE — 93296 REM INTERROG EVL PM/IDS: CPT

## 2023-04-28 PROCEDURE — 93295 DEV INTERROG REMOTE 1/2/MLT: CPT

## 2023-06-05 ENCOUNTER — APPOINTMENT (OUTPATIENT)
Dept: ELECTROPHYSIOLOGY | Facility: CLINIC | Age: 73
End: 2023-06-05

## 2023-06-08 NOTE — H&P ADULT - NS MD HP PULSE POSTERIOR
Labs reviewed vitamin D level deficient at 11-will start patient on vitamin D 50,000 units 1 tablet weekly x8 weeks after completion patient to start over-the-counter vitamin D3 2000 international units 1 tablet daily.  Serum cholesterol 201,  advise healthy lifestyle changes with a low-cholesterol low-fat diet, blood work shows some mild anemia also advised patient to consume iron rich foods.  We will recheck labs next visit.  
+1 pulses b/l

## 2023-07-12 NOTE — ED PROVIDER NOTE - RESPIRATORY, MLM
Progress Note - Pulmonary   Pat Garland 63 y.o. male MRN: [id number]   Encounter: [id number]      Assessment/Plan:  Patient is a 82-year-old male with past medical history significant for morbid obesity, SCOTT who presents to [facility] rheumatology clinic for evaluation. The patient has had frequent utilization of steroids over the past year. He notes that these are primary for his breathing and they do significantly help. Discussed extents with rheumatology and his joint stiffness is not clearly autoimmune manifestations of his disease. He has no signs of lupus activity. No clear evidence of lupus vanishing lung either. Given his obstructive lung disease, will consider alternate diagnoses such as asthma which would be steroid responsive as well. Given the patient's frequency of steroids, makes it difficult to interpret his eosinophil level. Have provided patient with a prescription for eosinophil level as well as total IgE and if these are elevated in association with symptomatology given his frequency of use would be a candidate for biologic therapy. For the patient's morbid obesity, he was counseled extensively on need for weight loss. He does have significant edema which may continue to take his diuretics which she does report have improved. The patient did have significant consolidations noted on his previous imaging. This has somewhat improved. Given lack of complete resolution and persistent groundglass, would repeat CT scan in approximately 3 months. No acute indication for bronchoscopy. The patient does have hypoxemic respiratory failure, this may be slight related to OHS as it is primarily exertional.  He is using oxygen at night. He may continue to do so given his noted desaturations on home pulse oximetry. 1. SCOTT (obstructive sleep apnea)    2. COPD, severe ([address])    3. Chronic hypoxemic respiratory failure (HCC)    4.  Moderate persistent asthma, unspecified whether complicated    5. RUTHERFORD (dyspnea on exertion)    6. 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 Orders:  Check [facility] allergy panel, CBC with differential, emergency prescription for prednisone provided    Subjective: The patients last use of prednisone was about 10 days ago. He is using 4L NC at home with activity. He is using 4L at night with his CPAP. He will note chest pain or chest heaviness with difficulty breathing. He will note wheezing. He denies fevers, chills.       Inhaler Regimen:  Anoro  Flovent  Albuterol nebulizer - 3x/week    Answers for HPI/ROS submitted by the patient on 7/5/2023  Do you have chest tightness?: Yes  Do you have a cough?: Yes  Do you have difficulty breathing?: Yes  Do you experience frequent throat clearing?: Yes  Do you have a hoarse voice?: Yes  Do you have shortness of breath?: Yes  Do you have a wet cough?: Yes  Do you have wheezing?: Yes  Chronicity: chronic  When did you first notice your symptoms?: more than 1 month ago  How often do your symptoms occur?: constantly  Since you first noticed this problem, how has it changed?: unchanged  Have you had a change in appetite?: Yes  Do you have chest pain?: Yes  Do you have shortness of breath that occurs with effort or exertion?: Yes  Do you have ear congestion?: No  Do you have ear pain?: No  Do you have a fever?: No  Do you have headaches?: Yes  Do you have heartburn?: No  Do you have fatigue?: Yes  Do you have muscle pain?: Yes  Do you have nasal congestion?: Yes  Do you have shortness of breath when lying flat?: No  Do you have shortness of breath when you wake up?: No  Do you have post-nasal drip?: No  Do you have a runny nose?: Yes  Do you have sneezing?: No  Do you have a sore throat?: No  Do you have sweats?: No  Do you have trouble swallowing?: No  Have you experienced weight loss?: No  Which of the following makes your symptoms worse?: any activity, change in weather, climbing stairs, emotional stress, exercise, exposure to fumes, exposure to smoke, minimal activity, strenuous activity, URI  Which of the following makes your symptoms better?: cold air, oral steroids, rest, steroid inhaler      Remainder of review of systems negative except as described in HPI. The following portions of the patient's history were reviewed and updated as appropriate: allergies, current medications, past family history, past medical history, past social history, past surgical history and problem list.     Objective:   Vitals: There were no vitals taken for this visit., RA, There is no height or weight on file to calculate BMI. Physical Exam  Gen: Pleasant, awake, alert, oriented x 3, no acute distress  HEENT: Mucous membranes moist, no oral lesions, no thrush  NECK: No accessory muscle use, JVP not elevated  Cardiac: RRR, single S1, single S2, no murmurs, no rubs, no gallops  Lungs: CTA   Abdomen: normoactive bowel sounds, soft nontender, nondistended, no rebound or rigidity, no guarding, obese  Extremities: no cyanosis, no clubbing, 2+ LE edema  MSK:  Strength equal in all extremities  Derm:  No rashes/lesions noted  Neuro:  Appropriate mood/affect    Labs: I have personally reviewed pertinent lab results. Lab Results   Component Value Date    WBC 7.55 05/25/2023    HGB 15.2 05/25/2023     05/25/2023     Lab Results   Component Value Date    CREATININE 1.09 05/25/2023        Imaging and other studies: I have personally reviewed pertinent reports. and I have personally reviewed pertinent films in PACS  CTA Chest/Abdomen/Pelvis 6/9/2023  My interpretation:  Resolution of consolidation    Radiology findings:  LUNGS: Small amount of residual groundglass opacity in the inferior right lower lobe. Consolidative process has resolved in the interim. PLEURA: No pleural effusion. MEDIASTINUM AND TESSY: Stable mildly prominent mediastinal lymph nodes.   CHEST WALL AND LOWER NECK: Unremarkable. Pulmonary Function Testing: I have personally reviewed pertinent reports. and I have personally reviewed pertinent films in PACS                          FVC                 FEV1               FEV1/FVC       DLCOcor  9/4/2019          2.54 55%         1.36 41%         53%                 66%  12/28/2021      2.57 55%         1.62 45%         63%                 91%    Mohsen Mathias, [address] [place] [address] Breath sounds clear and equal bilaterally.

## 2023-07-17 ENCOUNTER — NON-APPOINTMENT (OUTPATIENT)
Age: 73
End: 2023-07-17

## 2023-07-17 ENCOUNTER — APPOINTMENT (OUTPATIENT)
Dept: ELECTROPHYSIOLOGY | Facility: CLINIC | Age: 73
End: 2023-07-17
Payer: MEDICARE

## 2023-07-17 VITALS — SYSTOLIC BLOOD PRESSURE: 130 MMHG | RESPIRATION RATE: 14 BRPM | HEART RATE: 60 BPM | DIASTOLIC BLOOD PRESSURE: 73 MMHG

## 2023-07-17 DIAGNOSIS — I50.22 CHRONIC SYSTOLIC (CONGESTIVE) HEART FAILURE: ICD-10-CM

## 2023-07-17 PROCEDURE — 93290 INTERROG DEV EVAL ICPMS IP: CPT | Mod: 26

## 2023-07-17 PROCEDURE — 93282 PRGRMG EVAL IMPLANTABLE DFB: CPT

## 2023-08-05 NOTE — ED PROVIDER NOTE - NEUROLOGICAL SENSATION
CC:   Chief Complaint   Patient presents with   • Eye Problem     Left eye, redness and \"bump\" . Bothers her and its itchy        Established Patient     SUBJECTIVE  HPI:Rosemary Reyes is a 40 year old female who presents today with     Eye Problem   The left eye is affected. This is a new problem. The current episode started in the past 7 days. The problem has been unchanged. Associated symptoms include an eye discharge and eye redness.       His past medical history is significant for:  Past Medical History:   Diagnosis Date   • Fracture, clavicle    • HSV (herpes simplex virus) infection    • Hx of abnormal cervical Pap smear    • Pleurisy    • Spider veins        Allergies:   ALLERGIES:   Allergen Reactions   • Bee Venom SWELLING     Swelling at bee sting site       Current Outpatient Medications   Medication Sig Dispense Refill   • tobramycin (TOBREX) 0.3 % ophthalmic solution Instill 2 drops to affected eye(s) four times daily for 5 days 1 each 0   • amoxicillin (AMOXIL) 500 MG tablet        No current facility-administered medications for this visit.         Review of Systems:  Review of Systems   Eyes: Positive for discharge and redness.   All other systems reviewed and are negative.      All other systems reviewed are negative    OBJECTIVE  Vitals:   Visit Vitals  /84   Pulse 70   Temp 96 °F (35.6 °C)   Resp 20   Wt 119.3 kg (263 lb)   LMP 08/05/2023   SpO2 99%   BMI 39.99 kg/m²       Physical Exam:  Physical Exam  Vitals and nursing note reviewed.   Eyes:      General:         Left eye: Hordeolum present.     Extraocular Movements: Extraocular movements intact.      Conjunctiva/sclera:      Left eye: Left conjunctiva is injected.      Pupils: Pupils are equal, round, and reactive to light.         ASSESSMENT/PLAN  Left lower lid stye and conjunctivitis  I had a discussion with the patient about this condition.  Discussed typical cause, course of illness and prognosis.  Medication as ordered.   Should expect improvement over the next 48-72 hours and resolution within the next week.  If not or if worse seek immediate reevaluation in [facility]IC or with patient's pcp.  Patient verbalized understanding.    If symptoms should suddenly change or worsen, seek immediate reevaluation with PCP or return to Immediate Care.  The patient verbalized understanding.    Joe Sequeira MD  8/5/2023       present and normal in 4 extremities

## 2023-10-18 ENCOUNTER — APPOINTMENT (OUTPATIENT)
Dept: ELECTROPHYSIOLOGY | Facility: CLINIC | Age: 73
End: 2023-10-18
Payer: MEDICARE

## 2023-10-18 ENCOUNTER — NON-APPOINTMENT (OUTPATIENT)
Age: 73
End: 2023-10-18

## 2023-10-18 PROCEDURE — 93296 REM INTERROG EVL PM/IDS: CPT

## 2023-10-18 PROCEDURE — 93295 DEV INTERROG REMOTE 1/2/MLT: CPT

## 2023-11-29 ENCOUNTER — APPOINTMENT (OUTPATIENT)
Dept: MRI IMAGING | Facility: HOSPITAL | Age: 73
End: 2023-11-29
Payer: MEDICARE

## 2023-11-29 ENCOUNTER — OUTPATIENT (OUTPATIENT)
Dept: OUTPATIENT SERVICES | Facility: HOSPITAL | Age: 73
LOS: 1 days | End: 2023-11-29
Payer: MEDICARE

## 2023-11-29 DIAGNOSIS — Z98.890 OTHER SPECIFIED POSTPROCEDURAL STATES: Chronic | ICD-10-CM

## 2023-11-29 DIAGNOSIS — I69.952 HEMIPLEGIA AND HEMIPARESIS FOLLOWING UNSPECIFIED CEREBROVASCULAR DISEASE AFFECTING LEFT DOMINANT SIDE: ICD-10-CM

## 2023-11-29 DIAGNOSIS — Z86.79 PERSONAL HISTORY OF OTHER DISEASES OF THE CIRCULATORY SYSTEM: Chronic | ICD-10-CM

## 2023-11-29 DIAGNOSIS — Z90.49 ACQUIRED ABSENCE OF OTHER SPECIFIED PARTS OF DIGESTIVE TRACT: Chronic | ICD-10-CM

## 2023-11-29 DIAGNOSIS — Z95.1 PRESENCE OF AORTOCORONARY BYPASS GRAFT: Chronic | ICD-10-CM

## 2023-11-29 PROCEDURE — 71046 X-RAY EXAM CHEST 2 VIEWS: CPT | Mod: 26

## 2023-11-29 PROCEDURE — 70551 MRI BRAIN STEM W/O DYE: CPT | Mod: 26

## 2023-11-29 PROCEDURE — 71046 X-RAY EXAM CHEST 2 VIEWS: CPT

## 2023-11-29 PROCEDURE — 70551 MRI BRAIN STEM W/O DYE: CPT

## 2024-01-18 ENCOUNTER — APPOINTMENT (OUTPATIENT)
Dept: ELECTROPHYSIOLOGY | Facility: CLINIC | Age: 74
End: 2024-01-18
Payer: MEDICARE

## 2024-01-18 ENCOUNTER — NON-APPOINTMENT (OUTPATIENT)
Age: 74
End: 2024-01-18

## 2024-01-18 PROCEDURE — 93296 REM INTERROG EVL PM/IDS: CPT

## 2024-01-18 PROCEDURE — 93295 DEV INTERROG REMOTE 1/2/MLT: CPT

## 2024-02-04 NOTE — CONSULT NOTE ADULT - PROBLEM SELECTOR PROBLEM 4
on the discharge service for the patient. I have reviewed and made amendments to the documentation where necessary.
HTN (hypertension)

## 2024-04-18 ENCOUNTER — APPOINTMENT (OUTPATIENT)
Dept: ELECTROPHYSIOLOGY | Facility: CLINIC | Age: 74
End: 2024-04-18

## 2024-05-17 ENCOUNTER — APPOINTMENT (OUTPATIENT)
Dept: ELECTROPHYSIOLOGY | Facility: CLINIC | Age: 74
End: 2024-05-17
Payer: MEDICARE

## 2024-05-17 ENCOUNTER — NON-APPOINTMENT (OUTPATIENT)
Age: 74
End: 2024-05-17

## 2024-05-17 PROCEDURE — 93296 REM INTERROG EVL PM/IDS: CPT

## 2024-05-17 PROCEDURE — 93295 DEV INTERROG REMOTE 1/2/MLT: CPT

## 2024-06-07 ENCOUNTER — EMERGENCY (EMERGENCY)
Facility: HOSPITAL | Age: 74
LOS: 1 days | Discharge: HOME | End: 2024-06-07
Admitting: EMERGENCY MEDICINE
Payer: COMMERCIAL

## 2024-06-07 VITALS
TEMPERATURE: 98 F | OXYGEN SATURATION: 98 % | SYSTOLIC BLOOD PRESSURE: 126 MMHG | RESPIRATION RATE: 16 BRPM | DIASTOLIC BLOOD PRESSURE: 72 MMHG | HEART RATE: 61 BPM

## 2024-06-07 VITALS
HEART RATE: 58 BPM | OXYGEN SATURATION: 100 % | TEMPERATURE: 97 F | DIASTOLIC BLOOD PRESSURE: 78 MMHG | RESPIRATION RATE: 16 BRPM | SYSTOLIC BLOOD PRESSURE: 148 MMHG

## 2024-06-07 DIAGNOSIS — Z90.49 ACQUIRED ABSENCE OF OTHER SPECIFIED PARTS OF DIGESTIVE TRACT: Chronic | ICD-10-CM

## 2024-06-07 DIAGNOSIS — Z98.890 OTHER SPECIFIED POSTPROCEDURAL STATES: Chronic | ICD-10-CM

## 2024-06-07 DIAGNOSIS — Z86.79 PERSONAL HISTORY OF OTHER DISEASES OF THE CIRCULATORY SYSTEM: Chronic | ICD-10-CM

## 2024-06-07 DIAGNOSIS — Z95.1 PRESENCE OF AORTOCORONARY BYPASS GRAFT: Chronic | ICD-10-CM

## 2024-06-07 PROCEDURE — 93010 ELECTROCARDIOGRAM REPORT: CPT

## 2024-06-07 PROCEDURE — 73562 X-RAY EXAM OF KNEE 3: CPT | Mod: 26,RT

## 2024-06-07 PROCEDURE — 99283 EMERGENCY DEPT VISIT LOW MDM: CPT

## 2024-06-07 RX ORDER — ACETAMINOPHEN 500 MG
650 TABLET ORAL ONCE
Refills: 0 | Status: COMPLETED | OUTPATIENT
Start: 2024-06-07 | End: 2024-06-07

## 2024-06-07 RX ADMIN — Medication 650 MILLIGRAM(S): at 09:52

## 2024-06-07 NOTE — ED ADULT NURSE NOTE - NSFALLHARMRISKINTERV_ED_ALL_ED

## 2024-06-07 NOTE — ED PROVIDER NOTE - NSFOLLOWUPINSTRUCTIONS_ED_ALL_ED_FT
Use tylenol if needed for pain   follow up w/ ortho   Return to the ED if needed or if symptoms worsen

## 2024-06-07 NOTE — ED PROVIDER NOTE - PATIENT PORTAL LINK FT
You can access the FollowMyHealth Patient Portal offered by [facility] by registering at the following website: [email or web address]. By joining [email or web address]’s FollowMyHealth portal, you will also be able to view your health information using other applications (apps) compatible with our system.

## 2024-06-07 NOTE — ED PROVIDER NOTE - CLINICAL SUMMARY MEDICAL DECISION MAKING FREE TEXT BOX
72 y/o male w/ a pmh of htn, pad, hld nstemi, and ischemic cardiomyopathy reports today c/o R-knee pain X 4 months. Pt reports prior to sx onset he was walking down the stairs , tripped and fell down 2 stairs. He reports that he has seen pain management for his pain- but was waiting to see ortho and never saw them. His pain currently is 2/10- sx worsening when walking down the stairs w/ otc medication providing alleviation.     pt stable, in nad and non-toxic appearing  plan for pt- xr and apap   pt likely will be dc and ortho referral placed  he is resting comfortably- will reassess.

## 2024-06-07 NOTE — ED PROVIDER NOTE - OBJECTIVE STATEMENT
72 y/o male w/ a pmh of htn, pad, hld nstemi, and ischemic cardiomyopathy reports today c/o R-knee pain X 4 months. Pt reports prior to sx onset he was walking down the stairs , tripped and fell down 2 stairs. He reports that he has seen pain management for his pain- but was waiting to see ortho and never saw them. His pain currently is 2/10- sx worsening when walking down the stairs w/ otc medication providing alleviation. Denies; cp, sob, abdominal pain, dizziness, ha, cp, sob, lbp, neck pain, fever, chills, nausea or vomiting.

## 2024-06-07 NOTE — ED ADULT TRIAGE NOTE - CHIEF COMPLAINT QUOTE
Pt c/o rt knee pain s/p trip and fall down two stairs back in February. Also endorsing left sided lower back pain radiating down left leg x  1 week. Denies dizziness, chest pain, fever. pmhx: CAD

## 2024-06-07 NOTE — ED PROVIDER NOTE - PROGRESS NOTE DETAILS
xr= no fx   ace wrap applied in the ED  recommended pt f/u w/ ortho  apap recommended  ED return precautions discussed- of sx worsen or prn.    He was able to ambulate on his own out the ED w/o assistance.

## 2024-06-12 NOTE — ED PROVIDER NOTE - CROS ED SKIN ALL NEG
Advance Care Planning   Ambulatory ACP Specialist Patient Outreach    Date:  6/12/2024    ACP Specialist:  LILIBETH KEANE LCSW    Outreach call to patient in follow-up to ACP Specialist referral from:Coleman Gilbert MD    [x] PCP  [] Provider   [] Ambulatory Care Management [] Other     For:                  [x] Advance Directive Assistance              [] Complete Portable DNR order              [] Complete POST/POLST/MOST              [] Code Status Discussion             [] Discuss Goals of Care             [] Early ACP Decision-Making              [] Other (Specify)    Date Referral Received: 5/17/2024    Next Step:   [x] ACP scheduled conversation  [] Outreach again in one week               [] Email / Mail ACP Info Sheets  [] Email / Mail Advance Directive   [] Closing referral.  Routing closure to referring provider/staff and to ACP Specialist .    [] Closure letter mailed to patient with invitation to contact ACP Specialist if / when ready.   [] Other (Specify here):         [] At this time, Healthcare Decision Maker Is:        [] Primary agent named in scanned advance directive.    [] Legal Next of Kin.     [x] Unable to determine legal decision maker at this time.         Outreaches:          [x]  Additional Outreach -  Date:  6/12/2024   (Specify Dates & special circumstances):    Outcomes: ACP Specialist contacted pt  today via phone: [phone] who confirmed scheduled appt for 6/19/2024 @ 11 am.  LILIBETH KEANE LCSW          Thank you for this referral.       
negative...

## 2024-07-05 ENCOUNTER — EMERGENCY (EMERGENCY)
Facility: HOSPITAL | Age: 74
LOS: 1 days | Discharge: HOME | End: 2024-07-05
Attending: EMERGENCY MEDICINE | Admitting: EMERGENCY MEDICINE
Payer: COMMERCIAL

## 2024-07-05 VITALS
TEMPERATURE: 98 F | RESPIRATION RATE: 18 BRPM | DIASTOLIC BLOOD PRESSURE: 75 MMHG | HEART RATE: 74 BPM | OXYGEN SATURATION: 97 % | SYSTOLIC BLOOD PRESSURE: 126 MMHG

## 2024-07-05 DIAGNOSIS — Z90.49 ACQUIRED ABSENCE OF OTHER SPECIFIED PARTS OF DIGESTIVE TRACT: Chronic | ICD-10-CM

## 2024-07-05 DIAGNOSIS — Z86.79 PERSONAL HISTORY OF OTHER DISEASES OF THE CIRCULATORY SYSTEM: Chronic | ICD-10-CM

## 2024-07-05 DIAGNOSIS — Z95.1 PRESENCE OF AORTOCORONARY BYPASS GRAFT: Chronic | ICD-10-CM

## 2024-07-05 DIAGNOSIS — Z98.890 OTHER SPECIFIED POSTPROCEDURAL STATES: Chronic | ICD-10-CM

## 2024-07-05 PROCEDURE — 99284 EMERGENCY DEPT VISIT MOD MDM: CPT | Mod: 57

## 2024-07-05 PROCEDURE — 93010 ELECTROCARDIOGRAM REPORT: CPT

## 2024-07-05 PROCEDURE — 73660 X-RAY EXAM OF TOE(S): CPT | Mod: 26,LT

## 2024-07-05 PROCEDURE — 28490 TREAT BIG TOE FRACTURE: CPT | Mod: 54,TA

## 2024-07-05 RX ORDER — TETANUS TOXOID, REDUCED DIPHTHERIA TOXOID AND ACELLULAR PERTUSSIS VACCINE, ADSORBED 5; 2.5; 8; 8; 2.5 [IU]/.5ML; [IU]/.5ML; UG/.5ML; UG/.5ML; UG/.5ML
0.5 SUSPENSION INTRAMUSCULAR ONCE
Refills: 0 | Status: COMPLETED | OUTPATIENT
Start: 2024-07-05 | End: 2024-07-05

## 2024-07-05 RX ORDER — ACETAMINOPHEN 325 MG
975 TABLET ORAL ONCE
Refills: 0 | Status: COMPLETED | OUTPATIENT
Start: 2024-07-05 | End: 2024-07-05

## 2024-07-05 RX ORDER — CEPHALEXIN 500 MG
500 CAPSULE ORAL ONCE
Refills: 0 | Status: COMPLETED | OUTPATIENT
Start: 2024-07-05 | End: 2024-07-05

## 2024-07-05 RX ADMIN — Medication 500 MILLIGRAM(S): at 19:31

## 2024-07-05 RX ADMIN — TETANUS TOXOID, REDUCED DIPHTHERIA TOXOID AND ACELLULAR PERTUSSIS VACCINE, ADSORBED 0.5 MILLILITER(S): 5; 2.5; 8; 8; 2.5 SUSPENSION INTRAMUSCULAR at 18:31

## 2024-07-05 RX ADMIN — Medication 975 MILLIGRAM(S): at 18:31

## 2024-07-06 RX ORDER — CEPHALEXIN 500 MG
1 CAPSULE ORAL
Qty: 21 | Refills: 0
Start: 2024-07-06 | End: 2024-07-12

## 2024-07-06 RX ORDER — CEPHALEXIN 500 MG
1 CAPSULE ORAL
Qty: 15 | Refills: 0
Start: 2024-07-06 | End: 2024-07-10

## 2024-07-15 ENCOUNTER — APPOINTMENT (OUTPATIENT)
Dept: ELECTROPHYSIOLOGY | Facility: CLINIC | Age: 74
End: 2024-07-15
Payer: MEDICARE

## 2024-07-15 VITALS
RESPIRATION RATE: 16 BRPM | BODY MASS INDEX: 23.17 KG/M2 | WEIGHT: 135 LBS | DIASTOLIC BLOOD PRESSURE: 68 MMHG | SYSTOLIC BLOOD PRESSURE: 132 MMHG | HEART RATE: 64 BPM | OXYGEN SATURATION: 99 %

## 2024-07-15 PROCEDURE — 93282 PRGRMG EVAL IMPLANTABLE DFB: CPT

## 2024-07-15 RX ORDER — EMPAGLIFLOZIN 10 MG/1
10 TABLET, FILM COATED ORAL
Qty: 30 | Refills: 1 | Status: ACTIVE | COMMUNITY
Start: 2024-07-15

## 2024-07-15 RX ORDER — PANTOPRAZOLE 20 MG/1
20 TABLET, DELAYED RELEASE ORAL DAILY
Qty: 30 | Refills: 1 | Status: ACTIVE | COMMUNITY
Start: 2024-07-15

## 2024-07-15 RX ORDER — HYDRALAZINE HYDROCHLORIDE 25 MG/1
25 TABLET ORAL
Qty: 90 | Refills: 3 | Status: ACTIVE | COMMUNITY
Start: 2024-07-15

## 2024-07-15 RX ORDER — RANOLAZINE 500 MG/1
500 TABLET, EXTENDED RELEASE ORAL
Qty: 180 | Refills: 3 | Status: ACTIVE | COMMUNITY
Start: 2024-07-15

## 2024-07-19 ENCOUNTER — APPOINTMENT (OUTPATIENT)
Dept: WOUND CARE | Facility: CLINIC | Age: 74
End: 2024-07-19

## 2024-07-19 ENCOUNTER — APPOINTMENT (OUTPATIENT)
Dept: WOUND CARE | Facility: HOSPITAL | Age: 74
End: 2024-07-19

## 2024-07-19 VITALS
HEART RATE: 68 BPM | SYSTOLIC BLOOD PRESSURE: 131 MMHG | HEIGHT: 64 IN | WEIGHT: 135 LBS | TEMPERATURE: 98 F | BODY MASS INDEX: 23.05 KG/M2 | DIASTOLIC BLOOD PRESSURE: 71 MMHG

## 2024-07-19 DIAGNOSIS — I73.9 PERIPHERAL VASCULAR DISEASE, UNSPECIFIED: ICD-10-CM

## 2024-07-19 DIAGNOSIS — S92.402A DISPLACED UNSPECIFIED FRACTURE OF LEFT GREAT TOE, INITIAL ENCOUNTER FOR CLOSED FRACTURE: ICD-10-CM

## 2024-07-19 DIAGNOSIS — L03.032 CELLULITIS OF LEFT TOE: ICD-10-CM

## 2024-07-19 PROCEDURE — 93923 UPR/LXTR ART STDY 3+ LVLS: CPT | Mod: 26

## 2024-07-19 PROCEDURE — 99203 OFFICE O/P NEW LOW 30 MIN: CPT

## 2024-07-30 ENCOUNTER — OUTPATIENT (OUTPATIENT)
Dept: OUTPATIENT SERVICES | Facility: HOSPITAL | Age: 74
LOS: 1 days | End: 2024-07-30
Payer: COMMERCIAL

## 2024-07-30 ENCOUNTER — RESULT REVIEW (OUTPATIENT)
Age: 74
End: 2024-07-30

## 2024-07-30 ENCOUNTER — APPOINTMENT (OUTPATIENT)
Dept: RADIOLOGY | Facility: CLINIC | Age: 74
End: 2024-07-30

## 2024-07-30 DIAGNOSIS — Z98.890 OTHER SPECIFIED POSTPROCEDURAL STATES: Chronic | ICD-10-CM

## 2024-07-30 DIAGNOSIS — Z00.8 ENCOUNTER FOR OTHER GENERAL EXAMINATION: ICD-10-CM

## 2024-07-30 DIAGNOSIS — Z95.1 PRESENCE OF AORTOCORONARY BYPASS GRAFT: Chronic | ICD-10-CM

## 2024-07-30 DIAGNOSIS — Z86.79 PERSONAL HISTORY OF OTHER DISEASES OF THE CIRCULATORY SYSTEM: Chronic | ICD-10-CM

## 2024-07-30 DIAGNOSIS — Z90.49 ACQUIRED ABSENCE OF OTHER SPECIFIED PARTS OF DIGESTIVE TRACT: Chronic | ICD-10-CM

## 2024-07-30 PROCEDURE — 73660 X-RAY EXAM OF TOE(S): CPT | Mod: 26,LT

## 2024-07-30 PROCEDURE — 73660 X-RAY EXAM OF TOE(S): CPT

## 2024-08-07 ENCOUNTER — NON-APPOINTMENT (OUTPATIENT)
Age: 74
End: 2024-08-07

## 2024-08-09 ENCOUNTER — APPOINTMENT (OUTPATIENT)
Dept: WOUND CARE | Facility: HOSPITAL | Age: 74
End: 2024-08-09

## 2024-08-09 ENCOUNTER — OUTPATIENT (OUTPATIENT)
Dept: OUTPATIENT SERVICES | Facility: HOSPITAL | Age: 74
LOS: 1 days | End: 2024-08-09
Payer: MEDICARE

## 2024-08-09 DIAGNOSIS — Z90.49 ACQUIRED ABSENCE OF OTHER SPECIFIED PARTS OF DIGESTIVE TRACT: Chronic | ICD-10-CM

## 2024-08-09 DIAGNOSIS — Z86.79 PERSONAL HISTORY OF OTHER DISEASES OF THE CIRCULATORY SYSTEM: Chronic | ICD-10-CM

## 2024-08-09 DIAGNOSIS — Z98.890 OTHER SPECIFIED POSTPROCEDURAL STATES: Chronic | ICD-10-CM

## 2024-08-09 DIAGNOSIS — Z95.1 PRESENCE OF AORTOCORONARY BYPASS GRAFT: Chronic | ICD-10-CM

## 2024-08-09 PROCEDURE — 99213 OFFICE O/P EST LOW 20 MIN: CPT

## 2024-08-09 PROCEDURE — G0463: CPT

## 2024-08-09 NOTE — ASSESSMENT
[FreeTextEntry1] : rx x-rays left big toe Apply betadine gauze and coflex daily dispense surgical shoe left hallux no need for additional antibiosis patient may need nail avulsion if any increased pressure under nail left big toe presents return 3 weeks recommend patient stay off of left foot and only ambulate using surgical shoe recommend rest ice and elevation left foot return 3 weeks

## 2024-08-09 NOTE — REASON FOR VISIT
[Consultation] : a consultation visit

## 2024-08-09 NOTE — HISTORY OF PRESENT ILLNESS
[FreeTextEntry1] : patient dropped tile on left big toe 6 weeks ago patient went to [facility] ED for evaluation patient given antibiotics-Keflex GENNY and PVR done and reveal good readings with adequate GENNY X-ray report revealed "Comminuted fracture of the distal aspect of left hallux distal phalanx Joint spaces maintained" signed by Dr. Andrew tang patient not wearing surgical shoe and not using coban on left hallux with no signs of infection new x-rays taken and reeval:  EXAM: [id number] - XR TOE(S) MIN 2 VIEWS LT  - ORDERED BY: JUANITA RUBI   PROCEDURE DATE:  07/30/2024    INTERPRETATION:  CLINICAL INDICATION: Left toe fracture, follow-up. TECHNIQUE: X-ray left foot 3 views.  COMPARISON: X-ray left foot 7/5/2024  FINDINGS:  Healing comminuted fracture of the distal aspect of the first toe distal phalanx. Minimal osseous callus formation is seen. No dislocation. Joint spaces are maintained.  IMPRESSION:  Mild interval healing of the known first left toe distal phalanx fracture.  --- End of Report ---  JULIO CESAR CASTELLANOS DO; Resident Radiologist This document has been electronically signed. CHRISTIANO WAKEFIELD MD; Attending Radiologist Good range of motion left hallux IPJ or MPJ left foot No more pain on palpation to left hallux nail with no edema no erythema no drainage chronic subungual hematoma left hallux no drainage recommend patient apply coban to left hallux for next 3 weeks patient has no pain and no signs of infection and was told to return to wound center as needed or if any signs of pain or infection

## 2024-09-04 ENCOUNTER — APPOINTMENT (OUTPATIENT)
Dept: ELECTROPHYSIOLOGY | Facility: CLINIC | Age: 74
End: 2024-09-04

## 2024-09-04 NOTE — PATIENT PROFILE ADULT - NSPROGENANESREACTION_GEN_A_NUR
Lala Phillip : 1967 Sex: female  Age: 56 y.o.    Chief Complaint   Patient presents with    Annual Exam    Health Maintenance     Had mammogram done this morning at [facility]     Discuss Labs    Referral - General     Referral for Dr Healy was denied-out of network     Gyn is recommending  and [facility]     Urinary Tract Infection     Per patient when she caths still having pain    Caths 2-3 times a day      Leg Pain     Right leg pain no improvement since last visit   Had ultrasound done     Foot Pain     Right heel; has seen Dr Painter previously          HPI  HPI:      Presents today for routine physical but multiple other things to address and follow-up on as well.  No UTI symptoms as she had before, no fever chills abdominal/flank pain significant malaise or otherwise but still some dysuria.  She has to self cath throughout the day.  Saw gynecologist today.  She recommended vaginal estrogen.  Dr. Michaels referred to [facility] urogynecologist but not covered.  Insurance claims they never got our letter, we will send again.  We referred to dr huynh who also was not in network.  She will try to find out who is in network.  Was unable to see sarcoid center because of insurance.  Does have generalized arthralgias.  I wonder if this sarcoid is flaring.    Has had right calf pain for over 6 to 8 weeks, failing home exercises, started when she went to the gym but does not recall specific injury.  Ultrasound negative.  Possible tear.  Check MRI if covered.  Has been dealing with ongoing right heel pain, agrees to x-ray today and referral to Dr. Painter, likely heel spur/plantar fasciitis of the differential reviewed    Blood work reviewed, LFTs interestingly normalized, we did discuss consideration fibrosis, hepatitis B negative as are titers, defers vaccine now, had hep A vaccine.  BMP shows CO2 33 anion gap 5 otherwise negative HDL 38  triglyceride 167 LFTs now normal TSH 2.08 vitamin D 47 CBC  no previous reaction

## 2024-09-17 DIAGNOSIS — L03.032 CELLULITIS OF LEFT TOE: ICD-10-CM

## 2024-10-14 ENCOUNTER — APPOINTMENT (OUTPATIENT)
Dept: ELECTROPHYSIOLOGY | Facility: CLINIC | Age: 74
End: 2024-10-14
Payer: MEDICARE

## 2024-10-14 ENCOUNTER — NON-APPOINTMENT (OUTPATIENT)
Age: 74
End: 2024-10-14

## 2024-10-14 PROCEDURE — 93295 DEV INTERROG REMOTE 1/2/MLT: CPT

## 2024-10-14 PROCEDURE — 93296 REM INTERROG EVL PM/IDS: CPT

## 2024-12-12 NOTE — DISCHARGE NOTE ADULT - CONDITION (STATED IN TERMS THAT PERMIT A SPECIFIC MEASURABLE COMPARISON WITH CONDITION ON ADMISSION):
[FreeTextEntry1] : Patient will continue his longitude care for his complex and chronic condition.
stable

## 2025-01-01 ENCOUNTER — EMERGENCY (EMERGENCY)
Facility: HOSPITAL | Age: 75
LOS: 1 days | End: 2025-01-01
Attending: STUDENT IN AN ORGANIZED HEALTH CARE EDUCATION/TRAINING PROGRAM | Admitting: STUDENT IN AN ORGANIZED HEALTH CARE EDUCATION/TRAINING PROGRAM
Payer: COMMERCIAL

## 2025-01-01 ENCOUNTER — INPATIENT (INPATIENT)
Facility: HOSPITAL | Age: 75
LOS: 3 days | Discharge: HOME | End: 2025-06-20
Attending: INTERNAL MEDICINE | Admitting: INTERNAL MEDICINE
Payer: COMMERCIAL

## 2025-01-01 VITALS
TEMPERATURE: 97 F | OXYGEN SATURATION: 100 % | HEART RATE: 85 BPM | DIASTOLIC BLOOD PRESSURE: 68 MMHG | RESPIRATION RATE: 18 BRPM | SYSTOLIC BLOOD PRESSURE: 114 MMHG

## 2025-01-01 VITALS
SYSTOLIC BLOOD PRESSURE: 145 MMHG | HEIGHT: 65 IN | WEIGHT: 139.99 LBS | DIASTOLIC BLOOD PRESSURE: 85 MMHG | RESPIRATION RATE: 18 BRPM | HEART RATE: 78 BPM | TEMPERATURE: 98 F | OXYGEN SATURATION: 100 %

## 2025-01-01 VITALS
HEART RATE: 88 BPM | SYSTOLIC BLOOD PRESSURE: 172 MMHG | TEMPERATURE: 98 F | DIASTOLIC BLOOD PRESSURE: 84 MMHG | WEIGHT: 132.06 LBS | OXYGEN SATURATION: 99 % | RESPIRATION RATE: 18 BRPM

## 2025-01-01 VITALS — RESPIRATION RATE: 17 BRPM | HEART RATE: 65 BPM

## 2025-01-01 DIAGNOSIS — Z98.890 OTHER SPECIFIED POSTPROCEDURAL STATES: Chronic | ICD-10-CM

## 2025-01-01 DIAGNOSIS — N18.30 CHRONIC KIDNEY DISEASE, STAGE 3 UNSPECIFIED: ICD-10-CM

## 2025-01-01 DIAGNOSIS — Z90.49 ACQUIRED ABSENCE OF OTHER SPECIFIED PARTS OF DIGESTIVE TRACT: Chronic | ICD-10-CM

## 2025-01-01 DIAGNOSIS — E78.5 HYPERLIPIDEMIA, UNSPECIFIED: ICD-10-CM

## 2025-01-01 DIAGNOSIS — I25.10 ATHEROSCLEROTIC HEART DISEASE OF NATIVE CORONARY ARTERY WITHOUT ANGINA PECTORIS: ICD-10-CM

## 2025-01-01 DIAGNOSIS — Z86.79 PERSONAL HISTORY OF OTHER DISEASES OF THE CIRCULATORY SYSTEM: Chronic | ICD-10-CM

## 2025-01-01 DIAGNOSIS — G98.8 OTHER DISORDERS OF NERVOUS SYSTEM: ICD-10-CM

## 2025-01-01 DIAGNOSIS — Z95.1 PRESENCE OF AORTOCORONARY BYPASS GRAFT: Chronic | ICD-10-CM

## 2025-01-01 DIAGNOSIS — N17.9 ACUTE KIDNEY FAILURE, UNSPECIFIED: ICD-10-CM

## 2025-01-01 DIAGNOSIS — I10 ESSENTIAL (PRIMARY) HYPERTENSION: ICD-10-CM

## 2025-01-01 DIAGNOSIS — R53.1 WEAKNESS: ICD-10-CM

## 2025-01-01 LAB
ACHR BLOCK AB SER-ACNC: 45 % — HIGH (ref 0–25)
ACHR MOD AB SER-ACNC: 95 % — HIGH (ref 0–45)
ACRM BINDING ANTIBODY: >80 NMOL/L — HIGH (ref 0–0.24)
ADD ON TEST-SPECIMEN IN LAB: SIGNIFICANT CHANGE UP
ADD ON TEST-SPECIMEN IN LAB: SIGNIFICANT CHANGE UP
ALBUMIN SERPL ELPH-MCNC: 2.5 G/DL — LOW (ref 3.3–5)
ALBUMIN SERPL ELPH-MCNC: 4 G/DL — SIGNIFICANT CHANGE UP (ref 3.3–5)
ALP SERPL-CCNC: 54 U/L — SIGNIFICANT CHANGE UP (ref 40–120)
ALP SERPL-CCNC: 98 U/L — SIGNIFICANT CHANGE UP (ref 40–120)
ALT FLD-CCNC: 14 U/L — SIGNIFICANT CHANGE UP (ref 4–41)
ALT FLD-CCNC: 21 U/L — SIGNIFICANT CHANGE UP (ref 4–41)
ANION GAP SERPL CALC-SCNC: 11 MMOL/L — SIGNIFICANT CHANGE UP (ref 7–14)
ANION GAP SERPL CALC-SCNC: 11 MMOL/L — SIGNIFICANT CHANGE UP (ref 7–14)
ANION GAP SERPL CALC-SCNC: 12 MMOL/L — SIGNIFICANT CHANGE UP (ref 7–14)
ANION GAP SERPL CALC-SCNC: 13 MMOL/L — SIGNIFICANT CHANGE UP (ref 7–14)
ANION GAP SERPL CALC-SCNC: 9 MMOL/L — SIGNIFICANT CHANGE UP (ref 7–14)
APTT BLD: 35.1 SEC — SIGNIFICANT CHANGE UP (ref 26.1–36.8)
AST SERPL-CCNC: 16 U/L — SIGNIFICANT CHANGE UP (ref 4–40)
AST SERPL-CCNC: 25 U/L — SIGNIFICANT CHANGE UP (ref 4–40)
BASOPHILS # BLD AUTO: 0.02 K/UL — SIGNIFICANT CHANGE UP (ref 0–0.2)
BASOPHILS NFR BLD AUTO: 0.3 % — SIGNIFICANT CHANGE UP (ref 0–2)
BASOPHILS NFR BLD AUTO: 0.4 % — SIGNIFICANT CHANGE UP (ref 0–2)
BASOPHILS NFR BLD AUTO: 0.4 % — SIGNIFICANT CHANGE UP (ref 0–2)
BILIRUB SERPL-MCNC: 0.4 MG/DL — SIGNIFICANT CHANGE UP (ref 0.2–1.2)
BILIRUB SERPL-MCNC: 0.6 MG/DL — SIGNIFICANT CHANGE UP (ref 0.2–1.2)
BLOOD GAS VENOUS COMPREHENSIVE RESULT: SIGNIFICANT CHANGE UP
BUN SERPL-MCNC: 18 MG/DL — SIGNIFICANT CHANGE UP (ref 7–23)
BUN SERPL-MCNC: 19 MG/DL — SIGNIFICANT CHANGE UP (ref 7–23)
BUN SERPL-MCNC: 19 MG/DL — SIGNIFICANT CHANGE UP (ref 7–23)
BUN SERPL-MCNC: 20 MG/DL — SIGNIFICANT CHANGE UP (ref 7–23)
BUN SERPL-MCNC: 24 MG/DL — HIGH (ref 7–23)
BUN SERPL-MCNC: 26 MG/DL — HIGH (ref 7–23)
BUN SERPL-MCNC: 27 MG/DL — HIGH (ref 7–23)
CALCIUM SERPL-MCNC: 5.9 MG/DL — CRITICAL LOW (ref 8.4–10.5)
CALCIUM SERPL-MCNC: 9.3 MG/DL — SIGNIFICANT CHANGE UP (ref 8.4–10.5)
CALCIUM SERPL-MCNC: 9.6 MG/DL — SIGNIFICANT CHANGE UP (ref 8.4–10.5)
CALCIUM SERPL-MCNC: 9.7 MG/DL — SIGNIFICANT CHANGE UP (ref 8.4–10.5)
CALCIUM SERPL-MCNC: 9.8 MG/DL — SIGNIFICANT CHANGE UP (ref 8.4–10.5)
CALCIUM SERPL-MCNC: 9.8 MG/DL — SIGNIFICANT CHANGE UP (ref 8.4–10.5)
CALCIUM SERPL-MCNC: 9.9 MG/DL — SIGNIFICANT CHANGE UP (ref 8.4–10.5)
CHLORIDE SERPL-SCNC: 103 MMOL/L — SIGNIFICANT CHANGE UP (ref 98–107)
CHLORIDE SERPL-SCNC: 103 MMOL/L — SIGNIFICANT CHANGE UP (ref 98–107)
CHLORIDE SERPL-SCNC: 105 MMOL/L — SIGNIFICANT CHANGE UP (ref 98–107)
CHLORIDE SERPL-SCNC: 106 MMOL/L — SIGNIFICANT CHANGE UP (ref 98–107)
CHLORIDE SERPL-SCNC: 120 MMOL/L — HIGH (ref 98–107)
CO2 SERPL-SCNC: 14 MMOL/L — LOW (ref 22–31)
CO2 SERPL-SCNC: 22 MMOL/L — SIGNIFICANT CHANGE UP (ref 22–31)
CO2 SERPL-SCNC: 22 MMOL/L — SIGNIFICANT CHANGE UP (ref 22–31)
CO2 SERPL-SCNC: 23 MMOL/L — SIGNIFICANT CHANGE UP (ref 22–31)
CO2 SERPL-SCNC: 24 MMOL/L — SIGNIFICANT CHANGE UP (ref 22–31)
CO2 SERPL-SCNC: 25 MMOL/L — SIGNIFICANT CHANGE UP (ref 22–31)
CO2 SERPL-SCNC: 25 MMOL/L — SIGNIFICANT CHANGE UP (ref 22–31)
CREAT SERPL-MCNC: 1.04 MG/DL — SIGNIFICANT CHANGE UP (ref 0.5–1.3)
CREAT SERPL-MCNC: 1.66 MG/DL — HIGH (ref 0.5–1.3)
CREAT SERPL-MCNC: 1.68 MG/DL — HIGH (ref 0.5–1.3)
CREAT SERPL-MCNC: 1.77 MG/DL — HIGH (ref 0.5–1.3)
CREAT SERPL-MCNC: 1.84 MG/DL — HIGH (ref 0.5–1.3)
CREAT SERPL-MCNC: 1.88 MG/DL — HIGH (ref 0.5–1.3)
CREAT SERPL-MCNC: 2.09 MG/DL — HIGH (ref 0.5–1.3)
EGFR: 33 ML/MIN/1.73M2 — LOW
EGFR: 33 ML/MIN/1.73M2 — LOW
EGFR: 37 ML/MIN/1.73M2 — LOW
EGFR: 37 ML/MIN/1.73M2 — LOW
EGFR: 38 ML/MIN/1.73M2 — LOW
EGFR: 38 ML/MIN/1.73M2 — LOW
EGFR: 40 ML/MIN/1.73M2 — LOW
EGFR: 40 ML/MIN/1.73M2 — LOW
EGFR: 43 ML/MIN/1.73M2 — LOW
EGFR: 43 ML/MIN/1.73M2 — LOW
EGFR: 44 ML/MIN/1.73M2 — LOW
EGFR: 44 ML/MIN/1.73M2 — LOW
EGFR: 76 ML/MIN/1.73M2 — SIGNIFICANT CHANGE UP
EGFR: 76 ML/MIN/1.73M2 — SIGNIFICANT CHANGE UP
EOSINOPHIL # BLD AUTO: 0.08 K/UL — SIGNIFICANT CHANGE UP (ref 0–0.5)
EOSINOPHIL # BLD AUTO: 0.09 K/UL — SIGNIFICANT CHANGE UP (ref 0–0.5)
EOSINOPHIL # BLD AUTO: 0.16 K/UL — SIGNIFICANT CHANGE UP (ref 0–0.5)
EOSINOPHIL NFR BLD AUTO: 1.4 % — SIGNIFICANT CHANGE UP (ref 0–6)
EOSINOPHIL NFR BLD AUTO: 2 % — SIGNIFICANT CHANGE UP (ref 0–6)
EOSINOPHIL NFR BLD AUTO: 3.5 % — SIGNIFICANT CHANGE UP (ref 0–6)
FLUAV AG NPH QL: SIGNIFICANT CHANGE UP
FLUBV AG NPH QL: SIGNIFICANT CHANGE UP
GAS PNL BLDV: SIGNIFICANT CHANGE UP
GLUCOSE SERPL-MCNC: 100 MG/DL — HIGH (ref 70–99)
GLUCOSE SERPL-MCNC: 105 MG/DL — HIGH (ref 70–99)
GLUCOSE SERPL-MCNC: 114 MG/DL — HIGH (ref 70–99)
GLUCOSE SERPL-MCNC: 155 MG/DL — HIGH (ref 70–99)
GLUCOSE SERPL-MCNC: 83 MG/DL — SIGNIFICANT CHANGE UP (ref 70–99)
GLUCOSE SERPL-MCNC: 96 MG/DL — SIGNIFICANT CHANGE UP (ref 70–99)
GLUCOSE SERPL-MCNC: 98 MG/DL — SIGNIFICANT CHANGE UP (ref 70–99)
HCT VFR BLD CALC: 36.1 % — LOW (ref 39–50)
HCT VFR BLD CALC: 37.1 % — LOW (ref 39–50)
HCT VFR BLD CALC: 37.7 % — LOW (ref 39–50)
HCT VFR BLD CALC: 38.5 % — LOW (ref 39–50)
HCT VFR BLD CALC: 38.7 % — LOW (ref 39–50)
HCT VFR BLD CALC: 39.3 % — SIGNIFICANT CHANGE UP (ref 39–50)
HCT VFR BLD CALC: 39.8 % — SIGNIFICANT CHANGE UP (ref 39–50)
HGB BLD-MCNC: 12.5 G/DL — LOW (ref 13–17)
HGB BLD-MCNC: 12.8 G/DL — LOW (ref 13–17)
HGB BLD-MCNC: 12.8 G/DL — LOW (ref 13–17)
HGB BLD-MCNC: 12.9 G/DL — LOW (ref 13–17)
HGB BLD-MCNC: 12.9 G/DL — LOW (ref 13–17)
HGB BLD-MCNC: 13.1 G/DL — SIGNIFICANT CHANGE UP (ref 13–17)
HGB BLD-MCNC: 13.1 G/DL — SIGNIFICANT CHANGE UP (ref 13–17)
IANC: 3.24 K/UL — SIGNIFICANT CHANGE UP (ref 1.8–7.4)
IMM GRANULOCYTES # BLD AUTO: 0.01 K/UL — SIGNIFICANT CHANGE UP (ref 0–0.07)
IMM GRANULOCYTES # BLD AUTO: 0.03 K/UL — SIGNIFICANT CHANGE UP (ref 0–0.07)
IMM GRANULOCYTES NFR BLD AUTO: 0.2 % — SIGNIFICANT CHANGE UP (ref 0–0.9)
IMM GRANULOCYTES NFR BLD AUTO: 0.2 % — SIGNIFICANT CHANGE UP (ref 0–0.9)
IMM GRANULOCYTES NFR BLD AUTO: 0.7 % — SIGNIFICANT CHANGE UP (ref 0–0.9)
INR BLD: 0.96 RATIO — SIGNIFICANT CHANGE UP (ref 0.85–1.16)
LIDOCAIN IGE QN: 28 U/L — SIGNIFICANT CHANGE UP (ref 7–60)
LYMPHOCYTES # BLD AUTO: 0.98 K/UL — LOW (ref 1–3.3)
LYMPHOCYTES # BLD AUTO: 1.03 K/UL — SIGNIFICANT CHANGE UP (ref 1–3.3)
LYMPHOCYTES # BLD AUTO: 1.2 K/UL — SIGNIFICANT CHANGE UP (ref 1–3.3)
LYMPHOCYTES # BLD AUTO: 21.3 % — SIGNIFICANT CHANGE UP (ref 13–44)
LYMPHOCYTES NFR BLD AUTO: 20.8 % — SIGNIFICANT CHANGE UP (ref 13–44)
LYMPHOCYTES NFR BLD AUTO: 22.8 % — SIGNIFICANT CHANGE UP (ref 13–44)
MAGNESIUM SERPL-MCNC: 2.1 MG/DL — SIGNIFICANT CHANGE UP (ref 1.6–2.6)
MAGNESIUM SERPL-MCNC: 2.2 MG/DL — SIGNIFICANT CHANGE UP (ref 1.6–2.6)
MAGNESIUM SERPL-MCNC: 2.2 MG/DL — SIGNIFICANT CHANGE UP (ref 1.6–2.6)
MAGNESIUM SERPL-MCNC: 2.3 MG/DL — SIGNIFICANT CHANGE UP (ref 1.6–2.6)
MAGNESIUM SERPL-MCNC: 2.3 MG/DL — SIGNIFICANT CHANGE UP (ref 1.6–2.6)
MCHC RBC-ENTMCNC: 28.6 PG — SIGNIFICANT CHANGE UP (ref 27–34)
MCHC RBC-ENTMCNC: 28.7 PG — SIGNIFICANT CHANGE UP (ref 27–34)
MCHC RBC-ENTMCNC: 28.9 PG — SIGNIFICANT CHANGE UP (ref 27–34)
MCHC RBC-ENTMCNC: 29 PG — SIGNIFICANT CHANGE UP (ref 27–34)
MCHC RBC-ENTMCNC: 29.2 PG — SIGNIFICANT CHANGE UP (ref 27–34)
MCHC RBC-ENTMCNC: 29.2 PG — SIGNIFICANT CHANGE UP (ref 27–34)
MCHC RBC-ENTMCNC: 29.3 PG — SIGNIFICANT CHANGE UP (ref 27–34)
MCHC RBC-ENTMCNC: 32.9 G/DL — SIGNIFICANT CHANGE UP (ref 32–36)
MCHC RBC-ENTMCNC: 33.3 G/DL — SIGNIFICANT CHANGE UP (ref 32–36)
MCHC RBC-ENTMCNC: 33.3 G/DL — SIGNIFICANT CHANGE UP (ref 32–36)
MCHC RBC-ENTMCNC: 33.5 G/DL — SIGNIFICANT CHANGE UP (ref 32–36)
MCHC RBC-ENTMCNC: 34 G/DL — SIGNIFICANT CHANGE UP (ref 32–36)
MCHC RBC-ENTMCNC: 34.5 G/DL — SIGNIFICANT CHANGE UP (ref 32–36)
MCHC RBC-ENTMCNC: 34.6 G/DL — SIGNIFICANT CHANGE UP (ref 32–36)
MCV RBC AUTO: 84.7 FL — SIGNIFICANT CHANGE UP (ref 80–100)
MCV RBC AUTO: 84.7 FL — SIGNIFICANT CHANGE UP (ref 80–100)
MCV RBC AUTO: 85.3 FL — SIGNIFICANT CHANGE UP (ref 80–100)
MCV RBC AUTO: 85.8 FL — SIGNIFICANT CHANGE UP (ref 80–100)
MCV RBC AUTO: 86.3 FL — SIGNIFICANT CHANGE UP (ref 80–100)
MCV RBC AUTO: 87.3 FL — SIGNIFICANT CHANGE UP (ref 80–100)
MCV RBC AUTO: 87.7 FL — SIGNIFICANT CHANGE UP (ref 80–100)
MONOCYTES # BLD AUTO: 0.26 K/UL — SIGNIFICANT CHANGE UP (ref 0–0.9)
MONOCYTES # BLD AUTO: 0.35 K/UL — SIGNIFICANT CHANGE UP (ref 0–0.9)
MONOCYTES # BLD AUTO: 0.37 K/UL — SIGNIFICANT CHANGE UP (ref 0–0.9)
MONOCYTES NFR BLD AUTO: 5.7 % — SIGNIFICANT CHANGE UP (ref 2–14)
MONOCYTES NFR BLD AUTO: 6.4 % — SIGNIFICANT CHANGE UP (ref 2–14)
MONOCYTES NFR BLD AUTO: 7.7 % — SIGNIFICANT CHANGE UP (ref 2–14)
MRSA PCR RESULT.: SIGNIFICANT CHANGE UP
NEUTROPHILS # BLD AUTO: 2.93 K/UL — SIGNIFICANT CHANGE UP (ref 1.8–7.4)
NEUTROPHILS # BLD AUTO: 3.24 K/UL — SIGNIFICANT CHANGE UP (ref 1.8–7.4)
NEUTROPHILS # BLD AUTO: 4.09 K/UL — SIGNIFICANT CHANGE UP (ref 1.8–7.4)
NEUTROPHILS NFR BLD AUTO: 64.9 % — SIGNIFICANT CHANGE UP (ref 43–77)
NEUTROPHILS NFR BLD AUTO: 70.4 % — SIGNIFICANT CHANGE UP (ref 43–77)
NEUTROPHILS NFR BLD AUTO: 70.9 % — SIGNIFICANT CHANGE UP (ref 43–77)
NRBC # BLD AUTO: 0 K/UL — SIGNIFICANT CHANGE UP (ref 0–0)
NRBC # FLD: 0 K/UL — SIGNIFICANT CHANGE UP (ref 0–0)
NRBC BLD AUTO-RTO: 0 /100 WBCS — SIGNIFICANT CHANGE UP (ref 0–0)
NT-PROBNP SERPL-SCNC: 2610 PG/ML — HIGH
NT-PROBNP SERPL-SCNC: 2994 PG/ML — HIGH
PHOSPHATE SERPL-MCNC: 3.2 MG/DL — SIGNIFICANT CHANGE UP (ref 2.5–4.5)
PHOSPHATE SERPL-MCNC: 3.3 MG/DL — SIGNIFICANT CHANGE UP (ref 2.5–4.5)
PHOSPHATE SERPL-MCNC: 3.5 MG/DL — SIGNIFICANT CHANGE UP (ref 2.5–4.5)
PHOSPHATE SERPL-MCNC: 3.6 MG/DL — SIGNIFICANT CHANGE UP (ref 2.5–4.5)
PLATELET # BLD AUTO: 116 K/UL — LOW (ref 150–400)
PLATELET # BLD AUTO: 120 K/UL — LOW (ref 150–400)
PLATELET # BLD AUTO: 125 K/UL — LOW (ref 150–400)
PLATELET # BLD AUTO: 126 K/UL — LOW (ref 150–400)
PLATELET # BLD AUTO: 132 K/UL — LOW (ref 150–400)
PLATELET # BLD AUTO: 141 K/UL — LOW (ref 150–400)
PLATELET # BLD AUTO: 155 K/UL — SIGNIFICANT CHANGE UP (ref 150–400)
PMV BLD: 11.5 FL — SIGNIFICANT CHANGE UP (ref 7–13)
PMV BLD: 11.7 FL — SIGNIFICANT CHANGE UP (ref 7–13)
PMV BLD: 11.8 FL — SIGNIFICANT CHANGE UP (ref 7–13)
PMV BLD: 11.8 FL — SIGNIFICANT CHANGE UP (ref 7–13)
PMV BLD: 12 FL — SIGNIFICANT CHANGE UP (ref 7–13)
POTASSIUM SERPL-MCNC: 2.7 MMOL/L — CRITICAL LOW (ref 3.5–5.3)
POTASSIUM SERPL-MCNC: 3.5 MMOL/L — SIGNIFICANT CHANGE UP (ref 3.5–5.3)
POTASSIUM SERPL-MCNC: 3.6 MMOL/L — SIGNIFICANT CHANGE UP (ref 3.5–5.3)
POTASSIUM SERPL-MCNC: 3.8 MMOL/L — SIGNIFICANT CHANGE UP (ref 3.5–5.3)
POTASSIUM SERPL-MCNC: 3.9 MMOL/L — SIGNIFICANT CHANGE UP (ref 3.5–5.3)
POTASSIUM SERPL-MCNC: 4 MMOL/L — SIGNIFICANT CHANGE UP (ref 3.5–5.3)
POTASSIUM SERPL-MCNC: 4.2 MMOL/L — SIGNIFICANT CHANGE UP (ref 3.5–5.3)
POTASSIUM SERPL-SCNC: 2.7 MMOL/L — CRITICAL LOW (ref 3.5–5.3)
POTASSIUM SERPL-SCNC: 3.5 MMOL/L — SIGNIFICANT CHANGE UP (ref 3.5–5.3)
POTASSIUM SERPL-SCNC: 3.6 MMOL/L — SIGNIFICANT CHANGE UP (ref 3.5–5.3)
POTASSIUM SERPL-SCNC: 3.8 MMOL/L — SIGNIFICANT CHANGE UP (ref 3.5–5.3)
POTASSIUM SERPL-SCNC: 3.9 MMOL/L — SIGNIFICANT CHANGE UP (ref 3.5–5.3)
POTASSIUM SERPL-SCNC: 4 MMOL/L — SIGNIFICANT CHANGE UP (ref 3.5–5.3)
POTASSIUM SERPL-SCNC: 4.2 MMOL/L — SIGNIFICANT CHANGE UP (ref 3.5–5.3)
PROT SERPL-MCNC: 4.2 G/DL — LOW (ref 6–8.3)
PROT SERPL-MCNC: 7.2 G/DL — SIGNIFICANT CHANGE UP (ref 6–8.3)
PROTHROM AB SERPL-ACNC: 11.4 SEC — SIGNIFICANT CHANGE UP (ref 9.9–13.4)
RBC # BLD: 4.26 M/UL — SIGNIFICANT CHANGE UP (ref 4.2–5.8)
RBC # BLD: 4.38 M/UL — SIGNIFICANT CHANGE UP (ref 4.2–5.8)
RBC # BLD: 4.42 M/UL — SIGNIFICANT CHANGE UP (ref 4.2–5.8)
RBC # BLD: 4.46 M/UL — SIGNIFICANT CHANGE UP (ref 4.2–5.8)
RBC # BLD: 4.48 M/UL — SIGNIFICANT CHANGE UP (ref 4.2–5.8)
RBC # BLD: 4.51 M/UL — SIGNIFICANT CHANGE UP (ref 4.2–5.8)
RBC # BLD: 4.56 M/UL — SIGNIFICANT CHANGE UP (ref 4.2–5.8)
RBC # FLD: 13 % — SIGNIFICANT CHANGE UP (ref 10.3–14.5)
RBC # FLD: 13.1 % — SIGNIFICANT CHANGE UP (ref 10.3–14.5)
RBC # FLD: 13.2 % — SIGNIFICANT CHANGE UP (ref 10.3–14.5)
RBC # FLD: 13.4 % — SIGNIFICANT CHANGE UP (ref 10.3–14.5)
RSV RNA NPH QL NAA+NON-PROBE: SIGNIFICANT CHANGE UP
S AUREUS DNA NOSE QL NAA+PROBE: SIGNIFICANT CHANGE UP
SARS-COV-2 RNA SPEC QL NAA+PROBE: SIGNIFICANT CHANGE UP
SODIUM SERPL-SCNC: 139 MMOL/L — SIGNIFICANT CHANGE UP (ref 135–145)
SODIUM SERPL-SCNC: 140 MMOL/L — SIGNIFICANT CHANGE UP (ref 135–145)
SODIUM SERPL-SCNC: 143 MMOL/L — SIGNIFICANT CHANGE UP (ref 135–145)
SOURCE RESPIRATORY: SIGNIFICANT CHANGE UP
TROPONIN T, HIGH SENSITIVITY RESULT: 30 NG/L — SIGNIFICANT CHANGE UP
WBC # BLD: 4.2 K/UL — SIGNIFICANT CHANGE UP (ref 3.8–10.5)
WBC # BLD: 4.22 K/UL — SIGNIFICANT CHANGE UP (ref 3.8–10.5)
WBC # BLD: 4.38 K/UL — SIGNIFICANT CHANGE UP (ref 3.8–10.5)
WBC # BLD: 4.52 K/UL — SIGNIFICANT CHANGE UP (ref 3.8–10.5)
WBC # BLD: 4.6 K/UL — SIGNIFICANT CHANGE UP (ref 3.8–10.5)
WBC # BLD: 4.74 K/UL — SIGNIFICANT CHANGE UP (ref 3.8–10.5)
WBC # BLD: 5.77 K/UL — SIGNIFICANT CHANGE UP (ref 3.8–10.5)
WBC # FLD AUTO: 4.2 K/UL — SIGNIFICANT CHANGE UP (ref 3.8–10.5)
WBC # FLD AUTO: 4.22 K/UL — SIGNIFICANT CHANGE UP (ref 3.8–10.5)
WBC # FLD AUTO: 4.38 K/UL — SIGNIFICANT CHANGE UP (ref 3.8–10.5)
WBC # FLD AUTO: 4.52 K/UL — SIGNIFICANT CHANGE UP (ref 3.8–10.5)
WBC # FLD AUTO: 4.6 K/UL — SIGNIFICANT CHANGE UP (ref 3.8–10.5)
WBC # FLD AUTO: 4.74 K/UL — SIGNIFICANT CHANGE UP (ref 3.8–10.5)
WBC # FLD AUTO: 5.77 K/UL — SIGNIFICANT CHANGE UP (ref 3.8–10.5)

## 2025-01-01 PROCEDURE — 99285 EMERGENCY DEPT VISIT HI MDM: CPT

## 2025-01-01 PROCEDURE — 70450 CT HEAD/BRAIN W/O DYE: CPT | Mod: 26,59

## 2025-01-01 PROCEDURE — 99285 EMERGENCY DEPT VISIT HI MDM: CPT | Mod: 25

## 2025-01-01 PROCEDURE — 93010 ELECTROCARDIOGRAM REPORT: CPT

## 2025-01-01 PROCEDURE — 76700 US EXAM ABDOM COMPLETE: CPT | Mod: 26

## 2025-01-01 PROCEDURE — 74177 CT ABD & PELVIS W/CONTRAST: CPT | Mod: 26

## 2025-01-01 PROCEDURE — 71045 X-RAY EXAM CHEST 1 VIEW: CPT | Mod: 26

## 2025-01-01 PROCEDURE — 70553 MRI BRAIN STEM W/O & W/DYE: CPT | Mod: 26

## 2025-01-01 PROCEDURE — 99233 SBSQ HOSP IP/OBS HIGH 50: CPT

## 2025-01-01 PROCEDURE — 31500 INSERT EMERGENCY AIRWAY: CPT

## 2025-01-01 PROCEDURE — 70498 CT ANGIOGRAPHY NECK: CPT | Mod: 26

## 2025-01-01 PROCEDURE — 99255 IP/OBS CONSLTJ NEW/EST HI 80: CPT

## 2025-01-01 PROCEDURE — 70496 CT ANGIOGRAPHY HEAD: CPT | Mod: 26

## 2025-01-01 PROCEDURE — 92950 HEART/LUNG RESUSCITATION CPR: CPT

## 2025-01-01 PROCEDURE — 71260 CT THORAX DX C+: CPT | Mod: 26

## 2025-01-01 RX ORDER — MECLIZINE HCL 12.5 MG
25 TABLET ORAL ONCE
Refills: 0 | Status: COMPLETED | OUTPATIENT
Start: 2025-01-01 | End: 2025-01-01

## 2025-01-01 RX ORDER — FUROSEMIDE 10 MG/ML
1 INJECTION INTRAMUSCULAR; INTRAVENOUS
Qty: 0 | Refills: 0 | DISCHARGE
Start: 2025-01-01

## 2025-01-01 RX ORDER — LISINOPRIL 5 MG/1
20 TABLET ORAL DAILY
Refills: 0 | Status: DISCONTINUED | OUTPATIENT
Start: 2025-01-01 | End: 2025-01-01

## 2025-01-01 RX ORDER — RANOLAZINE 1000 MG/1
500 TABLET, FILM COATED, EXTENDED RELEASE ORAL
Refills: 0 | Status: DISCONTINUED | OUTPATIENT
Start: 2025-01-01 | End: 2025-01-01

## 2025-01-01 RX ORDER — LIDOCAINE HYDROCHLORIDE 20 MG/ML
1 JELLY TOPICAL EVERY 24 HOURS
Refills: 0 | Status: DISCONTINUED | OUTPATIENT
Start: 2025-01-01 | End: 2025-01-01

## 2025-01-01 RX ORDER — CLOPIDOGREL BISULFATE 75 MG/1
75 TABLET, FILM COATED ORAL DAILY
Refills: 0 | Status: DISCONTINUED | OUTPATIENT
Start: 2025-01-01 | End: 2025-01-01

## 2025-01-01 RX ORDER — SIMETHICONE 80 MG
80 TABLET,CHEWABLE ORAL DAILY
Refills: 0 | Status: DISCONTINUED | OUTPATIENT
Start: 2025-01-01 | End: 2025-01-01

## 2025-01-01 RX ORDER — ASPIRIN 325 MG
81 TABLET ORAL DAILY
Refills: 0 | Status: DISCONTINUED | OUTPATIENT
Start: 2025-01-01 | End: 2025-01-01

## 2025-01-01 RX ORDER — CARVEDILOL 3.12 MG/1
6.25 TABLET, FILM COATED ORAL EVERY 12 HOURS
Refills: 0 | Status: DISCONTINUED | OUTPATIENT
Start: 2025-01-01 | End: 2025-01-01

## 2025-01-01 RX ORDER — SPIRONOLACTONE 25 MG
1 TABLET ORAL
Qty: 90 | Refills: 0 | DISCHARGE
Start: 2025-01-01 | End: 2025-07-27

## 2025-01-01 RX ORDER — NITROGLYCERIN 20 MG/G
0.4 OINTMENT TOPICAL
Refills: 0 | Status: DISCONTINUED | OUTPATIENT
Start: 2025-01-01 | End: 2025-01-01

## 2025-01-01 RX ORDER — MECLIZINE HCL 12.5 MG
12.5 TABLET ORAL EVERY 8 HOURS
Refills: 0 | Status: DISCONTINUED | OUTPATIENT
Start: 2025-01-01 | End: 2025-01-01

## 2025-01-01 RX ORDER — IPRATROPIUM BROMIDE AND ALBUTEROL SULFATE .5; 2.5 MG/3ML; MG/3ML
3 SOLUTION RESPIRATORY (INHALATION) ONCE
Refills: 0 | Status: COMPLETED | OUTPATIENT
Start: 2025-01-01 | End: 2025-01-01

## 2025-01-01 RX ORDER — HEPARIN SODIUM 1000 [USP'U]/ML
5000 INJECTION INTRAVENOUS; SUBCUTANEOUS EVERY 12 HOURS
Refills: 0 | Status: DISCONTINUED | OUTPATIENT
Start: 2025-01-01 | End: 2025-01-01

## 2025-01-01 RX ORDER — OLOPATADINE HYDROCHLORIDE 1 MG/ML
1 SOLUTION OPHTHALMIC
Refills: 0 | DISCHARGE

## 2025-01-01 RX ORDER — CARVEDILOL 3.12 MG/1
1 TABLET, FILM COATED ORAL
Qty: 180 | Refills: 0 | DISCHARGE
Start: 2025-01-01 | End: 2025-07-27

## 2025-01-01 RX ORDER — B1/B2/B3/B5/B6/B12/VIT C/FOLIC 500-0.5 MG
1 TABLET ORAL
Refills: 0 | DISCHARGE

## 2025-01-01 RX ORDER — RANOLAZINE 1000 MG/1
1 TABLET, FILM COATED, EXTENDED RELEASE ORAL
Refills: 0 | DISCHARGE

## 2025-01-01 RX ORDER — ACETAMINOPHEN 500 MG/5ML
1000 LIQUID (ML) ORAL ONCE
Refills: 0 | Status: COMPLETED | OUTPATIENT
Start: 2025-01-01 | End: 2025-01-01

## 2025-01-01 RX ORDER — ALBUTEROL SULFATE 2.5 MG/3ML
2 VIAL, NEBULIZER (ML) INHALATION
Refills: 0 | DISCHARGE

## 2025-01-01 RX ORDER — MECLIZINE HCL 12.5 MG
1 TABLET ORAL
Qty: 90 | Refills: 0
Start: 2025-01-01 | End: 2025-07-18

## 2025-01-01 RX ORDER — SENNA 187 MG
2 TABLET ORAL
Refills: 0 | DISCHARGE

## 2025-01-01 RX ORDER — SPIRONOLACTONE 25 MG
25 TABLET ORAL DAILY
Refills: 0 | Status: DISCONTINUED | OUTPATIENT
Start: 2025-01-01 | End: 2025-01-01

## 2025-01-01 RX ORDER — ROSUVASTATIN CALCIUM 20 MG/1
40 TABLET, FILM COATED ORAL AT BEDTIME
Refills: 0 | Status: DISCONTINUED | OUTPATIENT
Start: 2025-01-01 | End: 2025-01-01

## 2025-01-01 RX ORDER — ONDANSETRON HCL/PF 4 MG/2 ML
4 VIAL (ML) INJECTION ONCE
Refills: 0 | Status: COMPLETED | OUTPATIENT
Start: 2025-01-01 | End: 2025-01-01

## 2025-01-01 RX ORDER — FUROSEMIDE 10 MG/ML
40 INJECTION INTRAMUSCULAR; INTRAVENOUS ONCE
Refills: 0 | Status: COMPLETED | OUTPATIENT
Start: 2025-01-01 | End: 2025-01-01

## 2025-01-01 RX ORDER — FUROSEMIDE 10 MG/ML
20 INJECTION INTRAMUSCULAR; INTRAVENOUS DAILY
Refills: 0 | Status: DISCONTINUED | OUTPATIENT
Start: 2025-01-01 | End: 2025-01-01

## 2025-01-01 RX ORDER — GABAPENTIN 400 MG/1
300 CAPSULE ORAL DAILY
Refills: 0 | Status: DISCONTINUED | OUTPATIENT
Start: 2025-01-01 | End: 2025-01-01

## 2025-01-01 RX ORDER — SODIUM CHLORIDE 9 G/1000ML
1000 INJECTION, SOLUTION INTRAVENOUS ONCE
Refills: 0 | Status: COMPLETED | OUTPATIENT
Start: 2025-01-01 | End: 2025-01-01

## 2025-01-01 RX ORDER — LISINOPRIL 5 MG/1
1 TABLET ORAL
Qty: 0 | Refills: 0 | DISCHARGE
Start: 2025-01-01

## 2025-01-01 RX ORDER — CLOPIDOGREL BISULFATE 75 MG/1
1 TABLET, FILM COATED ORAL
Qty: 0 | Refills: 0 | DISCHARGE
Start: 2025-01-01

## 2025-01-01 RX ORDER — SPIRONOLACTONE 25 MG
1 TABLET ORAL
Qty: 0 | Refills: 0 | DISCHARGE
Start: 2025-01-01

## 2025-01-01 RX ORDER — CALCIUM GLUCONATE 20 MG/ML
2 INJECTION, SOLUTION INTRAVENOUS ONCE
Refills: 0 | Status: COMPLETED | OUTPATIENT
Start: 2025-01-01 | End: 2025-01-01

## 2025-01-01 RX ORDER — SENNA 187 MG
2 TABLET ORAL AT BEDTIME
Refills: 0 | Status: DISCONTINUED | OUTPATIENT
Start: 2025-01-01 | End: 2025-01-01

## 2025-01-01 RX ORDER — MAGNESIUM SULFATE 500 MG/ML
2 SYRINGE (ML) INJECTION ONCE
Refills: 0 | Status: COMPLETED | OUTPATIENT
Start: 2025-01-01 | End: 2025-01-01

## 2025-01-01 RX ADMIN — Medication 40 MILLIEQUIVALENT(S): at 17:21

## 2025-01-01 RX ADMIN — Medication 25 MILLIGRAM(S): at 06:23

## 2025-01-01 RX ADMIN — Medication 40 MILLIGRAM(S): at 05:35

## 2025-01-01 RX ADMIN — LIDOCAINE HYDROCHLORIDE 1 PATCH: 20 JELLY TOPICAL at 18:01

## 2025-01-01 RX ADMIN — RANOLAZINE 500 MILLIGRAM(S): 1000 TABLET, FILM COATED, EXTENDED RELEASE ORAL at 06:24

## 2025-01-01 RX ADMIN — Medication 1 APPLICATION(S): at 06:38

## 2025-01-01 RX ADMIN — HEPARIN SODIUM 5000 UNIT(S): 1000 INJECTION INTRAVENOUS; SUBCUTANEOUS at 17:02

## 2025-01-01 RX ADMIN — Medication 25 MILLIGRAM(S): at 05:35

## 2025-01-01 RX ADMIN — LIDOCAINE HYDROCHLORIDE 1 PATCH: 20 JELLY TOPICAL at 07:39

## 2025-01-01 RX ADMIN — CARVEDILOL 6.25 MILLIGRAM(S): 3.12 TABLET, FILM COATED ORAL at 05:35

## 2025-01-01 RX ADMIN — CARVEDILOL 6.25 MILLIGRAM(S): 3.12 TABLET, FILM COATED ORAL at 06:38

## 2025-01-01 RX ADMIN — HEPARIN SODIUM 5000 UNIT(S): 1000 INJECTION INTRAVENOUS; SUBCUTANEOUS at 06:38

## 2025-01-01 RX ADMIN — CALCIUM GLUCONATE 200 GRAM(S): 20 INJECTION, SOLUTION INTRAVENOUS at 17:21

## 2025-01-01 RX ADMIN — Medication 400 MILLIGRAM(S): at 05:09

## 2025-01-01 RX ADMIN — GABAPENTIN 300 MILLIGRAM(S): 400 CAPSULE ORAL at 22:07

## 2025-01-01 RX ADMIN — SODIUM CHLORIDE 1000 MILLILITER(S): 9 INJECTION, SOLUTION INTRAVENOUS at 16:14

## 2025-01-01 RX ADMIN — Medication 40 MILLIGRAM(S): at 05:09

## 2025-01-01 RX ADMIN — Medication 40 MILLIGRAM(S): at 06:24

## 2025-01-01 RX ADMIN — IPRATROPIUM BROMIDE AND ALBUTEROL SULFATE 3 MILLILITER(S): .5; 2.5 SOLUTION RESPIRATORY (INHALATION) at 10:38

## 2025-01-01 RX ADMIN — CLOPIDOGREL BISULFATE 75 MILLIGRAM(S): 75 TABLET, FILM COATED ORAL at 11:21

## 2025-01-01 RX ADMIN — CLOPIDOGREL BISULFATE 75 MILLIGRAM(S): 75 TABLET, FILM COATED ORAL at 11:02

## 2025-01-01 RX ADMIN — Medication 25 GRAM(S): at 18:36

## 2025-01-01 RX ADMIN — CARVEDILOL 6.25 MILLIGRAM(S): 3.12 TABLET, FILM COATED ORAL at 05:09

## 2025-01-01 RX ADMIN — Medication 4 MILLIGRAM(S): at 15:31

## 2025-01-01 RX ADMIN — Medication 40 MILLIGRAM(S): at 06:38

## 2025-01-01 RX ADMIN — Medication 25 MILLIGRAM(S): at 18:29

## 2025-01-01 RX ADMIN — GABAPENTIN 300 MILLIGRAM(S): 400 CAPSULE ORAL at 22:32

## 2025-01-01 RX ADMIN — ROSUVASTATIN CALCIUM 40 MILLIGRAM(S): 20 TABLET, FILM COATED ORAL at 22:33

## 2025-01-01 RX ADMIN — Medication 81 MILLIGRAM(S): at 11:23

## 2025-01-01 RX ADMIN — Medication 100 MILLIEQUIVALENT(S): at 18:36

## 2025-01-01 RX ADMIN — RANOLAZINE 500 MILLIGRAM(S): 1000 TABLET, FILM COATED, EXTENDED RELEASE ORAL at 18:30

## 2025-01-01 RX ADMIN — CARVEDILOL 6.25 MILLIGRAM(S): 3.12 TABLET, FILM COATED ORAL at 17:02

## 2025-01-01 RX ADMIN — Medication 2 TABLET(S): at 22:06

## 2025-01-01 RX ADMIN — HEPARIN SODIUM 5000 UNIT(S): 1000 INJECTION INTRAVENOUS; SUBCUTANEOUS at 18:27

## 2025-01-01 RX ADMIN — Medication 81 MILLIGRAM(S): at 12:45

## 2025-01-01 RX ADMIN — Medication 1 APPLICATION(S): at 05:34

## 2025-01-01 RX ADMIN — Medication 75 MILLILITER(S): at 19:30

## 2025-01-01 RX ADMIN — HEPARIN SODIUM 5000 UNIT(S): 1000 INJECTION INTRAVENOUS; SUBCUTANEOUS at 05:09

## 2025-01-01 RX ADMIN — ROSUVASTATIN CALCIUM 40 MILLIGRAM(S): 20 TABLET, FILM COATED ORAL at 21:46

## 2025-01-01 RX ADMIN — CLOPIDOGREL BISULFATE 75 MILLIGRAM(S): 75 TABLET, FILM COATED ORAL at 12:45

## 2025-01-01 RX ADMIN — HEPARIN SODIUM 5000 UNIT(S): 1000 INJECTION INTRAVENOUS; SUBCUTANEOUS at 06:24

## 2025-01-01 RX ADMIN — HEPARIN SODIUM 5000 UNIT(S): 1000 INJECTION INTRAVENOUS; SUBCUTANEOUS at 05:34

## 2025-01-01 RX ADMIN — Medication 10 MILLIGRAM(S): at 13:00

## 2025-01-01 RX ADMIN — RANOLAZINE 500 MILLIGRAM(S): 1000 TABLET, FILM COATED, EXTENDED RELEASE ORAL at 05:35

## 2025-01-01 RX ADMIN — CLOPIDOGREL BISULFATE 75 MILLIGRAM(S): 75 TABLET, FILM COATED ORAL at 12:59

## 2025-01-01 RX ADMIN — Medication 1 APPLICATION(S): at 11:25

## 2025-01-01 RX ADMIN — Medication 81 MILLIGRAM(S): at 11:02

## 2025-01-01 RX ADMIN — Medication 10 MILLIGRAM(S): at 12:45

## 2025-01-01 RX ADMIN — FUROSEMIDE 40 MILLIGRAM(S): 10 INJECTION INTRAMUSCULAR; INTRAVENOUS at 16:00

## 2025-01-01 RX ADMIN — CARVEDILOL 6.25 MILLIGRAM(S): 3.12 TABLET, FILM COATED ORAL at 18:30

## 2025-01-01 RX ADMIN — Medication 25 MILLIGRAM(S): at 15:31

## 2025-01-01 RX ADMIN — Medication 1000 MILLIGRAM(S): at 06:00

## 2025-01-01 RX ADMIN — Medication 10 MILLIGRAM(S): at 11:25

## 2025-01-01 RX ADMIN — Medication 10 MILLIGRAM(S): at 11:03

## 2025-01-01 RX ADMIN — Medication 81 MILLIGRAM(S): at 13:00

## 2025-01-01 RX ADMIN — CARVEDILOL 6.25 MILLIGRAM(S): 3.12 TABLET, FILM COATED ORAL at 06:24

## 2025-01-01 RX ADMIN — ROSUVASTATIN CALCIUM 40 MILLIGRAM(S): 20 TABLET, FILM COATED ORAL at 22:06

## 2025-01-01 RX ADMIN — Medication 400 MILLIGRAM(S): at 12:14

## 2025-01-01 RX ADMIN — Medication 100 MILLIEQUIVALENT(S): at 19:42

## 2025-01-13 ENCOUNTER — APPOINTMENT (OUTPATIENT)
Dept: ELECTROPHYSIOLOGY | Facility: CLINIC | Age: 75
End: 2025-01-13

## 2025-01-18 ENCOUNTER — EMERGENCY (EMERGENCY)
Facility: HOSPITAL | Age: 75
LOS: 1 days | Discharge: HOME | End: 2025-01-18
Attending: STUDENT IN AN ORGANIZED HEALTH CARE EDUCATION/TRAINING PROGRAM | Admitting: STUDENT IN AN ORGANIZED HEALTH CARE EDUCATION/TRAINING PROGRAM
Payer: MEDICARE

## 2025-01-18 VITALS
HEART RATE: 86 BPM | OXYGEN SATURATION: 100 % | TEMPERATURE: 98 F | RESPIRATION RATE: 18 BRPM | DIASTOLIC BLOOD PRESSURE: 84 MMHG | SYSTOLIC BLOOD PRESSURE: 161 MMHG

## 2025-01-18 VITALS
HEART RATE: 76 BPM | RESPIRATION RATE: 18 BRPM | SYSTOLIC BLOOD PRESSURE: 166 MMHG | TEMPERATURE: 98 F | OXYGEN SATURATION: 100 % | DIASTOLIC BLOOD PRESSURE: 82 MMHG

## 2025-01-18 DIAGNOSIS — Z86.79 PERSONAL HISTORY OF OTHER DISEASES OF THE CIRCULATORY SYSTEM: Chronic | ICD-10-CM

## 2025-01-18 DIAGNOSIS — Z98.890 OTHER SPECIFIED POSTPROCEDURAL STATES: Chronic | ICD-10-CM

## 2025-01-18 DIAGNOSIS — Z95.1 PRESENCE OF AORTOCORONARY BYPASS GRAFT: Chronic | ICD-10-CM

## 2025-01-18 DIAGNOSIS — Z90.49 ACQUIRED ABSENCE OF OTHER SPECIFIED PARTS OF DIGESTIVE TRACT: Chronic | ICD-10-CM

## 2025-01-18 LAB
ALBUMIN SERPL ELPH-MCNC: 3.8 G/DL — SIGNIFICANT CHANGE UP (ref 3.3–5)
ALP SERPL-CCNC: 81 U/L — SIGNIFICANT CHANGE UP (ref 40–120)
ALT FLD-CCNC: 41 U/L — SIGNIFICANT CHANGE UP (ref 4–41)
ANION GAP SERPL CALC-SCNC: 11 MMOL/L — SIGNIFICANT CHANGE UP (ref 7–14)
AST SERPL-CCNC: 28 U/L — SIGNIFICANT CHANGE UP (ref 4–40)
BASOPHILS # BLD AUTO: 0.05 K/UL — SIGNIFICANT CHANGE UP (ref 0–0.2)
BASOPHILS NFR BLD AUTO: 1 % — SIGNIFICANT CHANGE UP (ref 0–2)
BILIRUB SERPL-MCNC: 0.6 MG/DL — SIGNIFICANT CHANGE UP (ref 0.2–1.2)
BUN SERPL-MCNC: 12 MG/DL — SIGNIFICANT CHANGE UP (ref 7–23)
CALCIUM SERPL-MCNC: 9.3 MG/DL — SIGNIFICANT CHANGE UP (ref 8.4–10.5)
CHLORIDE SERPL-SCNC: 104 MMOL/L — SIGNIFICANT CHANGE UP (ref 98–107)
CO2 SERPL-SCNC: 26 MMOL/L — SIGNIFICANT CHANGE UP (ref 22–31)
CREAT SERPL-MCNC: 1.16 MG/DL — SIGNIFICANT CHANGE UP (ref 0.5–1.3)
EGFR: 67 ML/MIN/1.73M2 — SIGNIFICANT CHANGE UP
EOSINOPHIL # BLD AUTO: 0.07 K/UL — SIGNIFICANT CHANGE UP (ref 0–0.5)
EOSINOPHIL NFR BLD AUTO: 1.4 % — SIGNIFICANT CHANGE UP (ref 0–6)
FLUAV AG NPH QL: SIGNIFICANT CHANGE UP
FLUBV AG NPH QL: SIGNIFICANT CHANGE UP
GLUCOSE SERPL-MCNC: 106 MG/DL — HIGH (ref 70–99)
HCT VFR BLD CALC: 43.1 % — SIGNIFICANT CHANGE UP (ref 39–50)
HGB BLD-MCNC: 13.6 G/DL — SIGNIFICANT CHANGE UP (ref 13–17)
IANC: 3.18 K/UL — SIGNIFICANT CHANGE UP (ref 1.8–7.4)
IMM GRANULOCYTES NFR BLD AUTO: 0.4 % — SIGNIFICANT CHANGE UP (ref 0–0.9)
LYMPHOCYTES # BLD AUTO: 1.36 K/UL — SIGNIFICANT CHANGE UP (ref 1–3.3)
LYMPHOCYTES # BLD AUTO: 26.8 % — SIGNIFICANT CHANGE UP (ref 13–44)
MCHC RBC-ENTMCNC: 28.2 PG — SIGNIFICANT CHANGE UP (ref 27–34)
MCHC RBC-ENTMCNC: 31.6 G/DL — LOW (ref 32–36)
MCV RBC AUTO: 89.2 FL — SIGNIFICANT CHANGE UP (ref 80–100)
MONOCYTES # BLD AUTO: 0.4 K/UL — SIGNIFICANT CHANGE UP (ref 0–0.9)
MONOCYTES NFR BLD AUTO: 7.9 % — SIGNIFICANT CHANGE UP (ref 2–14)
NEUTROPHILS # BLD AUTO: 3.18 K/UL — SIGNIFICANT CHANGE UP (ref 1.8–7.4)
NEUTROPHILS NFR BLD AUTO: 62.5 % — SIGNIFICANT CHANGE UP (ref 43–77)
NRBC # BLD: 0 /100 WBCS — SIGNIFICANT CHANGE UP (ref 0–0)
NRBC # FLD: 0 K/UL — SIGNIFICANT CHANGE UP (ref 0–0)
PLATELET # BLD AUTO: 226 K/UL — SIGNIFICANT CHANGE UP (ref 150–400)
POTASSIUM SERPL-MCNC: 4.2 MMOL/L — SIGNIFICANT CHANGE UP (ref 3.5–5.3)
POTASSIUM SERPL-SCNC: 4.2 MMOL/L — SIGNIFICANT CHANGE UP (ref 3.5–5.3)
PROT SERPL-MCNC: 6.8 G/DL — SIGNIFICANT CHANGE UP (ref 6–8.3)
RBC # BLD: 4.83 M/UL — SIGNIFICANT CHANGE UP (ref 4.2–5.8)
RBC # FLD: 15.7 % — HIGH (ref 10.3–14.5)
RSV RNA NPH QL NAA+NON-PROBE: SIGNIFICANT CHANGE UP
SARS-COV-2 RNA SPEC QL NAA+PROBE: SIGNIFICANT CHANGE UP
SODIUM SERPL-SCNC: 141 MMOL/L — SIGNIFICANT CHANGE UP (ref 135–145)
WBC # BLD: 5.08 K/UL — SIGNIFICANT CHANGE UP (ref 3.8–10.5)
WBC # FLD AUTO: 5.08 K/UL — SIGNIFICANT CHANGE UP (ref 3.8–10.5)

## 2025-01-18 PROCEDURE — 99284 EMERGENCY DEPT VISIT MOD MDM: CPT

## 2025-01-18 PROCEDURE — 71046 X-RAY EXAM CHEST 2 VIEWS: CPT | Mod: 26

## 2025-01-18 RX ORDER — BENZONATATE 100 MG
100 CAPSULE ORAL ONCE
Refills: 0 | Status: COMPLETED | OUTPATIENT
Start: 2025-01-18 | End: 2025-01-18

## 2025-01-18 RX ORDER — SODIUM CHLORIDE 9 MG/ML
1000 INJECTION, SOLUTION INTRAMUSCULAR; INTRAVENOUS; SUBCUTANEOUS ONCE
Refills: 0 | Status: COMPLETED | OUTPATIENT
Start: 2025-01-18 | End: 2025-01-18

## 2025-01-18 RX ADMIN — SODIUM CHLORIDE 1000 MILLILITER(S): 9 INJECTION, SOLUTION INTRAMUSCULAR; INTRAVENOUS; SUBCUTANEOUS at 19:30

## 2025-01-18 RX ADMIN — Medication 100 MILLIGRAM(S): at 19:30

## 2025-01-18 NOTE — ED ADULT TRIAGE NOTE - CHIEF COMPLAINT QUOTE
Pt presents to ED ambulatory from home with c/o fever, cough, and shortness of breath. Pt reports he was recently evaluated in ER in [facility] and was advised to come to ED in [place] when [place]. Pt has hx of HTN.

## 2025-01-18 NOTE — ED ADULT NURSE NOTE - MODE OF DISCHARGE
CC:  pregnancy  She has no complaints today.  She does report good fetal movement.  She is denying any headaches, vision changes, or right upper quadrant pain.  Her initial blood pressure was mildly elevated, but repeat was normal.  We will continue to see her weekly to monitor blood pressure.   testing today is reassuring.  A/P: Supervision of pregnancy at 33 weeks with gestational hypertension  --Continue with weekly  testing and weekly OB follow-ups.   to ED WR to wait for UBER/Wheelchair

## 2025-01-18 NOTE — ED ADULT NURSE REASSESSMENT NOTE - NS ED NURSE REASSESS COMMENT FT1
PT and family educated on hypoglycemia, hyperglycemia, sick days, glucose checks, locations to administer medication. PT and family are able to teach back. NAD.  pt denies SOB, chest pain, dizziness, weakness, urinary symptoms, HA, n/v/d, fevers, chills, pain. respirations are even and un labored. meal provided. safety precautions maintained. IV removed.

## 2025-01-18 NOTE — ED PROVIDER NOTE - CLINICAL SUMMARY MEDICAL DECISION MAKING FREE TEXT BOX
Olvin Jones MD (Attending MD): Patient is a 74-year-old male with history of hypertension presenting to ED with URI symptoms x 2 days.  Patient returned from [place] yesterday and follow-up with his primary care provider and was informe patient reports shortness of breath and pain when coughing.  No exertional chest pain.  Tactile fevers.  No urinary or bowel changes.  No leg edema.  Has been taking Tylenol for his symptoms.    Review of systems otherwise negative.    General: Alert and Orientated x 3. No apparent distress.  Head: Normocephalic and atraumatic.  Eyes: PERRLA with EOMI.  Neck: Supple. Trachea midline.   Cardiac: Normal S1 and S2 w/ RRR. No murmurs appreciated. No JVD appreciated.  Pulmonary: CTA bilaterally. No increased WOB. No wheezes or crackles.  Abdominal: Soft, non-tender. (+) bowel sounds appreciated in all 4 quadrants. No hepatosplenomegaly.   Neurologic: No focal sensory or motor deficits.  Musculoskeletal: Strength appropriate in all 4 extremities for age with no limited ROM.  Skin: Color appropriate for race. Intact, warm, and well-perfused.  Psychiatric: Appropriate mood and affect. No apparent risk to self or others.     MDM: Patient hemodynamically stable.  Likely viral URI versus pneumonia.  Low concern for ACS, CHF or PE given symptoms more likely infectious.  Ill get labs, chest x-ray, flu COVID swab, symptomatic treatment.  Disposition pending reassessment.

## 2025-01-18 NOTE — ED PROVIDER NOTE - PATIENT PORTAL LINK FT
You can access the FollowMyHealth Patient Portal offered by [facility] by registering at the following website: [email or web address]. By joining [employer]’s FollowMyHealth portal, you will also be able to view your health information using other applications (apps) compatible with our system.

## 2025-01-18 NOTE — ED ADULT NURSE NOTE - OBJECTIVE STATEMENT
pt alert ,oriented x3. reports recent cough with difficulty breathing. denies ch pains ,fever. re spirations not labored at present.  medicated as ordered. iv access,labs sent. will continue to monitor

## 2025-01-18 NOTE — ED PROVIDER NOTE - NSFOLLOWUPINSTRUCTIONS_ED_ALL_ED_FT
You were seen in the Emergency Department for upper respiratory symptoms. Lab and imaging results, if performed, were discussed with you along with your discharge diagnosis.    Follow up with your doctor in 1 week - bring copies of your results if you were given. If you do not have a primary doctor, please call [phone] to find one convenient for you.    Continue all prescribed medications.     To control your pain or fevers at home, you should take Ibuprofen 400 mg along with Tylenol 650mg-1000mg every 6 to 8 hours. Limit your maximum daily Tylenol from all sources to 4000mg.     Return to ED for any new or worsening symptoms including but not limited to: development of chest pain, shortness of breath, fever, vomiting, focal numbness, weakness or tingling, any severe CP, headache, abdominal pain, back pain.      Rest and keep yourself hydrated with fluids.

## 2025-01-18 NOTE — ED ADULT NURSE NOTE - CHIEF COMPLAINT QUOTE
Pt presents to ED ambulatory from home with c/o fever, cough, and shortness of breath. Pt reports he was recently evaluated in ER in [facility] and was advised [place] come to ED in [place] when [place]. Pt has hx of HTN.

## 2025-02-20 NOTE — H&P ADULT - PROBLEM SELECTOR PLAN 9
No - F: none  - E: replete K<4, Mg<2  - N: DASH/TLC  - D: lovenox 40mg q24h  - G: protonix 40mg daily    code: full  dispo: pending medical optimization, anticipate return to home

## 2025-03-10 NOTE — DISCHARGE NOTE NURSING/CASE MANAGEMENT/SOCIAL WORK - NSTRANSFERBELONGINGSDISPO_GEN_A_NUR
Patient ID: PAULA is a 12 year old male.  MRN: [id number]  No chief complaint on file.    HISTORY OF PRESENT ILLNESS  HPI    Virtual visit   Inattention, hyperactivity   Ongoing for the past several months, primarily at home as he does not seem to be interfering his school work.    Mother states that he is very hyperactive at home to the point that he can not pay attention to 1 particular thing.       The patient is in 5th grade, doing well in school.    Teachers have not necessarily said anything regarding possible ADHD symptoms but have mentioned something about what seems to be introverted behavior what he does not want to participate in activities with other children.    I did refer patient to developmental department at [facility] who did extensive testing ruling out any sort of mood disorder, diagnosed with ADHD, medication was recommended.    Review of Systems   All other systems reviewed and are negative.    Please see HPI for further ROS.       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  SOCIAL HISTORY  Social History     Socioeconomic History    Marital status: Single     Spouse name: Not on file    Number of children: Not on file    Years of education: Not on file    Highest education level: Not on file   Occupational History    Not on file   Tobacco Use    Smoking status: Never     Smokeless tobacco: Never   Substance and Sexual Activity    Alcohol use: Never    Drug use: Never    Sexual activity: Not on file   Other Topics Concern    Not on file   Social History Narrative    Not on file     Social Determinants of Health     Financial Resource Strain: Not on File (1/8/2023)    Received from [facility]    Financial Resource Strain     Financial Resource Strain: 0   Food Insecurity: Patient Declined (3/9/2025)    Food Insecurity     Worried about Food: Patient declined     Food is Gone: Patient declined   Transportation Needs: Patient Declined (3/9/2025)    Transportation Needs     Lack of Reliable Transportation: Patient declined   Physical Activity: Not on File (1/8/2023)    Received from [facility]    Physical Activity     Physical Activity: 0   Stress: Not on File (1/8/2023)    Received from [facility]    Stress     Stress: 0   Social Connections: Not on File (9/14/2024)    Received from [facility]    Social Connections     Connectedness: 0   Interpersonal Safety: Not on file     CURRENT MEDICATIONS  Current Outpatient Medications   Medication Sig Dispense Refill    dexmethylpheniDATE (FOCALIN) 5 MG tablet Take 1 tablet by mouth daily. 30 tablet 0    mometasone (ELOCON) 0.1 % cream Apply to affected area daily as directed by MD.  Avoid face and groin areas. 45 g 2    cetirizine (ZyrTEC Allergy) 10 MG tablet Take 1 tablet by mouth daily. 30 tablet 6     No current facility-administered medications for this visit.       Patient's medications, allergies, past medical, surgical, social and family histories were reviewed and updated as appropriate    Limited physical examination secondary to this being a virtual visit.  OBJECTIVE  There were no vitals filed for this visit.  Physical Exam  Neurological:      Mental Status: He is alert.   Psychiatric:         Mood and Affect: Mood normal.         Behavior: Behavior normal.          All pertinent laboratory results were reviewed.    I have reviewed  and discussed with patient previous records, labs, and imaging.    ASSESSMENT AND PLAN  Problem List Items Addressed This Visit          Mental Health    Attention deficit hyperactivity disorder (ADHD), combined type - Primary     -Recent formal diagnosis, no mood disorder.  Very possible mild spectrum in the autistic range, will continue to monitor.  For now, we will start Focalin at a low dosage given his chronically lower than average weight curves.  Will see him in office in 1 month.         Relevant Medications    dexmethylpheniDATE (FOCALIN) 5 MG tablet       Schedule follow up: Return in about 1 month (around 4/10/2025) for adhd.    Oleg Keane MD   with patient

## 2025-03-21 ENCOUNTER — APPOINTMENT (OUTPATIENT)
Dept: ELECTROPHYSIOLOGY | Facility: CLINIC | Age: 75
End: 2025-03-21

## 2025-04-04 NOTE — PATIENT PROFILE ADULT - NSASFALLNEEDSASSIST_GEN_A_NUR
Ambulated to discharge exit; steady gait; Driven home by Merna.    Electronic consent downtime:  Consent on paper completed and will be scanned in on a later date.   yes

## 2025-04-26 ENCOUNTER — INPATIENT (INPATIENT)
Facility: HOSPITAL | Age: 75
LOS: 2 days | Discharge: HOME | DRG: 282 | End: 2025-04-29
Attending: INTERNAL MEDICINE | Admitting: STUDENT IN AN ORGANIZED HEALTH CARE EDUCATION/TRAINING PROGRAM
Payer: MEDICARE

## 2025-04-26 VITALS
DIASTOLIC BLOOD PRESSURE: 72 MMHG | HEART RATE: 83 BPM | RESPIRATION RATE: 19 BRPM | SYSTOLIC BLOOD PRESSURE: 132 MMHG | OXYGEN SATURATION: 97 % | TEMPERATURE: 98 F

## 2025-04-26 DIAGNOSIS — I50.20 UNSPECIFIED SYSTOLIC (CONGESTIVE) HEART FAILURE: ICD-10-CM

## 2025-04-26 DIAGNOSIS — E78.5 HYPERLIPIDEMIA, UNSPECIFIED: ICD-10-CM

## 2025-04-26 DIAGNOSIS — Z98.890 OTHER SPECIFIED POSTPROCEDURAL STATES: Chronic | ICD-10-CM

## 2025-04-26 DIAGNOSIS — I21.4 NON-ST ELEVATION (NSTEMI) MYOCARDIAL INFARCTION: ICD-10-CM

## 2025-04-26 DIAGNOSIS — I10 ESSENTIAL (PRIMARY) HYPERTENSION: ICD-10-CM

## 2025-04-26 DIAGNOSIS — Z90.49 ACQUIRED ABSENCE OF OTHER SPECIFIED PARTS OF DIGESTIVE TRACT: Chronic | ICD-10-CM

## 2025-04-26 DIAGNOSIS — I25.10 ATHEROSCLEROTIC HEART DISEASE OF NATIVE CORONARY ARTERY WITHOUT ANGINA PECTORIS: ICD-10-CM

## 2025-04-26 DIAGNOSIS — N18.30 CHRONIC KIDNEY DISEASE, STAGE 3 UNSPECIFIED: ICD-10-CM

## 2025-04-26 DIAGNOSIS — R07.9 CHEST PAIN, UNSPECIFIED: ICD-10-CM

## 2025-04-26 DIAGNOSIS — Z86.79 PERSONAL HISTORY OF OTHER DISEASES OF THE CIRCULATORY SYSTEM: Chronic | ICD-10-CM

## 2025-04-26 DIAGNOSIS — Z95.1 PRESENCE OF AORTOCORONARY BYPASS GRAFT: Chronic | ICD-10-CM

## 2025-04-26 DIAGNOSIS — I73.9 PERIPHERAL VASCULAR DISEASE, UNSPECIFIED: ICD-10-CM

## 2025-04-26 LAB
ADD ON TEST-SPECIMEN IN LAB: SIGNIFICANT CHANGE UP
ALBUMIN SERPL ELPH-MCNC: 3.8 G/DL — SIGNIFICANT CHANGE UP (ref 3.3–5)
ALP SERPL-CCNC: 93 U/L — SIGNIFICANT CHANGE UP (ref 40–120)
ALT FLD-CCNC: 12 U/L — SIGNIFICANT CHANGE UP (ref 10–45)
ANION GAP SERPL CALC-SCNC: 12 MMOL/L — SIGNIFICANT CHANGE UP (ref 5–17)
APTT BLD: 125.6 SEC — CRITICAL HIGH (ref 26.1–36.8)
APTT BLD: 171.9 SEC — CRITICAL HIGH (ref 26.1–36.8)
APTT BLD: 47.8 SEC — HIGH (ref 26.1–36.8)
AST SERPL-CCNC: 21 U/L — SIGNIFICANT CHANGE UP (ref 10–40)
BASOPHILS # BLD AUTO: 0.05 K/UL — SIGNIFICANT CHANGE UP (ref 0–0.2)
BASOPHILS NFR BLD AUTO: 0.8 % — SIGNIFICANT CHANGE UP (ref 0–2)
BILIRUB SERPL-MCNC: 0.8 MG/DL — SIGNIFICANT CHANGE UP (ref 0.2–1.2)
BLD GP AB SCN SERPL QL: NEGATIVE — SIGNIFICANT CHANGE UP
BUN SERPL-MCNC: 16 MG/DL — SIGNIFICANT CHANGE UP (ref 7–23)
CALCIUM SERPL-MCNC: 9.2 MG/DL — SIGNIFICANT CHANGE UP (ref 8.4–10.5)
CHLORIDE SERPL-SCNC: 107 MMOL/L — SIGNIFICANT CHANGE UP (ref 96–108)
CO2 SERPL-SCNC: 21 MMOL/L — LOW (ref 22–31)
CREAT SERPL-MCNC: 1.35 MG/DL — HIGH (ref 0.5–1.3)
EGFR: 56 ML/MIN/1.73M2 — LOW
EGFR: 56 ML/MIN/1.73M2 — LOW
EOSINOPHIL # BLD AUTO: 0.1 K/UL — SIGNIFICANT CHANGE UP (ref 0–0.5)
EOSINOPHIL NFR BLD AUTO: 1.6 % — SIGNIFICANT CHANGE UP (ref 0–6)
GLUCOSE SERPL-MCNC: 106 MG/DL — HIGH (ref 70–99)
HCT VFR BLD CALC: 38.4 % — LOW (ref 39–50)
HGB BLD-MCNC: 12.6 G/DL — LOW (ref 13–17)
IMM GRANULOCYTES NFR BLD AUTO: 0.3 % — SIGNIFICANT CHANGE UP (ref 0–0.9)
INR BLD: 1.03 RATIO — SIGNIFICANT CHANGE UP (ref 0.85–1.16)
INR BLD: 1.06 RATIO — SIGNIFICANT CHANGE UP (ref 0.85–1.16)
LYMPHOCYTES # BLD AUTO: 1.39 K/UL — SIGNIFICANT CHANGE UP (ref 1–3.3)
LYMPHOCYTES # BLD AUTO: 21.7 % — SIGNIFICANT CHANGE UP (ref 13–44)
MAGNESIUM SERPL-MCNC: 2.2 MG/DL — SIGNIFICANT CHANGE UP (ref 1.6–2.6)
MCHC RBC-ENTMCNC: 29.7 PG — SIGNIFICANT CHANGE UP (ref 27–34)
MCHC RBC-ENTMCNC: 32.8 G/DL — SIGNIFICANT CHANGE UP (ref 32–36)
MCV RBC AUTO: 90.6 FL — SIGNIFICANT CHANGE UP (ref 80–100)
MONOCYTES # BLD AUTO: 0.58 K/UL — SIGNIFICANT CHANGE UP (ref 0–0.9)
MONOCYTES NFR BLD AUTO: 9 % — SIGNIFICANT CHANGE UP (ref 2–14)
NEUTROPHILS # BLD AUTO: 4.28 K/UL — SIGNIFICANT CHANGE UP (ref 1.8–7.4)
NEUTROPHILS NFR BLD AUTO: 66.6 % — SIGNIFICANT CHANGE UP (ref 43–77)
NRBC BLD AUTO-RTO: 0 /100 WBCS — SIGNIFICANT CHANGE UP (ref 0–0)
PHOSPHATE SERPL-MCNC: 2.8 MG/DL — SIGNIFICANT CHANGE UP (ref 2.5–4.5)
PLATELET # BLD AUTO: 131 K/UL — LOW (ref 150–400)
POTASSIUM SERPL-MCNC: 3.9 MMOL/L — SIGNIFICANT CHANGE UP (ref 3.5–5.3)
POTASSIUM SERPL-SCNC: 3.9 MMOL/L — SIGNIFICANT CHANGE UP (ref 3.5–5.3)
PROT SERPL-MCNC: 6.4 G/DL — SIGNIFICANT CHANGE UP (ref 6–8.3)
PROTHROM AB SERPL-ACNC: 11.7 SEC — SIGNIFICANT CHANGE UP (ref 9.9–13.4)
PROTHROM AB SERPL-ACNC: 12.2 SEC — SIGNIFICANT CHANGE UP (ref 9.9–13.4)
RBC # BLD: 4.24 M/UL — SIGNIFICANT CHANGE UP (ref 4.2–5.8)
RBC # FLD: 13.8 % — SIGNIFICANT CHANGE UP (ref 10.3–14.5)
RH IG SCN BLD-IMP: POSITIVE — SIGNIFICANT CHANGE UP
SODIUM SERPL-SCNC: 140 MMOL/L — SIGNIFICANT CHANGE UP (ref 135–145)
TROPONIN T, HIGH SENSITIVITY RESULT: 108 NG/L — HIGH (ref 0–51)
TROPONIN T, HIGH SENSITIVITY RESULT: 120 NG/L — HIGH (ref 0–51)
WBC # BLD: 6.42 K/UL — SIGNIFICANT CHANGE UP (ref 3.8–10.5)
WBC # FLD AUTO: 6.42 K/UL — SIGNIFICANT CHANGE UP (ref 3.8–10.5)

## 2025-04-26 PROCEDURE — 99221 1ST HOSP IP/OBS SF/LOW 40: CPT

## 2025-04-26 PROCEDURE — 99223 1ST HOSP IP/OBS HIGH 75: CPT

## 2025-04-26 PROCEDURE — 99053 MED SERV 10PM-8AM 24 HR FAC: CPT

## 2025-04-26 PROCEDURE — 99291 CRITICAL CARE FIRST HOUR: CPT

## 2025-04-26 PROCEDURE — 93010 ELECTROCARDIOGRAM REPORT: CPT

## 2025-04-26 RX ORDER — LISINOPRIL 5 MG/1
20 TABLET ORAL DAILY
Refills: 0 | Status: DISCONTINUED | OUTPATIENT
Start: 2025-04-26 | End: 2025-04-29

## 2025-04-26 RX ORDER — GABAPENTIN 400 MG/1
300 CAPSULE ORAL DAILY
Refills: 0 | Status: DISCONTINUED | OUTPATIENT
Start: 2025-04-26 | End: 2025-04-29

## 2025-04-26 RX ORDER — HYPROMELLOSE 0.4 %
1 DROPS OPHTHALMIC (EYE) THREE TIMES A DAY
Refills: 0 | Status: DISCONTINUED | OUTPATIENT
Start: 2025-04-26 | End: 2025-04-29

## 2025-04-26 RX ORDER — LISINOPRIL 5 MG/1
1 TABLET ORAL
Refills: 0 | DISCHARGE

## 2025-04-26 RX ORDER — SPIRONOLACTONE 25 MG
25 TABLET ORAL DAILY
Refills: 0 | Status: DISCONTINUED | OUTPATIENT
Start: 2025-04-26 | End: 2025-04-29

## 2025-04-26 RX ORDER — HEPARIN SODIUM 1000 [USP'U]/ML
3800 INJECTION INTRAVENOUS; SUBCUTANEOUS EVERY 6 HOURS
Refills: 0 | Status: DISCONTINUED | OUTPATIENT
Start: 2025-04-26 | End: 2025-04-29

## 2025-04-26 RX ORDER — PRASUGREL HYDROCHLORIDE 10 MG/1
10 TABLET, COATED ORAL ONCE
Refills: 0 | Status: COMPLETED | OUTPATIENT
Start: 2025-04-26 | End: 2025-04-26

## 2025-04-26 RX ORDER — ASPIRIN 325 MG
81 TABLET ORAL DAILY
Refills: 0 | Status: DISCONTINUED | OUTPATIENT
Start: 2025-04-26 | End: 2025-04-29

## 2025-04-26 RX ORDER — CARVEDILOL 3.12 MG/1
6.25 TABLET, FILM COATED ORAL EVERY 12 HOURS
Refills: 0 | Status: DISCONTINUED | OUTPATIENT
Start: 2025-04-26 | End: 2025-04-29

## 2025-04-26 RX ORDER — CARVEDILOL 3.12 MG/1
12.5 TABLET, FILM COATED ORAL EVERY 12 HOURS
Refills: 0 | Status: DISCONTINUED | OUTPATIENT
Start: 2025-04-26 | End: 2025-04-26

## 2025-04-26 RX ORDER — NITROGLYCERIN 20 MG/G
0.4 OINTMENT TOPICAL
Refills: 0 | Status: DISCONTINUED | OUTPATIENT
Start: 2025-04-26 | End: 2025-04-26

## 2025-04-26 RX ORDER — GABAPENTIN 400 MG/1
100 CAPSULE ORAL AT BEDTIME
Refills: 0 | Status: DISCONTINUED | OUTPATIENT
Start: 2025-04-26 | End: 2025-04-26

## 2025-04-26 RX ORDER — FUROSEMIDE 10 MG/ML
20 INJECTION INTRAMUSCULAR; INTRAVENOUS DAILY
Refills: 0 | Status: DISCONTINUED | OUTPATIENT
Start: 2025-04-26 | End: 2025-04-29

## 2025-04-26 RX ORDER — ASPIRIN 325 MG
1 TABLET ORAL
Refills: 0 | DISCHARGE

## 2025-04-26 RX ORDER — PRASUGREL HYDROCHLORIDE 10 MG/1
10 TABLET, COATED ORAL DAILY
Refills: 0 | Status: DISCONTINUED | OUTPATIENT
Start: 2025-04-26 | End: 2025-04-29

## 2025-04-26 RX ORDER — ROSUVASTATIN CALCIUM 20 MG/1
40 TABLET, FILM COATED ORAL AT BEDTIME
Refills: 0 | Status: DISCONTINUED | OUTPATIENT
Start: 2025-04-26 | End: 2025-04-29

## 2025-04-26 RX ORDER — HEPARIN SODIUM 1000 [USP'U]/ML
INJECTION INTRAVENOUS; SUBCUTANEOUS
Qty: 25000 | Refills: 0 | Status: DISCONTINUED | OUTPATIENT
Start: 2025-04-26 | End: 2025-04-29

## 2025-04-26 RX ADMIN — HEPARIN SODIUM 650 UNIT(S)/HR: 1000 INJECTION INTRAVENOUS; SUBCUTANEOUS at 17:44

## 2025-04-26 RX ADMIN — ROSUVASTATIN CALCIUM 40 MILLIGRAM(S): 20 TABLET, FILM COATED ORAL at 21:31

## 2025-04-26 RX ADMIN — HEPARIN SODIUM 550 UNIT(S)/HR: 1000 INJECTION INTRAVENOUS; SUBCUTANEOUS at 11:37

## 2025-04-26 RX ADMIN — PRASUGREL HYDROCHLORIDE 10 MILLIGRAM(S): 10 TABLET, COATED ORAL at 07:31

## 2025-04-26 RX ADMIN — Medication 1 DROP(S): at 07:31

## 2025-04-26 RX ADMIN — PRASUGREL HYDROCHLORIDE 10 MILLIGRAM(S): 10 TABLET, COATED ORAL at 12:12

## 2025-04-26 RX ADMIN — GABAPENTIN 300 MILLIGRAM(S): 400 CAPSULE ORAL at 22:13

## 2025-04-26 RX ADMIN — HEPARIN SODIUM 0 UNIT(S)/HR: 1000 INJECTION INTRAVENOUS; SUBCUTANEOUS at 10:35

## 2025-04-26 RX ADMIN — FUROSEMIDE 20 MILLIGRAM(S): 10 INJECTION INTRAMUSCULAR; INTRAVENOUS at 07:31

## 2025-04-26 RX ADMIN — Medication 25 MILLIGRAM(S): at 07:32

## 2025-04-26 RX ADMIN — Medication 20 MILLIGRAM(S): at 17:39

## 2025-04-26 RX ADMIN — LISINOPRIL 20 MILLIGRAM(S): 5 TABLET ORAL at 07:31

## 2025-04-26 RX ADMIN — NITROGLYCERIN 0.4 MILLIGRAM(S): 20 OINTMENT TOPICAL at 07:03

## 2025-04-26 RX ADMIN — Medication 1 DROP(S): at 21:31

## 2025-04-26 RX ADMIN — HEPARIN SODIUM 750 UNIT(S)/HR: 1000 INJECTION INTRAVENOUS; SUBCUTANEOUS at 06:59

## 2025-04-26 RX ADMIN — Medication 1 DROP(S): at 15:38

## 2025-04-26 RX ADMIN — CARVEDILOL 6.25 MILLIGRAM(S): 3.12 TABLET, FILM COATED ORAL at 17:39

## 2025-04-26 RX ADMIN — Medication 20 MILLIGRAM(S): at 08:27

## 2025-04-26 RX ADMIN — HEPARIN SODIUM 750 UNIT(S)/HR: 1000 INJECTION INTRAVENOUS; SUBCUTANEOUS at 06:03

## 2025-04-26 RX ADMIN — Medication 81 MILLIGRAM(S): at 12:12

## 2025-04-26 NOTE — ED ADULT NURSE REASSESSMENT NOTE - NS ED NURSE REASSESS COMMENT FT1
Report received from DINESH Billings. Pt is AOx3, breathing spontaneously, unlabored on room air. VSS, reports relief of chest pain after nitroglycerin. MD Luz aware, as per md Luz, hold 2nd and 3rd dose of nitroglycerin for now. As per DINESH Peacock, pt weighted and weight entered in flowsheet. Heparin currently running at 7.5ml/hr as per Heparin nomogram. Per DINESH Peacock, heparin started at 0338, repeat coags at 0938.

## 2025-04-26 NOTE — CONSULT NOTE ADULT - ASSESSMENT
73 yo male with a PMH of CAD (s/p 3 stents and 3V-CABG in 2002), PAD(s/p LLE stent), HTN, HLD, chronic systolic CHF, s/p ICD implantation (2019) presenting with NSTEMI.    Still having chest pressure though improving. Responsive to nitroglycerin. Awaiting troponin. SBP 130s -150s, recommend give home BP medications (lisinopril, furosemide, coreg, spironolactone). If CP does not improve with better BP control could consider adding imdur if BP room. Will likely take to cath lab today if troponin does not downtrend.    Recommendations:  - cw home ASA  - increase home Prasugrel to 10 mg qD given wt > 60 kg   - trend troponin q2h until downtrending, call if significant uptrend  - cw home lisinopril, furosemide, coreg, spironolactone  - TTE  - Telemetry 73 yo male with a PMH of CAD (s/p 3 stents and 3V-CABG in 2002), PAD(s/p LLE stent), HTN, HLD, chronic systolic CHF, s/p ICD implantation (2019) presenting with NSTEMI.    Still having chest pressure though improving. Responsive to nitroglycerin. Awaiting troponin. SBP 130s -150s, recommend give home BP medications (lisinopril, furosemide, coreg, spironolactone). If CP does not improve with better BP control could consider adding imdur if BP room. Will likely take to cath lab today if troponin does not downtrend.    Recommendations:  - cw heparin gtt  - cw home ASA  - increase home Prasugrel to 10 mg qD given wt > 60 kg   - trend troponin q2h until downtrending, call if significant uptrend  - cw home lisinopril, furosemide, coreg, spironolactone  - TTE  - Telemetry

## 2025-04-26 NOTE — ED PROVIDER NOTE - PHYSICAL EXAMINATION
Cj Segal DO (PGY1)   Physical Exam:    Gen: NAD, AOx3  Head: NCAT  HEENT: EOMI, PEERLA, pink and moist mucous membranes  Lung: CTAB, no respiratory distress, no wheezes/rhonchi/rales B/L  CV: RRR, no murmurs, rubs or gallops  Abd: soft, NT, ND, no guarding, no rigidity, no rebound tenderness, no CVA tenderness   MSK: no visible deformities, ROM normal in UE/LE, no back pain  Neuro: No focal sensory or motor deficits. Sensation intact to light touch all extremities.  Skin: Warm, well perfused, no rash, no leg swelling  Psych: normal affect, calm

## 2025-04-26 NOTE — PATIENT PROFILE ADULT - NSPROGENPREVTRANSF_GEN_A_NUR
I reviewed the progress note and agree with the resident’s findings and plans as written. Case discussed with resident.    Bjorn Lanier, ZacariasD      no

## 2025-04-26 NOTE — PATIENT PROFILE ADULT - HISTORY OF COVID-19 VACCINATION
A/P: 20yo G0 LMP 5/2/23, with abdominal pain, likely 2/2 to ruptured ovarian cyst, clinically and hemodynamically stable    - no acute gyn intervention at this time  - tylenol/motrin PRN for pain  - please provide information for [facility] on discharge  - dispo per ED    Dr. Ayoub and Dr. Matos aware Yes

## 2025-04-26 NOTE — CONSULT NOTE ADULT - SUBJECTIVE AND OBJECTIVE BOX
[facility]    Dr. Isrrael Sandy     Office [phone] (9 am to 5 pm)  Service : [phone] ( 5pm to 9 am)  Also Available on Teams        RENAL INITIAL CONSULT NOTE: DATE OF SERVICE 25 @ 09:07    HPI: 74-year-old male with past medical history hypertension, hyperlipidemia, CABG with 3 stents placed, defibrillator placement, presents today for nonradiating, exertional, pleuritic,  midsternal chest pain.  the chest pain started while he was walking at roughly 10 AM today, notes that whenever he walks it makes the chest pain worse.  Notes when he takes a deep inspiration it also makes it worse in this regard as well.  He notes  that he has this chest pain at rest but is not as prominent.  denies any fever, chills, hemoptysis, cough, unilateral leg swelling, recent travel or recent infectious contacts, abdominal pain, nausea, vomiting, diarrhea, dysuria.   He arrives as a transfer from [facility] for positive delta Trope roughly from 62 to roughly 100, proBNP 4076, elevated D-dimer but negative CTA,   On a heparin drip.    Nephro on board for CKD      Allergies:  No Known Allergies      PAST MEDICAL & SURGICAL HISTORY:  HTN (hypertension)      PAD (peripheral artery disease)  stent to L lower extremity artery      Constipation, unspecified constipation type      Hyperlipidemia      NSTEMI (non-ST elevated myocardial infarction)   and 2018 and 2019      Coronary artery disease involving native coronary artery of native heart, unspecified whether angina present      Ischemic cardiomyopathy with implantable cardioverter-defibrillator (ICD)      S/P CABG (coronary artery bypass graft)  x3; [facility] 2002      History of coronary angiogram  multiple stents placed      History of femoral angiogram  stent placed in E 2016      History of laparoscopic cholecystectomy  2016          Home Medications Reviewed    Hospital Medications:   MEDICATIONS  (STANDING):  artificial tears (preservative free) Ophthalmic Solution 1 Drop(s) Both EYES three times a day  carvedilol 6.25 milliGRAM(s) Oral every 12 hours  furosemide    Tablet 20 milliGRAM(s) Oral daily  gabapentin 100 milliGRAM(s) Oral at bedtime  heparin  Infusion.  Unit(s)/Hr (7.5 mL/Hr) IV Continuous <Continuous>  lisinopril 20 milliGRAM(s) Oral daily  pantoprazole    Tablet 20 milliGRAM(s) Oral two times a day  rosuvastatin 40 milliGRAM(s) Oral at bedtime  spironolactone 25 milliGRAM(s) Oral daily      SOCIAL HISTORY:  Denies ETOh, Smoking,     FAMILY HISTORY:  Family history of coronary artery disease in brother (Sibling)  4 brothers with MI/CABG, 1  at 77yo    Family history of stroke  60yo CVA, heart attack 61yo    Family history of coronary artery disease in sister (Sibling)   from MI    Family history of MI (myocardial infarction)  mother,         REVIEW OF SYSTEMS:  CONSTITUTIONAL: No weakness, fevers or chills  EYES/ENT: No visual changes;  No vertigo or throat pain   NECK: No pain or stiffness  RESPIRATORY: No cough, wheezing, hemoptysis; No shortness of breath  CARDIOVASCULAR: No chest pain or palpitations.  GASTROINTESTINAL: No abdominal or epigastric pain. No nausea, vomiting, or hematemesis; No diarrhea or constipation. No melena or hematochezia.  GENITOURINARY: No dysuria, frequency, foamy urine, urinary urgency, incontinence or hematuria  NEUROLOGICAL: No numbness or weakness  SKIN: No itching, burning, rashes, or lesions   VASCULAR: No bilateral lower extremity edema.   All other review of systems is negative unless indicated above.    VITALS:  T(F): 98.5 (25 @ 05:41), Max: 98.5 (25 @ 05:41)  HR: 76 (25 @ 07:10)  BP: 135/64 (25 @ 07:10)  RR: 16 (25 @ 07:10)  SpO2: 97% (25 @ 07:10)  Wt(kg): --      Weight (kg): 63.3 ( @ 05:52)    PHYSICAL EXAM:  Constitutional: NAD  HEENT: anicteric sclera, oropharynx clear, MMM  Neck: No JVD  Respiratory: CTAB, no wheezes, rales or rhonchi  Cardiovascular: S1, S2, RRR  Gastrointestinal: BS+, soft, NT/ND  Extremities: No cyanosis or clubbing. No peripheral edema  Neurological: A/O x 3, no focal deficits  Psychiatric: Normal mood, normal affect  : No CVA tenderness. No nieto.   Skin: No rashes  Vascular Access:    LABS:      140  |  107  |  16  ----------------------------<  106[H]  3.9   |  21[L]  |  1.35[H]    Ca    9.2      2025 06:05  Phos  2.8       Mg     2.2         TPro  6.4  /  Alb  3.8  /  TBili  0.8  /  DBili      /  AST  21  /  ALT  12  /  AlkPhos  93      Creatinine Trend: 1.35 <--                        12.6   6.42  )-----------( 131      ( 2025 06:05 )             38.4     Urine Studies:  Urinalysis Basic - ( 2025 06:05 )    Color:  / Appearance:  / SG:  / pH:   Gluc: 106 mg/dL / Ketone:   / Bili:  / Urobili:    Blood:  / Protein:  / Nitrite:    Leuk Esterase:  / RBC:  / WBC    Sq Epi:  / Non Sq Epi:  / Bacteria:           RADIOLOGY & ADDITIONAL STUDIES:

## 2025-04-26 NOTE — CONSULT NOTE ADULT - SUBJECTIVE AND OBJECTIVE BOX
Cardiology Consult Note   [Please check [email or web address] password: [email or web address]" for cardiology service schedule and contact information]    HPI:  73 yo male with a PMH of CAD (s/p 3 stents and 3V-CABG in ), PAD(s/p LLE stent), HTN, HLD, chronic systolic CHF, s/p ICD implantation () presenting with NSTEMI.    Presented to [facility] with chest pressure beginning last night at rest and worsening with exertion. Has had pressure like this with walking 2 blocks for sometime but never at rest. Trop 60 ->100 at [place]. EKG abnormal but unchanged from prior.      PAST MEDICAL & SURGICAL HISTORY:  HTN (hypertension)      PAD (peripheral artery disease)  stent to L lower extremity artery      Constipation, unspecified constipation type      Hyperlipidemia      NSTEMI (non-ST elevated myocardial infarction)   and 2018 and       Coronary artery disease involving native coronary artery of native heart, unspecified whether angina present      Ischemic cardiomyopathy with implantable cardioverter-defibrillator (ICD)      S/P CABG (coronary artery bypass graft)  x3; [facility]       History of coronary angiogram  multiple stents placed      History of femoral angiogram  stent placed in E 2016      History of laparoscopic cholecystectomy  2016        FAMILY HISTORY:  Family history of coronary artery disease in brother (Sibling)  4 brothers with MI/CABG, 1  at 77yo    Family history of stroke  60yo CVA, heart attack 59yo    Family history of coronary artery disease in sister (Sibling)   from MI    Family history of MI (myocardial infarction)  mother,       SOCIAL HISTORY:  unchanged    MEDICATIONS:  carvedilol 12.5 milliGRAM(s) Oral every 12 hours  heparin   Injectable 3800 Unit(s) IV Push every 6 hours PRN  heparin  Infusion.  Unit(s)/Hr IV Continuous <Continuous>  nitroglycerin     SubLingual 0.4 milliGRAM(s) SubLingual every 5 minutes  prasugrel 10 milliGRAM(s) Oral Once  spironolactone 25 milliGRAM(s) Oral daily            rosuvastatin 40 milliGRAM(s) Oral at bedtime          -------------------------------------------------------------------------------------------  PHYSICAL EXAM:  T(C): 36.9 (25 @ 05:41), Max: 36.9 (25 @ 05:41)  HR: 77 (25 @ 06:03) (77 - 83)  BP: 158/65 (25 @ 06:03) (132/72 - 158/65)  RR: 18 (25 @ 06:03) (18 - 19)  SpO2: 100% (25 @ 06:03) (97% - 100%)  Wt(kg): --  I&O's Summary      GENERAL: NAD  HEAD: Atraumatic, Normocephalic.  ENT: Moist mucous membranes.  CHEST/LUNG: Unlabored respirations.  HEART: Regular rate and rhythm; No murmurs, rubs, or gallops.  EXTREMITIES:  no edema    -------------------------------------------------------------------------------------------  LABS:                          12.6   6.42  )-----------( 131      ( 2025 06:05 )             38.4         140  |  107  |  16  ----------------------------<  106[H]  3.9   |  21[L]  |  1.35[H]    Ca    9.2      2025 06:05  Phos  2.8       Mg     2.2         TPro  6.4  /  Alb  3.8  /  TBili  0.8  /  DBili  x   /  AST  21  /  ALT  12  /  AlkPhos  93      PT/INR - ( 2025 06:05 )   PT: 12.2 sec;   INR: 1.06 ratio         PTT - ( 2025 06:05 )  PTT:171.9 sec              -------------------------------------------------------------------------------------------  Cardiovascular Diagnostic Testing:    ECG:     Echo:     Stress Testing:    Cath:    -------------------------------------------------------------------------------------------

## 2025-04-26 NOTE — ED PROVIDER NOTE - OBJECTIVE STATEMENT
74-year-old male with past medical history hypertension, hyperlipidemia, CABG with 3 stents placed, defibrillator placement, presents today for nonradiating, exertional, pleuritic,  midsternal chest pain.  the chest pain started while he was walking at roughly 10 AM today, notes that whenever he walks it makes the chest pain worse.  Notes when he takes a deep inspiration it also makes it worse in this regard as well.  He notes  that he has this chest pain at rest but is not as prominent.  denies any fever, chills, hemoptysis, cough, unilateral leg swelling, recent travel or recent infectious contacts, abdominal pain, nausea, vomiting, diarrhea, dysuria.   He arrives as a transfer from [facility] for positive delta Trope roughly from 62 to roughly 100, proBNP 4076, elevated D-dimer but negative CTA,   On a heparin drip

## 2025-04-26 NOTE — H&P ADULT - ASSESSMENT
73 yo male with a PMH of CAD (s/p 3 stents and 3V-CABG in 2002), PAD(s/p LLE stent), HTN, HLD, chronic systolic CHF, s/p ICD implantation (2019) presenting with NSTEMI.

## 2025-04-26 NOTE — ED ADULT NURSE NOTE - NSFALLHARMRISKINTERV_ED_ALL_ED

## 2025-04-26 NOTE — PATIENT PROFILE ADULT - FUNCTIONAL ASSESSMENT - DAILY ACTIVITY 3.
Dr. Berrios,  Can you please review selegiline, if you agree please send medication to pharmacy.      Medication is prepped       Spoke to patient to get lab work completed for the medication Requip.  Patient is going to get her lab work completed next week. Advise patient she can go to any [facility] to get her lab work completed.    Lab orders are furthered.      =============================================    Med:  selegiline (ELDEPRYL) 5 MG tablet  -  Last filled on 11/28/2022 for 60 with 5 refills    LOV:  11/01/2022    FUV:  06/06/2023    =====================================================    Med:  rOPINIRole (REQUIP) 1 MG tablet  -  Last filled on 11/28/2022 for 90 with 5 refills    LOV:  11/01/2022    FUV:  06/06/2023    Labs: CBC, SGPT, patient is going to get lab work done next week.     =================================================  Med:  baclofen (LIORESAL) 10 MG tablet  -  Last filled on 11/28/2022 for 90 with 5 refills    LOV:  11/01/2022    FUV:  06/06/2023    Refill authorized per protocol.    ================================================      Recommendations: 11/01/2022     1. Carbidopa/levodopa 10/100 mg tablets.  One tablet p.o. 3 times a day.  2. Ropinirole 1 mg p.o. t.i.d.  3. Continue vitamin-D supplementation.    4. Selegiline 5 mg every morning and 5 mg every noon p.o..    5. Baclofen 10 mg p.o. t.i.d..  6. Follow-up in 6 months.    
4 = No assist / stand by assistance

## 2025-04-26 NOTE — H&P ADULT - NSHPLABSRESULTS_GEN_ALL_CORE
12.6   6.42  )-----------( 131      ( 26 Apr 2025 06:05 )             38.4     04-26    140  |  107  |  16  ----------------------------<  106[H]  3.9   |  21[L]  |  1.35[H]    Ca    9.2      26 Apr 2025 06:05  Phos  2.8     04-26  Mg     2.2     04-26    TPro  6.4  /  Alb  3.8  /  TBili  0.8  /  DBili  x   /  AST  21  /  ALT  12  /  AlkPhos  93  04-26    [facility] 2294 12.6   6.42  )-----------( 131      ( 26 Apr 2025 06:05 )             38.4     04-26    140  |  107  |  16  ----------------------------<  106[H]  3.9   |  21[L]  |  1.35[H]    Ca    9.2      26 Apr 2025 06:05  Phos  2.8     04-26  Mg     2.2     04-26    TPro  6.4  /  Alb  3.8  /  TBili  0.8  /  DBili  x   /  AST  21  /  ALT  12  /  AlkPhos  93  04-26    proBNP 4636    ekg with ST elevation aVR

## 2025-04-26 NOTE — H&P ADULT - NSHPPHYSICALEXAM_GEN_ALL_CORE
Vital Signs Last 24 Hrs  T(C): 36.9 (26 Apr 2025 11:50), Max: 36.9 (26 Apr 2025 05:41)  T(F): 98.4 (26 Apr 2025 11:50), Max: 98.5 (26 Apr 2025 05:41)  HR: 80 (26 Apr 2025 11:50) (76 - 83)  BP: 121/59 (26 Apr 2025 11:50) (121/59 - 158/65)  BP(mean): 96 (26 Apr 2025 10:40) (96 - 96)  RR: 18 (26 Apr 2025 11:50) (16 - 19)  SpO2: 98% (26 Apr 2025 11:50) (97% - 100%)    Parameters below as of 26 Apr 2025 11:50  Patient On (Oxygen Delivery Method): room air        CONSTITUTIONAL: NAD, well-developed, well-groomed  EYES: PERRLA conjunctiva and sclera clear  ENMT: Moist oral mucosa, no pharyngeal injection or exudates; normal dentition  NECK: Supple, no palpable masses  RESPIRATORY: Normal respiratory effort; lungs are clear to auscultation bilaterally  CARDIOVASCULAR: Regular rate and rhythm, normal S1 and S2, no murmur/rub/gallop; No lower extremity edema; Peripheral pulses are 2+ bilaterally  ABDOMEN: Nontender to palpation, normoactive bowel sounds, no rebound/guarding;  MUSCULOSKELETAL: no clubbing or cyanosis of digits; no joint swelling or tenderness to palpation  PSYCH: A+O to person, place, and time; affect appropriate  NEUROLOGY: CN 2-12 are intact and symmetric; no gross sensory deficits   SKIN: No rashes; no palpable lesions

## 2025-04-26 NOTE — H&P ADULT - PROBLEM SELECTOR PLAN 4
HFrEF last ef here tte is 27% s/p ICD  TTE then further optimization of GDMT  opt cards dr jefry aponte

## 2025-04-26 NOTE — ED ADULT NURSE REASSESSMENT NOTE - NS ED NURSE REASSESS COMMENT FT1
heparin paused at 10:35 per heparin acs nomogram. 2 RN at bedside verifying. Pt is AOx3, breathing spontaneously, unlabored on room air. VSS, denies pain and discomfort. awaiting bed

## 2025-04-26 NOTE — CONSULT NOTE ADULT - SUBJECTIVE AND OBJECTIVE BOX
75 yo male with a PMH of CAD (s/p 3 stents and 3V-CABG in 2002), PAD(s/p LLE stent), HTN, HLD, chronic systolic CHF, s/p ICD implantation (2019) presented to ED complaining of chest pain found to hace NSTEMI     EKG: NSR  No acute ischemic changes   -LHC 7/2021 which showed  patent stent to common left common illiac. SVG to OM is closed at ostium looks like chronically closed. SVG to RCA known to be closed. LIMA TO LAD supplied RCA territory via collaterals    1. Chest pain  - NSTEMI  - now resolved plan for LHC on monday   - c/w heparin ggt     2. ICM  -s/p ICD  -c/w coreg  -lisinopril lasix     3. HTN  -improved  -c/w coreg and imdur  -continue to monitor BP     Ulisses Abarca MD  Interventional Cardiology / Endovascular Specialist  [place] Office : [phone] [place] [address] [place] [address]  Tel:   [place] Office : [address] [facility] [place] [address]  Tel: [phone]        HISTORY OF PRESENTING ILLNESS:  74-year-old male, with past medical history hypertension, hyperlipidemia, CABG with 3 stents placed, defibrillator placement, presents today for nonradiating, exertional,  midsternal chest  pressure the chest pressure at 8pm last night, he was at rest. took nitro and did not help AM today, notes that whenever he walks it makes the chest pain worse.  denies any fever, chills ecent travel or recent infectious contacts, abdominal pain, nausea, vomiting, diarrhea, dysuria.  Tx from [facility] for NSTEMI    PAST MEDICAL & SURGICAL HISTORY:  HTN (hypertension)      PAD (peripheral artery disease)  stent to L lower extremity artery      Constipation, unspecified constipation type      Hyperlipidemia      NSTEMI (non-ST elevated myocardial infarction)   and 2018 and 2019      Coronary artery disease involving native coronary artery of native heart, unspecified whether angina present      Ischemic cardiomyopathy with implantable cardioverter-defibrillator (ICD)      S/P CABG (coronary artery bypass graft)  x3; [facility]       History of coronary angiogram  multiple stents placed      History of femoral angiogram  stent placed in E 2016      History of laparoscopic cholecystectomy  2016          SOCIAL HISTORY: Substance Use (street drugs): ( x ) never used  (  ) other:    FAMILY HISTORY:  Family history of coronary artery disease in brother (Sibling)  4 brothers with MI/CABG, 1  at 77yo    Family history of stroke  60yo CVA, heart attack 59yo    Family history of coronary artery disease in sister (Sibling)   from MI    Family history of MI (myocardial infarction)  mother,         REVIEW OF SYSTEMS:  CONSTITUTIONAL: No fever, weight loss, or fatigue  EYES: No eye pain, visual disturbances, or discharge  ENMT:  No difficulty hearing, tinnitus, vertigo; No sinus or throat pain  BREASTS: No pain, masses, or nipple discharge  GASTROINTESTINAL: No abdominal or epigastric pain. No nausea, vomiting, or hematemesis; No diarrhea or constipation. No melena or hematochezia.  GENITOURINARY: No dysuria, frequency, hematuria, or incontinence  NEUROLOGICAL: No headaches, memory loss, loss of strength, numbness, or tremors  ENDOCRINE: No heat or cold intolerance; No hair loss  MUSCULOSKELETAL: No joint pain or swelling; No muscle, back, or extremity pain  PSYCHIATRIC: No depression, anxiety, mood swings, or difficulty sleeping  HEME/LYMPH: No easy bruising, or bleeding gums  All others negative    MEDICATIONS:  aspirin  chewable 81 milliGRAM(s) Oral daily  carvedilol 6.25 milliGRAM(s) Oral every 12 hours  furosemide    Tablet 20 milliGRAM(s) Oral daily  heparin   Injectable 3800 Unit(s) IV Push every 6 hours PRN  heparin  Infusion.  Unit(s)/Hr IV Continuous <Continuous>  lisinopril 20 milliGRAM(s) Oral daily  prasugrel 10 milliGRAM(s) Oral daily  spironolactone 25 milliGRAM(s) Oral daily      cetirizine 10 milliGRAM(s) Oral daily  guaiFENesin Oral Liquid (Sugar-Free) 100 milliGRAM(s) Oral every 6 hours PRN    gabapentin 300 milliGRAM(s) Oral daily    pantoprazole    Tablet 20 milliGRAM(s) Oral two times a day    rosuvastatin 40 milliGRAM(s) Oral at bedtime    artificial tears (preservative free) Ophthalmic Solution 1 Drop(s) Both EYES three times a day  benzocaine/menthol Lozenge 1 Lozenge Oral three times a day      FAMILY HISTORY:  Family history of coronary artery disease in brother (Sibling)  4 brothers with MI/CABG, 1  at 77yo    Family history of stroke  60yo CVA, heart attack 59yo    Family history of coronary artery disease in sister (Sibling)   from MI    Family history of MI (myocardial infarction)  mother,           Allergies    No Known Allergies    Intolerances    	      PHYSICAL EXAM:  T(C): 36.6 (25 @ 07:45), Max: 37.1 (25 @ 19:19)  HR: 97 (25 @ 07:45) (71 - 97)  BP: 110/62 (25 @ 07:45) (107/61 - 139/67)  RR: 18 (25 @ 07:45) (18 - 18)  SpO2: 97% (25 @ 07:45) (95% - 100%)  Wt(kg): --  I&O's Summary    2025 07:01  -  2025 07:00  --------------------------------------------------------  IN: 638 mL / OUT: 200 mL / NET: 438 mL        LABS:	 	    CARDIAC MARKERS:    GENERAL: NAD  EYES:  conjunctiva and sclera clear  ENMT: No tonsillar erythema, exudates, or enlargement  Cardiovascular: Normal S1 S2, No JVD, No murmurs, No edema  Respiratory: Lungs clear to auscultation	  Gastrointestinal:  Soft, Non-tender, + BS	  Extremities: No edema                              12.4   4.54  )-----------( 116      ( 2025 06:46 )             38.6         139  |  105  |  16  ----------------------------<  98  3.6   |  20[L]  |  1.40[H]    Ca    9.3      2025 06:46  Phos  2.8       Mg     2.1         TPro  6.4  /  Alb  3.8  /  TBili  0.8  /  DBili  x   /  AST  21  /  ALT  12  /  AlkPhos  93      proBNP:   Lipid Profile:   HgA1c:   TSH:     Consultant(s) Notes Reviewed:  [x ] YES  [ ] NO    Care Discussed with Consultants/Other Providers [ x] YES  [ ] NO    Imaging Personally Reviewed independently:  [x] YES  [ ] NO    All labs, radiologic studies, vitals, orders and medications list reviewed. Patient is seen and examined at bedside. Case discussed with medical team.    ASSESSMENT/PLAN:

## 2025-04-26 NOTE — CONSULT NOTE ADULT - ASSESSMENT
74-year-old male with past medical history hypertension, hyperlipidemia, CABG with 3 stents placed, defibrillator placement, presents today for nonradiating, exertional, pleuritic,  midsternal chest pain. Uncdergoing cardiac wup. Nephro on board fro CKD    CKD 3A  Baseline SCR 1.1-1.3  Well known in our office patient of Dr. Arcos Last seen 12/2024  Now at baseline  Avoid nephrotoxins  Avoid Contrast when possible  If needs angio would hydrate pre and post procedure  Keep MAP>65  Monitor urine output    HTN  Resume home meds  Monitor    CKD MBD  Can check PTH and vitamin D  Ca and Phos WNL    Anemia  Mild Monitor    CAD/ACS?  Hx of CABG plus multiple stents  On heparin drip  Per cardiology  Trend trops

## 2025-04-26 NOTE — ED ADULT NURSE NOTE - OBJECTIVE STATEMENT
75 yo male A&ox4, PMH HTN, HLD, CABG on Effient, BIBEMS as cards txf from [facility].  PT reports was having SOB x few hours and called EMS to go to ED,   having chest pressure at that time, pt reports having chest pressure but it is better now.  PT received ASA 325mg PO, and heparin IV drip started at 0338 at 7.5ml/hr, 18G LAC in place by OSH, txf for NSTEMI.  BReathing even and unlabored, abdomen soft nontender, no pedal edema. Pt denies chest pain, palpitations, shortness of breath, headache, visual disturbances, numbness/tingling, fever, chills, diaphoresis,  nausea, vomiting, constipation, diarrhea, or urinary symptoms. 18G LAC, CM 80 NSR.

## 2025-04-26 NOTE — H&P ADULT - HISTORY OF PRESENT ILLNESS
74-year-old male, with past medical history hypertension, hyperlipidemia, CABG with 3 stents placed, defibrillator placement, presents today for nonradiating, exertional,  midsternal chest  pressure the chest pressure at 8pm last night, he was at rest. took nitro and did not help AM today, notes that whenever he walks it makes the chest pain worse.  denies any fever, chills ecent travel or recent infectious contacts, abdominal pain, nausea, vomiting, diarrhea, dysuria.  Tx from [facility] for NSTEMI

## 2025-04-26 NOTE — ED ADULT NURSE REASSESSMENT NOTE - NS ED NURSE REASSESS COMMENT FT1
Report taken from MARC MONTIEL. Pt and family introduced to oncoming RN and updated on plan of care. pt is A&OX4, heparin 7.5 gtt paused due to hypertherapeutic range of coags and to resume at 11:35 at rate of 5.5 gtt. Call bell in reach, pt educated on use. Bed locked and in lowest position. Denies any needs or complaints at this time. Report taken from MARC MONTIEL. Pt and family introduced to oncoming RN and updated on plan of care. pt is A&OX4, heparin 7.5 gtt paused due to above therapeutic range of coags and to resume at 11:35 at rate of 5.5 gtt. Call bell in reach, pt educated on use. Bed locked and in lowest position. Denies any needs or complaints at this time.

## 2025-04-26 NOTE — CONSULT NOTE ADULT - ASSESSMENT
73 yo male with a PMH of CAD (s/p 3 stents and 3V-CABG in 2002), PAD(s/p LLE stent), HTN, HLD, chronic systolic CHF, s/p ICD implantation (2019) presented to ED complaining of chest pain found to hace NSTEMI     EKG: NSR  No acute ischemic changes   -LHC 7/2021 which showed  patent stent to common left common illiac. SVG to OM is closed at ostium looks like chronically closed. SVG to RCA known to be closed. LIMA TO LAD supplied RCA territory via collaterals    1. Chest pain  - NSTEMI  - now resolved plan for LHC on monday   - c/w heparin ggt     2. ICM  -s/p ICD  -c/w coreg  -lisinopril lasix     3. HTN  -improved  -c/w coreg and imdur  -continue to monitor BP

## 2025-04-26 NOTE — H&P ADULT - PROBLEM SELECTOR PLAN 1
c/w aspirin, prasugrel, hep gtt  plan for possible LHC today  TTE  monitor on tele  trend trops q2hr till downtrending

## 2025-04-26 NOTE — H&P ADULT - NSICDXPASTSURGICALHX_GEN_ALL_CORE_FT
PAST SURGICAL HISTORY:  History of coronary angiogram multiple stents placed    History of femoral angiogram stent placed in LLE 2016    History of laparoscopic cholecystectomy 2016    S/P CABG (coronary artery bypass graft) x3; [facility] 2002

## 2025-04-26 NOTE — ED PROVIDER NOTE - ATTENDING CONTRIBUTION TO CARE
------------ATTENDING NOTE------------   pt transferred by EMS from outside ED for concerns of chest pressure and elevated Tn, still mild chest pressure on ED arrival, on Heparin IV gtt, no sob/dyspnea, no numbness/weakness, +equal distal pulses, soft benign abd, nml neuro exam, speaks English but improved communication w/ Spencer , Cardiology consult on arrival, awaiting labs / imaging / consult for tx / admission -->  - Cleve Ravi MD   -------------------------------------------------------- ------------ATTENDING NOTE------------   pt transferred by EMS from outside ED for concerns of chest pressure and elevated Tn, still mild chest pressure on ED arrival, on Heparin IV gtt, no sob/dyspnea, no numbness/weakness, +equal distal pulses, soft benign abd, nml neuro exam, speaks English but improved communication w/ Spencer , Cardiology consult on arrival, awaiting labs / imaging / consult for tx / admission --> stable, ED sign out 7AM pending Med tele admission / full labs / Cards consult for tx.  - Cleve Ravi MD   --------------------------------------------------------

## 2025-04-26 NOTE — PATIENT PROFILE ADULT - FALL HARM RISK - HARM RISK INTERVENTIONS

## 2025-04-26 NOTE — CONSULT NOTE ADULT - ATTENDING COMMENTS
71 y/o man with CAD s/p PCI, s/p CABG, PAD s/p LE stent, HTN, HLD, chronic systolic HF s/p ICD, admitted with chest pain/NSTEMI.  --When seen in ED, chest pain had resolved and patient comfortable.  --Mild troponin elevation noted.  --Given history of CAD, will likely need coronary angiography.  --Check TTE.  --On heparin and antiplatelet therapy.  --Will need to trend renal function prior to angiography; renal input noted--when cath planned, will need hydration per Nephrology.   --Monitor closely on telemetry.  --Check TTE.  --Monitor hemodynamics and for recurrence of chest pain; more urgent angiography if recurrent CP or enzymes uptrend.  --Dr. Bustillos, patient's Cardiologist, to follow.

## 2025-04-26 NOTE — ED PROVIDER NOTE - PROGRESS NOTE DETAILS
Ulisses Luz MD, PGY1: Pt received at signout, pt hemodynamically stable. Pt's sxs improved after one dose of nitroglycerin. Pt endorsed to hospitalist. Will admit. Ulisses Luz MD, PGY1: Pt received at signout, pt hemodynamically stable. Pt's sxs improved after one dose of nitroglycerin. Per transfer paperwork, pt received 325mg of ASA at OSH at 1:22am. Pt endorsed to hospitalist. Will admit. I have assumed care of this patient. I have fully participated in the care of this patient. I have made amendments to the documentation where appropriate and have supervised the ongoing plan of care enacted by the Resident.

## 2025-04-27 LAB
ANION GAP SERPL CALC-SCNC: 14 MMOL/L — SIGNIFICANT CHANGE UP (ref 5–17)
APTT BLD: 51.7 SEC — HIGH (ref 26.1–36.8)
APTT BLD: 59.9 SEC — HIGH (ref 26.1–36.8)
APTT BLD: 69 SEC — HIGH (ref 26.1–36.8)
APTT BLD: 70.3 SEC — HIGH (ref 26.1–36.8)
BUN SERPL-MCNC: 16 MG/DL — SIGNIFICANT CHANGE UP (ref 7–23)
CALCIUM SERPL-MCNC: 9.3 MG/DL — SIGNIFICANT CHANGE UP (ref 8.4–10.5)
CHLORIDE SERPL-SCNC: 105 MMOL/L — SIGNIFICANT CHANGE UP (ref 96–108)
CO2 SERPL-SCNC: 20 MMOL/L — LOW (ref 22–31)
CREAT SERPL-MCNC: 1.4 MG/DL — HIGH (ref 0.5–1.3)
EGFR: 53 ML/MIN/1.73M2 — LOW
EGFR: 53 ML/MIN/1.73M2 — LOW
GLUCOSE SERPL-MCNC: 98 MG/DL — SIGNIFICANT CHANGE UP (ref 70–99)
HCT VFR BLD CALC: 38.6 % — LOW (ref 39–50)
HGB BLD-MCNC: 12.4 G/DL — LOW (ref 13–17)
MAGNESIUM SERPL-MCNC: 2.1 MG/DL — SIGNIFICANT CHANGE UP (ref 1.6–2.6)
MCHC RBC-ENTMCNC: 29 PG — SIGNIFICANT CHANGE UP (ref 27–34)
MCHC RBC-ENTMCNC: 32.1 G/DL — SIGNIFICANT CHANGE UP (ref 32–36)
MCV RBC AUTO: 90.2 FL — SIGNIFICANT CHANGE UP (ref 80–100)
NRBC BLD AUTO-RTO: 0 /100 WBCS — SIGNIFICANT CHANGE UP (ref 0–0)
PLATELET # BLD AUTO: 116 K/UL — LOW (ref 150–400)
POTASSIUM SERPL-MCNC: 3.6 MMOL/L — SIGNIFICANT CHANGE UP (ref 3.5–5.3)
POTASSIUM SERPL-SCNC: 3.6 MMOL/L — SIGNIFICANT CHANGE UP (ref 3.5–5.3)
RBC # BLD: 4.28 M/UL — SIGNIFICANT CHANGE UP (ref 4.2–5.8)
RBC # FLD: 13.7 % — SIGNIFICANT CHANGE UP (ref 10.3–14.5)
SODIUM SERPL-SCNC: 139 MMOL/L — SIGNIFICANT CHANGE UP (ref 135–145)
WBC # BLD: 4.54 K/UL — SIGNIFICANT CHANGE UP (ref 3.8–10.5)
WBC # FLD AUTO: 4.54 K/UL — SIGNIFICANT CHANGE UP (ref 3.8–10.5)

## 2025-04-27 PROCEDURE — 99232 SBSQ HOSP IP/OBS MODERATE 35: CPT

## 2025-04-27 PROCEDURE — 71045 X-RAY EXAM CHEST 1 VIEW: CPT | Mod: 26

## 2025-04-27 RX ORDER — DEXTROMETHORPHAN HBR, GUAIFENESIN 200 MG/10ML
100 LIQUID ORAL EVERY 6 HOURS
Refills: 0 | Status: DISCONTINUED | OUTPATIENT
Start: 2025-04-27 | End: 2025-04-29

## 2025-04-27 RX ADMIN — HEPARIN SODIUM 750 UNIT(S)/HR: 1000 INJECTION INTRAVENOUS; SUBCUTANEOUS at 14:40

## 2025-04-27 RX ADMIN — HEPARIN SODIUM 650 UNIT(S)/HR: 1000 INJECTION INTRAVENOUS; SUBCUTANEOUS at 00:22

## 2025-04-27 RX ADMIN — HEPARIN SODIUM 650 UNIT(S)/HR: 1000 INJECTION INTRAVENOUS; SUBCUTANEOUS at 07:17

## 2025-04-27 RX ADMIN — CARVEDILOL 6.25 MILLIGRAM(S): 3.12 TABLET, FILM COATED ORAL at 05:25

## 2025-04-27 RX ADMIN — Medication 1 DROP(S): at 05:26

## 2025-04-27 RX ADMIN — Medication 1 LOZENGE: at 21:28

## 2025-04-27 RX ADMIN — Medication 1 DROP(S): at 21:28

## 2025-04-27 RX ADMIN — Medication 20 MILLIGRAM(S): at 05:25

## 2025-04-27 RX ADMIN — Medication 20 MILLIGRAM(S): at 17:26

## 2025-04-27 RX ADMIN — Medication 81 MILLIGRAM(S): at 11:25

## 2025-04-27 RX ADMIN — Medication 25 MILLIGRAM(S): at 05:25

## 2025-04-27 RX ADMIN — Medication 1 LOZENGE: at 14:00

## 2025-04-27 RX ADMIN — CARVEDILOL 6.25 MILLIGRAM(S): 3.12 TABLET, FILM COATED ORAL at 17:26

## 2025-04-27 RX ADMIN — PRASUGREL HYDROCHLORIDE 10 MILLIGRAM(S): 10 TABLET, COATED ORAL at 11:25

## 2025-04-27 RX ADMIN — Medication 40 MILLIEQUIVALENT(S): at 09:32

## 2025-04-27 RX ADMIN — Medication 10 MILLIGRAM(S): at 11:25

## 2025-04-27 RX ADMIN — Medication 1 LOZENGE: at 09:31

## 2025-04-27 RX ADMIN — Medication 1 DROP(S): at 14:00

## 2025-04-27 RX ADMIN — LISINOPRIL 20 MILLIGRAM(S): 5 TABLET ORAL at 05:25

## 2025-04-27 RX ADMIN — GABAPENTIN 300 MILLIGRAM(S): 400 CAPSULE ORAL at 21:28

## 2025-04-27 RX ADMIN — HEPARIN SODIUM 750 UNIT(S)/HR: 1000 INJECTION INTRAVENOUS; SUBCUTANEOUS at 07:53

## 2025-04-27 RX ADMIN — ROSUVASTATIN CALCIUM 40 MILLIGRAM(S): 20 TABLET, FILM COATED ORAL at 21:28

## 2025-04-27 RX ADMIN — HEPARIN SODIUM 750 UNIT(S)/HR: 1000 INJECTION INTRAVENOUS; SUBCUTANEOUS at 21:28

## 2025-04-27 RX ADMIN — FUROSEMIDE 20 MILLIGRAM(S): 10 INJECTION INTRAMUSCULAR; INTRAVENOUS at 05:25

## 2025-04-27 NOTE — PROGRESS NOTE ADULT - PROBLEM SELECTOR PLAN 1
c/w aspirin, prasugrel, hep gtt  plan for [facility] Monday  TTE  monitor on tele  trops peaked

## 2025-04-27 NOTE — PROGRESS NOTE ADULT - SUBJECTIVE AND OBJECTIVE BOX
PROGRESS NOTE:   Authored by Dr. Socorro Law MD, Available on MS Teams    Patient is a 74y old  Male who presents with a chief complaint of chest pressure (27 Apr 2025 09:44)      SUBJECTIVE / OVERNIGHT EVENTS: Patient has a cough, no chest pain or shortness of breath.     ADDITIONAL REVIEW OF SYSTEMS:    MEDICATIONS  (STANDING):  artificial tears (preservative free) Ophthalmic Solution 1 Drop(s) Both EYES three times a day  aspirin  chewable 81 milliGRAM(s) Oral daily  benzocaine/menthol Lozenge 1 Lozenge Oral three times a day  carvedilol 6.25 milliGRAM(s) Oral every 12 hours  cetirizine 10 milliGRAM(s) Oral daily  furosemide    Tablet 20 milliGRAM(s) Oral daily  gabapentin 300 milliGRAM(s) Oral daily  heparin  Infusion.  Unit(s)/Hr (7.5 mL/Hr) IV Continuous <Continuous>  lisinopril 20 milliGRAM(s) Oral daily  pantoprazole    Tablet 20 milliGRAM(s) Oral two times a day  prasugrel 10 milliGRAM(s) Oral daily  rosuvastatin 40 milliGRAM(s) Oral at bedtime  sodium chloride 0.9%. 1000 milliLiter(s) (75 mL/Hr) IV Continuous <Continuous>  spironolactone 25 milliGRAM(s) Oral daily    MEDICATIONS  (PRN):  guaiFENesin Oral Liquid (Sugar-Free) 100 milliGRAM(s) Oral every 6 hours PRN Cough  heparin   Injectable 3800 Unit(s) IV Push every 6 hours PRN For aPTT less than 40      CAPILLARY BLOOD GLUCOSE        I&O's Summary    26 Apr 2025 07:01  -  27 Apr 2025 07:00  --------------------------------------------------------  IN: 638 mL / OUT: 200 mL / NET: 438 mL    27 Apr 2025 07:01  -  27 Apr 2025 13:45  --------------------------------------------------------  IN: 236 mL / OUT: 200 mL / NET: 36 mL        PHYSICAL EXAM:  Vital Signs Last 24 Hrs  T(C): 36.5 (27 Apr 2025 11:32), Max: 37.1 (26 Apr 2025 19:19)  T(F): 97.7 (27 Apr 2025 11:32), Max: 98.7 (26 Apr 2025 19:19)  HR: 76 (27 Apr 2025 11:32) (71 - 97)  BP: 99/62 (27 Apr 2025 11:32) (99/62 - 129/70)  BP(mean): --  RR: 18 (27 Apr 2025 11:32) (18 - 18)  SpO2: 97% (27 Apr 2025 11:32) (95% - 100%)    Parameters below as of 27 Apr 2025 11:32  Patient On (Oxygen Delivery Method): room air        CONSTITUTIONAL: NAD  RESPIRATORY: Normal respiratory effort; lungs are clear to auscultation bilaterally  CARDIOVASCULAR: Regular rate and rhythm, normal S1 and S2, no murmur/rub/gallop; No lower extremity edema  ABDOMEN: Nontender to palpation, normoactive bowel sounds, no rebound/guarding  MUSCLOSKELETAL: no clubbing or cyanosis of digits; no joint swelling or tenderness to palpation  PSYCH: A+O to person, place, and time; affect appropriate    LABS:                        12.4   4.54  )-----------( 116      ( 27 Apr 2025 06:46 )             38.6     04-27    139  |  105  |  16  ----------------------------<  98  3.6   |  20[L]  |  1.40[H]    Ca    9.3      27 Apr 2025 06:46  Phos  2.8     04-26  Mg     2.1     04-27    TPro  6.4  /  Alb  3.8  /  TBili  0.8  /  DBili  x   /  AST  21  /  ALT  12  /  AlkPhos  93  04-26    PT/INR - ( 26 Apr 2025 09:27 )   PT: 11.7 sec;   INR: 1.03 ratio         PTT - ( 27 Apr 2025 06:46 )  PTT:51.7 sec      Urinalysis Basic - ( 27 Apr 2025 06:46 )    Color: x / Appearance: x / SG: x / pH: x  Gluc: 98 mg/dL / Ketone: x  / Bili: x / Urobili: x   Blood: x / Protein: x / Nitrite: x   Leuk Esterase: x / RBC: x / WBC x   Sq Epi: x / Non Sq Epi: x / Bacteria: x

## 2025-04-27 NOTE — PROGRESS NOTE ADULT - SUBJECTIVE AND OBJECTIVE BOX
[facility]    Dr. Isrrael Sandy     Office [phone] (9 am to 5 pm)  Service: [phone] (5pm to 9am)  Also Available on TEAMS      RENAL PROGRESS NOTE: DATE OF SERVICE 04-27-25 @ 09:05    Patient is a 74y old  Male who presents with a chief complaint of chest pressure (26 Apr 2025 13:13)      Patient seen and examined at bedside. No chest pain/sob    VITALS:  T(F): 97.9 (04-27-25 @ 07:45), Max: 98.7 (04-26-25 @ 19:19)  HR: 97 (04-27-25 @ 07:45)  BP: 110/62 (04-27-25 @ 07:45)  RR: 18 (04-27-25 @ 07:45)  SpO2: 97% (04-27-25 @ 07:45)  Wt(kg): --    04-26 @ 07:01  -  04-27 @ 07:00  --------------------------------------------------------  IN: 638 mL / OUT: 200 mL / NET: 438 mL          PHYSICAL EXAM:  Constitutional: NAD  Neck: No JVD  Respiratory: CTAB, no wheezes, rales or rhonchi  Cardiovascular: S1, S2, RRR  Gastrointestinal: BS+, soft, NT/ND  Extremities: No peripheral edema    Hospital Medications:   MEDICATIONS  (STANDING):  artificial tears (preservative free) Ophthalmic Solution 1 Drop(s) Both EYES three times a day  aspirin  chewable 81 milliGRAM(s) Oral daily  carvedilol 6.25 milliGRAM(s) Oral every 12 hours  cetirizine 10 milliGRAM(s) Oral daily  furosemide    Tablet 20 milliGRAM(s) Oral daily  gabapentin 300 milliGRAM(s) Oral daily  heparin  Infusion.  Unit(s)/Hr (7.5 mL/Hr) IV Continuous <Continuous>  lisinopril 20 milliGRAM(s) Oral daily  pantoprazole    Tablet 20 milliGRAM(s) Oral two times a day  prasugrel 10 milliGRAM(s) Oral daily  rosuvastatin 40 milliGRAM(s) Oral at bedtime  spironolactone 25 milliGRAM(s) Oral daily      LABS:  04-27    139  |  105  |  16  ----------------------------<  98  3.6   |  20[L]  |  1.40[H]    Ca    9.3      27 Apr 2025 06:46  Phos  2.8     04-26  Mg     2.1     04-27    TPro  6.4  /  Alb  3.8  /  TBili  0.8  /  DBili      /  AST  21  /  ALT  12  /  AlkPhos  93  04-26    Creatinine Trend: 1.40 <--, 1.35 <--                                12.4   4.54  )-----------( 116      ( 27 Apr 2025 06:46 )             38.6     Urine Studies:  Urinalysis - [04-27-25 @ 06:46]      Color  / Appearance  / SG  / pH       Gluc 98 / Ketone   / Bili  / Urobili        Blood  / Protein  / Leuk Est  / Nitrite       RBC  / WBC  / Hyaline  / Gran  / Sq Epi  / Non Sq Epi  / Bacteria             RADIOLOGY & ADDITIONAL STUDIES:

## 2025-04-27 NOTE — PROGRESS NOTE ADULT - SUBJECTIVE AND OBJECTIVE BOX
Ulisses Abarca MD  Interventional Cardiology / Endovascular Specialist  [place] Office : [address] [place] [address] [place] [address]  Tel:   [place] Office : [address] [facility] [place] [address]  Tel: [phone]    Pt lying in bed in NAD no CP   	  MEDICATIONS:  aspirin  chewable 81 milliGRAM(s) Oral daily  carvedilol 6.25 milliGRAM(s) Oral every 12 hours  furosemide    Tablet 20 milliGRAM(s) Oral daily  heparin   Injectable 3800 Unit(s) IV Push every 6 hours PRN  heparin  Infusion.  Unit(s)/Hr IV Continuous <Continuous>  lisinopril 20 milliGRAM(s) Oral daily  prasugrel 10 milliGRAM(s) Oral daily  spironolactone 25 milliGRAM(s) Oral daily      cetirizine 10 milliGRAM(s) Oral daily  guaiFENesin Oral Liquid (Sugar-Free) 100 milliGRAM(s) Oral every 6 hours PRN    gabapentin 300 milliGRAM(s) Oral daily    pantoprazole    Tablet 20 milliGRAM(s) Oral two times a day    rosuvastatin 40 milliGRAM(s) Oral at bedtime    artificial tears (preservative free) Ophthalmic Solution 1 Drop(s) Both EYES three times a day  benzocaine/menthol Lozenge 1 Lozenge Oral three times a day      PAST MEDICAL/SURGICAL HISTORY  PAST MEDICAL & SURGICAL HISTORY:  HTN (hypertension)      PAD (peripheral artery disease)  stent to L lower extremity artery      Constipation, unspecified constipation type      Hyperlipidemia      NSTEMI (non-ST elevated myocardial infarction)   and 2018 and 2019      Coronary artery disease involving native coronary artery of native heart, unspecified whether angina present      Ischemic cardiomyopathy with implantable cardioverter-defibrillator (ICD)      S/P CABG (coronary artery bypass graft)  x3; [facility] 2002      History of coronary angiogram  multiple stents placed      History of femoral angiogram  stent placed in LLE 2016      History of laparoscopic cholecystectomy  2016          SOCIAL HISTORY: Substance Use (street drugs): ( x ) never used  (  ) other:    FAMILY HISTORY:  Family history of coronary artery disease in brother (Sibling)  4 brothers with MI/CABG, 1  at 79yo    Family history of stroke  58yo CVA, heart attack 59yo    Family history of coronary artery disease in sister (Sibling)   from MI    Family history of MI (myocardial infarction)  mother,         REVIEW OF SYSTEMS:  CONSTITUTIONAL: No fever, weight loss, or fatigue  EYES: No eye pain, visual disturbances, or discharge  ENMT:  No difficulty hearing, tinnitus, vertigo; No sinus or throat pain  BREASTS: No pain, masses, or nipple discharge  GASTROINTESTINAL: No abdominal or epigastric pain. No nausea, vomiting, or hematemesis; No diarrhea or constipation. No melena or hematochezia.  GENITOURINARY: No dysuria, frequency, hematuria, or incontinence  NEUROLOGICAL: No headaches, memory loss, loss of strength, numbness, or tremors  ENDOCRINE: No heat or cold intolerance; No hair loss  MUSCULOSKELETAL: No joint pain or swelling; No muscle, back, or extremity pain  PSYCHIATRIC: No depression, anxiety, mood swings, or difficulty sleeping  HEME/LYMPH: No easy bruising, or bleeding gums  All others negative    PHYSICAL EXAM:  T(C): 36.6 (25 @ 07:45), Max: 37.1 (25 @ 19:19)  HR: 97 (25 @ 07:45) (71 - 97)  BP: 110/62 (25 @ 07:45) (107/61 - 139/67)  RR: 18 (25 @ 07:45) (18 - 18)  SpO2: 97% (25 @ 07:45) (95% - 100%)  Wt(kg): --  I&O's Summary    2025 07:01  -  2025 07:00  --------------------------------------------------------  IN: 638 mL / OUT: 200 mL / NET: 438 mL          GENERAL: NAD  EYES:  conjunctiva and sclera clear  ENMT: No tonsillar erythema, exudates, or enlargement  Cardiovascular: Normal S1 S2, No JVD, No murmurs, No edema  Respiratory: Lungs clear to auscultation	  Gastrointestinal:  Soft, Non-tender, + BS	  Extremities: No edema                                    12.4   4.54  )-----------( 116      ( 2025 06:46 )             38.6         139  |  105  |  16  ----------------------------<  98  3.6   |  20[L]  |  1.40[H]    Ca    9.3      2025 06:46  Phos  2.8       Mg     2.1         TPro  6.4  /  Alb  3.8  /  TBili  0.8  /  DBili  x   /  AST  21  /  ALT  12  /  AlkPhos  93      proBNP:   Lipid Profile:   HgA1c:   TSH:     Consultant(s) Notes Reviewed:  [x ] YES  [ ] NO    Care Discussed with Consultants/Other Providers [ x] YES  [ ] NO    Imaging Personally Reviewed independently:  [x] YES  [ ] NO    All labs, radiologic studies, vitals, orders and medications list reviewed. Patient is seen and examined at bedside. Case discussed with medical team.

## 2025-04-28 LAB
ANION GAP SERPL CALC-SCNC: 13 MMOL/L — SIGNIFICANT CHANGE UP (ref 5–17)
APTT BLD: 65.9 SEC — HIGH (ref 26.1–36.8)
BUN SERPL-MCNC: 17 MG/DL — SIGNIFICANT CHANGE UP (ref 7–23)
CALCIUM SERPL-MCNC: 9.6 MG/DL — SIGNIFICANT CHANGE UP (ref 8.4–10.5)
CHLORIDE SERPL-SCNC: 106 MMOL/L — SIGNIFICANT CHANGE UP (ref 96–108)
CO2 SERPL-SCNC: 20 MMOL/L — LOW (ref 22–31)
CREAT SERPL-MCNC: 1.4 MG/DL — HIGH (ref 0.5–1.3)
EGFR: 53 ML/MIN/1.73M2 — LOW
EGFR: 53 ML/MIN/1.73M2 — LOW
GLUCOSE SERPL-MCNC: 86 MG/DL — SIGNIFICANT CHANGE UP (ref 70–99)
HCT VFR BLD CALC: 38.4 % — LOW (ref 39–50)
HGB BLD-MCNC: 12.5 G/DL — LOW (ref 13–17)
MAGNESIUM SERPL-MCNC: 2.3 MG/DL — SIGNIFICANT CHANGE UP (ref 1.6–2.6)
MCHC RBC-ENTMCNC: 29.6 PG — SIGNIFICANT CHANGE UP (ref 27–34)
MCHC RBC-ENTMCNC: 32.6 G/DL — SIGNIFICANT CHANGE UP (ref 32–36)
MCV RBC AUTO: 91 FL — SIGNIFICANT CHANGE UP (ref 80–100)
NRBC BLD AUTO-RTO: 0 /100 WBCS — SIGNIFICANT CHANGE UP (ref 0–0)
PHOSPHATE SERPL-MCNC: 2.9 MG/DL — SIGNIFICANT CHANGE UP (ref 2.5–4.5)
PLATELET # BLD AUTO: 126 K/UL — LOW (ref 150–400)
POTASSIUM SERPL-MCNC: 4 MMOL/L — SIGNIFICANT CHANGE UP (ref 3.5–5.3)
POTASSIUM SERPL-SCNC: 4 MMOL/L — SIGNIFICANT CHANGE UP (ref 3.5–5.3)
RBC # BLD: 4.22 M/UL — SIGNIFICANT CHANGE UP (ref 4.2–5.8)
RBC # FLD: 13.5 % — SIGNIFICANT CHANGE UP (ref 10.3–14.5)
SODIUM SERPL-SCNC: 139 MMOL/L — SIGNIFICANT CHANGE UP (ref 135–145)
WBC # BLD: 4.98 K/UL — SIGNIFICANT CHANGE UP (ref 3.8–10.5)
WBC # FLD AUTO: 4.98 K/UL — SIGNIFICANT CHANGE UP (ref 3.8–10.5)

## 2025-04-28 PROCEDURE — 99152 MOD SED SAME PHYS/QHP 5/>YRS: CPT

## 2025-04-28 PROCEDURE — 93459 L HRT ART/GRFT ANGIO: CPT | Mod: 26

## 2025-04-28 PROCEDURE — 99233 SBSQ HOSP IP/OBS HIGH 50: CPT

## 2025-04-28 RX ORDER — BENZONATATE 100 MG
100 CAPSULE ORAL THREE TIMES A DAY
Refills: 0 | Status: DISCONTINUED | OUTPATIENT
Start: 2025-04-28 | End: 2025-04-29

## 2025-04-28 RX ADMIN — GABAPENTIN 300 MILLIGRAM(S): 400 CAPSULE ORAL at 21:24

## 2025-04-28 RX ADMIN — Medication 81 MILLIGRAM(S): at 11:17

## 2025-04-28 RX ADMIN — Medication 1 DROP(S): at 21:26

## 2025-04-28 RX ADMIN — CARVEDILOL 6.25 MILLIGRAM(S): 3.12 TABLET, FILM COATED ORAL at 21:26

## 2025-04-28 RX ADMIN — Medication 25 MILLIGRAM(S): at 05:32

## 2025-04-28 RX ADMIN — Medication 100 MILLIGRAM(S): at 21:25

## 2025-04-28 RX ADMIN — Medication 100 MILLIGRAM(S): at 14:05

## 2025-04-28 RX ADMIN — PRASUGREL HYDROCHLORIDE 10 MILLIGRAM(S): 10 TABLET, COATED ORAL at 11:17

## 2025-04-28 RX ADMIN — Medication 1 LOZENGE: at 13:10

## 2025-04-28 RX ADMIN — HEPARIN SODIUM 750 UNIT(S)/HR: 1000 INJECTION INTRAVENOUS; SUBCUTANEOUS at 07:24

## 2025-04-28 RX ADMIN — Medication 1 LOZENGE: at 21:25

## 2025-04-28 RX ADMIN — FUROSEMIDE 20 MILLIGRAM(S): 10 INJECTION INTRAMUSCULAR; INTRAVENOUS at 05:32

## 2025-04-28 RX ADMIN — DEXTROMETHORPHAN HBR, GUAIFENESIN 100 MILLIGRAM(S): 200 LIQUID ORAL at 22:50

## 2025-04-28 RX ADMIN — LISINOPRIL 20 MILLIGRAM(S): 5 TABLET ORAL at 05:32

## 2025-04-28 RX ADMIN — Medication 20 MILLIGRAM(S): at 21:27

## 2025-04-28 RX ADMIN — CARVEDILOL 6.25 MILLIGRAM(S): 3.12 TABLET, FILM COATED ORAL at 05:32

## 2025-04-28 RX ADMIN — Medication 1 DROP(S): at 13:10

## 2025-04-28 RX ADMIN — ROSUVASTATIN CALCIUM 40 MILLIGRAM(S): 20 TABLET, FILM COATED ORAL at 21:26

## 2025-04-28 RX ADMIN — Medication 75 MILLILITER(S): at 07:25

## 2025-04-28 RX ADMIN — HEPARIN SODIUM 750 UNIT(S)/HR: 1000 INJECTION INTRAVENOUS; SUBCUTANEOUS at 11:16

## 2025-04-28 RX ADMIN — Medication 1 DROP(S): at 05:32

## 2025-04-28 RX ADMIN — Medication 100 MILLIGRAM(S): at 12:42

## 2025-04-28 RX ADMIN — Medication 10 MILLIGRAM(S): at 11:17

## 2025-04-28 RX ADMIN — Medication 20 MILLIGRAM(S): at 05:32

## 2025-04-28 RX ADMIN — Medication 1 LOZENGE: at 05:32

## 2025-04-28 NOTE — PROGRESS NOTE ADULT - SUBJECTIVE AND OBJECTIVE BOX
[facility]    Dr. Isrrael Sandy     Office [phone] (9 am to 5 pm)  Service: [phone] (5pm to 9am)  Also Available on TEAMS      RENAL PROGRESS NOTE: DATE OF SERVICE 04-28-25 @ 10:20    Patient is a 74y old  Male who presents with a chief complaint of chest pressure (27 Apr 2025 13:45)      Patient seen and examined at bedside. No chest pain/sob    VITALS:  T(F): 98.5 (04-28-25 @ 04:20), Max: 98.5 (04-28-25 @ 04:20)  HR: 66 (04-28-25 @ 04:20)  BP: 120/61 (04-28-25 @ 04:20)  RR: 18 (04-28-25 @ 04:20)  SpO2: 96% (04-28-25 @ 04:20)  Wt(kg): --    04-27 @ 07:01  -  04-28 @ 07:00  --------------------------------------------------------  IN: 1342 mL / OUT: 200 mL / NET: 1142 mL          PHYSICAL EXAM:  Constitutional: NAD  Neck: No JVD  Respiratory: CTAB, no wheezes, rales or rhonchi  Cardiovascular: S1, S2, RRR  Gastrointestinal: BS+, soft, NT/ND  Extremities: No peripheral edema    Hospital Medications:   MEDICATIONS  (STANDING):  artificial tears (preservative free) Ophthalmic Solution 1 Drop(s) Both EYES three times a day  aspirin  chewable 81 milliGRAM(s) Oral daily  benzocaine/menthol Lozenge 1 Lozenge Oral three times a day  carvedilol 6.25 milliGRAM(s) Oral every 12 hours  cetirizine 10 milliGRAM(s) Oral daily  furosemide    Tablet 20 milliGRAM(s) Oral daily  gabapentin 300 milliGRAM(s) Oral daily  heparin  Infusion.  Unit(s)/Hr (7.5 mL/Hr) IV Continuous <Continuous>  lisinopril 20 milliGRAM(s) Oral daily  pantoprazole    Tablet 20 milliGRAM(s) Oral two times a day  prasugrel 10 milliGRAM(s) Oral daily  rosuvastatin 40 milliGRAM(s) Oral at bedtime  sodium chloride 0.9%. 1000 milliLiter(s) (75 mL/Hr) IV Continuous <Continuous>  spironolactone 25 milliGRAM(s) Oral daily      LABS:  04-28    139  |  106  |  17  ----------------------------<  86  4.0   |  20[L]  |  1.40[H]    Ca    9.6      28 Apr 2025 06:46  Phos  2.9     04-28  Mg     2.3     04-28      Creatinine Trend: 1.40 <--, 1.40 <--, 1.35 <--    Phosphorus: 2.9 mg/dL (04-28 @ 06:46)                              12.5   4.98  )-----------( 126      ( 28 Apr 2025 06:46 )             38.4     Urine Studies:  Urinalysis - [04-28-25 @ 06:46]      Color  / Appearance  / SG  / pH       Gluc 86 / Ketone   / Bili  / Urobili        Blood  / Protein  / Leuk Est  / Nitrite       RBC  / WBC  / Hyaline  / Gran  / Sq Epi  / Non Sq Epi  / Bacteria             RADIOLOGY & ADDITIONAL STUDIES:

## 2025-04-28 NOTE — PROGRESS NOTE ADULT - SUBJECTIVE AND OBJECTIVE BOX
Ulisses Abarca MD  Interventional Cardiology / Advance Heart Failure and Cardiac Transplant Specialist  [place] Office : [address] [place] [address] [place] [address]  Tel:   [place] Office : [address] [facility] [place] [address]  Tel: [phone]       Pt is lying in bed comfortable not in distress, no chest pains no SOB no palpitations  	  MEDICATIONS:  aspirin  chewable 81 milliGRAM(s) Oral daily  carvedilol 6.25 milliGRAM(s) Oral every 12 hours  furosemide    Tablet 20 milliGRAM(s) Oral daily  heparin   Injectable 3800 Unit(s) IV Push every 6 hours PRN  heparin  Infusion.  Unit(s)/Hr IV Continuous <Continuous>  lisinopril 20 milliGRAM(s) Oral daily  prasugrel 10 milliGRAM(s) Oral daily  spironolactone 25 milliGRAM(s) Oral daily      benzonatate 100 milliGRAM(s) Oral three times a day  cetirizine 10 milliGRAM(s) Oral daily  guaiFENesin Oral Liquid (Sugar-Free) 100 milliGRAM(s) Oral every 6 hours PRN    gabapentin 300 milliGRAM(s) Oral daily    pantoprazole    Tablet 20 milliGRAM(s) Oral two times a day    rosuvastatin 40 milliGRAM(s) Oral at bedtime    artificial tears (preservative free) Ophthalmic Solution 1 Drop(s) Both EYES three times a day  benzocaine/menthol Lozenge 1 Lozenge Oral three times a day  sodium chloride 0.9%. 1000 milliLiter(s) IV Continuous <Continuous>      PAST MEDICAL/SURGICAL HISTORY  PAST MEDICAL & SURGICAL HISTORY:  HTN (hypertension)      PAD (peripheral artery disease)  stent to L lower extremity artery      Constipation, unspecified constipation type      Hyperlipidemia      NSTEMI (non-ST elevated myocardial infarction)   and 2018 and 2019      Coronary artery disease involving native coronary artery of native heart, unspecified whether angina present      Ischemic cardiomyopathy with implantable cardioverter-defibrillator (ICD)      S/P CABG (coronary artery bypass graft)  x3; [facility]       History of coronary angiogram  multiple stents placed      History of femoral angiogram  stent placed in E 2016      History of laparoscopic cholecystectomy  2016          SOCIAL HISTORY: Substance Use (street drugs): ( x ) never used  (  ) other:    FAMILY HISTORY:  Family history of coronary artery disease in brother (Sibling)  4 brothers with MI/CABG, 1  at 79yo    Family history of stroke  60yo CVA, heart attack 61yo    Family history of coronary artery disease in sister (Sibling)   from MI    Family history of MI (myocardial infarction)  mother,         PHYSICAL EXAM:  T(C): 36.7 (25 @ 16:47), Max: 36.9 (25 @ 04:20)  HR: 66 (25 @ 16:47) (66 - 72)  BP: 138/65 (25 @ 16:47) (113/65 - 138/65)  RR: 16 (25 @ 16:47) (16 - 18)  SpO2: 99% (25 @ 16:47) (96% - 99%)  Wt(kg): --  I&O's Summary    2025 07:  -  2025 07:00  --------------------------------------------------------  IN: 1342 mL / OUT: 200 mL / NET: 1142 mL    2025 07:  -  2025 18:04  --------------------------------------------------------  IN: 600 mL / OUT: 0 mL / NET: 600 mL          GENERAL: NAD  EYES:   PERRLA   ENMT:   Moist mucous membranes, Good dentition, No lesions  Cardiovascular: Normal S1 S2, No JVD, No murmurs, No edema  Respiratory: Lungs clear to auscultation	  Gastrointestinal:  Soft, Non-tender, + BS	  Extremities: no edema                                    12.5   4.98  )-----------( 126      ( 2025 06:46 )             38.4         139  |  106  |  17  ----------------------------<  86  4.0   |  20[L]  |  1.40[H]    Ca    9.6      2025 06:46  Phos  2.9       Mg     2.3           proBNP:   Lipid Profile:   HgA1c:   TSH:     Consultant(s) Notes Reviewed:  [x ] YES  [ ] NO    Care Discussed with Consultants/Other Providers [ x] YES  [ ] NO    Imaging Personally Reviewed independently:  [x] YES  [ ] NO    All labs, radiologic studies, vitals, orders and medications list reviewed. Patient is seen and examined at bedside. Case discussed with medical team.

## 2025-04-28 NOTE — PROVIDER CONTACT NOTE (OTHER) - ASSESSMENT
Patient A&Ox4, pt had PAT 4.9 sec  on tele, patient denies chest pain, SOB or other discomfort, VS: BP: 113/65 HR: 72 RR: 18 T: 97.9 O2: 97% room air. Patient was sleeping at the time of event and asymptomatic.

## 2025-04-28 NOTE — PROGRESS NOTE ADULT - PROBLEM SELECTOR PLAN 1
c/w aspirin, prasugrel, hep gtt    TTE  monitor on tele  trops peaked    >    > 4/28: LHC pending, pre and post cath NS IVF ordered as per nephro recs. Reached out to cardiology Dr. Abarca, pending further recs/discharge recs. c/w aspirin, prasugrel, hep gtt    TTE  monitor on tele  trops peaked    >    > 4/28: LHC pending, Echo pending > pre and post cath NS IVF ordered as per nephro recs. Reached out to cardiology Dr. Abarca, pending further recs/discharge recs.

## 2025-04-28 NOTE — PROGRESS NOTE ADULT - SUBJECTIVE AND OBJECTIVE BOX
[facility] Division of Hospital Medicine  Annabel Robbins MD M-F, 8A-5P: MS Teams  Other Times: HIC Extension / HIC Pager      Patient is a 74y old  Male who presents with a chief complaint of chest pressure (2025 10:20)      SUBJECTIVE / OVERNIGHT EVENTS:  Patient was examined today. He is complaining of dry cough. He denies headache, dizziness, fever, chills, chest pain, nausea, vomiting, abd pain, constipation, diarrhea. Discussed discharge planning.     ADDITIONAL REVIEW OF SYSTEMS:    MEDICATIONS  (STANDING):  artificial tears (preservative free) Ophthalmic Solution 1 Drop(s) Both EYES three times a day  aspirin  chewable 81 milliGRAM(s) Oral daily  benzocaine/menthol Lozenge 1 Lozenge Oral three times a day  benzonatate 100 milliGRAM(s) Oral three times a day  carvedilol 6.25 milliGRAM(s) Oral every 12 hours  cetirizine 10 milliGRAM(s) Oral daily  furosemide    Tablet 20 milliGRAM(s) Oral daily  gabapentin 300 milliGRAM(s) Oral daily  heparin  Infusion.  Unit(s)/Hr (7.5 mL/Hr) IV Continuous <Continuous>  lisinopril 20 milliGRAM(s) Oral daily  pantoprazole    Tablet 20 milliGRAM(s) Oral two times a day  prasugrel 10 milliGRAM(s) Oral daily  rosuvastatin 40 milliGRAM(s) Oral at bedtime  sodium chloride 0.9%. 1000 milliLiter(s) (75 mL/Hr) IV Continuous <Continuous>  spironolactone 25 milliGRAM(s) Oral daily    MEDICATIONS  (PRN):  guaiFENesin Oral Liquid (Sugar-Free) 100 milliGRAM(s) Oral every 6 hours PRN Cough  heparin   Injectable 3800 Unit(s) IV Push every 6 hours PRN For aPTT less than 40      CAPILLARY BLOOD GLUCOSE          I&O's Summary    2025 07:01  -  2025 07:00  --------------------------------------------------------  IN: 1342 mL / OUT: 200 mL / NET: 1142 mL        Daily     Daily Weight in k.7 (2025 06:07)    PHYSICAL EXAM:  Vital Signs Last 24 Hrs  T(C): 36.9 (2025 04:20), Max: 36.9 (2025 04:20)  T(F): 98.5 (2025 04:20), Max: 98.5 (2025 04:20)  HR: 66 (2025 04:20) (66 - 72)  BP: 120/61 (2025 04:20) (113/65 - 131/66)  BP(mean): --  RR: 18 (2025 04:20) (18 - 18)  SpO2: 96% (2025 04:20) (96% - 98%)    Parameters below as of 2025 04:20  Patient On (Oxygen Delivery Method): room air      CONSTITUTIONAL: NAD, well-developed,   EYES: PERRLA; conjunctiva and sclera clear  ENMT: Moist oral mucosa  NECK: Supple,  RESPIRATORY: Normal respiratory effort; lungs are clear to auscultation bilaterally  CARDIOVASCULAR: Regular rate and rhythm, normal S1 and S2, no murmur/rub/gallop;   No lower extremity edema; Peripheral pulses are 2+ bilaterally  ABDOMEN: Nontender to palpation, normoactive bowel sounds,   MUSCULOSKELETAL:  no clubbing or cyanosis of digits; no joint swelling or tenderness to palpation, moving all extremities   PSYCH: A+O to person, place, and time; affect appropriate  NEUROLOGY: CN 2-12 are intact and symmetric; no gross sensory/motor deficits   SKIN: No rashes; no palpable lesions    LABS:                        12.5   4.98  )-----------( 126      ( 2025 06:46 )             38.4     -    139  |  106  |  17  ----------------------------<  86  4.0   |  20[L]  |  1.40[H]    Ca    9.6      2025 06:46  Phos  2.9       Mg     2.3             PTT - ( 2025 06:47 )  PTT:65.9 sec      Urinalysis Basic - ( 2025 06:46 )    Color: x / Appearance: x / SG: x / pH: x  Gluc: 86 mg/dL / Ketone: x  / Bili: x / Urobili: x   Blood: x / Protein: x / Nitrite: x   Leuk Esterase: x / RBC: x / WBC x   Sq Epi: x / Non Sq Epi: x / Bacteria: x            RADIOLOGY & ADDITIONAL TESTS:  Results Reviewed:   Imaging Personally Reviewed:  Electrocardiogram Personally Reviewed:    COORDINATION OF CARE:  Care Discussed with Consultants/Other Providers [Y/N]:  Prior or Outpatient Records Reviewed [Y/N]:

## 2025-04-28 NOTE — PROVIDER CONTACT NOTE (OTHER) - BACKGROUND
74y male, history hypertension, hyperlipidemia, CABG with 3 stents placed, defibrillator placement, presents for nonradiating, exertional, pleuritic,  midsternal chest pain.

## 2025-04-29 ENCOUNTER — RESULT REVIEW (OUTPATIENT)
Age: 75
End: 2025-04-29

## 2025-04-29 ENCOUNTER — TRANSCRIPTION ENCOUNTER (OUTPATIENT)
Age: 75
End: 2025-04-29

## 2025-04-29 VITALS
DIASTOLIC BLOOD PRESSURE: 64 MMHG | TEMPERATURE: 98 F | RESPIRATION RATE: 18 BRPM | SYSTOLIC BLOOD PRESSURE: 117 MMHG | OXYGEN SATURATION: 96 % | HEART RATE: 71 BPM

## 2025-04-29 LAB
ANION GAP SERPL CALC-SCNC: 14 MMOL/L — SIGNIFICANT CHANGE UP (ref 5–17)
APTT BLD: 33.1 SEC — SIGNIFICANT CHANGE UP (ref 26.1–36.8)
BUN SERPL-MCNC: 21 MG/DL — SIGNIFICANT CHANGE UP (ref 7–23)
CALCIUM SERPL-MCNC: 9.1 MG/DL — SIGNIFICANT CHANGE UP (ref 8.4–10.5)
CHLORIDE SERPL-SCNC: 106 MMOL/L — SIGNIFICANT CHANGE UP (ref 96–108)
CO2 SERPL-SCNC: 20 MMOL/L — LOW (ref 22–31)
CREAT SERPL-MCNC: 1.56 MG/DL — HIGH (ref 0.5–1.3)
EGFR: 47 ML/MIN/1.73M2 — LOW
EGFR: 47 ML/MIN/1.73M2 — LOW
GLUCOSE SERPL-MCNC: 112 MG/DL — HIGH (ref 70–99)
HCT VFR BLD CALC: 38.4 % — LOW (ref 39–50)
HGB BLD-MCNC: 12.6 G/DL — LOW (ref 13–17)
MCHC RBC-ENTMCNC: 29.6 PG — SIGNIFICANT CHANGE UP (ref 27–34)
MCHC RBC-ENTMCNC: 32.8 G/DL — SIGNIFICANT CHANGE UP (ref 32–36)
MCV RBC AUTO: 90.4 FL — SIGNIFICANT CHANGE UP (ref 80–100)
NRBC BLD AUTO-RTO: 0 /100 WBCS — SIGNIFICANT CHANGE UP (ref 0–0)
PLATELET # BLD AUTO: 119 K/UL — LOW (ref 150–400)
POTASSIUM SERPL-MCNC: 3.8 MMOL/L — SIGNIFICANT CHANGE UP (ref 3.5–5.3)
POTASSIUM SERPL-SCNC: 3.8 MMOL/L — SIGNIFICANT CHANGE UP (ref 3.5–5.3)
RBC # BLD: 4.25 M/UL — SIGNIFICANT CHANGE UP (ref 4.2–5.8)
RBC # FLD: 13.6 % — SIGNIFICANT CHANGE UP (ref 10.3–14.5)
SODIUM SERPL-SCNC: 140 MMOL/L — SIGNIFICANT CHANGE UP (ref 135–145)
WBC # BLD: 4.15 K/UL — SIGNIFICANT CHANGE UP (ref 3.8–10.5)
WBC # FLD AUTO: 4.15 K/UL — SIGNIFICANT CHANGE UP (ref 3.8–10.5)

## 2025-04-29 PROCEDURE — C1894: CPT

## 2025-04-29 PROCEDURE — 80048 BASIC METABOLIC PNL TOTAL CA: CPT

## 2025-04-29 PROCEDURE — C1769: CPT

## 2025-04-29 PROCEDURE — 99285 EMERGENCY DEPT VISIT HI MDM: CPT

## 2025-04-29 PROCEDURE — 86901 BLOOD TYPING SEROLOGIC RH(D): CPT

## 2025-04-29 PROCEDURE — 86900 BLOOD TYPING SEROLOGIC ABO: CPT

## 2025-04-29 PROCEDURE — C1887: CPT

## 2025-04-29 PROCEDURE — 85610 PROTHROMBIN TIME: CPT

## 2025-04-29 PROCEDURE — 93306 TTE W/DOPPLER COMPLETE: CPT | Mod: 26

## 2025-04-29 PROCEDURE — 71045 X-RAY EXAM CHEST 1 VIEW: CPT

## 2025-04-29 PROCEDURE — C8929: CPT

## 2025-04-29 PROCEDURE — 93005 ELECTROCARDIOGRAM TRACING: CPT

## 2025-04-29 PROCEDURE — 86850 RBC ANTIBODY SCREEN: CPT

## 2025-04-29 PROCEDURE — 84484 ASSAY OF TROPONIN QUANT: CPT

## 2025-04-29 PROCEDURE — 99239 HOSP IP/OBS DSCHRG MGMT >30: CPT

## 2025-04-29 PROCEDURE — 84100 ASSAY OF PHOSPHORUS: CPT

## 2025-04-29 PROCEDURE — 85025 COMPLETE CBC W/AUTO DIFF WBC: CPT

## 2025-04-29 PROCEDURE — 85027 COMPLETE CBC AUTOMATED: CPT

## 2025-04-29 PROCEDURE — 93459 L HRT ART/GRFT ANGIO: CPT

## 2025-04-29 PROCEDURE — 83735 ASSAY OF MAGNESIUM: CPT

## 2025-04-29 PROCEDURE — 85730 THROMBOPLASTIN TIME PARTIAL: CPT

## 2025-04-29 PROCEDURE — 83880 ASSAY OF NATRIURETIC PEPTIDE: CPT

## 2025-04-29 PROCEDURE — 80053 COMPREHEN METABOLIC PANEL: CPT

## 2025-04-29 PROCEDURE — T1013: CPT

## 2025-04-29 RX ORDER — CARVEDILOL 3.12 MG/1
1 TABLET, FILM COATED ORAL
Qty: 180 | Refills: 0
Start: 2025-04-29 | End: 2025-07-27

## 2025-04-29 RX ORDER — SPIRONOLACTONE 25 MG
1 TABLET ORAL
Qty: 90 | Refills: 0
Start: 2025-04-29 | End: 2025-07-27

## 2025-04-29 RX ORDER — PRASUGREL HYDROCHLORIDE 10 MG/1
1 TABLET, COATED ORAL
Refills: 0 | DISCHARGE

## 2025-04-29 RX ORDER — CLOPIDOGREL BISULFATE 75 MG/1
1 TABLET, FILM COATED ORAL
Qty: 4 | Refills: 0
Start: 2025-04-29 | End: 2025-07-27

## 2025-04-29 RX ADMIN — FUROSEMIDE 20 MILLIGRAM(S): 10 INJECTION INTRAMUSCULAR; INTRAVENOUS at 05:29

## 2025-04-29 RX ADMIN — Medication 10 MILLIGRAM(S): at 11:18

## 2025-04-29 RX ADMIN — Medication 1 LOZENGE: at 13:15

## 2025-04-29 RX ADMIN — PRASUGREL HYDROCHLORIDE 10 MILLIGRAM(S): 10 TABLET, COATED ORAL at 11:17

## 2025-04-29 RX ADMIN — CARVEDILOL 6.25 MILLIGRAM(S): 3.12 TABLET, FILM COATED ORAL at 18:04

## 2025-04-29 RX ADMIN — CARVEDILOL 6.25 MILLIGRAM(S): 3.12 TABLET, FILM COATED ORAL at 05:28

## 2025-04-29 RX ADMIN — Medication 1 DROP(S): at 13:15

## 2025-04-29 RX ADMIN — Medication 100 MILLIGRAM(S): at 13:15

## 2025-04-29 RX ADMIN — Medication 1 DROP(S): at 05:28

## 2025-04-29 RX ADMIN — LISINOPRIL 20 MILLIGRAM(S): 5 TABLET ORAL at 05:28

## 2025-04-29 RX ADMIN — Medication 81 MILLIGRAM(S): at 11:18

## 2025-04-29 RX ADMIN — Medication 20 MILLIGRAM(S): at 18:04

## 2025-04-29 RX ADMIN — Medication 100 MILLIGRAM(S): at 05:28

## 2025-04-29 RX ADMIN — Medication 1 LOZENGE: at 06:08

## 2025-04-29 RX ADMIN — Medication 75 MILLILITER(S): at 12:35

## 2025-04-29 RX ADMIN — Medication 20 MILLIGRAM(S): at 06:08

## 2025-04-29 RX ADMIN — Medication 25 MILLIGRAM(S): at 05:28

## 2025-04-29 NOTE — PROGRESS NOTE ADULT - SUBJECTIVE AND OBJECTIVE BOX
Ulisses Abarca MD  Interventional Cardiology / Advance Heart Failure and Cardiac Transplant Specialist  [place] Office : [address] [place] [address] [place] [address]  Tel:   [place] Office : [address] [facility] [place] [address]  Tel: [phone]       Pt is lying in bed comfortable not in distress, no chest pains no SOB no palpitations s/p cath   	  MEDICATIONS:  aspirin  chewable 81 milliGRAM(s) Oral daily  carvedilol 6.25 milliGRAM(s) Oral every 12 hours  furosemide    Tablet 20 milliGRAM(s) Oral daily  lisinopril 20 milliGRAM(s) Oral daily  prasugrel 10 milliGRAM(s) Oral daily  spironolactone 25 milliGRAM(s) Oral daily  benzonatate 100 milliGRAM(s) Oral three times a day  cetirizine 10 milliGRAM(s) Oral daily  guaiFENesin Oral Liquid (Sugar-Free) 100 milliGRAM(s) Oral every 6 hours PRN  gabapentin 300 milliGRAM(s) Oral daily  pantoprazole    Tablet 20 milliGRAM(s) Oral two times a day    rosuvastatin 40 milliGRAM(s) Oral at bedtime    artificial tears (preservative free) Ophthalmic Solution 1 Drop(s) Both EYES three times a day  benzocaine/menthol Lozenge 1 Lozenge Oral three times a day  sodium chloride 0.9%. 1000 milliLiter(s) IV Continuous <Continuous>  sodium chloride 0.9%. 1000 milliLiter(s) IV Continuous <Continuous>      PAST MEDICAL/SURGICAL HISTORY  PAST MEDICAL & SURGICAL HISTORY:  HTN (hypertension)      PAD (peripheral artery disease)  stent to L lower extremity artery      Constipation, unspecified constipation type      Hyperlipidemia      NSTEMI (non-ST elevated myocardial infarction)   and 2018 and 2019      Coronary artery disease involving native coronary artery of native heart, unspecified whether angina present      Ischemic cardiomyopathy with implantable cardioverter-defibrillator (ICD)      S/P CABG (coronary artery bypass graft)  x3; [facility] 2002      History of coronary angiogram  multiple stents placed      History of femoral angiogram  stent placed in E 2016      History of laparoscopic cholecystectomy  2016          SOCIAL HISTORY: Substance Use (street drugs): ( x ) never used  (  ) other:    FAMILY HISTORY:  Family history of coronary artery disease in brother (Sibling)  4 brothers with MI/CABG, 1  at 77yo    Family history of stroke  58yo CVA, heart attack 61yo    Family history of coronary artery disease in sister (Sibling)   from MI    Family history of MI (myocardial infarction)  mother,          PHYSICAL EXAM:  T(C): 36.7 (25 @ 04:54), Max: 36.9 (25 @ 16:38)  HR: 71 (25 @ 04:54) (66 - 72)  BP: 117/64 (25 @ 04:54) (117/64 - 154/72)  RR: 18 (25 @ 04:54) (16 - 18)  SpO2: 96% (25 @ 04:54) (96% - 99%)  Wt(kg): --  I&O's Summary    2025 07:01  -  2025 07:00  --------------------------------------------------------  IN: 600 mL / OUT: 0 mL / NET: 600 mL          EYES:   PERRLA   ENMT:   Moist mucous membranes, Good dentition, No lesions  Cardiovascular: Normal S1 S2, No JVD, No murmurs, No edema  Respiratory: Lungs clear to auscultation	  Gastrointestinal:  Soft, Non-tender, + BS	  Extremities: groin site looks ok                                     12.6   4.15  )-----------( 119      ( 2025 08:30 )             38.4         140  |  106  |  21  ----------------------------<  112[H]  3.8   |  20[L]  |  1.56[H]    Ca    9.1      2025 08:30  Phos  2.9       Mg     2.3           proBNP:   Lipid Profile:   HgA1c:   TSH:     Consultant(s) Notes Reviewed:  [x ] YES  [ ] NO    Care Discussed with Consultants/Other Providers [ x] YES  [ ] NO    Imaging Personally Reviewed independently:  [x] YES  [ ] NO    All labs, radiologic studies, vitals, orders and medications list reviewed. Patient is seen and examined at bedside. Case discussed with medical team.

## 2025-04-29 NOTE — PROGRESS NOTE ADULT - NSPROGADDITIONALINFOA_GEN_ALL_CORE
----- Message from Bryanna Ventura sent at 5/19/2020  4:46 PM CDT -----  Contact: [phone]/self  Patient is requesting a call back. She checked her Blood pressure and the machine says to contact physician. Please call her. Thanks     
Called patient, patient stated she was calling because she checked her blood pressure and it said it was 145/97 and to call the doctors office. Inform patient to recheck blood pressure in a few and if it is still high or if she is having blurred vision go to L&D to be evaluated. Patient verbalized understanding.   
I reviewed the overnight course of events on the unit, re-confirming the patient history. I discussed the care with the patient and their family. The plan of care was discussed with the ACP team and modifications were made to the notation where appropriate. Differential diagnosis and plan of care discussed with patient after the evaluation. Advanced care planning was discussed with patient and family.  Advanced care planning forms were reviewed and discussed.  Risks, benefits and alternatives of cardiac procedures were discussed in detail and all questions were answered. 35 minutes spent on total encounter of which more than fifty percent of the encounter was spent counseling and/or coordinating care by the attending physician.
I reviewed the overnight course of events on the unit, re-confirming the patient history. I discussed the care with the patient and their family. The plan of care was discussed with the ACP team and modifications were made to the notation where appropriate. Differential diagnosis and plan of care discussed with patient after the evaluation. Advanced care planning was discussed with patient and family.  Advanced care planning forms were reviewed and discussed.  Risks, benefits and alternatives of cardiac procedures were discussed in detail and all questions were answered. 35 minutes spent on total encounter of which more than fifty percent of the encounter was spent counseling and/or coordinating care by the attending physician.
Case and plan discussed with ACP: Donna
Case and plan discussed with ACP: Kelli CHANG

## 2025-04-29 NOTE — PROGRESS NOTE ADULT - PROBLEM SELECTOR PLAN 2
s/p CABG  c/w aspirin, prasugrel, statin s/p CABG  c/w aspirin, statin s/p CABG  c/w aspirin, statin      NSTEMI. ACS.   Acute Chest pain. Unstable angina.   CAD.  PAD.  ANGIE on CKD stage 3A, concern for ABILIO/ATN.   chronic kidney disease–mineral and bone disorder. concern for acute metabolic acidosis.   HFrEF. Chronic HF.   Hypertension. Hyperlipidemia.   Anemia.   History of stroke.   s/p ICD placement. AMA

## 2025-04-29 NOTE — DISCHARGE NOTE PROVIDER - NSDCQMBETA_CARD_A_CORE
How Severe Are Your Spot(S)?: mild What Type Of Note Output Would You Prefer (Optional)?: Bullet Format What Is The Reason For Today's Visit?: Full Body Skin Examination What Is The Reason For Today's Visit? (Being Monitored For X): concerning skin lesions on an annual basis Additional History: Last seen 12/11/2018. BCC Mohs. Dr. Elder No, not prescribed... Yes

## 2025-04-29 NOTE — DISCHARGE NOTE NURSING/CASE MANAGEMENT/SOCIAL WORK - NSDCPEFALRISK_GEN_ALL_CORE
For information on Fall & Injury Prevention, visit: [email or web address] OR  [email or web address] OR  https://www.cdc.gov/steadi/patient.html

## 2025-04-29 NOTE — DISCHARGE NOTE NURSING/CASE MANAGEMENT/SOCIAL WORK - PATIENT PORTAL LINK FT
You can access the FollowMyHealth Patient Portal offered by [facility] by registering at the following website: [email or web address]. By joining [email or web address]’s FollowMyHealth portal, you will also be able to view your health information using other applications (apps) compatible with our system.

## 2025-04-29 NOTE — PROGRESS NOTE ADULT - REASON FOR ADMISSION
chest pressure

## 2025-04-29 NOTE — PROGRESS NOTE ADULT - PROBLEM SELECTOR PLAN 7
Baseline SCR 1.1-1.3  sees Dr. Murphy opt  monitor crt  dose medications renally
Baseline SCR 1.1-1.3  sees Dr. Abhijit Murphy opt  monitor crt  dose medications renally    PCP Dr. JAMAL Edward      # Discharge planning, pending cardiology clearance, F/U CM.
Baseline SCR 1.1-1.3  sees Dr. Abhijit Murphy opt  monitor crt  dose medications renally    PCP Dr. JAMAL Edward      # Discharge planning 65min, F/U CM.

## 2025-04-29 NOTE — PROGRESS NOTE ADULT - ASSESSMENT
74-year-old male with past medical history hypertension, hyperlipidemia, CABG with 3 stents placed, defibrillator placement, presents today for nonradiating, exertional, pleuritic,  midsternal chest pain. Uncdergoing cardiac wup. Nephro on board fro CKD    CKD 3A  Baseline SCR 1.1-1.3  Well known in our office patient of Dr. Arcos Last seen 12/2024  Now at baseline  Avoid nephrotoxins  Avoid Contrast when possible  s/p cath - continue hydration post procedure  Keep MAP>65  Monitor urine output    HTN  BP acceptable  Continue current meds  Monitor    CKD MBD  Can check PTH and vitamin D  Ca and Phos WNL    Anemia  Mild Monitor    CAD/ACS?  Hx of CABG plus multiple stents  Per cardiology  s/p C
73 yo male with a PMH of CAD (s/p 3 stents and 3V-CABG in 2002), PAD(s/p LLE stent), HTN, HLD, chronic systolic CHF, s/p ICD implantation (2019) presenting with NSTEMI.
71 yo male with a PMH of CAD (s/p 3 stents and 3V-CABG in 2002), PAD(s/p LLE stent), HTN, HLD, chronic systolic CHF, s/p ICD implantation (2019) presenting with NSTEMI.
 75 yo male with a PMH of CAD (s/p 3 stents and 3V-CABG in 2002), PAD(s/p LLE stent), HTN, HLD, chronic systolic CHF, s/p ICD implantation (2019) presented to ED complaining of chest pain found to hace NSTEMI     EKG: NSR  No acute ischemic changes   -[facility] 7/2021 which showed  patent stent to common left common illiac. SVG to OM is closed at ostium looks like chronically closed. SVG to RCA known to be closed. LIMA TO LAD supplied RCA territory via collaterals    1. Chest pain  - NSTEMI  - now resolved plan for [facility] on monday   - c/w heparin ggt     2. ICM  -s/p ICD  -c/w coreg  -lisinopril lasix     3. HTN  -improved  -c/w coreg and imdur  -continue to monitor BP
74-year-old male with past medical history hypertension, hyperlipidemia, CABG with 3 stents placed, defibrillator placement, presents today for nonradiating, exertional, pleuritic,  midsternal chest pain. Uncdergoing cardiac wup. Nephro on board fro CKD    CKD 3A  Baseline SCR 1.1-1.3  Well known in our office patient of Dr. Arcos Last seen 12/2024  Now at baseline  Avoid nephrotoxins  Avoid Contrast when possible  If needs cath would hydrate pre and post procedure with NS 75 cc/hour 6 hours pre and 6 hours post to minimize ABILIO  Keep MAP>65  Monitor urine output    HTN  Resume home meds  Monitor    CKD MBD  Can check PTH and vitamin D  Ca and Phos WNL    Anemia  Mild Monitor    CAD/ACS?  Hx of CABG plus multiple stents  On heparin drip  Per cardiology  planned FOR [facility] on Monday
74-year-old male with past medical history hypertension, hyperlipidemia, CABG with 3 stents placed, defibrillator placement, presents today for nonradiating, exertional, pleuritic,  midsternal chest pain. Uncdergoing cardiac wup. Nephro on board fro CKD    CKD 3A  Baseline SCR 1.1-1.3  Well known in our office patient of Dr. Arcos Last seen 12/2024  Now at baseline  Avoid nephrotoxins  Avoid Contrast when possible  Planned for cath renal function stable recieving hydration, monitor  Keep MAP>65  Monitor urine output    HTN  Resume home meds  Monitor    CKD MBD  Can check PTH and vitamin D  Ca and Phos WNL    Anemia  Mild Monitor    CAD/ACS?  Hx of CABG plus multiple stents  On heparin drip  Per cardiology  planned FOR [facility] on Monday
 73 yo male with a PMH of CAD (s/p 3 stents and 3V-CABG in 2002), PAD(s/p LLE stent), HTN, HLD, chronic systolic CHF, s/p ICD implantation (2019) presented to ED complaining of chest pain found to hace NSTEMI     EKG: NSR  No acute ischemic changes   -[facility] 7/2021 which showed  patent stent to common left common illiac. SVG to OM is closed at ostium looks like chronically closed. SVG to RCA known to be closed. LIMA TO LAD supplied RCA territory via collaterals    1. Chest pain  - NSTEMI  - now resolved plan for [facility] today   - c/w heparin ggt     2. ICM  -s/p ICD  -c/w coreg  -lisinopril lasix     3. HTN  -improved  -c/w coreg and imdur  -continue to monitor BP
 73 yo male with a PMH of CAD (s/p 3 stents and 3V-CABG in 2002), PAD(s/p LLE stent), HTN, HLD, chronic systolic CHF, s/p ICD implantation (2019) presented to ED complaining of chest pain found to hace NSTEMI     EKG: NSR  No acute ischemic changes   -[facility] 7/2021 which showed  patent stent to common left common illiac. SVG to OM is closed at ostium looks like chronically closed. SVG to RCA known to be closed. LIMA TO LAD supplied RCA territory via collaterals    1. Chest pain  - NSTEMI  -  [facility] shows severe TVD, only patent LIMA to LAD other grafts closes  - switch prasugrel to plavix sec to h/o stroke , plavix 300mg tomorrow and then 75mg daily     2. ICM  -s/p ICD  -c/w coreg  -lisinopril lasix     3. HTN  -improved  -c/w coreg and imdur  -continue to monitor BP
71 yo male with a PMH of CAD (s/p 3 stents and 3V-CABG in 2002), PAD(s/p LLE stent), HTN, HLD, chronic systolic CHF, s/p ICD implantation (2019) presenting with NSTEMI.

## 2025-04-29 NOTE — PROGRESS NOTE ADULT - PROBLEM SELECTOR PLAN 1
c/w aspirin, prasugrel, hep gtt    TTE  monitor on tele  trops peaked    >    > 4/28: LHC pending, Echo pending > pre and post cath NS IVF ordered as per nephro recs. Reached out to cardiology Dr. Abarca, pending further recs/discharge recs.  > 4/29: s/p LHC, formal cath report pending, Discussed with Dr. Abarca, patient cleared for discharge per cardiology, close outpatient follow up within 1 week. Case discussed with nephro attending Dr. Sandy, patient can be discharged and follow up outpatient within 1 week, BMP outpatient check within 1 week. c/w aspirin, prasugrel, hep gtt    TTE  monitor on tele  trops peaked    >    > 4/28: LHC pending, Echo pending > pre and post cath NS IVF ordered as per nephro recs. Reached out to cardiology Dr. Abarca, pending further recs/discharge recs.  > 4/29: s/p LHC, formal cath report pending, Discussed with Dr. Abarca, patient cleared for discharge per cardiology, close outpatient follow up within 1 week. concern for ANGIE/ABILIO/ATN, Case discussed with nephro attending Dr. Sandy, patient can be discharged and follow up outpatient within 1 week, BMP outpatient check within 1 week. c/w aspirin, prasugrel, hep gtt    TTE  monitor on tele  trops peaked    >    > 4/28: LHC pending, Echo pending > pre and post cath NS IVF ordered as per nephro recs. Reached out to cardiology Dr. Abarca, pending further recs/discharge recs.  > 4/29: s/p LHC, formal cath report pending, Discussed with Dr. Abarca, patient cleared for discharge per cardiology, close outpatient follow up within 1 week. concern for ANGIE/ABILIO/ATN, Case discussed with nephro attending Dr. Sandy, patient can be discharged and follow up outpatient within 1 week, BMP outpatient check within 1 week.  - per cards recs stop effient, plavix prescription to be sent per their recs.

## 2025-04-29 NOTE — DISCHARGE NOTE PROVIDER - NSDCFUADDAPPT_GEN_ALL_CORE_FT
APPTS ARE READY TO BE MADE: [ ] YES    Best Family or Patient Contact (if needed):    Additional Information about above appointments (if needed):    1: cardiology within 1 week  2: nephro within 1 week  3: BMP check 5/5/25 > FAX to nephro    Other comments or requests:    APPTS ARE READY TO BE MADE: [X] YES    Best Family or Patient Contact (if needed):    Additional Information about above appointments (if needed):    1: cardiology within 1 week  2: nephro within 1 week  3: BMP check 5/5/25 > FAX to nephro  4. PCP within 1 - 2 weeks    Other comments or requests:    APPTS ARE READY TO BE MADE: [X] YES    Best Family or Patient Contact (if needed):    Additional Information about above appointments (if needed):    1: cardiology within 1 week  2: nephro within 1 week  3: BMP check 5/5/25 > FAX to nephro  4. PCP within 1 - 2 weeks    Patient informed us they already have secured a follow up appointment which is not visible on [facility] and declined to provide appointment details.     Other comments or requests:

## 2025-04-29 NOTE — DISCHARGE NOTE NURSING/CASE MANAGEMENT/SOCIAL WORK - FINANCIAL ASSISTANCE
[facility] provides services at a reduced cost to those who are determined to be eligible through [facility]’s financial assistance program. Information regarding [facility]’s financial assistance program can be found by going to [email or web address] or by calling [phone].

## 2025-04-29 NOTE — PROGRESS NOTE ADULT - SUBJECTIVE AND OBJECTIVE BOX
[facility]   MD CHRISTINE SWEENEY MD SAKIL BHUIYAN, MD MARIA SANTIAGO, SHERRY    TEL:  OFFICE: [phone]  From 5pm-7am Answering Service [phone]    -- RENAL FOLLOW UP NOTE ---Date of Service 04-29-25 @ 14:01    Patient is a 74y old  Male who presents with a chief complaint of chest pressure      Patient seen and examined at bedside. No chest pain/sob    VITALS:  T(F): 98 (04-29-25 @ 04:54), Max: 98.4 (04-28-25 @ 16:38)  HR: 71 (04-29-25 @ 04:54)  BP: 117/64 (04-29-25 @ 04:54)  RR: 18 (04-29-25 @ 04:54)  SpO2: 96% (04-29-25 @ 04:54)  Wt(kg): --    04-28 @ 07:01  -  04-29 @ 07:00  --------------------------------------------------------  IN: 600 mL / OUT: 0 mL / NET: 600 mL          PHYSICAL EXAM:  General: NAD  Neck: No JVD  Respiratory: CTAB, no wheezes, rales or rhonchi  Cardiovascular: S1, S2, RRR  Gastrointestinal: BS+, soft, NT/ND  Extremities: No peripheral edema    Hospital Medications:   MEDICATIONS  (STANDING):  artificial tears (preservative free) Ophthalmic Solution 1 Drop(s) Both EYES three times a day  aspirin  chewable 81 milliGRAM(s) Oral daily  benzocaine/menthol Lozenge 1 Lozenge Oral three times a day  benzonatate 100 milliGRAM(s) Oral three times a day  carvedilol 6.25 milliGRAM(s) Oral every 12 hours  cetirizine 10 milliGRAM(s) Oral daily  furosemide    Tablet 20 milliGRAM(s) Oral daily  gabapentin 300 milliGRAM(s) Oral daily  lisinopril 20 milliGRAM(s) Oral daily  pantoprazole    Tablet 20 milliGRAM(s) Oral two times a day  prasugrel 10 milliGRAM(s) Oral daily  rosuvastatin 40 milliGRAM(s) Oral at bedtime  sodium chloride 0.9%. 1000 milliLiter(s) (75 mL/Hr) IV Continuous <Continuous>  sodium chloride 0.9%. 1000 milliLiter(s) (75 mL/Hr) IV Continuous <Continuous>  spironolactone 25 milliGRAM(s) Oral daily      LABS:  04-29    140  |  106  |  21  ----------------------------<  112[H]  3.8   |  20[L]  |  1.56[H]    Ca    9.1      29 Apr 2025 08:30  Phos  2.9     04-28  Mg     2.3     04-28      Creatinine Trend: 1.56 <--, 1.40 <--, 1.40 <--, 1.35 <--                                12.6   4.15  )-----------( 119      ( 29 Apr 2025 08:30 )             38.4     Urine Studies:  Urinalysis - [04-29-25 @ 08:30]      Color  / Appearance  / SG  / pH       Gluc 112 / Ketone   / Bili  / Urobili        Blood  / Protein  / Leuk Est  / Nitrite       RBC  / WBC  / Hyaline  / Gran  / Sq Epi  / Non Sq Epi  / Bacteria             RADIOLOGY & ADDITIONAL STUDIES:

## 2025-04-29 NOTE — DISCHARGE NOTE PROVIDER - CARE PROVIDER_API CALL
Harsha Arcos [facility]  Nephrology  [address], [address]  [place] [address]  Phone: [phone]  Fax: [phone]  Established Patient  Follow Up Time:    Harsha Arcos [facility]  Nephrology  [address], [address]  [place] [address]  Phone: [phone]  Fax: [phone]  Established Patient  Follow Up Time:     MAURO NEWSMOE  Phone: ()-  Fax: ()-  Follow Up Time:     Ulisses Abarca  Interventional Cardiology  [address] [address]  Phone: [phone]  Fax: [phone]  Follow Up Time:

## 2025-04-29 NOTE — PROGRESS NOTE ADULT - PROBLEM SELECTOR PLAN 4
HFrEF last ef here TTE is 27% s/p ICD  TTE then further optimization of GDMT  opt cards Dr Ulisses Bustillos

## 2025-04-29 NOTE — DISCHARGE NOTE PROVIDER - NSDCMRMEDTOKEN_GEN_ALL_CORE_FT
aspirin 81 mg oral tablet, chewable: 1 tab(s) chewed once a day  cetirizine 10 mg oral tablet: 1 tab(s) orally once a day as needed  Coreg 6.25 mg oral tablet: 1 tab(s) orally once a day  furosemide 20 mg oral tablet: 1 tab(s) orally once a day  gabapentin 300 mg oral capsule: 1 cap(s) orally once a day  lisinopril 20 mg oral tablet: 1 tab(s) orally once a day  Multivitamin Tablet: 1 tablet orally once a day  nitroglycerin 0.4 mg sublingual tablet: 1 tab(s) sublingual every 5 minutes, As Needed  pantoprazole 40 mg oral delayed release tablet: 1 tab(s) orally once a day  prasugrel 5 mg oral tablet: 1 tab(s) orally once a day  rosuvastatin 40 mg oral tablet: 1 tab(s) orally once a day  Systane 0.3%-0.4% Eye Drop: 1 drop instilled in each eye 3 times a day   aspirin 81 mg oral tablet, chewable: 1 tab(s) chewed once a day  cetirizine 10 mg oral tablet: 1 tab(s) orally once a day as needed  clopidogrel 75 mg oral tablet: 1 tab(s) orally once a day Take 4 tabs on 4/30/25 (loading dose) then starting on 5/1/25 take 1 tablet daily MDD: 1  Coreg 6.25 mg oral tablet: 1 tab(s) orally 2 times a day MDD: 2  furosemide 20 mg oral tablet: 1 tab(s) orally once a day  gabapentin 300 mg oral capsule: 1 cap(s) orally once a day  lisinopril 20 mg oral tablet: 1 tab(s) orally once a day  Multivitamin Tablet: 1 tablet orally once a day  nitroglycerin 0.4 mg sublingual tablet: 1 tab(s) sublingual every 5 minutes, As Needed  pantoprazole 40 mg oral delayed release tablet: 1 tab(s) orally once a day  rosuvastatin 40 mg oral tablet: 1 tab(s) orally once a day  spironolactone 25 mg oral tablet: 1 tab(s) orally once a day MDD: 1  Systane 0.3%-0.4% Eye Drop: 1 drop instilled in each eye 3 times a day

## 2025-04-29 NOTE — DISCHARGE NOTE PROVIDER - PROVIDER TOKENS
PROVIDER:[TOKEN:[[id number]],ESTABLISHEDPATIENT:[T]] PROVIDER:[TOKEN:[[id number]],ESTABLISHEDPATIENT:[T]],PROVIDER:[TOKEN:[[id number]]],PROVIDER:[TOKEN:[[id number]]]

## 2025-04-29 NOTE — DISCHARGE NOTE PROVIDER - NSDCFUSCHEDAPPT_GEN_ALL_CORE_FT
[facility] [id number]  Scheduled Appointment: 04/30/2025    Ang Mancera  [facility]  PAINMGT [address]  Scheduled Appointment: 05/15/2025    [facility] [id number]  Scheduled Appointment: 07/15/2025     [facility] Physician [facility] [id number]  Scheduled Appointment: 07/15/2025

## 2025-04-29 NOTE — DISCHARGE NOTE PROVIDER - NSDCCPCAREPLAN_GEN_ALL_CORE_FT
PRINCIPAL DISCHARGE DIAGNOSIS  Diagnosis: NSTEMI (non-ST elevation myocardial infarction)  Assessment and Plan of Treatment: Continue present medication regimen.   Follow up with Cardiology, Dr. Abarca in 1-2 weeks.   Discontinue Effient.   Start Plavix: Take 4 tablets on Wednesday 4/30/25 then take 1 tab daily thereafter beginning on 5/1/25.

## 2025-04-29 NOTE — PROGRESS NOTE ADULT - SUBJECTIVE AND OBJECTIVE BOX
[facility] Division of Hospital Medicine  Annabel Robbins MD M-F, 8A-5P: MS Teams  Other Times: [facility] Extension / [facility] Pager      Patient is a 74y old  Male who presents with a chief complaint of chest pressure (2025 12:18)      SUBJECTIVE / OVERNIGHT EVENTS:  Patient was examined    ADDITIONAL REVIEW OF SYSTEMS:    MEDICATIONS  (STANDING):  artificial tears (preservative free) Ophthalmic Solution 1 Drop(s) Both EYES three times a day  aspirin  chewable 81 milliGRAM(s) Oral daily  benzocaine/menthol Lozenge 1 Lozenge Oral three times a day  benzonatate 100 milliGRAM(s) Oral three times a day  carvedilol 6.25 milliGRAM(s) Oral every 12 hours  cetirizine 10 milliGRAM(s) Oral daily  furosemide    Tablet 20 milliGRAM(s) Oral daily  gabapentin 300 milliGRAM(s) Oral daily  lisinopril 20 milliGRAM(s) Oral daily  pantoprazole    Tablet 20 milliGRAM(s) Oral two times a day  prasugrel 10 milliGRAM(s) Oral daily  rosuvastatin 40 milliGRAM(s) Oral at bedtime  sodium chloride 0.9%. 1000 milliLiter(s) (75 mL/Hr) IV Continuous <Continuous>  spironolactone 25 milliGRAM(s) Oral daily    MEDICATIONS  (PRN):  guaiFENesin Oral Liquid (Sugar-Free) 100 milliGRAM(s) Oral every 6 hours PRN Cough      CAPILLARY BLOOD GLUCOSE          I&O's Summary    2025 07:01  -  2025 07:00  --------------------------------------------------------  IN: 600 mL / OUT: 0 mL / NET: 600 mL        Daily     Daily Weight in k.8 (2025 04:17)    PHYSICAL EXAM:  Vital Signs Last 24 Hrs  T(C): 36.7 (2025 04:54), Max: 36.9 (2025 16:38)  T(F): 98 (2025 04:54), Max: 98.4 (2025 16:38)  HR: 71 (2025 04:54) (66 - 72)  BP: 117/64 (2025 04:54) (117/64 - 154/72)  BP(mean): --  RR: 18 (2025 04:54) (16 - 18)  SpO2: 96% (2025 04:54) (96% - 99%)    Parameters below as of 2025 04:54  Patient On (Oxygen Delivery Method): room air      CONSTITUTIONAL: NAD, well-developed, well-groomed  EYES: PERRLA; conjunctiva and sclera clear  ENMT: Moist oral mucosa, no pharyngeal injection or exudates; normal dentition  NECK: Supple, no palpable masses; no thyromegaly  RESPIRATORY: Normal respiratory effort; lungs are clear to auscultation bilaterally  CARDIOVASCULAR: Regular rate and rhythm, normal S1 and S2, no murmur/rub/gallop; No lower extremity edema; Peripheral pulses are 2+ bilaterally  ABDOMEN: Nontender to palpation, normoactive bowel sounds, no rebound/guarding; No hepatosplenomegaly  MUSCULOSKELETAL:  no clubbing or cyanosis of digits; no joint swelling or tenderness to palpation, moving all extremities   PSYCH: A+O to person, place, and time; affect appropriate  NEUROLOGY: CN 2-12 are intact and symmetric; no gross sensory/motor deficits   SKIN: No rashes; no palpable lesions    LABS:                        12.6   4.15  )-----------( 119      ( 2025 08:30 )             38.4     04-29    140  |  106  |  21  ----------------------------<  112[H]  3.8   |  20[L]  |  1.56[H]    Ca    9.1      2025 08:30  Phos  2.9     04-28  Mg     2.3     04-28        PTT - ( 2025 08:30 )  PTT:33.1 sec      Urinalysis Basic - ( 2025 08:30 )    Color: x / Appearance: x / SG: x / pH: x  Gluc: 112 mg/dL / Ketone: x  / Bili: x / Urobili: x   Blood: x / Protein: x / Nitrite: x   Leuk Esterase: x / RBC: x / WBC x   Sq Epi: x / Non Sq Epi: x / Bacteria: x            RADIOLOGY & ADDITIONAL TESTS:  Results Reviewed:   Imaging Personally Reviewed:  Electrocardiogram Personally Reviewed:    COORDINATION OF CARE:  Care Discussed with Consultants/Other Providers [Y/N]:  Prior or Outpatient Records Reviewed [Y/N]:   [facility] Division of Hospital Medicine  Annabel Robbins MD M-F, 8A-5P: MS Teams  Other Times: [facility] Extension / [facility] Pager      Patient is a 74y old  Male who presents with a chief complaint of chest pressure (2025 12:18)      SUBJECTIVE / OVERNIGHT EVENTS:  Patient was examined today. He denies any new issue. Has some cough ongoing. 10 point ROS neg, he denies headache, dizziness, fever, chills, chest pain, sob, palpitations, n/v, abd pain, change in bowel/bladder habits. Discussed specialist recs, discharge plan, follow up plan, patient in agreement.    ADDITIONAL REVIEW OF SYSTEMS:    MEDICATIONS  (STANDING):  artificial tears (preservative free) Ophthalmic Solution 1 Drop(s) Both EYES three times a day  aspirin  chewable 81 milliGRAM(s) Oral daily  benzocaine/menthol Lozenge 1 Lozenge Oral three times a day  benzonatate 100 milliGRAM(s) Oral three times a day  carvedilol 6.25 milliGRAM(s) Oral every 12 hours  cetirizine 10 milliGRAM(s) Oral daily  furosemide    Tablet 20 milliGRAM(s) Oral daily  gabapentin 300 milliGRAM(s) Oral daily  lisinopril 20 milliGRAM(s) Oral daily  pantoprazole    Tablet 20 milliGRAM(s) Oral two times a day  prasugrel 10 milliGRAM(s) Oral daily  rosuvastatin 40 milliGRAM(s) Oral at bedtime  sodium chloride 0.9%. 1000 milliLiter(s) (75 mL/Hr) IV Continuous <Continuous>  spironolactone 25 milliGRAM(s) Oral daily    MEDICATIONS  (PRN):  guaiFENesin Oral Liquid (Sugar-Free) 100 milliGRAM(s) Oral every 6 hours PRN Cough      CAPILLARY BLOOD GLUCOSE          I&O's Summary    2025 07:01  -  2025 07:00  --------------------------------------------------------  IN: 600 mL / OUT: 0 mL / NET: 600 mL        Daily     Daily Weight in k.8 (2025 04:17)    PHYSICAL EXAM:  Vital Signs Last 24 Hrs  T(C): 36.7 (2025 04:54), Max: 36.9 (2025 16:38)  T(F): 98 (2025 04:54), Max: 98.4 (2025 16:38)  HR: 71 (2025 04:54) (66 - 72)  BP: 117/64 (2025 04:54) (117/64 - 154/72)  BP(mean): --  RR: 18 (2025 04:54) (16 - 18)  SpO2: 96% (2025 04:54) (96% - 99%)    Parameters below as of 2025 04:54  Patient On (Oxygen Delivery Method): room air      CONSTITUTIONAL: NAD, well-developed,   EYES: PERRLA; conjunctiva and sclera clear  ENMT: Moist oral mucosa  NECK: Supple,  RESPIRATORY: Normal respiratory effort; lungs are clear to auscultation bilaterally  CARDIOVASCULAR: Regular rate and rhythm, normal S1 and S2, no murmur/rub/gallop;   No lower extremity edema; Peripheral pulses are 2+ bilaterally  ABDOMEN: Nontender to palpation, normoactive bowel sounds,   MUSCULOSKELETAL:  no clubbing or cyanosis of digits; no joint swelling or tenderness to palpation, moving all extremities   PSYCH: A+O to person, place, and time; affect appropriate  NEUROLOGY: CN 2-12 are intact and symmetric; no gross sensory/motor deficits   SKIN: No rashes; no palpable lesions      LABS:                        12.6   4.15  )-----------( 119      ( 2025 08:30 )             38.4         140  |  106  |  21  ----------------------------<  112[H]  3.8   |  20[L]  |  1.56[H]    Ca    9.1      2025 08:30  Phos  2.9     04-  Mg     2.3     04-28        PTT - ( 2025 08:30 )  PTT:33.1 sec      Urinalysis Basic - ( 2025 08:30 )    Color: x / Appearance: x / SG: x / pH: x  Gluc: 112 mg/dL / Ketone: x  / Bili: x / Urobili: x   Blood: x / Protein: x / Nitrite: x   Leuk Esterase: x / RBC: x / WBC x   Sq Epi: x / Non Sq Epi: x / Bacteria: x            RADIOLOGY & ADDITIONAL TESTS:  Results Reviewed:   Imaging Personally Reviewed:  Electrocardiogram Personally Reviewed:    COORDINATION OF CARE:  Care Discussed with Consultants/Other Providers [Y/N]:  Prior or Outpatient Records Reviewed [Y/N]:

## 2025-04-29 NOTE — DISCHARGE NOTE NURSING/CASE MANAGEMENT/SOCIAL WORK - NSDCVIVACCINE_GEN_ALL_CORE_FT
Tdap; 05-Jul-2024 18:31; Velma Doan); [employer]; [id number] (Exp. Date: 10-Feb-2026); IntraMuscular; Deltoid Left.; 0.5 milliLiter(s); VIS (VIS Published: 09-May-2013, VIS Presented: 05-Jul-2024);

## 2025-04-29 NOTE — DISCHARGE NOTE PROVIDER - HOSPITAL COURSE
HPI:  74-year-old male, with past medical history hypertension, hyperlipidemia, CABG with 3 stents placed, defibrillator placement, presents today for nonradiating, exertional,  midsternal chest  pressure the chest pressure at 8pm last night, he was at rest. took nitro and did not help AM today, notes that whenever he walks it makes the chest pain worse.  denies any fever, chills ecent travel or recent infectious contacts, abdominal pain, nausea, vomiting, diarrhea, dysuria.  Tx from [facility] for NSTEMI (26 Apr 2025 12:52)    Hospital Course:      Important Medication Changes and Reason:    Active or Pending Issues Requiring Follow-up:    Advanced Directives:   [ ] Full code  [ ] DNR  [ ] Hospice    Discharge Diagnoses:         HPI:  74-year-old male, with past medical history hypertension, hyperlipidemia, CABG with 3 stents placed, defibrillator placement, presents today for nonradiating, exertional,  midsternal chest  pressure the chest pressure at 8pm last night, he was at rest. took nitro and did not help AM today, notes that whenever he walks it makes the chest pain worse.  denies any fever, chills recent travel or recent infectious contacts, abdominal pain, nausea, vomiting, diarrhea, dysuria.  Tx from [facility] for NSTEMI (26 Apr 2025 12:52)    Hospital Course:    -  LHC shows severe TVD, only patent LIMA to LAD other grafts closes    Important Medication Changes and Reason:  switch prasugrel to plavix sec to h/o stroke , plavix 300mg tomorrow and then 75mg daily       Active or Pending Issues Requiring Follow-up:  outpatient within 1 week, BMP outpatient check within 1 week.  F/u cards, nephro within 1 week.      Advanced Directives:   [ X] Full code  [ ] DNR  [ ] Hospice    Discharge Diagnoses:  NSTEMI. ACS.   Acute Chest pain. Unstable angina.   CAD.  PAD.  ANGIE on CKD stage 3A, concern for ABILIO/ATN.   chronic kidney disease–mineral and bone disorder. concern for acute metabolic acidosis.   HFrEF. Chronic HF.   Hypertension. Hyperlipidemia.   Anemia.   History of stroke.   s/p ICD placement.

## 2025-04-29 NOTE — DISCHARGE NOTE PROVIDER - CARE PROVIDERS DIRECT ADDRESSES
,[email or web address]

## 2025-04-29 NOTE — PROGRESS NOTE ADULT - PROBLEM SELECTOR PLAN 5
c/w home lisinopril 20mg daily, coreg 6.2mg q12hr, furosemide 20mg daily

## 2025-04-29 NOTE — PROGRESS NOTE ADULT - PROVIDER SPECIALTY LIST ADULT
Cardiology
Cardiology
Nephrology
Internal Medicine
Nephrology
Nephrology
Cardiology
Internal Medicine
Internal Medicine

## 2025-05-15 ENCOUNTER — APPOINTMENT (OUTPATIENT)
Dept: PAIN MANAGEMENT | Facility: CLINIC | Age: 75
End: 2025-05-15

## 2025-05-16 ENCOUNTER — APPOINTMENT (OUTPATIENT)
Dept: ELECTROPHYSIOLOGY | Facility: CLINIC | Age: 75
End: 2025-05-16

## 2025-06-16 NOTE — CONSULT NOTE ADULT - NSCONSULTADDITIONALINFOA_GEN_ALL_CORE
This problem is uncontrolled. A1c on 7/1/2024 was 11.6%. Patient had stopped healthy diet and exercise and had been off his meds. Since restarting lifestyle management and medication, fasting glucose has come down from 356 to 134.  - continue actos  - increase metformin to 2g daily - XR due to GI upset  - continue jardiance  - fructosamine ordered to be drawn in a few weeks  
I reviewed the overnight course of events on the unit, re-confirming the patient history. I discussed the care with the patient and their family. The plan of care was discussed with the ACP team and modifications were made to the notation where appropriate. Differential diagnosis and plan of care discussed with patient after the evaluation. Advanced care planning was discussed with patient and family.  Advanced care planning forms were reviewed and discussed.  Risks, benefits and alternatives of cardiac procedures were discussed in detail and all questions were answered. 70 minutes spent on total encounter of which more than fifty percent of the encounter was spent counseling and/or coordinating care by the attending physician.

## 2025-06-16 NOTE — H&P ADULT - ASSESSMENT
74M pmhx CAD x 3 stents (on Aspirin and Plavix), ICD, HTN, HLD, CKD, presents with multiple neurologic symptoms .Starting approximately one week prior to ED presentation on 6/16/25, patient reports onset of multiple symptoms, including HA (occipital, 8/10 maximally, "pain" quality, nonpositional, associated with nausea/vomiting and photophobia), vertigo (present at rest but worse with head movement), difficulty swallowing (especially saliva, he says "I am not jenniffer to turn my tongue"), b/l blurry vision (both eyes, only present when reading, denies double vision). Pt also reports neck pain that causes his head to drop forward, which then worsens his vertigo. He does not feel like his neck is weak, however. Pt denies focal weakness, numbness/tingling subjectively (though on exam found to have numbness), double vision, tinnitus, LoC, involuntary movements, slurred speech. Patient does not note a certain time of day where the symptoms may improve or worsen. Patient does not note any respiratory distress. Patient denies generalized weakness.

## 2025-06-16 NOTE — H&P ADULT - NSICDXPASTSURGICALHX_GEN_ALL_CORE_FT
PAST SURGICAL HISTORY:  History of coronary angiogram multiple stents placed    History of femoral angiogram stent placed in LLE 2016    History of laparoscopic cholecystectomy 2016    S/P CABG (coronary artery bypass graft) x3; [facility] 2002

## 2025-06-16 NOTE — CONSULT NOTE ADULT - SUBJECTIVE AND OBJECTIVE BOX
[facility]OLOGY [facility]ACT[facility]   MD CHRISTINE SWEENEY MD MARIA SANTIAGO, NP        TEL:  OFFICE: [phone]  From 5pm-7am answering service [phone]    --- INITIAL RENAL CONSULT NOTE ---date of service 25 @ 14:54    HPI:  74-year-old male with a history of hypertension, hyperlipidemia, CABG x3 stents, and implanted defibrillator presents to the ED with a 10-day history of left-sided headache radiating to the ipsilateral neck, accompanied by vertigo, vomiting, intermittent chest pain, and blurry vision. He reports difficulty supporting his head due to neck pain, dysphagia with solids, and impaired tongue motility contributing to multiple daily emesis episodes. During vertigo episodes, he experiences gait instability and transient blurry vision. Cardiology evaluation by Dr. Bustillos on  was unremarkable. He denies fever, chills, hearing changes, shortness of breath, abdominal pain, or  symptoms. Neurological exam reveals diminished sensation in the left face and left upper extremity, but 5/5 strength and sensation in all extremities. No focal deficits were otherwise noted. Differentials include, but are not limited to, subacute stroke, vertigo, and acute cardiac pathology. Nephrology consulted for acute on ckd.       Allergies:  No Known Allergies      PAST MEDICAL & SURGICAL HISTORY:  HTN (hypertension)      PAD (peripheral artery disease)  stent to L lower extremity artery      Constipation, unspecified constipation type      Hyperlipidemia      NSTEMI (non-ST elevated myocardial infarction)   and 2018 and 2019      Coronary artery disease involving native coronary artery of native heart, unspecified whether angina present      Ischemic cardiomyopathy with implantable cardioverter-defibrillator (ICD)      S/P CABG (coronary artery bypass graft)  x3; [facility] 2002      History of coronary angiogram  multiple stents placed      History of femoral angiogram  stent placed in E 2016      History of laparoscopic cholecystectomy  2016          Home Medications Reviewed    Hospital Medications:   MEDICATIONS  (STANDING):      SOCIAL HISTORY:  Denies ETOh, Smoking    FAMILY HISTORY:  Family history of coronary artery disease in brother (Sibling)  4 brothers with MI/CABG, 1  at 79yo    Family history of stroke  58yo CVA, heart attack 59yo    Family history of coronary artery disease in sister (Sibling)   from MI    Family history of MI (myocardial infarction)  mother,         REVIEW OF SYSTEMS:  CONSTITUTIONAL: as per hpi   EYES/ENT: as per hpi   NECK: No pain or stiffness  RESPIRATORY: No cough, wheezing, hemoptysis; No shortness of breath  CARDIOVASCULAR: as per hpi   GASTROINTESTINAL: No abdominal or epigastric pain. No nausea, vomiting, or hematemesis; No diarrhea or constipation. No melena or hematochezia.  GENITOURINARY: No dysuria, frequency, foamy urine, urinary urgency, incontinence or hematuria  NEUROLOGICAL: as per hpi   SKIN: No itching, burning, rashes, or lesions   VASCULAR: No bilateral lower extremity edema.   All other review of systems is negative unless indicated above.    VITALS:  T(F): 97.8 (25 @ 14:34), Max: 98.1 (25 @ 11:36)  HR: 78 (25 @ 14:34)  BP: 148/70 (25 @ 14:34)  RR: 16 (25 @ 14:34)  SpO2: 100% (25 @ 14:34)  Wt(kg): --      Weight (kg): 59.9 ( @ 10:57)    PHYSICAL EXAM:  General: NAD  HEENT: anicteric sclera, oropharynx clear, MMM  Neck: No JVD  Respiratory: CTAB, no wheezes, rales or rhonchi  Cardiovascular: S1, S2, RRR  Gastrointestinal: BS+, soft, NT/ND  Extremities: No cyanosis or clubbing. No peripheral edema  Neurological: A/O x 3, no focal deficits  Psychiatric: Normal mood, normal affect  : No CVA tenderness. No nieto.   Skin: No rashes  Vascular Access: none    LABS:      140  |  103  |  20  ----------------------------<  155[H]  3.5   |  25  |  2.09[H]    Ca    9.8      2025 12:30  Mg     2.30         TPro  7.2  /  Alb  4.0  /  TBili  0.6  /  DBili      /  AST  25  /  ALT  21  /  AlkPhos  98      Creatinine Trend: 2.09 <--                        12.9   4.60  )-----------( 141      ( 2025 12:30 )             38.5     Urine Studies:  Urinalysis Basic - ( 2025 12:30 )    Color:  / Appearance:  / SG:  / pH:   Gluc: 155 mg/dL / Ketone:   / Bili:  / Urobili:    Blood:  / Protein:  / Nitrite:    Leuk Esterase:  / RBC:  / WBC    Sq Epi:  / Non Sq Epi:  / Bacteria:           RADIOLOGY & ADDITIONAL STUDIES:

## 2025-06-16 NOTE — ED ADULT NURSE NOTE - OBJECTIVE STATEMENT
Pt with hx of vertigo in the past and CAD. Pt states over past two  weeks has dizziness with nausea, pain to neck and head. Pt placed on cardiac monitor no vomiting noted .  iv placed blood work collected. Pt  denies  chest pain at this time.  Pts family at bedside. Pt awaiting medical eval.

## 2025-06-16 NOTE — ED ADULT TRIAGE NOTE - AS PAIN REST
Follow Up Office Visit      Date of Visit:  2023   Patient Name: Onel Clark  : 3/25/1931   MRN: [id number]     Chief Complaint:    Chief Complaint   Patient presents with    Hypertension       History of Present Illness: Onel Clark is a 89 y.o. male who is here today for follow up.  Patient came in today for recheck on his chronic medical conditions.  Seen for hypertension degenerative arthritis in his back, hypothyroidism, prior TIA, GERD, diabetes, memory loss.  Patient also with current complaint of constipation.  Discussed over-the-counter medications as options for his constipation.  Other conditions are relatively stable.  Reviewed prior blood work.        Subjective      Review of Systems:   Review of Systems   Constitutional:  Positive for fatigue and unexpected weight loss.   Eyes:  Negative for blurred vision.   Cardiovascular:  Negative for chest pain.   Endocrine: Negative for polyphagia and polyuria.   Skin:  Negative for pallor.   Neurological:  Positive for weakness and confusion. Negative for dizziness, tremors, seizures and speech difficulty.   Psychiatric/Behavioral:  The patient is not nervous/anxious.      Past Medical History:   Past Medical History:   Diagnosis Date    Acquired hypothyroidism     Allergies     Arthritis     Benign prostatic hyperplasia with nocturia     Bilateral carotid artery disease     Cancer of the skin, basal cell     Chronic non-seasonal allergic rhinitis     DUE TO POLLEN    Chronic renal impairment     COPD (chronic obstructive pulmonary disease)     Decreased hearing of both ears     Degenerative lumbar spinal stenosis     Diabetes mellitus     Elevated PSA     Frequent falls     GERD without esophagitis     High risk medication use     Hx of bilateral hip replacements     Hx of decompressive lumbar laminectomy     Hx of total knee replacement, bilateral     Hx of transient ischemic attack (TIA)     Hypertension     Mixed hyperlipidemia due  to type 2 diabetes mellitus     Nicotine dependence     No natural teeth     FALSE TEETH    Osteoarthritis     Primary hypertension     Primary osteoarthritis involving multiple joints     Proteinuria due to type 2 diabetes mellitus     Thyroid disease     TIA (transient ischemic attack)     Type 2 diabetes mellitus with diabetic polyneuropathy     Type 2 diabetes mellitus with stage 2 chronic kidney disease, without long-term current use of insulin        Past Surgical History:   Past Surgical History:   Procedure Laterality Date    BACK SURGERY  2007    Lumbar Laminectomy    CARPAL TUNNEL RELEASE  2012    REPLACEMENT TOTAL KNEE Left 2013    TOTAL HIP ARTHROPLASTY Right 2016       Family History:   Family History   Problem Relation Age of Onset    Alzheimer's disease Mother     Stroke Father     Coronary artery disease Father     Diabetes Sister     Hypertension Sister     Hyperlipidemia Sister        Social History:   Social History     Socioeconomic History    Marital status:    Tobacco Use    Smoking status: Former    Smokeless tobacco: Never   Substance and Sexual Activity    Alcohol use: Yes    Drug use: No       Medications:     Current Outpatient Medications:     amLODIPine (NORVASC) 5 MG tablet, Take 1 tablet by mouth Daily., Disp: 90 tablet, Rfl: 1    atorvastatin (LIPITOR) 80 MG tablet, Take 1 tablet by mouth every night at bedtime., Disp: 90 tablet, Rfl: 1    clopidogrel (PLAVIX) 75 MG tablet, Take 1 tablet by mouth Daily., Disp: 90 tablet, Rfl: 1    levothyroxine (SYNTHROID, LEVOTHROID) 137 MCG tablet, Take 1 tablet by mouth Daily., Disp: 90 tablet, Rfl: 1    losartan (COZAAR) 50 MG tablet, Take 1 tablet by mouth Daily., Disp: 90 tablet, Rfl: 1    memantine (NAMENDA) 5 MG tablet, Take 1 tablet by mouth 2 (Two) Times a Day., Disp: 180 tablet, Rfl: 1    metFORMIN (GLUCOPHAGE) 1000 MG tablet, Take 1 tablet by mouth 2 (Two) Times a Day With Meals., Disp: 180 tablet, Rfl: 1    omeprazole (priLOSEC) 40  "MG capsule, Take 1 capsule by mouth Daily., Disp: 90 capsule, Rfl: 1    ASPIRIN 81 PO, Take 81 mg by mouth Daily., Disp: , Rfl:     Cetirizine HCl 10 MG capsule, Take 10 mg by mouth Daily With Breakfast., Disp: , Rfl:     furosemide (LASIX) 40 MG tablet, Take 1 tablet by mouth 1 (One) Time Per Week., Disp: 30 tablet, Rfl: 2    meloxicam (Mobic) 7.5 MG tablet, Take 1 tablet by mouth Daily., Disp: 30 tablet, Rfl: 5    potassium chloride (K-DUR,KLOR-CON) 20 MEQ CR tablet, Take 1 tablet by mouth 2 (Two) Times a Day. (Patient taking differently: Take 1 tablet by mouth 1 (One) Time Per Week.), Disp: 180 tablet, Rfl: 0    Allergies:   No Known Allergies    Objective     Physical Exam:  Vital Signs:   Vitals:    06/19/23 1333   BP: 136/80   Pulse: 94   SpO2: 98%   Weight: 90.3 kg (199 lb)   Height: 182.9 cm (72\")     Body mass index is 26.99 kg/m².     Physical Exam  Vitals and nursing note reviewed.   Constitutional:       General: He is not in acute distress.     Appearance: Normal appearance. He is not ill-appearing.   HENT:      Head: Normocephalic and atraumatic.      Right Ear: Tympanic membrane and ear canal normal.      Left Ear: Tympanic membrane and ear canal normal.      Nose: Nose normal.   Cardiovascular:      Rate and Rhythm: Normal rate and regular rhythm.      Heart sounds: Normal heart sounds.   Pulmonary:      Effort: Pulmonary effort is normal.      Breath sounds: Normal breath sounds.   Neurological:      Mental Status: He is alert and oriented to person, place, and time. Mental status is at baseline.   Psychiatric:         Mood and Affect: Mood normal.       Procedures      Assessment / Plan      Assessment/Plan:   Diagnoses and all orders for this visit:    1. Degenerative lumbar spinal stenosis (Primary)  -     meloxicam (Mobic) 7.5 MG tablet; Take 1 tablet by mouth Daily.  Dispense: 30 tablet; Refill: 5    2. Essential (primary) hypertension  -     losartan (COZAAR) 50 MG tablet; Take 1 tablet by " mouth Daily.  Dispense: 90 tablet; Refill: 1  -     atorvastatin (LIPITOR) 80 MG tablet; Take 1 tablet by mouth every night at bedtime.  Dispense: 90 tablet; Refill: 1  -     amLODIPine (NORVASC) 5 MG tablet; Take 1 tablet by mouth Daily.  Dispense: 90 tablet; Refill: 1    3. Acquired hypothyroidism  -     levothyroxine (SYNTHROID, LEVOTHROID) 137 MCG tablet; Take 1 tablet by mouth Daily.  Dispense: 90 tablet; Refill: 1    4. TIA (transient ischemic attack)  -     clopidogrel (PLAVIX) 75 MG tablet; Take 1 tablet by mouth Daily.  Dispense: 90 tablet; Refill: 1  -     atorvastatin (LIPITOR) 80 MG tablet; Take 1 tablet by mouth every night at bedtime.  Dispense: 90 tablet; Refill: 1    5. Gastroesophageal reflux disease without esophagitis  -     omeprazole (priLOSEC) 40 MG capsule; Take 1 capsule by mouth Daily.  Dispense: 90 capsule; Refill: 1    6. Type 2 diabetes mellitus without complication, without long-term current use of insulin  -     metFORMIN (GLUCOPHAGE) 1000 MG tablet; Take 1 tablet by mouth 2 (Two) Times a Day With Meals.  Dispense: 180 tablet; Refill: 1    7. Memory loss  -     memantine (NAMENDA) 5 MG tablet; Take 1 tablet by mouth 2 (Two) Times a Day.  Dispense: 180 tablet; Refill: 1         Continue current medications for his current medical problems.  Use MiraLAX for constipation.    Follow Up:   No follow-ups on file.    Ilir Fair  [facility]    6 (moderate pain)

## 2025-06-16 NOTE — H&P ADULT - TIME BILLING
Admission H&P including bedside history , examination , reviewing test results and treatment plan. Renal & Neurology Consult noted and cardiology consulted .D/W Patient and ACP.

## 2025-06-16 NOTE — CONSULT NOTE ADULT - ASSESSMENT
75 yo male with a PMH of CAD (s/p 3 stents and 3V-CABG in 2002), PAD(s/p LLE stent), HTN, HLD, chronic systolic CHF, s/p ICD implantation (2019) presented to ED complaining of HA and difficulty swallowing     EKG: NSR  No acute ischemic changes   Echo - 4/25 - Mod to Sev LV dysfunction EF 36%, mod MR   Cath - 4/25 - Severe native disease LAD, LCX, %, LIMA to LAD patent other 2 grafts closed     1. Chest pain  - atypical cont asa plavix   - troponin negative     2. ICM  -s/p ICD  -c/w coreg  -resume lisinopril and  lasix     3. HTN  -c/w coreg      4) Headache/ Difficulty swallowing   - f/u Neuro f/u MG serology, f/u MRI brain if ICD compatible   - CTA brain shows diffuse disease

## 2025-06-16 NOTE — ED PROVIDER NOTE - PROGRESS NOTE DETAILS
Og MACK PGY1: Patient CT imaging shows severe narrowing in the bilateral cavernous segments &  Severe multifocal luminal narrowing in the intradural vertebral arteries, right greater than left. Per Dr. Hanna, recc neuro consult for concerns of myasthenia gravis. Dispo medicine vs ICU pending neuro recc. Og MACK PGY1: Spoke with NP Rama Rome who recommended patient be admitted under Dr. Hanna for worsening CKD (creatinine 2.0 from baseline 1.5). Dispo pending CT imaging. DD ED ATTG: rec'd s/o from Dr Wilcox - 74M CAD with CABG and stents c/o dizziness x 10 days.  L side sensory changes.  TBA.  Admitting Dr schaefer Neuro eval prior to admission, concern for MG, done, recjoellen's in chart, OK for admission to medicine.

## 2025-06-16 NOTE — ED ADULT TRIAGE NOTE - TEMPERATURE IN CELSIUS (DEGREES C)
Patient states her symptoms are not getting any better, may be getting somewhat worse. Temp of 101 today, sore throat and no longer bringing up mucus, feels it's settling in her chest.    Please advise patient.   36.7

## 2025-06-16 NOTE — ED ADULT NURSE REASSESSMENT NOTE - NS ED NURSE REASSESS COMMENT FT1
Pt handoff received, alert and oriented x4, able to move all extremities and follow commands. Still c/o posterior headache, denies dizziness, nausea, vomiting. Breathing is even and unlabored on room air. NAD. Pending dispo. Safety maintained.

## 2025-06-16 NOTE — ED PROVIDER NOTE - PHYSICAL EXAMINATION
VITAL SIGNS: I have reviewed nursing notes and confirm.  CONSTITUTIONAL:  in no acute distress.  SKIN: Skin exam is warm and dry, no acute rash.  HEAD: Normocephalic; atraumatic.  EYES: PERRL, EOM intact; conjunctiva and sclera clear.  ENT:  airway clear.   NECK: Supple; non tender.    CARD: Regular rate and rhythm.  RESP: No wheezes,  no rales or rhonchi.   ABD:  soft; non-distended; non-tender;   EXT: Normal ROM. No peripheral edema.   NEURO: see mdm

## 2025-06-16 NOTE — CONSULT NOTE ADULT - SUBJECTIVE AND OBJECTIVE BOX
Ulisses Abarca MD  Interventional Cardiology / Advance Heart Failure and Cardiac Transplant Specialist  [place] Office : [phone] [place] [address] [place] [address]  Tel:   [place] Office : [phone] [facility] [place] [address]  Tel: [phone]         HISTORY OF PRESENTING ILLNESS:  HPI:   74M pmhx CAD x 3 stents (on Aspirin and Plavix), ICD, HTN, HLD, CKD, presents with multiple neurologic symptoms .Starting approximately one week prior to ED presentation on 25, patient reports onset of multiple symptoms, including HA (occipital, 8/10 maximally, "pain" quality, nonpositional, associated with nausea/vomiting and photophobia), vertigo (present at rest but worse with head movement), difficulty swallowing (especially saliva, he says "I am not jenniffer to turn my tongue"), b/l blurry vision (both eyes, only present when reading, denies double vision). Pt also reports neck pain that causes his head to drop forward, which then worsens his vertigo. He does not feel like his neck is weak, however. Pt denies focal weakness, numbness/tingling subjectively (though on exam found to have numbness), double vision, tinnitus, LoC, involuntary movements, slurred speech. Patient does not note a certain time of day where the symptoms may improve or worsen. Patient does not note any respiratory distress. Patient denies generalized weakness.    PAST MEDICAL & SURGICAL HISTORY:  HTN (hypertension)      PAD (peripheral artery disease)  stent to L lower extremity artery      Constipation, unspecified constipation type      Hyperlipidemia      NSTEMI (non-ST elevated myocardial infarction)   and 2018 and 2019      Coronary artery disease involving native coronary artery of native heart, unspecified whether angina present      Ischemic cardiomyopathy with implantable cardioverter-defibrillator (ICD)      S/P CABG (coronary artery bypass graft)  x3; [facility] 2002      History of coronary angiogram  multiple stents placed      History of femoral angiogram  stent placed in LLE 2016      History of laparoscopic cholecystectomy  2016          SOCIAL HISTORY: Substance Use (street drugs): ( x ) never used  (  ) other:    FAMILY HISTORY:  Family history of coronary artery disease in brother (Sibling)  4 brothers with MI/CABG, 1  at 79yo    Family history of stroke  60yo CVA, heart attack 61yo    Family history of coronary artery disease in sister (Sibling)   from MI    Family history of MI (myocardial infarction)  mother,          MEDICATIONS:  aspirin  chewable 81 milliGRAM(s) Oral daily  carvedilol 6.25 milliGRAM(s) Oral every 12 hours  clopidogrel Tablet 75 milliGRAM(s) Oral daily  heparin   Injectable 5000 Unit(s) SubCutaneous every 12 hours  nitroglycerin     SubLingual 0.4 milliGRAM(s) SubLingual every 5 minutes PRN      cetirizine 10 milliGRAM(s) Oral daily    gabapentin 300 milliGRAM(s) Oral daily  meclizine 12.5 milliGRAM(s) Oral every 8 hours PRN    pantoprazole    Tablet 40 milliGRAM(s) Oral before breakfast    rosuvastatin 40 milliGRAM(s) Oral at bedtime        FAMILY HISTORY:  Family history of coronary artery disease in brother (Sibling)  4 brothers with MI/CABG, 1  at 79yo    Family history of stroke  60yo CVA, heart attack 61yo    Family history of coronary artery disease in sister (Sibling)   from MI    Family history of MI (myocardial infarction)  mother,           Allergies    No Known Allergies    Intolerances    	      PHYSICAL EXAM:  T(C): 36.7 (25 @ 22:28), Max: 36.7 (25 @ 10:57)  HR: 75 (25 @ 22:28) (75 - 88)  BP: 122/64 (25 @ 22:28) (122/64 - 172/84)  RR: 18 (25 @ 20:10) (16 - 18)  SpO2: 100% (25 @ 20:10) (99% - 100%)  Wt(kg): --  I&O's Summary      GENERAL: NAD   EYES: EOMI, PERRLA, conjunctiva and sclera clear  ENMT: No tonsillar erythema, exudates, or enlargement; Moist mucous membranes, Good dentition, No lesions  Cardiovascular: Normal S1 S2, No JVD, No murmurs, No edema  Respiratory: Lungs clear to auscultation	  Gastrointestinal:  Soft, Non-tender, + BS	  Extremities: Normal range of motion, No clubbing, cyanosis or edema  LYMPH: No lymphadenopathy noted  NERVOUS SYSTEM:  Alert & Oriented X3, Good concentration; Motor Strength 5/5 B/L upper and lower extremities; DTRs 2+ intact and symmetric      LABS:	 	    CARDIAC MARKERS:                                  12.9   4.60  )-----------( 141      ( 2025 12:30 )             38.5         140  |  103  |  20  ----------------------------<  155[H]  3.5   |  25  |  2.09[H]    Ca    9.8      2025 12:30  Mg     2.30         TPro  7.2  /  Alb  4.0  /  TBili  0.6  /  DBili  x   /  AST  25  /  ALT  21  /  AlkPhos  98      proBNP:   Lipid Profile:   HgA1c:   TSH:     Consultant(s) Notes Reviewed:  [x ] YES  [ ] NO    Care Discussed with Consultants/Other Providers [ x] YES  [ ] NO    Imaging Personally Reviewed independently:  [x] YES  [ ] NO    All labs, radiologic studies, vitals, orders and medications list reviewed. Patient is seen and examined at bedside. Case discussed with medical team.    ASSESSMENT/PLAN:

## 2025-06-16 NOTE — CONSULT NOTE ADULT - SUBJECTIVE AND OBJECTIVE BOX
Neurology - Consult Note    -  Spectra: [phone] ([facility]), [phone] ([facility])  -    HPI: Patient BRENT BECKMAN is a 74y (1950) wo/man with a PMHx significant for ***      Review of Systems:  INCOMPLETE   CONSTITUTIONAL: No fevers or chills  EYES AND ENT: No visual changes or no throat pain   NECK: No pain or stiffness  RESPIRATORY: No hemoptysis or shortness of breath  CARDIOVASCULAR: No chest pain or palpitations  GASTROINTESTINAL: No melena or hematochezia  GENITOURINARY: No dysuria or hematuria  NEUROLOGICAL: +As stated in HPI above  SKIN: No itching, burning, rashes, or lesions   All other review of systems is negative unless indicated above.    Allergies:  No Known Allergies      PMHx/PSHx/Family Hx: As above, otherwise see below   CAD (coronary artery disease)    HTN (hypertension)    PAD (peripheral artery disease)    S/P CABG x 3    Constipation, unspecified constipation type    Hyperlipidemia    NSTEMI (non-ST elevated myocardial infarction)    Coronary artery disease involving native coronary artery of native heart, unspecified whether angina present    Ischemic cardiomyopathy with implantable cardioverter-defibrillator (ICD)        Social Hx:  No current use of tobacco, alcohol, or illicit drugs  Lives with ***    Medications:  MEDICATIONS  (STANDING):    MEDICATIONS  (PRN):      Vitals:  T(C): 36.7 (06-16-25 @ 20:10), Max: 36.7 (06-16-25 @ 10:57)  HR: 75 (06-16-25 @ 20:10) (75 - 88)  BP: 122/64 (06-16-25 @ 20:10) (122/64 - 172/84)  RR: 18 (06-16-25 @ 20:10) (16 - 18)  SpO2: 100% (06-16-25 @ 20:10) (99% - 100%)    Physical Examination: INCOMPLETE  General - NAD  Cardiovascular - Peripheral pulses palpable, no edema  Eyes - Fundoscopy with flat, sharp optic discs and no hemorrhage or exudates; Fundoscopy not well visualized; Fundoscopy not performed due to safety precautions in the setting of the COVID-19 pandemic    Neurologic Exam:  Mental status - Awake, Alert, Oriented to person, place, and time. Speech fluent, repetition and naming intact. Follows simple and complex commands. Attention/concentration, recent and remote memory (including registration and recall), and fund of knowledge intact    Cranial nerves - PERRLA, VFF, EOMI, face sensation (V1-V3) intact b/l, facial strength intact without asymmetry b/l, hearing intact b/l, palate with symmetric elevation, trapezius OR sternocleidomastoid 5/5 strength b/l, tongue midline on protrusion with full lateral movement    Motor - Normal bulk and tone throughout. No pronator drift.  Strength testing            Deltoid      Biceps      Triceps     Wrist Extension    Wrist Flexion     Interossei         R            5                 5               5                     5                              5                        5                 5  L             5                 5               5                     5                              5                        5                 5              Hip Flexion    Hip Extension    Knee Flexion    Knee Extension    Dorsiflexion    Plantar Flexion  R              5                           5                       5                           5                            5                          5  L              5                           5                        5                           5                            5                          5    Sensation - Light touch/temperature OR pain/vibration intact throughout    DTR's -             Biceps      Triceps     Brachioradialis      Patellar    Ankle    Toes/plantar response  R             2+             2+                  2+                       2+            2+                 Down  L              2+             2+                 2+                        2+           2+                 Down    Coordination - Finger to Nose intact b/l. No tremors appreciated    Gait and station - Normal casual gait. Romberg (-)    Labs:                        12.9   4.60  )-----------( 141      ( 16 Jun 2025 12:30 )             38.5     06-16    140  |  103  |  20  ----------------------------<  155[H]  3.5   |  25  |  2.09[H]    Ca    9.8      16 Jun 2025 12:30  Mg     2.30     06-16    TPro  7.2  /  Alb  4.0  /  TBili  0.6  /  DBili  x   /  AST  25  /  ALT  21  /  AlkPhos  98  06-16    CAPILLARY BLOOD GLUCOSE        LIVER FUNCTIONS - ( 16 Jun 2025 12:30 )  Alb: 4.0 g/dL / Pro: 7.2 g/dL / ALK PHOS: 98 U/L / ALT: 21 U/L / AST: 25 U/L / GGT: x             PT/INR - ( 16 Jun 2025 12:30 )   PT: 11.4 sec;   INR: 0.96 ratio         PTT - ( 16 Jun 2025 12:30 )  PTT:35.1 sec  CSF:                  Radiology:  CT Head No Cont:  (16 Jun 2025 13:26)     Neurology - Consult Note    -  Spectra: [phone] ([facility]), [phone] ([facility])  -  [place]  ID [id number]    HPI: 74M pmhx CABG x 3 stents, ICD, HTN, HLD, CKD, presents with multiple neurologic symptoms for which neurology is consulted.    Allergies:  No Known Allergies      PMHx/PSHx/Family Hx: As above, otherwise see below   CAD (coronary artery disease)    HTN (hypertension)    PAD (peripheral artery disease)    S/P CABG x 3    Constipation, unspecified constipation type    Hyperlipidemia    NSTEMI (non-ST elevated myocardial infarction)    Coronary artery disease involving native coronary artery of native heart, unspecified whether angina present    Ischemic cardiomyopathy with implantable cardioverter-defibrillator (ICD)        Social Hx:  No current use of tobacco, alcohol, or illicit drugs  Lives with ***    Medications:  MEDICATIONS  (STANDING):    MEDICATIONS  (PRN):      Vitals:  T(C): 36.7 (06-16-25 @ 20:10), Max: 36.7 (06-16-25 @ 10:57)  HR: 75 (06-16-25 @ 20:10) (75 - 88)  BP: 122/64 (06-16-25 @ 20:10) (122/64 - 172/84)  RR: 18 (06-16-25 @ 20:10) (16 - 18)  SpO2: 100% (06-16-25 @ 20:10) (99% - 100%)    Physical Examination: INCOMPLETE  General - NAD  Cardiovascular - Peripheral pulses palpable, no edema  Eyes - Fundoscopy with flat, sharp optic discs and no hemorrhage or exudates; Fundoscopy not well visualized; Fundoscopy not performed due to safety precautions in the setting of the COVID-19 pandemic    Neurologic Exam:  Mental status - Awake, Alert, Oriented to person, place, and time. Speech fluent, repetition and naming intact. Follows simple and complex commands. Attention/concentration, recent and remote memory (including registration and recall), and fund of knowledge intact    Cranial nerves - PERRLA, VFF, EOMI, face sensation (V1-V3) intact b/l, facial strength intact without asymmetry b/l, hearing intact b/l, palate with symmetric elevation, trapezius OR sternocleidomastoid 5/5 strength b/l, tongue midline on protrusion with full lateral movement    Motor - Normal bulk and tone throughout. No pronator drift.  Strength testing            Deltoid      Biceps      Triceps     Wrist Extension    Wrist Flexion     Interossei         R            5                 5               5                     5                              5                        5                 5  L             5                 5               5                     5                              5                        5                 5              Hip Flexion    Hip Extension    Knee Flexion    Knee Extension    Dorsiflexion    Plantar Flexion  R              5                           5                       5                           5                            5                          5  L              5                           5                        5                           5                            5                          5    Sensation - Light touch/temperature OR pain/vibration intact throughout    DTR's -             Biceps      Triceps     Brachioradialis      Patellar    Ankle    Toes/plantar response  R             2+             2+                  2+                       2+            2+                 Down  L              2+             2+                 2+                        2+           2+                 Down    Coordination - Finger to Nose intact b/l. No tremors appreciated    Gait and station - Normal casual gait. Romberg (-)    Labs:                        12.9   4.60  )-----------( 141      ( 16 Jun 2025 12:30 )             38.5     06-16    140  |  103  |  20  ----------------------------<  155[H]  3.5   |  25  |  2.09[H]    Ca    9.8      16 Jun 2025 12:30  Mg     2.30     06-16    TPro  7.2  /  Alb  4.0  /  TBili  0.6  /  DBili  x   /  AST  25  /  ALT  21  /  AlkPhos  98  06-16    CAPILLARY BLOOD GLUCOSE        LIVER FUNCTIONS - ( 16 Jun 2025 12:30 )  Alb: 4.0 g/dL / Pro: 7.2 g/dL / ALK PHOS: 98 U/L / ALT: 21 U/L / AST: 25 U/L / GGT: x             PT/INR - ( 16 Jun 2025 12:30 )   PT: 11.4 sec;   INR: 0.96 ratio         PTT - ( 16 Jun 2025 12:30 )  PTT:35.1 sec  CSF:                  Radiology:  CT Head No Cont:  (16 Jun 2025 13:26)     Neurology - Consult Note    -  Spectra: [phone] ([facility]), [phone] ([facility])  -  [place]  ID [id number]    HPI: 74M pmhx CAD x 3 stents (on Aspirin and Plavix), ICD, HTN, HLD, CKD, presents with multiple neurologic symptoms for which neurology is consulted.    Starting approximately one week prior to ED presentation on 6/16/25, patient reports onset of multiple symptoms, including HA (occipital, 8/10 maximally, "pain" quality, nonpositional, associated with nausea/vomiting and photophobia), vertigo (present at rest but worse with head movement), difficulty swallowing (especially saliva, he says "I am not jenniffer to turn my tongue"), b/l blurry vision (both eyes, only present when reading, denies double vision). Pt also reports neck pain that causes his head to drop forward, which then worsens his vertigo. He does not feel like his neck is weak, however. Pt denies focal weakness, numbness/tingling subjectively (though on exam found to have numbness), double vision, tinnitus, LoC, involuntary movements, slurred speech. Patient does not note a certain time of day where the symptoms may improve or worsen. Patient does not note any respiratory distress. Patient denies generalized weakness.    Pt reports having vertigo many years ago, but that self resolved. For the other symptoms, patient has not experienced them before, and as such is concerned.     Allergies:  No Known Allergies      PMHx/PSHx/Family Hx: As above, otherwise see below   CAD (coronary artery disease)    HTN (hypertension)    PAD (peripheral artery disease)    S/P CABG x 3    Constipation, unspecified constipation type    Hyperlipidemia    NSTEMI (non-ST elevated myocardial infarction)    Coronary artery disease involving native coronary artery of native heart, unspecified whether angina present    Ischemic cardiomyopathy with implantable cardioverter-defibrillator (ICD)        Social Hx:  No current use of tobacco, alcohol, or illicit drugs  Lives with ***    Medications:  MEDICATIONS  (STANDING):    MEDICATIONS  (PRN):      Vitals:  T(C): 36.7 (06-16-25 @ 20:10), Max: 36.7 (06-16-25 @ 10:57)  HR: 75 (06-16-25 @ 20:10) (75 - 88)  BP: 122/64 (06-16-25 @ 20:10) (122/64 - 172/84)  RR: 18 (06-16-25 @ 20:10) (16 - 18)  SpO2: 100% (06-16-25 @ 20:10) (99% - 100%)    Neuro Exam:  Mental Status: Alert and oriented to self, location, month, but not year. Speech fluent with intact repetition and comprehension. Follows simple and complex commands.   Cranial Nerves: PERRL, VFF, EOMI horizontal b/l and downgaze, but on upgaze appears dysconjugate (either R eye intorted or L eye extorted). b/l mild ptosis. Decreased sensation L V3, face symmetric, tongue midline with full lateral movement. No palatal/uvular asymmetry. Shoulder shrug symmetric.  Motor: 5/5 strength throughout b/l UE and LE  Sensory: Decreased sensation to LT LUE. SILT b/l LE.  Reflexes: +1 b/l BR, Biceps, Triceps, and Ankle. +0 b/l patellar (confounded because pt not relaxing legs b/l). Mute plantars b/l  Coordination: no dysmetria b/l FTN or HTS  Gait: Pt deferred because concerned would provoke diziness    HINTS (performed when pt reporting minimal dizziness): No nystagmus, no skew deviation, no corrective saccades    Labs:                        12.9   4.60  )-----------( 141      ( 16 Jun 2025 12:30 )             38.5     06-16    140  |  103  |  20  ----------------------------<  155[H]  3.5   |  25  |  2.09[H]    Ca    9.8      16 Jun 2025 12:30  Mg     2.30     06-16    TPro  7.2  /  Alb  4.0  /  TBili  0.6  /  DBili  x   /  AST  25  /  ALT  21  /  AlkPhos  98  06-16    CAPILLARY BLOOD GLUCOSE        LIVER FUNCTIONS - ( 16 Jun 2025 12:30 )  Alb: 4.0 g/dL / Pro: 7.2 g/dL / ALK PHOS: 98 U/L / ALT: 21 U/L / AST: 25 U/L / GGT: x             PT/INR - ( 16 Jun 2025 12:30 )   PT: 11.4 sec;   INR: 0.96 ratio         PTT - ( 16 Jun 2025 12:30 )  PTT:35.1 sec  CSF:                  Radiology:  CT Head No Cont:  (16 Jun 2025 13:26)

## 2025-06-16 NOTE — CONSULT NOTE ADULT - ASSESSMENT
LKW: approximately 1 week prior  NIHSS 1 (decreased sensation L side)  preMRS 0  Pt not IV tenecteplase candidate because outside of time window  Pt not thrombectomy candidate because outside of time window and no LVO    Impression: Multiple neurologic symptoms, including vertigo, L numbness (L V3 and LUE), difficulty swallowing, b/l blurry vision,     Recommendations:  -MRI Brain w/wo IV contrast   -Will discuss need for MG labs  -ok for q4h neurochecks  -Can trial meclizine for symptom relief. If refractory, consider Diazepam.  -Headache medications  [] First line: recommend migraine medications (to be given all together at the same time): ketorolac (Toradol) 30mg IV q8h PRN (or acetaminophen 1g IV q8h PRN), metoclopramide (Reglan) 10mg q8h PRN, diphenhydramine (Benadryl) 25mg IV q8h PRN. Please repeat at least 2-3 cycles for medications to be effective. IV hydration, Mg 2g IV x1   LKW: approximately 1 week prior  NIHSS 1 (decreased sensation L side)  preMRS 0  Pt not IV tenecteplase candidate because outside of time window  Pt not thrombectomy candidate because outside of time window and no LVO    Impression: Multiple neurologic symptoms, including vertigo, difficulty swallowing, b/l blurry vision. On exam also found to have b/l ptosis, slight dysconjugate gaze on upgaze (but not fitting a specific cranial neuropathy), weakness of Neck flexion (although pain limited), L numbness (L V3 and LUE). No clear localization for all patients symptoms, but would evaluate for intracranial pathology. Given difficulty swallowing with b/l ptosis, there is some consideration for ocular and  bulbar MG as well, although MG would NOT account for vertigo or L sided numbness (but there could theoretically be concomitant MG and another intracranial process, although both happening at same time is not very likely).    Recommendations:  -MRI Brain w/wo IV contrast (if MRI compatible)  -MG labs: Anti-AChR antibodies (binding, blocking, modulating), Anti-MUSK antibodies, Anti-LRP4 antibodies  -Check NIF and FVC x 1. WIll discuss if further testing needed.  -ok for q4h neurochecks  -Can trial meclizine for symptom relief. If refractory, consider Diazepam.  -Headache medications  [] First line: recommend migraine medications (to be given all together at the same time): ketorolac (Toradol) 30mg IV q8h PRN (or acetaminophen 1g IV q8h PRN), metoclopramide (Reglan) 10mg q8h PRN, diphenhydramine (Benadryl) 25mg IV q8h PRN. Please repeat at least 2-3 cycles for medications to be effective. IV hydration, Mg 2g IV x1   LKW: approximately 1 week prior  NIHSS 1 (decreased sensation L side)  preMRS 0  Pt not IV tenecteplase candidate because outside of time window  Pt not thrombectomy candidate because outside of time window and no LVO    Impression: Multiple neurologic symptoms, including vertigo, difficulty swallowing, b/l blurry vision. On exam also found to have b/l ptosis, slight dysconjugate gaze on upgaze (but not fitting a specific cranial neuropathy), weakness of Neck flexion (although pain limited), L numbness (L V3 and LUE). No clear localization for all patients symptoms, but would evaluate for intracranial pathology. Given difficulty swallowing with b/l ptosis, there is some consideration for ocular and  bulbar MG as well, although MG would NOT account for vertigo or L sided numbness (but there could theoretically be concomitant MG and another intracranial process, although both happening at same time is not very likely).    Recommendations:  -MRI Brain w/wo IV contrast (if MRI compatible)  -MG labs: Anti-AChR antibodies (binding, blocking, modulating), Anti-MUSK antibodies, Anti-LRP4 antibodies  -Check NIF and FVC x 1. WIll discuss if further testing needed.  -ok for q4h neurochecks  -Can trial meclizine for symptom relief. If refractory, consider Diazepam.  -Headache medications  [] First line: recommend migraine medications (to be given all together at the same time): ketorolac (Toradol) 30mg IV q8h PRN (or acetaminophen 1g IV q8h PRN), metoclopramide (Reglan) 10mg q8h PRN, diphenhydramine (Benadryl) 25mg IV q8h PRN. Please repeat at least 2-3 cycles for medications to be effective. IV hydration, Mg 2g IV x1    Case to be seen on AM rounds with neuro attending Dr. Carvalho.   LKW: approximately 1 week prior  NIHSS 1 (decreased sensation L side)  preMRS 0  Pt not IV tenecteplase candidate because outside of time window  Pt not thrombectomy candidate because outside of time window and no LVO    Impression: Multiple neurologic symptoms, including vertigo, difficulty swallowing, b/l blurry vision. On exam also found to have b/l ptosis, slight dysconjugate gaze on upgaze (but not fitting a specific cranial neuropathy), weakness of Neck flexion (although pain limited), L numbness (L V3 and LUE). Unclear localization for all of patient's symptoms, potentially brainstem localization given vertigo, dysphagia, and numbness, although this would not explain everything. Given difficulty swallowing with b/l ptosis, there is some consideration for ocular and  bulbar MG as well, although MG would NOT account for vertigo or L sided numbness (but there could theoretically be concomitant MG and another intracranial process, although both happening at same time is not very likely).    Recommendations:  -MRI Brain w/wo IV contrast (if MRI compatible)  -MG labs: Anti-AChR antibodies (binding, blocking, modulating), Anti-MUSK antibodies, Anti-LRP4 antibodies  -Check NIF and FVC x 1. WIll discuss if further testing needed.  -ok for q4h neurochecks  -Can trial meclizine for symptom relief. If refractory, consider Diazepam.  -Headache medications  [] First line: recommend migraine medications (to be given all together at the same time): ketorolac (Toradol) 30mg IV q8h PRN (or acetaminophen 1g IV q8h PRN), metoclopramide (Reglan) 10mg q8h PRN, diphenhydramine (Benadryl) 25mg IV q8h PRN. Please repeat at least 2-3 cycles for medications to be effective. IV hydration, Mg 2g IV x1    Case to be seen on AM rounds with neuro attending Dr. Carvalho.   LKW: approximately 1 week prior  NIHSS 1 (decreased sensation L side)  preMRS 0  Pt not IV tenecteplase candidate because outside of time window  Pt not thrombectomy candidate because outside of time window and no LVO    Impression: Multiple neurologic symptoms, including vertigo, difficulty swallowing, b/l blurry vision. On exam also found to have b/l ptosis, slight dysconjugate gaze on upgaze (but not fitting a specific cranial neuropathy), weakness of Neck flexion (although pain limited), L numbness (L V3 and LUE). Unclear localization for all of patient's symptoms, potentially brainstem localization given vertigo, dysphagia, and numbness, although this would not explain everything. Given difficulty swallowing with b/l ptosis, there is some consideration for ocular and  bulbar MG as well, although MG would NOT account for vertigo or L sided numbness (but there could theoretically be concomitant MG and another intracranial process, although both happening at same time is not very likely).     Recommendations:  -MRI Brain w/wo IV contrast (if MRI compatible)  -MG labs: Anti-AChR antibodies (binding, blocking, modulating), Anti-MUSK antibodies, Anti-LRP4 antibodies  -Check NIF and FVC x 1. WIll discuss if further testing needed.  -ok for q4h neurochecks  -Can trial meclizine for symptom relief. If refractory, consider Diazepam.  -Headache medications  -no neurologic contraindication to continuing with DAPT  [] First line: recommend migraine medications (to be given all together at the same time): ketorolac (Toradol) 30mg IV q8h PRN (or acetaminophen 1g IV q8h PRN), metoclopramide (Reglan) 10mg q8h PRN, diphenhydramine (Benadryl) 25mg IV q8h PRN. Please repeat at least 2-3 cycles for medications to be effective. IV hydration, Mg 2g IV x1    Case to be seen on AM rounds with neuro attending Dr. Carvalho.   LKW: approximately 1 week prior  NIHSS 1 (decreased sensation L side)  preMRS 0  Pt not IV tenecteplase candidate because outside of time window  Pt not thrombectomy candidate because outside of time window and no LVO    Impression: Multiple neurologic symptoms, including vertigo, difficulty swallowing, b/l blurry vision. On exam also found to have b/l ptosis, slight dysconjugate gaze on upgaze (but not fitting a specific cranial neuropathy), weakness of Neck flexion (although pain limited), L numbness (L V3 and LUE). Unclear localization for all of patient's symptoms, potentially brainstem localization given vertigo, dysphagia, and numbness, although this would not explain everything. Given difficulty swallowing with b/l ptosis, there is some consideration for ocular and  bulbar MG as well, although MG would NOT account for vertigo or L sided numbness (but there could theoretically be concomitant MG and another intracranial process, although both happening at same time is not very likely). Given HA with some migranous features, also possibility of complex migraine as well (perhaps migraine with brainstem aura).    Recommendations:  -MRI Brain w/wo IV contrast (if MRI compatible)  -MG labs: Anti-AChR antibodies (binding, blocking, modulating), Anti-MUSK antibodies, Anti-LRP4 antibodies  -Check NIF and FVC x 1. WIll discuss if further testing needed.  -ok for q4h neurochecks  -Can trial meclizine for symptom relief. If refractory, consider Diazepam.  -Headache medications  -no neurologic contraindication to continuing with DAPT  [] First line: recommend migraine medications (to be given all together at the same time): ketorolac (Toradol) 30mg IV q8h PRN (or acetaminophen 1g IV q8h PRN), metoclopramide (Reglan) 10mg q8h PRN, diphenhydramine (Benadryl) 25mg IV q8h PRN. Please repeat at least 2-3 cycles for medications to be effective. IV hydration, Mg 2g IV x1    Case to be seen on AM rounds with neuro attending Dr. Carvalho.

## 2025-06-16 NOTE — ED ADULT NURSE REASSESSMENT NOTE - NS ED NURSE REASSESS COMMENT FT1
received repot by DINESH rodriguez, Pt awake and alert, A&OX4, resp even and unlabored. Pt in no acute distress. Pt remains on CM, NSR, pending neuro consults. Denies CP, SOB,  dizziness, palpitations, blurry vision. Resting comfortably. Safety maintained, plan of care ongoing

## 2025-06-16 NOTE — CONSULT NOTE ADULT - ASSESSMENT
74-year-old male with a history of hypertension, hyperlipidemia, CABG x3 stents, and implanted defibrillator presents to the ED with a 10-day history of left-sided headache radiating to the ipsilateral neck, accompanied by vertigo, vomiting, intermittent chest pain, and blurry vision. He reports difficulty supporting his head due to neck pain, dysphagia with solids, and impaired tongue motility contributing to multiple daily emesis episodes. During vertigo episodes, he experiences gait instability and transient blurry vision. Cardiology evaluation by Dr. Bustillos on 05/28 was unremarkable. He denies fever, chills, hearing changes, shortness of breath, abdominal pain, or  symptoms. Neurological exam reveals diminished sensation in the left face and left upper extremity, but 5/5 strength and sensation in all extremities. No focal deficits were otherwise noted. Differentials include, but are not limited to, subacute stroke, vertigo, and acute cardiac pathology. Nephrology consulted for acute on ckd.     A/P  ANGIE on CKD 3  scr baseline ~1.3-1.5  admitted with scr 2.09  ?etiology   check urine urea, urine creatinine, US renal   hold ACEi/ARB  avoid nephrotoxins  monitor UO and bmp     HTN  bp fluctuating  monitor bp     CKD-MB  monitor ca, po4 daily     Headache  workup as primary

## 2025-06-16 NOTE — ED ADULT TRIAGE NOTE - ESI TRIAGE ACUITY LEVEL, MLM
2 Render In Strict Bullet Format?: No Detail Level: Zone Continue Regimen: tretinoin 0.1 % topical cream QHS.\\nclindamycin 1.2 % (1 % base)-benzoyl peroxide 5 % topical gel QAM.\\ndoxycycline hyclate 100 mg capsule QD.\\nspironolactone 50 mg tablet QD. Discontinue Regimen: triamcinolone acetonide 0.1 % topical cream.

## 2025-06-16 NOTE — CONSULT NOTE ADULT - ATTENDING COMMENTS
Mr. Rahman is a 75 yo man w/ pmhx CAD s/p 3 stents (on Aspirin and Plavix), ICD, HTN, HLD, CKD, presents dysphagia and neck weakness. Patient earliest symptoms include increased drooling and some mild chocking events during meals. Then about 7-10 days ago started to notice neck weakness and it was harder to keep head elevated or straight up. He also reports some signs of fatigable weakness in extra-occular muscles, stating that the longer he tries to read the more blurry his vision gets. No respiratory complaints and no signs of weakness peripherally. Normal reflexes so I  think Guillain Paterson or GBS variant like liu victor or Bickerstaff encephalitis is unlikely.    Of note patient had history of strokes, last was in 2017. On imaging here he has small L thalamocapsular lacune and significant multifocal intracranial athero that is extensively calcified, but most notably severe vertebrobasilar calcified athero with stenosis. Brainstem infarct needs to be ruled out given history and extant of intracranial athero. If MR negative for stroke could consider treating empirically for myasthenia gravis. Would wait for MR brain before initiating therapy, but anticipate potentially treating with Mestinon, IVIG +/- steroids.      - MRI brain w/o  - if MRI is negative would need imaging of thymus gland to check for thymoma  - Follow up myasthenia serologies  - SLP eval and treat  - Obtain baseline NIF and VC  - continue home DAPT and statin  - caution and consider avoiding medications including: aminoglycosides, fluoroquinolones, macrolides, beta blockers, quinidine, procainamide, immune checkpoint inhibitors, penicillamine, quinine, statins

## 2025-06-16 NOTE — ED PROVIDER NOTE - CLINICAL SUMMARY MEDICAL DECISION MAKING FREE TEXT BOX
Patient is a 74-year-old male with a history of hypertension, hyperlipidemia, CABG x3 stents, and implanted defibrillator presents to the ED with a 10-day history of left-sided headache radiating to the ipsilateral neck, accompanied by vertigo, vomiting, intermittent chest pain, and blurry vision. He reports difficulty supporting his head due to neck pain, dysphagia with solids, and impaired tongue motility contributing to multiple daily emesis episodes. During vertigo episodes, he experiences gait instability and transient blurry vision. Cardiology evaluation by Dr. Bustillos on 05/28 was unremarkable. He denies fever, chills, hearing changes, shortness of breath, abdominal pain, or  symptoms. Neurological exam reveals diminished sensation in the left face and left upper extremity, but 5/5 strength and sensation in all other extremities. No focal deficits were otherwise noted. Differentials include, but are not limited to, subacute stroke, vertigo, and acute cardiac pathology. CT head and CTA are ordered. Patient is a 74-year-old male with a history of hypertension, hyperlipidemia, CABG x3 stents, and implanted defibrillator presents to the ED with a 10-day history of left-sided headache radiating to the ipsilateral neck, accompanied by vertigo, vomiting, intermittent chest pain, and blurry vision. He reports difficulty supporting his head due to neck pain, dysphagia with solids, and impaired tongue motility contributing to multiple daily emesis episodes. During vertigo episodes, he experiences gait instability and transient blurry vision. Cardiology evaluation by Dr. Bustillos on 05/28 was unremarkable. He denies fever, chills, hearing changes, shortness of breath, abdominal pain, or  symptoms. Neurological exam reveals diminished sensation in the left face and left upper extremity, but 5/5 strength and sensation in all extremities. No focal deficits were otherwise noted. Differentials include, but are not limited to, subacute stroke, vertigo, and acute cardiac pathology. CT head and CTA are ordered.

## 2025-06-16 NOTE — ED PROVIDER NOTE - ATTENDING CONTRIBUTION TO CARE
74M h/o HTN, HLD, CAD s/p CABG and stents, s/p AICD p/w  10-days of  left-sided headache a/w vertigo, vomiting, and blurry vision. Also notes some pain radiating down into left side of back as well. Does have h/o of vertigo but states symptoms are not similar to prior episodes. States positional changes do illicit worsening of symptoms.. During vertigo episodes, he experiences gait instability and transient blurry vision. Otherwise denies chest pain, focal weakness, fever/chills, recent illness or other associated symptoms.  GEN: awake and alert  CV: rrr, no appreciable murmur  PULM: clear lungs b/l  ABD: soft, ntnd  EXT: no edema/swelling  NEURO: diminished sensation in the left face and left upper extremity compared to right side, sensation otherwise intact; 5/5 strength throughout. No focal CN deficits otherwise noted except differences in sensation to face. No pronator drift, pass-pointing or discoordination noted.   MDM: 74M h/o HTN, HLD, CAD s/p CABG and stents, s/p AICD p/w  10-days of  left-sided headache a/w vertigo, vomiting, and blurry vision. DDX  includes, but not limited to subacute CVA,, vertigo, ACS. Low suspicion for infectious etiology. Neck supple, no fevers or other s/s to raise concern for encephalitis. Symptoms ongoing for more than a week making SAH unlikely as well. Will get stroke workup with CTH and CTA head/neck. Check labs for electrolyte disturbances, renal dysfunction, evidence of anemia. Cards for AICD interrogation. Dispo pending results of workup though anticipate will likely require admission.

## 2025-06-17 NOTE — PATIENT PROFILE ADULT - FALL HARM RISK - HARM RISK INTERVENTIONS

## 2025-06-17 NOTE — PHYSICAL THERAPY INITIAL EVALUATION ADULT - GENERAL OBSERVATIONS, REHAB EVAL
Pt encountered standing at bedside independently in NAD, all lines intact, a&ox4, SPO2 100%, and RN aware.

## 2025-06-17 NOTE — PHYSICAL THERAPY INITIAL EVALUATION ADULT - ACTIVE RANGE OF MOTION EXAMINATION, REHAB EVAL
viviana. upper extremity Active ROM was WNL (within normal limits)/bilateral lower extremity Active ROM was WNL (within normal limits)

## 2025-06-17 NOTE — PROCEDURE NOTE - ADDITIONAL PROCEDURE DETAILS
Episodes of SR with APC recorded as AF on June 12 (since Jan 2025)  few NSVT (6 beats)  recorded on June 5, 2025  Single chamber ICD MRI conditional with 1.5 T or 3.0 T   Cardiology form completed, please fax to MRI, MRI will call "[employer] Rep to reprogram to MRI Surescan mode

## 2025-06-17 NOTE — PHYSICAL THERAPY INITIAL EVALUATION ADULT - ADDITIONAL COMMENTS
Pt left seated at edge of bed in NAD, all lines intact, call bell in reach, SPO2 100%, and RN Michelle aware.

## 2025-06-17 NOTE — PHYSICAL THERAPY INITIAL EVALUATION ADULT - PERTINENT HX OF CURRENT PROBLEM, REHAB EVAL
Patient is a 74 year old male, PMH stated below, presents with HA, vertigo, difficulty swallowing, bilateral blurry vision and neck pain.

## 2025-06-17 NOTE — ED ADULT NURSE REASSESSMENT NOTE - NS ED NURSE REASSESS COMMENT FT1
break coverage rn. received report from DINESH arellano. pt A&Ox4, vitally stable. pt endorses partial relief of headache at this time. denies dizziness, blurry vision, nausea, vomiting, fevers, chills, chest pain, SOB, numbness/tingling to hands/feet. q4 neuro check documented as per flow sheet. safety maitnained

## 2025-06-17 NOTE — PATIENT PROFILE ADULT - PRO INTERPRETER NEED 2
[id number]     21  Gogo [place] : 1940 Sex: female  Age: 68 y.o. Chief Complaint   Patient presents with    Follow-up     1 week follow-up       HPI    Patient presents today for follow-up visit on her medical problems. I saw her last week I felt she was in congestive heart failure NAD chest x-ray did demonstrate this. She was started on Lasix 20 mg daily and is feeling quite a bit better she states. Weight is only down a pound despite diuresis. Looks as though she is still retaining fluid with a lot of swelling to her lower extremities. Reviewed last note and again recent hospital stay. She is again accompanied by her nephew. The groin area has healed from her catheterization site. She still has not heard from cardiology regarding her potential upcoming TAVR  Shortness of breath has improved as has her cough. She is saturating at 98%. deression has been stable on her SSRI. Blood pressure and blood sugars she shows me are excellent. I did hold her Metformin and Actonel related to decreased kidney function.  Repeat numbers did show some improvement but I kept her off the medication for the time being. Her eyes and feet are up-to-date with her diabetes.  No retinopathy no foot sores  She is up-to-date with consultants including renal for chronic kidney disease, ophthalmology, endocrine for her thyroid, cardiology for her aortic stenosis and recent left main stent placement and previously with ear nose and throat who recommended no thyroid biopsy at that point but simply ultrasound follow-up.     Review of Systems     Constitutional: Negative for activity change, appetite change, chills, diaphoresis, fatigue, fever and unexpected weight change.    HENT: Negative for congestion, ear pain, hearing loss, postnasal drip, rhinorrhea and sinus pain.    Respiratory: Negative for  wheezing.  Positive for cough and shortness of breath  Cardiovascular: Negative for chest pain, palpitations and leg swelling. Gastrointestinal: Negative for abdominal pain, blood in stool, constipation, diarrhea, nausea and vomiting.          Endocrine:  Type 2 diabetes, goiter   Genitourinary: Negative for difficulty urinating, dysuria, frequency, hematuria and urgency. Musculoskeletal: Positive for arthralgias. Negative for back pain, gait problem and myalgias.        She was complaining of muscle pain prior to stopping the Actonel-did not tolerate oral bisphosphonates-did wish to try Actonel 1 more time for now-was successful-currently holding  Skin: No open wounds   allergic/Immunologic: Negative for environmental allergies and immunocompromised state. Neurological: Negative for dizziness (improved after cardiac stenting), weakness, light-headedness, numbness and headaches. Hematological: Negative. Endocrine: Type 2 diabetes-currently holding Metformin due to renal function         REST OF PERTINENT ROS GONE OVER AND WAS NEGATIVE.      PMH:  Problem List: Knee pain, Non-toxic nodular goiter, Essential hypertension, Goiter, Hyperlipidemia, Benign  essential hypertension, Type II diabetes mellitus uncontrolled  Health Maintenance:  Bone Density Test Screening - (12/3/2018)  Bone Density Scan - (12/3/2018)  Influenza Vaccination - (10/29/2018)  Colonoscopy - (7/25/2018)  7/18-due 23  Colonoscopy Screening - (7/25/2018)  Couseled on Home Safety - (6/13/2018)  Pneumonia Vaccination - (2/5/2018)  Mammogram - (12/20/2012)  Mammogram Screening - (12/20/2012)  Declining further  Tetanus Immunization - (1/18/2017)  Rectal Exam - 4/10,4/11  Breast Exam - 4/10,4/11  Hemmocult Cards - 9/12-neg x 3,5/14-neg x3, 6/16-neg x 3  EKG - 5/11,, 5/15, 3/17,5/17,3/18  2D ECHO - 1/17 , 1/18  Carotid Artery Study - 1/17-30-49% occlusion bilateral, 2/18  Stress Test - 4/17-neg,3/18-pos  Zoster/Shingles Vaccine - 2016  Medical Problems:  Non Insulin Dependent Diabetes, Hypertension, Hyperlipidemia  Goiter - multinodular-bx neg-dr hooper  GERD, Hypothyroidism  Osteoporosis - Bisphosphonate intolerant  Glaucoma, Renal Insufficiency  Aortic And Mitral Valve Disorders - 2D echo-  VHD  Pulmonary HTN - mild  Macular Degeneration  WBCs in urine - Workup by urology-  Coronary Artery Disease (CAD) - Positive stress test 3/18-heart cath--left main disease-stents  Diverticulosis, Diabetic Neuropathy, Vitamin B12 deficiency  Hospitalization for infected left groin cath site-  HFpEF      Follows with - Cardiology, nephrology, ophthalmology, endocrine  Surgical Hx:  AKIN-right salpingo-oophorectomy - Fibroids  Appendectomy  Right cataract surgery - left also  Left knee arthroscopy, Removal of Gallbladder  Cardiac catheterization-severe left main disease-atherectomy/PCI/stents     FH: Father:  MI.  Mother:  Cerebrovascular Accident (CVA). Brother 1:  Coronary Artery Disease (CAD) - 52's. 3 other brothers, 1 sister   SH:  Marital: . Personal Habits: Cigarette Use: Negative For current cigarette smoker. Alcohol: does not use  alcohol. 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                   Current Outpatient Medications:     furosemide (LASIX) 20 MG tablet, Take 1 tablet by mouth daily, Disp: 90 tablet, Rfl: 1    pravastatin (PRAVACHOL) 40 MG tablet, Take 40 mg by mouth every other day, Disp: , Rfl:     magnesium cl-calcium carbonate (NU-MAG) 71.5-119 MG TBEC tablet, TAKE TWO TABLETS BY MOUTH DAILY (Patient taking differently: Take 1 tablet by mouth daily ), Disp: 180 tablet, Rfl: 1    pioglitazone (ACTOS) 30 MG tablet, Take 1 tablet by mouth daily, Disp: 90 tablet, Rfl: 1    rosuvastatin (CRESTOR) 20 MG tablet, Take 1 tablet by mouth nightly, Disp: 30 tablet, Rfl: 3    clopidogrel (PLAVIX) 75 MG tablet, Take 1 tablet by mouth daily, Disp: 30 tablet, Rfl: 3    carvedilol (COREG) 6.25 MG tablet, Take 1 tablet by mouth 2 times daily (with meals), Disp: 60 tablet, Rfl: 3    amLODIPine (NORVASC) 5 MG tablet, Take 1 tablet by mouth daily, Disp: 30 tablet, Rfl: 3    glipiZIDE (GLUCOTROL XL) 10 MG extended release tablet, TAKE ONE TABLET BY MOUTH TWO TIMES A DAY, Disp: 180 tablet, Rfl: 1    JANUVIA 50 MG tablet, Take 1 tablet by mouth daily, Disp: 90 tablet, Rfl: 1    nitroGLYCERIN (NITROSTAT) 0.4 MG SL tablet, DISSOLVE ONE TABLET UNDER TONGUE AS NEEDED FOR CHEST PAINS. MAY REPEAT IN 5 MINUTES.  IF SYMPTOMS PERSISTS CALL 911, Disp: 25 tablet, Rfl: 1    Cinnamon 500 MG CAPS, Take 1 capsule by mouth daily, Disp: , Rfl:     aspirin 81 MG tablet, Take 81 mg by mouth daily, Disp: , Rfl:     vitamin D (CHOLECALCIFEROL) 1000 UNIT TABS tablet, Take 1,000 Units by mouth daily, Disp: , Rfl:     vitamin B-12 (CYANOCOBALAMIN) 100 MCG tablet, Take 1,000 mcg by mouth daily , Disp: , Rfl:   Allergies   Allergen Reactions    Naproxen     Penicillins        Past Medical History:   Diagnosis Date    Abscess of groin, left 6/22/2021    Aortic valve disorder     CAD (coronary artery disease)     Cancer (HCC)     glaucoma    Carotid stenosis, right 6/22/2021    Chest pain     Diabetes (HCC)     Diverticulosis     Femoral-popliteal atherosclerosis ([facility] [address] 7/13/2021    GERD (gastroesophageal reflux disease)     Goiter     Hyperlipidemia     Hypertension     Hypothyroidism     Macular degeneration     Mitral valve disorder     Neuropathy     Osteoporosis     VHD (valvular heart disease)     Vitamin B12 deficiency      Past Surgical History:   Procedure Laterality Date    APPENDECTOMY      BREAST BIOPSY Right     neg    CARDIAC CATHETERIZATION  06/11/2021    Dr Hira Lozano  06/15/2021    Dr Martinez Garcia 3.5 x 18 LM to LAD and 4.0 x18 Prox CX    EYE SURGERY Bilateral     cataract    HYSTERECTOMY      KNEE ARTHROPLASTY Left     TOOTH EXTRACTION      TRANSESOPHAGEAL ECHOCARDIOGRAM  06/10/2021     [facility]     Family History   Problem Relation Age of Onset    Stroke Mother     Heart Disease Father 79        MI    Heart Attack Father     Heart Disease Brother     Coronary Art Dis Brother     Stroke Brother     Stroke Brother      Social History     Socioeconomic History    Marital status:      Spouse name: Not on file    Number of children: Not on file    Years of education: Not on file    Highest education level: Not on file   Occupational History    Not on file   Tobacco Use    Smoking status: Never Smoker    Smokeless tobacco: Never Used   Vaping Use    Vaping Use: Never used   Substance and Sexual Activity    Alcohol use: Not Currently    Drug use: Never    Sexual activity: Not on file   Other Topics Concern    Not on file   Social History Narrative    Not on file     Social Determinants of Health     Financial Resource Strain:     Difficulty of Paying Living Expenses:    Food Insecurity:     Worried About Running Out of Food in the Last Year:     [address] in the Last Year:    Transportation Needs:     Lack of Transportation (Medical):      Lack of Transportation (Non-Medical):    Physical Activity:     Days of Exercise per Week:     Minutes of Exercise per Session:    Stress:     Feeling of Stress :    Social Connections:     Frequency of Communication with Friends and Family:     Frequency of Social Gatherings with Friends and Family:     Attends Yaz[employer] Services:     Active Member of Clubs or Organizations:     Attends Club or Organization Meetings:     Marital Status:    Intimate Partner Violence:     Fear of Current or Ex-Partner:     Emotionally Abused:     Physically Abused:     Sexually Abused:        Vitals:    07/22/21 1555   BP: (!) 132/52   Pulse: 87   Temp: 97.3 °F (36.3 °C)   TempSrc: Temporal   SpO2: 98%   Weight: 169 lb 12.8 oz (77 kg)   Height: 5' 2\" (1.575 m)       Physical Exam    Constitutional: She is oriented to person, place, and time. She appears well-developed and well-nourished. No distress.    Neck: Normal range of motion. Neck supple. Thyromegaly present. Positive for goiter   No JVD noted. Cardiovascular: Normal rate, regular rhythm and intact distal pulses. Exam reveals no gallop and no friction rub.   Murmur heard. Patient has significant lower extremity edema bilaterally 2+  Pulmonary/Chest: No respiratory distress. She has no wheezes. Scattered rhonchi in the lower fields bilaterally. Appears to have better air exchange today  Abdominal: Soft. Bowel sounds are normal. She exhibits no distension and no mass. There is no tenderness.   Musculoskeletal: Normal range of motion. Neurological: She is alert and oriented to person, place, and time. She displays normal reflexes. No sensory deficit. She exhibits normal muscle tone. Coordination normal.   Skin: Skin is warm and dry. No rash noted. No erythema.      Psychiatric: Mood and affect normal  Nursing note and vitals reviewed      Assessment and Plan: Jason Regan was seen today for follow-up. Diagnoses and all orders for this visit:    Heart failure with preserved ejection fraction, unspecified HF chronicity ([address]  -     Comprehensive Metabolic Panel; Future  -     CBC Auto Differential; Future  -     BRAIN NATRIURETIC PEPTIDE; Future  Some improvement from last week    Aortic valve stenosis, unspecified etiology  For upcoming TAVR procedure. Awaiting cardiology call    Coronary artery disease involving native coronary artery of native heart without angina pectoris  -     Comprehensive Metabolic Panel; Future  -     CBC Auto Differential; Future  Recent cath and stent placement    Chronic kidney disease, unspecified CKD stage  Stable-does see renal    Edema, unspecified type  -     BRAIN NATRIURETIC PEPTIDE;  Future    Type 2 diabetes mellitus with diabetic neuropathy, without long-term current use of insulin (HCC)  Stable on current medication    Other orders  -     furosemide [facility]

## 2025-06-18 NOTE — PHARMACOTHERAPY INTERVENTION NOTE - COMMENTS
Medication history is complete. Medication list updated in Outpatient Medication Record (OMR) via [facility], [facility], and patient with medication list.     Of Note:  -Gabapentin is listed as a follow up on OMR because no fill history at either pharmacy   -As per patient, taking pantoprazole 20mg twice daily, but RX filled for once daily  -Please note last filled dates for your reference     Home Medications:  Albuterol (Eqv-ProAir HFA) 90 mcg/inh inhalation aerosol: 2 puff(s) inhaled every 4 hours as needed for  shortness of breath and/or wheezing  aspirin 81 mg oral tablet, chewable: 1 tab(s) chewed once a day   cetirizine 10 mg oral tablet: 1 tab(s) orally once a day as needed   Coreg 6.25 mg oral tablet: 1 tab(s) orally 2 times a day (filled 4/22/25 for 30 tablets)   famotidine 20 mg oral tablet: 1 tab(s) orally once a day   furosemide 20 mg oral tablet: 1 tab(s) orally once a day (filled 3/1/25 for 30 tablets)  (?)gabapentin 300 mg oral capsule: 1 cap(s) orally once a day as needed for pain  hydrALAZINE 25 mg oral tablet: 1 tab(s) orally 2 times a day   lisinopril 20 mg oral tablet: 1 tab(s) orally once a day (filled 3/1/25 for 30 tablets)   Multiple Vitamins oral tablet: 1 tab(s) orally once a day   nitroglycerin 0.4 mg sublingual tablet: 1 tab(s) sublingual as needed for  chest pain (filled 3/1/25 for 25 tablets)   pantoprazole 20 mg oral delayed release tablet: 1 tab(s) orally 2 times a day (RX 1 tablet orally once daily on 11/26/24 for 60 tablets)  ranolazine 500 mg oral tablet, extended release: 1 tab(s) orally 2 times a day   rosuvastatin 40 mg oral tablet: 1 tab(s) orally once a day (filled 4/22/25 for 30 tablets)   senna (sennosides) 8.6 mg oral tablet: 2 tab(s) orally once a day (at bedtime)   spironolactone 25 mg oral tablet: 1 tab(s) orally once a day (filled 4/29/25 for 90 tablets)   Systane 0.3%-0.4% Eye Drop: 1 drop instilled in each eye 4 times a day   clopidogrel 75 mg oral tablet: 1 tab(s) orally once a day     Please call spectra [phone] if you have any questions.  Medication history is complete. Medication list updated in Outpatient Medication Record (OMR) via [facility], [facility], and patient with medication list.     Of Note:  -Gabapentin is listed as a follow up on OMR because no fill history at either pharmacy   -As per patient, taking pantoprazole 20mg twice daily, but RX filled for once daily  -Please note last filled dates for your reference     Home Medications:  Albuterol (Eqv-ProAir HFA) 90 mcg/inh inhalation aerosol: 2 puff(s) inhaled every 4 hours as needed for  shortness of breath and/or wheezing  aspirin 81 mg oral tablet, chewable: 1 tab(s) chewed once a day   cetirizine 10 mg oral tablet: 1 tab(s) orally once a day as needed   Coreg 6.25 mg oral tablet: 1 tab(s) orally 2 times a day (filled 4/22/25 for 30 tablets)   famotidine 20 mg oral tablet: 1 tab(s) orally once a day   furosemide 20 mg oral tablet: 1 tab(s) orally once a day (filled 3/1/25 for 30 tablets)  (?)gabapentin 300 mg oral capsule: 1 cap(s) orally once a day as needed for pain  hydrALAZINE 25 mg oral tablet: 1 tab(s) orally 2 times a day   lisinopril 20 mg oral tablet: 1 tab(s) orally once a day (filled 3/1/25 for 30 tablets)   Multiple Vitamins oral tablet: 1 tab(s) orally once a day   nitroglycerin 0.4 mg sublingual tablet: 1 tab(s) sublingual as needed for  chest pain (filled 3/1/25 for 25 tablets)   pantoprazole 20 mg oral delayed release tablet: 1 tab(s) orally 2 times a day (RX 1 tablet orally once daily on 11/26/24 for 60 tablets)  Pataday Once Daily Relief Extra Strength 0.7% ophthalmic solution: 1 drop(s) in each affected eye once a day   ranolazine 500 mg oral tablet, extended release: 1 tab(s) orally 2 times a day   rosuvastatin 40 mg oral tablet: 1 tab(s) orally once a day (filled 4/22/25 for 30 tablets)   senna (sennosides) 8.6 mg oral tablet: 2 tab(s) orally once a day (at bedtime)   spironolactone 25 mg oral tablet: 1 tab(s) orally once a day (filled 4/29/25 for 90 tablets)   Systane 0.3%-0.4% Eye Drop: 1 drop instilled in each eye 4 times a day   clopidogrel 75 mg oral tablet: 1 tab(s) orally once a day     Please call spectra [phone] if you have any questions.

## 2025-06-19 ENCOUNTER — TRANSCRIPTION ENCOUNTER (OUTPATIENT)
Age: 75
End: 2025-06-19

## 2025-06-19 NOTE — DISCHARGE NOTE NURSING/CASE MANAGEMENT/SOCIAL WORK - PATIENT PORTAL LINK FT
You can access the FollowMyHealth Patient Portal offered by [facility] by registering at the following website: [email or web address]. By joining [facility]’s FollowMyHealth portal, you will also be able to view your health information using other applications (apps) compatible with our system.

## 2025-06-19 NOTE — DISCHARGE NOTE NURSING/CASE MANAGEMENT/SOCIAL WORK - NSDCPNINST_GEN_ALL_CORE
Patient A&Ox4, no c/o pain during this shift, pt discharged to home this afternoon, iv lock removed, discharged given,  no distress noted.

## 2025-06-19 NOTE — DISCHARGE NOTE NURSING/CASE MANAGEMENT/SOCIAL WORK - NSDCVIVACCINE_GEN_ALL_CORE_FT
Tdap; 05-Jul-2024 18:31; Velma Doan); [employer]; [id number] (Exp. Date: 10-Feb-2026); IntraMuscular; Deltoid Left.; 0.5 milliLiter(s); VIS (VIS Published: 09-May-2013, VIS Presented: 05-Jul-2024);

## 2025-06-19 NOTE — PROVIDER CONTACT NOTE (OTHER) - RECOMMENDATIONS
CHIEF COMPLAINT:   Patient presents with:  Cough: congestion x 2 weeks       HPI:   Teto Lieberman is a 78year old female who presents for upper respiratory symptoms for  2 weeks. Patient reports sore throat, congestion, dry cough.  Symptoms have been imp
medications for this visit.     Past Medical History:   Diagnosis Date   • Breast cancer [facility]) 09-07-12   • S/P lumpectomy, right breast 09/12      Past Surgical History:  1/1/2011: COLONOSCOPY      Comment: Dr Ny Farooq  3/1/2011: ESOPHAGOSCOPY,BIOPSY      Co
lymphadenopathy. ASSESSMENT AND PLAN:   Vikram Alvarez is a 78year old female who presents with     ASSESSMENT: Viral upper respiratory tract infection  (primary encounter diagnosis)    PLAN: Comfort care as described in Patient Instructions.  Use
Notify provider
Malik BAUER) will come to assess patient.

## 2025-06-19 NOTE — DISCHARGE NOTE PROVIDER - NSDCMRMEDTOKEN_GEN_ALL_CORE_FT
Albuterol (Eqv-ProAir HFA) 90 mcg/inh inhalation aerosol: 2 puff(s) inhaled every 4 hours as needed for  shortness of breath and/or wheezing  aspirin 81 mg oral tablet, chewable: 1 tab(s) chewed once a day  cetirizine 10 mg oral tablet: 1 tab(s) orally once a day as needed  clopidogrel 75 mg oral tablet: 1 tab(s) orally once a day Take 4 tabs on 4/30/25 (loading dose) then starting on 5/1/25 take 1 tablet daily MDD: 1  Coreg 6.25 mg oral tablet: 1 tab(s) orally 2 times a day (filled 4/22/25 for 30 tablets) MDD: 2  gabapentin 300 mg oral capsule: 1 cap(s) orally once a day as needed for pain  hydrALAZINE 25 mg oral tablet: 1 tab(s) orally 2 times a day  meclizine 12.5 mg oral tablet: 1 tab(s) orally every 8 hours as needed for Dizziness  Multiple Vitamins oral tablet: 1 tab(s) orally once a day  nitroglycerin 0.4 mg sublingual tablet: 1 tab(s) sublingual as needed for  chest pain (filled 3/1/25 for 25 tablets)  pantoprazole 20 mg oral delayed release tablet: 1 tab(s) orally 2 times a day (RX 1 tablet orally once daily on 11/26/24 for 60 tablets)  Pataday Once Daily Relief Extra Strength 0.7% ophthalmic solution: 1 drop(s) in each affected eye once a day  ranolazine 500 mg oral tablet, extended release: 1 tab(s) orally 2 times a day  rosuvastatin 40 mg oral tablet: 1 tab(s) orally once a day (filled 4/22/25 for 30 tablets)  senna (sennosides) 8.6 mg oral tablet: 2 tab(s) orally once a day (at bedtime)  Systane 0.3%-0.4% Eye Drop: 1 drop instilled in each eye 4 times a day   Albuterol (Eqv-ProAir HFA) 90 mcg/inh inhalation aerosol: 2 puff(s) inhaled every 4 hours as needed for  shortness of breath and/or wheezing  aspirin 81 mg oral tablet, chewable: 1 tab(s) chewed once a day  cetirizine 10 mg oral tablet: 1 tab(s) orally once a day as needed  clopidogrel 75 mg oral tablet: 1 tab(s) orally once a day Take 4 tabs on 4/30/25 (loading dose) then starting on 5/1/25 take 1 tablet daily MDD: 1  Coreg 6.25 mg oral tablet: 1 tab(s) orally 2 times a day (filled 4/22/25 for 30 tablets) MDD: 2  furosemide 20 mg oral tablet: 1 tab(s) orally once a day  gabapentin 300 mg oral capsule: 1 cap(s) orally once a day as needed for pain  hydrALAZINE 25 mg oral tablet: 1 tab(s) orally 2 times a day  lisinopril 20 mg oral tablet: 1 tab(s) orally once a day Restart 6/21/25  meclizine 12.5 mg oral tablet: 1 tab(s) orally every 8 hours as needed for Dizziness  Multiple Vitamins oral tablet: 1 tab(s) orally once a day  nitroglycerin 0.4 mg sublingual tablet: 1 tab(s) sublingual as needed for  chest pain (filled 3/1/25 for 25 tablets)  pantoprazole 20 mg oral delayed release tablet: 1 tab(s) orally 2 times a day (RX 1 tablet orally once daily on 11/26/24 for 60 tablets)  Pataday Once Daily Relief Extra Strength 0.7% ophthalmic solution: 1 drop(s) in each affected eye once a day  ranolazine 500 mg oral tablet, extended release: 1 tab(s) orally 2 times a day  rosuvastatin 40 mg oral tablet: 1 tab(s) orally once a day (filled 4/22/25 for 30 tablets)  senna (sennosides) 8.6 mg oral tablet: 2 tab(s) orally once a day (at bedtime)  spironolactone 25 mg oral tablet: 1 tab(s) orally once a day Retsart on 6/21/25  Systane 0.3%-0.4% Eye Drop: 1 drop instilled in each eye 4 times a day   Albuterol (Eqv-ProAir HFA) 90 mcg/inh inhalation aerosol: 2 puff(s) inhaled every 4 hours as needed for  shortness of breath and/or wheezing  aspirin 81 mg oral tablet, chewable: 1 tab(s) chewed once a day  cetirizine 10 mg oral tablet: 1 tab(s) orally once a day as needed  clopidogrel 75 mg oral tablet: 1 tab(s) orally once a day  Coreg 6.25 mg oral tablet: 1 tab(s) orally 2 times a day (filled 4/22/25 for 30 tablets) MDD: 2  furosemide 20 mg oral tablet: 1 tab(s) orally once a day  gabapentin 300 mg oral capsule: 1 cap(s) orally once a day as needed for pain  hydrALAZINE 25 mg oral tablet: 1 tab(s) orally 2 times a day  lisinopril 20 mg oral tablet: 1 tab(s) orally once a day Restart 6/21/25  meclizine 12.5 mg oral tablet: 1 tab(s) orally every 8 hours as needed for Dizziness  Multiple Vitamins oral tablet: 1 tab(s) orally once a day  nitroglycerin 0.4 mg sublingual tablet: 1 tab(s) sublingual as needed for  chest pain (filled 3/1/25 for 25 tablets)  pantoprazole 20 mg oral delayed release tablet: 1 tab(s) orally 2 times a day (RX 1 tablet orally once daily on 11/26/24 for 60 tablets)  Pataday Once Daily Relief Extra Strength 0.7% ophthalmic solution: 1 drop(s) in each affected eye once a day  ranolazine 500 mg oral tablet, extended release: 1 tab(s) orally 2 times a day  rosuvastatin 40 mg oral tablet: 1 tab(s) orally once a day (filled 4/22/25 for 30 tablets)  senna (sennosides) 8.6 mg oral tablet: 2 tab(s) orally once a day (at bedtime)  spironolactone 25 mg oral tablet: 1 tab(s) orally once a day Retsart on 6/21/25  Systane 0.3%-0.4% Eye Drop: 1 drop instilled in each eye 4 times a day

## 2025-06-19 NOTE — DISCHARGE NOTE PROVIDER - NSFOLLOWUPCLINICS_GEN_ALL_ED_FT
[facility]  Neurology  [address] [place] [address] [address]rd [place] [address]  Phone: [phone]  Fax:   Follow Up Time: 1 week

## 2025-06-19 NOTE — DISCHARGE NOTE NURSING/CASE MANAGEMENT/SOCIAL WORK - FINANCIAL ASSISTANCE
[facility] provides services at a reduced cost to those who are determined to be eligible through [facility]’s financial assistance program. Information regarding [facility]’s financial assistance program can be found by going to [email or web address] or by calling [phone].

## 2025-06-19 NOTE — DISCHARGE NOTE PROVIDER - NSDCFUSCHEDAPPT_GEN_ALL_CORE_FT
[facility] Physician [facility] [phone]  Scheduled Appointment: 07/15/2025

## 2025-06-19 NOTE — DISCHARGE NOTE PROVIDER - HOSPITAL COURSE
74M pmhx CAD x 3 stents (on Aspirin and Plavix), ICD, HTN, HLD, CKD, presents with multiple neurologic symptoms .Starting approximately one week prior to ED presentation on 6/16/25, patient reports onset of multiple symptoms, including HA (occipital, 8/10 maximally, "pain" quality, nonpositional, associated with nausea/vomiting and photophobia), vertigo (present at rest but worse with head movement), difficulty swallowing (especially saliva, he says "I am not jenniffer to turn my tongue"), b/l blurry vision (both eyes, only present when reading, denies double vision). Pt also reports neck pain that causes his head to drop forward, which then worsens his vertigo. He does not feel like his neck is weak, however. Pt denies focal weakness, numbness/tingling subjectively (though on exam found to have numbness), double vision, tinnitus, LoC, involuntary movements, slurred speech. Patient does not note a certain time of day where the symptoms may improve or worsen. Patient does not note any respiratory distress. Patient denies generalized weakness.      Neurological disorder.   ·  Plan: Symptoms resolved.   Neurology consult noted and will follow recommendations.  Continue Meclizine.  Awaiting MRI.    Acute kidney injury superimposed on CKD.   ·  Plan: Renal help appreciated.  Trending BMP.  Holding Ace inhibitor , Aldactone and Lasix .    CAD in native artery.   ·  Plan: S/P CABG .  Continue home meds.  Will consult cards DR Bustillos in AM.    HTN (hypertension).   ·  Plan: BP medications with hold parameters.     HLD (hyperlipidemia).   ·  Plan: Continue Crestor.    Dispo : DC planning home tomorrow pending MRI.  - MRI Performed, DC to home pending read 74M pmhx CAD x 3 stents (on Aspirin and Plavix), ICD, HTN, HLD, CKD, presents with multiple neurologic symptoms .Starting approximately one week prior to ED presentation on 6/16/25, patient reports onset of multiple symptoms, including HA (occipital, 8/10 maximally, "pain" quality, nonpositional, associated with nausea/vomiting and photophobia), vertigo (present at rest but worse with head movement), difficulty swallowing (especially saliva, he says "I am not jenniffer to turn my tongue"), b/l blurry vision (both eyes, only present when reading, denies double vision). Pt also reports neck pain that causes his head to drop forward, which then worsens his vertigo. He does not feel like his neck is weak, however. Pt denies focal weakness, numbness/tingling subjectively (though on exam found to have numbness), double vision, tinnitus, LoC, involuntary movements, slurred speech. Patient does not note a certain time of day where the symptoms may improve or worsen. Patient does not note any respiratory distress. Patient denies generalized weakness.      Neurological disorder.   -Symptoms resolved.   -Neurology consult noted and will follow recommendations.  -Continue Meclizine.  -MRI Head, no acute findings.     Acute kidney injury superimposed on CKD.   -Renal consulted   -Trending BMP.  -held Ace inhibitor, Aldactone and Lasix  on admission  -Cr improved to 1.6 (Baseline 1.3-1.5)    CAD in native artery.   - S/P CABG .  -Continue home meds.  -Cardiology consulted -Pt noted with wheezing, CXR neg, s/p Lasix 40mg IV x 1 6/20, continue Lasix 20mg PO qd at home    HTN (hypertension).   -BP medications with hold parameters.  -Restart Aldactone, Lisinopril 6/21/25    HLD (hyperlipidemia).   -Continue Crestor.     Pt is medically cleared for d/c home, pt cleared by neurology, Pt to f/u outpatient.

## 2025-06-19 NOTE — DISCHARGE NOTE PROVIDER - NSDCCPCAREPLAN_GEN_ALL_CORE_FT
PRINCIPAL DISCHARGE DIAGNOSIS  Diagnosis: Weakness  Assessment and Plan of Treatment: Likely related to vertigo symptoms. Please follow up with your primary care provider within 1 week of discharge.      SECONDARY DISCHARGE DIAGNOSES  Diagnosis: Neurological disorder  Assessment and Plan of Treatment: Most likley related to vertigo/migranous headaches. Please follow up with your primary care doctor and neurologist within 1 week of discharge. Please continue to take Meclizine as prescribed.

## 2025-06-19 NOTE — DISCHARGE NOTE PROVIDER - PROVIDER TOKENS
PROVIDER:[TOKEN:[[id number]],FOLLOWUP:[1 week]],PROVIDER:[TOKEN:[[id number]],FOLLOWUP:[1 week]],FREE:[LAST:[Qurashi],FIRST:[Terese],PHONE:[phone]],FAX:[(   )    -],FOLLOWUP:[1 week],ESTABLISHEDPATIENT:[T]],PROVIDER:[TOKEN:[[id number]],FOLLOWUP:[1 week]]

## 2025-06-19 NOTE — DISCHARGE NOTE NURSING/CASE MANAGEMENT/SOCIAL WORK - NSDCPEFALRISK_GEN_ALL_CORE
For information on Fall & Injury Prevention, visit: [email or web address] OR  [email or web address] OR  https://www.cdc.gov/steadi/patient.html

## 2025-06-19 NOTE — DISCHARGE NOTE PROVIDER - CARE PROVIDER_API CALL
Ulisses Abarca  Interventional Cardiology  [address] [place] [address]  Phone: [phone]  Fax: [phone]  Follow Up Time: 1 week    Yamil Lorenzana  Nephrology  [address], [address]  [place] [address]  Phone: [phone]  Fax: [phone]  Follow Up Time: 1 week    Terese Koroma  Phone: [phone]  Fax: (   )    -  Established Patient  Follow Up Time: 1 week    Mason Mlaik  Clinical Cardiac Electrophysiology  [address] [place] [address], [address]  [place] [address]  Phone: [phone]  Fax: [phone]  Follow Up Time: 1 week

## 2025-06-19 NOTE — DISCHARGE NOTE PROVIDER - CARE PROVIDERS DIRECT ADDRESSES
,DirectAddress_Unknown,[email or web address],DirectAddress_Unknown,[email or web address]

## 2025-06-19 NOTE — PROVIDER CONTACT NOTE (OTHER) - ASSESSMENT
vs Temp 98.4 /71 RR 17 o2sat 99% RA.
Pt is A&Ox4, no acute distress noted, denies chest pain. Pt is stable.

## 2025-06-20 NOTE — PROGRESS NOTE ADULT - ASSESSMENT
74-year-old male with a history of hypertension, hyperlipidemia, CABG x3 stents, and implanted defibrillator presents to the ED with a 10-day history of left-sided headache radiating to the ipsilateral neck, accompanied by vertigo, vomiting, intermittent chest pain, and blurry vision. He reports difficulty supporting his head due to neck pain, dysphagia with solids, and impaired tongue motility contributing to multiple daily emesis episodes. During vertigo episodes, he experiences gait instability and transient blurry vision. Cardiology evaluation by Dr. Bustillos on 05/28 was unremarkable. He denies fever, chills, hearing changes, shortness of breath, abdominal pain, or  symptoms. Neurological exam reveals diminished sensation in the left face and left upper extremity, but 5/5 strength and sensation in all extremities. No focal deficits were otherwise noted. Differentials include, but are not limited to, subacute stroke, vertigo, and acute cardiac pathology. Nephrology consulted for acute on ckd.     A/P  ANGIE on CKD 3  scr baseline ~1.3-1.5  admitted with scr 2.09  ?etiology   renal function stable today  check urine urea, urine creatinine,  hold ACEi/ lasix and spironolactone   resume ACEI lasix spironolactone once scr at baseline  avoid nephrotoxins  monitor UO and bmp     HTN  bp acceptable  acei/lasix and spironolactone on hold due to angie  monitor bp     CKD-MB  monitor ca, po4 daily     Headache  workup as primary     
74-year-old male with a history of hypertension, hyperlipidemia, CABG x3 stents, and implanted defibrillator presents to the ED with a 10-day history of left-sided headache radiating to the ipsilateral neck, accompanied by vertigo, vomiting, intermittent chest pain, and blurry vision. He reports difficulty supporting his head due to neck pain, dysphagia with solids, and impaired tongue motility contributing to multiple daily emesis episodes. During vertigo episodes, he experiences gait instability and transient blurry vision. Cardiology evaluation by Dr. Bustillos on 05/28 was unremarkable. He denies fever, chills, hearing changes, shortness of breath, abdominal pain, or  symptoms. Neurological exam reveals diminished sensation in the left face and left upper extremity, but 5/5 strength and sensation in all extremities. No focal deficits were otherwise noted. Differentials include, but are not limited to, subacute stroke, vertigo, and acute cardiac pathology. Nephrology consulted for acute on ckd.     A/P  ANGIE on CKD 3  scr baseline ~1.3-1.5  admitted with scr 2.09 better today  ?etiology   check urine urea, urine creatinine, US renal   hold ACEi/ lasix and spironolactone   avoid nephrotoxins  monitor UO and bmp     HTN  bp acceptable  acei/lasix and spironolactone on hold due to angie  monitor bp     CKD-MB  monitor ca, po4 daily     Headache  workup as primary     
74M pmhx CAD x 3 stents (on Aspirin and Plavix), ICD, HTN, HLD, CKD, presents with multiple neurologic symptoms .Starting approximately one week prior to ED presentation on 6/16/25, patient reports onset of multiple symptoms, including HA (occipital, 8/10 maximally, "pain" quality, nonpositional, associated with nausea/vomiting and photophobia), vertigo (present at rest but worse with head movement), difficulty swallowing (especially saliva, he says "I am not jenniffer to turn my tongue"), b/l blurry vision (both eyes, only present when reading, denies double vision). Pt also reports neck pain that causes his head to drop forward, which then worsens his vertigo. He does not feel like his neck is weak, however. Pt denies focal weakness, numbness/tingling subjectively (though on exam found to have numbness), double vision, tinnitus, LoC, involuntary movements, slurred speech. Patient does not note a certain time of day where the symptoms may improve or worsen. Patient does not note any respiratory distress. Patient denies generalized weakness.       Problem/Plan - 1:  ·  Problem: Neurological disorder.   ·  Plan: Symptoms resolved.   Neurology consult noted and will follow recommendations.  Continue Meclizine.    < from: MR Head w/wo IV Cont (06.19.25 @ 11:03) >  IMPRESSION:  1. No evidence of acute infarct. Stable subcentimeter chronic lacunar   infarct left basal ganglia and mild white matter hypodensities; a   nonspecific finding which statistically reflects chronic microvascular   ischemic change.  2. No enhancing mass or leptomeningeal enhancement identified.  3. Additional findings described in detail above.    < end of copied text >     Problem/Plan - 2:  ·  Problem: Acute kidney injury superimposed on CKD.   ·  Plan: Renal help appreciated.  Trending BMP.  Holding Ace inhibitor , Aldactone and Lasix .     Problem/Plan - 3:  ·  Problem: CAD in native artery.   ·  Plan: S/P CABG .  Continue home meds.  Cards helping.     Problem/Plan - 4:  ·  Problem: HTN (hypertension).   ·  Plan: BP medications with hold parameters.     Problem/Plan - 5:  ·  Problem: HLD (hyperlipidemia).   ·  Plan: Continue Crestor.     Problem/Plan - 5:  ·  Problem: ICMP with Dyspnea.   ·  Plan: IV Lasix and restarting home meds tomorrow.            D/W cardiology attending.     Dispo : DC planning home today and F/U with PCP , cardiology and neurology .              
A/P: 75 yo male with a PMH of CAD (s/p 3 stents and 3V-CABG in 2002), PAD(s/p LLE stent), HTN, HLD, chronic systolic CHF, s/p ICD implantation (2019) presented to ED complaining of HA and difficulty swallowing     EKG: NSR  No acute ischemic changes   Echo - 4/25 - Mod to Sev LV dysfunction EF 36%, mod MR   Cath - 4/25 - Severe native disease LAD, LCX, %, LIMA to LAD patent other 2 grafts closed     1. Chest pain  - atypical cont asa plavix   - troponin negative   6/18 remains stable, continue ASA, plavix  6/19 c/o headache MRI head pending    2. ICM  -s/p ICD  -c/w coreg  -resume lisinopril and lasix     3. HTN  -c/w coreg      4) Headache/ Difficulty swallowing   - f/u Neuro f/u MG serology, f/u MRI brain if ICD compatible   - CTA brain shows diffuse disease   
A/P: 75 yo male with a PMH of CAD (s/p 3 stents and 3V-CABG in 2002), PAD(s/p LLE stent), HTN, HLD, chronic systolic CHF, s/p ICD implantation (2019) presented to ED complaining of HA and difficulty swallowing.     EKG: NSR  No acute ischemic changes   Echo - 4/25 - Mod to Sev LV dysfunction EF 36%, mod MR   Cath - 4/25 - Severe native disease LAD, LCX, %, LIMA to LAD patent other 2 grafts closed     1. Chest pain  - atypical cont asa plavix   - troponin negative   6/18 remains stable, continue ASA, plavix  6/19 c/o headache MRI head pending  6/20 recommend lasix when renal function stable    2. ICM  -s/p ICD  -c/w coreg  -resume lisinopril and lasix     3. HTN  -c/w coreg      4) Headache/ Difficulty swallowing   - f/u Neuro f/u MG serology, f/u MRI brain if ICD compatible   - CTA brain shows diffuse disease   
Mr. Rahman is a 75 yo man w/ pmhx CAD s/p 3 stents (on Aspirin and Plavix), ICD, HTN, HLD, CKD, presents dysphagia and neck weakness. Patient earliest symptoms include increased drooling and some mild chocking events during meals. Then about 7-10 days ago started to notice neck weakness and it was harder to keep head elevated or straight up. He also reports some signs of fatigable weakness in extra-occular muscles, stating that the longer he tries to read the more blurry his vision gets.    His complaints have shifted today. No longer with any dysphagia but today reporting dyspnea. Some improvement with nebs. Daughter reports change in voice over 2 weeks. Patient continues to have neck weakness but perhaps pain limited with forward flexion. Vision symptoms such as blurry vision seems to be worse at close range and in the bright room, not as bad at distance or in the dark.     While patient has a constellation of symptoms that may invoke neuromuscular disorder like MG, he provides details which add doubt to a diagnosis like MG and his exam is not particularly abnormal. For example, his blurry vision sounds like he may have a issue with pupillary constriction given blurry vision at bright and near. This is not expected from MG which does not effect autonomic nervous system. MG should also not cause pain such as what he has in his neck. The presence of dyspnea should be associated with other ocular or bulbar findings but his CN exam is grossly normal. He is very vasculopathic, has stroke as well as coronary disease. MRI did not reveal any new infarcts despite significant vetebrobasilar disease. Reflexes are normal and patient has a lack of sensory symptoms, making GBS or variants less likely. TTE is showing likely ICM with reduced EF to 35. CXR was normal though.    Patient's main complaint at this time seems to be dyspnea but unclear etiology. If patient's dyspnea were to be attributed to other etiology like heart failure or COPD, then would prefer to avoid acute MG therapies until further outpatient workup can confirm diagnosis. However, if no other etiology for dyspnea is given, can trial acute therapies here, despite diagnostic uncertainty.    Please obtain NIF/VC q6 hrs  Please have SLP evaluate swallow, either MBS or FEES is preferable    If no alternate explanation for dyspnea arises such as heart failure or COPD/Asthma, then can proceed with following:  Can trial mestinon 60 mg TID  CT chest scan for thymoma  IVIG 2g/kg divided over 5 days  Pre-medicate with IV benadryl 25 and Tylenol 30 minutes prior to each dose    - Follow up myasthenia serologies  - continue home DAPT and statin  - caution and consider avoiding medications including: aminoglycosides, fluoroquinolones, macrolides, beta blockers, quinidine, procainamide, immune checkpoint inhibitors, penicillamine, quinine, statins .  
 75 yo male with a PMH of CAD (s/p 3 stents and 3V-CABG in 2002), PAD(s/p LLE stent), HTN, HLD, chronic systolic CHF, s/p ICD implantation (2019) presented to ED complaining of HA and difficulty swallowing     EKG: NSR  No acute ischemic changes   Echo - 4/25 - Mod to Sev LV dysfunction EF 36%, mod MR   Cath - 4/25 - Severe native disease LAD, LCX, %, LIMA to LAD patent other 2 grafts closed     1. Chest pain  - atypical cont asa plavix   - troponin negative     2. ICM  -s/p ICD  -c/w coreg  -resume lisinopril and  lasix     3. HTN  -c/w coreg      4) Headache/ Difficulty swallowing   - f/u Neuro f/u MG serology, f/u MRI brain if ICD compatible   - CTA brain shows diffuse disease 
74-year-old male with a history of hypertension, hyperlipidemia, CABG x3 stents, and implanted defibrillator presents to the ED with a 10-day history of left-sided headache radiating to the ipsilateral neck, accompanied by vertigo, vomiting, intermittent chest pain, and blurry vision. He reports difficulty supporting his head due to neck pain, dysphagia with solids, and impaired tongue motility contributing to multiple daily emesis episodes. During vertigo episodes, he experiences gait instability and transient blurry vision. Cardiology evaluation by Dr. Bustillos on 05/28 was unremarkable. He denies fever, chills, hearing changes, shortness of breath, abdominal pain, or  symptoms. Neurological exam reveals diminished sensation in the left face and left upper extremity, but 5/5 strength and sensation in all extremities. No focal deficits were otherwise noted. Differentials include, but are not limited to, subacute stroke, vertigo, and acute cardiac pathology. Nephrology consulted for acute on ckd.     A/P  ANGIE on CKD 3  scr baseline ~1.3-1.5  admitted with scr 2.09 better today  ?etiology   check urine urea, urine creatinine,  hold ACEi/ lasix and spironolactone   resume ACEI lasix spironolactone once scr at baseline  avoid nephrotoxins  monitor UO and bmp     HTN  bp acceptable  acei/lasix and spironolactone on hold due to angie  monitor bp     CKD-MB  monitor ca, po4 daily     Headache  workup as primary     
74-year-old male with a history of hypertension, hyperlipidemia, CABG x3 stents, and implanted defibrillator presents to the ED with a 10-day history of left-sided headache radiating to the ipsilateral neck, accompanied by vertigo, vomiting, intermittent chest pain, and blurry vision. He reports difficulty supporting his head due to neck pain, dysphagia with solids, and impaired tongue motility contributing to multiple daily emesis episodes. During vertigo episodes, he experiences gait instability and transient blurry vision. Cardiology evaluation by Dr. Bustillos on 05/28 was unremarkable. He denies fever, chills, hearing changes, shortness of breath, abdominal pain, or  symptoms. Neurological exam reveals diminished sensation in the left face and left upper extremity, but 5/5 strength and sensation in all extremities. No focal deficits were otherwise noted. Differentials include, but are not limited to, subacute stroke, vertigo, and acute cardiac pathology. Nephrology consulted for acute on ckd.     A/P  ANGIE on CKD 3  scr baseline ~1.3-1.5  admitted with scr 2.09 better today  ?etiology   check urine urea, urine creatinine, US renal   hold ACEi/ lasix and spironolactone   avoid nephrotoxins  monitor UO and bmp     HTN  bp acceptable  acei/lasix and spironolactone on hold due to angie  monitor bp     CKD-MB  monitor ca, po4 daily     Headache  workup as primary     
74M pmhx CAD x 3 stents (on Aspirin and Plavix), ICD, HTN, HLD, CKD, presents with multiple neurologic symptoms .Starting approximately one week prior to ED presentation on 6/16/25, patient reports onset of multiple symptoms, including HA (occipital, 8/10 maximally, "pain" quality, nonpositional, associated with nausea/vomiting and photophobia), vertigo (present at rest but worse with head movement), difficulty swallowing (especially saliva, he says "I am not jenniffer to turn my tongue"), b/l blurry vision (both eyes, only present when reading, denies double vision). Pt also reports neck pain that causes his head to drop forward, which then worsens his vertigo. He does not feel like his neck is weak, however. Pt denies focal weakness, numbness/tingling subjectively (though on exam found to have numbness), double vision, tinnitus, LoC, involuntary movements, slurred speech. Patient does not note a certain time of day where the symptoms may improve or worsen. Patient does not note any respiratory distress. Patient denies generalized weakness.       Problem/Plan - 1:  ·  Problem: Neurological disorder.   ·  Plan: Symptoms resolved.   Neurology consult noted and will follow recommendations.  Continue Meclizine.  Awaiting MRI.     Problem/Plan - 2:  ·  Problem: Acute kidney injury superimposed on CKD.   ·  Plan: Renal help appreciated.  Trending BMP.  Holding Ace inhibitor , Aldactone and Lasix .     Problem/Plan - 3:  ·  Problem: CAD in native artery.   ·  Plan: S/P CABG .  Continue home meds.  Will consult cards DR Bustillos in AM.     Problem/Plan - 4:  ·  Problem: HTN (hypertension).   ·  Plan: BP medications with hold parameters.     Problem/Plan - 5:  ·  Problem: HLD (hyperlipidemia).   ·  Plan: Continue Crestor.    Dispo : DC planning home tomorrow pending MRI.    
 73 yo male with a PMH of CAD (s/p 3 stents and 3V-CABG in 2002), PAD(s/p LLE stent), HTN, HLD, chronic systolic CHF, s/p ICD implantation (2019) presented to ED complaining of HA and difficulty swallowing     EKG: NSR  No acute ischemic changes   Echo - 4/25 - Mod to Sev LV dysfunction EF 36%, mod MR   Cath - 4/25 - Severe native disease LAD, LCX, %, LIMA to LAD patent other 2 grafts closed     1. Chest pain  - atypical cont asa plavix   - troponin negative   6/18 remains stable, continue ASA, plavix    2. ICM  -s/p ICD  -c/w coreg  -resume lisinopril and  lasix     3. HTN  -c/w coreg      4) Headache/ Difficulty swallowing   - f/u Neuro f/u MG serology, f/u MRI brain if ICD compatible   - CTA brain shows diffuse disease 
 74M pmhx CAD x 3 stents (on Aspirin and Plavix), ICD, HTN, HLD, CKD, presents with multiple neurologic symptoms .Starting approximately one week prior to ED presentation on 6/16/25, patient reports onset of multiple symptoms, including HA (occipital, 8/10 maximally, "pain" quality, nonpositional, associated with nausea/vomiting and photophobia), vertigo (present at rest but worse with head movement), difficulty swallowing (especially saliva, he says "I am not jenniffer to turn my tongue"), b/l blurry vision (both eyes, only present when reading, denies double vision). Pt also reports neck pain that causes his head to drop forward, which then worsens his vertigo. He does not feel like his neck is weak, however. Pt denies focal weakness, numbness/tingling subjectively (though on exam found to have numbness), double vision, tinnitus, LoC, involuntary movements, slurred speech. Patient does not note a certain time of day where the symptoms may improve or worsen. Patient does not note any respiratory distress. Patient denies generalized weakness.       Problem/Plan - 1:  ·  Problem: Neurological disorder.   ·  Plan: Symptoms resolved.   Neurology consult noted and will follow recommendations.  Continue Meclizine.  Awaiting MRI.     Problem/Plan - 2:  ·  Problem: Acute kidney injury superimposed on CKD.   ·  Plan: Renal help appreciated.  Trending BMP.  Holding Ace inhibitor , Aldactone and Lasix .     Problem/Plan - 3:  ·  Problem: CAD in native artery.   ·  Plan: S/P CABG .  Continue home meds.  Will consult cards DR Bustillos in AM.     Problem/Plan - 4:  ·  Problem: HTN (hypertension).   ·  Plan: BP medications with hold parameters.     Problem/Plan - 5:  ·  Problem: HLD (hyperlipidemia).   ·  Plan: Continue Crestor.    Dispo : DC planning home tomorrow .
74M pmhx CAD x 3 stents (on Aspirin and Plavix), ICD, HTN, HLD, CKD, presents with multiple neurologic symptoms .Starting approximately one week prior to ED presentation on 6/16/25, patient reports onset of multiple symptoms, including HA (occipital, 8/10 maximally, "pain" quality, nonpositional, associated with nausea/vomiting and photophobia), vertigo (present at rest but worse with head movement), difficulty swallowing (especially saliva, he says "I am not jenniffer to turn my tongue"), b/l blurry vision (both eyes, only present when reading, denies double vision). Pt also reports neck pain that causes his head to drop forward, which then worsens his vertigo. He does not feel like his neck is weak, however. Pt denies focal weakness, numbness/tingling subjectively (though on exam found to have numbness), double vision, tinnitus, LoC, involuntary movements, slurred speech. Patient does not note a certain time of day where the symptoms may improve or worsen. Patient does not note any respiratory distress. Patient denies generalized weakness.       Problem/Plan - 1:  ·  Problem: Neurological disorder.   ·  Plan: Symptoms resolved.   Neurology consult noted and will follow recommendations.  Continue Meclizine.  Awaiting MRI.     Problem/Plan - 2:  ·  Problem: Acute kidney injury superimposed on CKD.   ·  Plan: Renal help appreciated.  Trending BMP.  Holding Ace inhibitor , Aldactone and Lasix .     Problem/Plan - 3:  ·  Problem: CAD in native artery.   ·  Plan: S/P CABG .  Continue home meds.  Will consult cards DR Bustillos in AM.     Problem/Plan - 4:  ·  Problem: HTN (hypertension).   ·  Plan: BP medications with hold parameters.     Problem/Plan - 5:  ·  Problem: HLD (hyperlipidemia).   ·  Plan: Continue Crestor.    Dispo : DC planning home today and F/U with PCP and neurology .

## 2025-06-20 NOTE — PROGRESS NOTE ADULT - NS ATTEND AMEND GEN_ALL_CORE FT

## 2025-06-20 NOTE — PROGRESS NOTE ADULT - REASON FOR ADMISSION
weakness neck and difficulty swallowing.

## 2025-06-20 NOTE — PROGRESS NOTE ADULT - SUBJECTIVE AND OBJECTIVE BOX
Ulisses Abarca MD  Interventional Cardiology / Endovascular Specialist  [place] Office : [address] [place] [address] [place] [address]  Tel:   [place] Office : [address] [facility] [place] [address]  Tel: [phone]      Pt laying in bed, NAD    	  MEDICATIONS:  aspirin  chewable 81 milliGRAM(s) Oral daily  carvedilol 6.25 milliGRAM(s) Oral every 12 hours  clopidogrel Tablet 75 milliGRAM(s) Oral daily  heparin   Injectable 5000 Unit(s) SubCutaneous every 12 hours  nitroglycerin     SubLingual 0.4 milliGRAM(s) SubLingual every 5 minutes PRN      cetirizine 10 milliGRAM(s) Oral daily    gabapentin 300 milliGRAM(s) Oral daily  meclizine 12.5 milliGRAM(s) Oral every 8 hours PRN    pantoprazole    Tablet 40 milliGRAM(s) Oral before breakfast    rosuvastatin 40 milliGRAM(s) Oral at bedtime    chlorhexidine 2% Cloths 1 Application(s) Topical <User Schedule>  lidocaine   4% Patch 1 Patch Transdermal every 24 hours PRN      PAST MEDICAL/SURGICAL HISTORY  PAST MEDICAL & SURGICAL HISTORY:  HTN (hypertension)      PAD (peripheral artery disease)  stent to L lower extremity artery      Constipation, unspecified constipation type      Hyperlipidemia      NSTEMI (non-ST elevated myocardial infarction)   and 2018 and       Coronary artery disease involving native coronary artery of native heart, unspecified whether angina present      Ischemic cardiomyopathy with implantable cardioverter-defibrillator (ICD)      S/P CABG (coronary artery bypass graft)  x3; [facility] 2002      History of coronary angiogram  multiple stents placed      History of femoral angiogram  stent placed in [facility] 2016      History of laparoscopic cholecystectomy  2016          SOCIAL HISTORY: Substance Use (street drugs): ( x ) never used  (  ) other:    FAMILY HISTORY:  Family history of coronary artery disease in brother (Sibling)  4 brothers with MI/CABG, 1  at 79yo    Family history of stroke  60yo CVA, heart attack 61yo    Family history of coronary artery disease in sister (Sibling)   from MI    Family history of MI (myocardial infarction)  mother,          PHYSICAL EXAM:  T(C): 36.4 (25 @ 13:00), Max: 36.6 (25 @ 21:51)  HR: 71 (25 @ 13:00) (69 - 78)  BP: 152/77 (25 @ 13:00) (123/61 - 153/61)  RR: 18 (25 @ 13:00) (18 - 18)  SpO2: 100% (25 @ 13:00) (98% - 100%)  Wt(kg): --  I&O's Summary    2025 07:01  -  2025 16:57  --------------------------------------------------------  IN: 780 mL / OUT: 0 mL / NET: 780 mL        GENERAL: NAD  EYES: EOMI, PERRLA, conjunctiva and sclera clear  ENMT: Moist mucous membranes  Cardiovascular: Normal S1 S2, No JVD, No murmurs, No edema  Respiratory: Lungs clear to auscultation	  Gastrointestinal:  Soft, Non-tender, + BS	  Extremities: Normal range of motion, No clubbing, cyanosis or edema  NERVOUS SYSTEM:  Alert & Oriented X3                          13.1   4.38  )-----------( 126      ( 2025 06:34 )             39.3     06-18    140  |  103  |  18  ----------------------------<  96  3.6   |  25  |  1.77[H]    Ca    9.6      2025 06:34  Phos  3.2     06-18  Mg     2.20     06-18      proBNP:   Lipid Profile:   HgA1c:   TSH:     Consultant(s) Notes Reviewed:  [x ] YES  [ ] NO    Care Discussed with Consultants/Other Providers [ x] YES  [ ] NO    Imaging Personally Reviewed independently:  [x] YES  [ ] NO    All labs, radiologic studies, vitals, orders and medications list reviewed. Patient is seen and examined at bedside. Case discussed with medical team.              
  Ulisses Abarca MD  Interventional Cardiology / Endovascular Specialist  [place] Office : [address] [place] [address] [place] [address]  Tel:   [place] Office : [address] [facility] [place] [address]  Tel: [phone]      Pt laying in bed, NAD    	  MEDICATIONS:  aspirin  chewable 81 milliGRAM(s) Oral daily  carvedilol 6.25 milliGRAM(s) Oral every 12 hours  clopidogrel Tablet 75 milliGRAM(s) Oral daily  heparin   Injectable 5000 Unit(s) SubCutaneous every 12 hours  hydrALAZINE 25 milliGRAM(s) Oral two times a day  nitroglycerin     SubLingual 0.4 milliGRAM(s) SubLingual every 5 minutes PRN  ranolazine 500 milliGRAM(s) Oral two times a day      cetirizine 10 milliGRAM(s) Oral daily    gabapentin 300 milliGRAM(s) Oral daily  meclizine 12.5 milliGRAM(s) Oral every 8 hours PRN    pantoprazole    Tablet 40 milliGRAM(s) Oral before breakfast  senna 2 Tablet(s) Oral at bedtime    rosuvastatin 40 milliGRAM(s) Oral at bedtime    chlorhexidine 2% Cloths 1 Application(s) Topical <User Schedule>  lidocaine   4% Patch 1 Patch Transdermal every 24 hours PRN      PAST MEDICAL/SURGICAL HISTORY  PAST MEDICAL & SURGICAL HISTORY:  HTN (hypertension)      PAD (peripheral artery disease)  stent to L lower extremity artery      Constipation, unspecified constipation type      Hyperlipidemia      NSTEMI (non-ST elevated myocardial infarction)   and 2018 and       Coronary artery disease involving native coronary artery of native heart, unspecified whether angina present      Ischemic cardiomyopathy with implantable cardioverter-defibrillator (ICD)      S/P CABG (coronary artery bypass graft)  x3; [facility] 2002      History of coronary angiogram  multiple stents placed      History of femoral angiogram  stent placed in [place] 2016      History of laparoscopic cholecystectomy  2016          SOCIAL HISTORY: Substance Use (street drugs): ( x ) never used  (  ) other:    FAMILY HISTORY:  Family history of coronary artery disease in brother (Sibling)  4 brothers with MI/CABG, 1  at 77yo    Family history of stroke  60yo CVA, heart attack 59yo    Family history of coronary artery disease in sister (Sibling)   from MI    Family history of MI (myocardial infarction)  mother,         PHYSICAL EXAM:  T(C): 36.9 (25 @ 14:51), Max: 36.9 (25 @ 14:51)  HR: 70 (25 @ 14:51) (70 - 81)  BP: 145/71 (25 @ 14:51) (113/63 - 164/80)  RR: 17 (25 @ 14:51) (16 - 19)  SpO2: 99% (25 @ 14:51) (97% - 100%)  Wt(kg): --  I&O's Summary    2025 07:01  -  2025 07:00  --------------------------------------------------------  IN: 780 mL / OUT: 0 mL / NET: 780 mL          GENERAL: NAD  EYES: EOMI, PERRLA, conjunctiva and sclera clear  ENMT: Moist mucous membranes  Cardiovascular: Normal S1 S2, No JVD, No murmurs, No edema  Respiratory: Lungs clear to auscultation	  Gastrointestinal:  Soft, Non-tender, + BS	  Extremities: Normal range of motion, No clubbing, cyanosis or edema  NERVOUS SYSTEM:  Alert & Oriented X3                                  12.8   4.22  )-----------( 120      ( 2025 06:51 )             37.7     06-19    140  |  105  |  24[H]  ----------------------------<  100[H]  3.9   |  22  |  1.68[H]    Ca    9.7      2025 06:51  Phos  3.3     06-19  Mg     2.20     06-19      proBNP:   Lipid Profile:   HgA1c:   TSH:     Consultant(s) Notes Reviewed:  [x ] YES  [ ] NO    Care Discussed with Consultants/Other Providers [ x] YES  [ ] NO    Imaging Personally Reviewed independently:  [x] YES  [ ] NO    All labs, radiologic studies, vitals, orders and medications list reviewed. Patient is seen and examined at bedside. Case discussed with medical team.              
Date of Service  : 06-17-25    INTERVAL HPI/OVERNIGHT EVENTS: I feel much better.   Vital Signs Last 24 Hrs  T(C): 36.6 (17 Jun 2025 21:51), Max: 36.8 (17 Jun 2025 01:52)  T(F): 97.8 (17 Jun 2025 21:51), Max: 98.2 (17 Jun 2025 01:52)  HR: 78 (17 Jun 2025 21:51) (67 - 78)  BP: 135/63 (17 Jun 2025 21:51) (126/66 - 160/81)  BP(mean): --  RR: 18 (17 Jun 2025 21:51) (16 - 19)  SpO2: 100% (17 Jun 2025 21:51) (98% - 100%)    Parameters below as of 17 Jun 2025 21:51  Patient On (Oxygen Delivery Method): room air      I&O's Summary    MEDICATIONS  (STANDING):  aspirin  chewable 81 milliGRAM(s) Oral daily  carvedilol 6.25 milliGRAM(s) Oral every 12 hours  cetirizine 10 milliGRAM(s) Oral daily  chlorhexidine 2% Cloths 1 Application(s) Topical <User Schedule>  clopidogrel Tablet 75 milliGRAM(s) Oral daily  gabapentin 300 milliGRAM(s) Oral daily  heparin   Injectable 5000 Unit(s) SubCutaneous every 12 hours  pantoprazole    Tablet 40 milliGRAM(s) Oral before breakfast  rosuvastatin 40 milliGRAM(s) Oral at bedtime    MEDICATIONS  (PRN):  meclizine 12.5 milliGRAM(s) Oral every 8 hours PRN Dizziness  nitroglycerin     SubLingual 0.4 milliGRAM(s) SubLingual every 5 minutes PRN Chest Pain    LABS:                        12.5   4.74  )-----------( 125      ( 17 Jun 2025 06:10 )             36.1     06-17    139  |  106  |  19  ----------------------------<  105[H]  4.0   |  22  |  1.88[H]    Ca    9.3      17 Jun 2025 06:10  Mg     2.30     06-16    TPro  7.2  /  Alb  4.0  /  TBili  0.6  /  DBili  x   /  AST  25  /  ALT  21  /  AlkPhos  98  06-16    PT/INR - ( 16 Jun 2025 12:30 )   PT: 11.4 sec;   INR: 0.96 ratio         PTT - ( 16 Jun 2025 12:30 )  PTT:35.1 sec  Urinalysis Basic - ( 17 Jun 2025 06:10 )    Color: x / Appearance: x / SG: x / pH: x  Gluc: 105 mg/dL / Ketone: x  / Bili: x / Urobili: x   Blood: x / Protein: x / Nitrite: x   Leuk Esterase: x / RBC: x / WBC x   Sq Epi: x / Non Sq Epi: x / Bacteria: x      CAPILLARY BLOOD GLUCOSE            Urinalysis Basic - ( 17 Jun 2025 06:10 )    Color: x / Appearance: x / SG: x / pH: x  Gluc: 105 mg/dL / Ketone: x  / Bili: x / Urobili: x   Blood: x / Protein: x / Nitrite: x   Leuk Esterase: x / RBC: x / WBC x   Sq Epi: x / Non Sq Epi: x / Bacteria: x      REVIEW OF SYSTEMS:  CONSTITUTIONAL: No fever, weight loss, or fatigue  EYES: No eye pain, visual disturbances, or discharge  ENMT:  No difficulty hearing, tinnitus, vertigo; No sinus or throat pain  NECK: No pain or stiffness  RESPIRATORY: No cough, wheezing, chills or hemoptysis; No shortness of breath  CARDIOVASCULAR: No chest pain, palpitations, dizziness, or leg swelling  GASTROINTESTINAL: No abdominal or epigastric pain. No nausea, vomiting, or hematemesis; No diarrhea or constipation. No melena or hematochezia.  GENITOURINARY: No dysuria, frequency, hematuria, or incontinence  NEUROLOGICAL: No headaches, memory loss, loss of strength, numbness, or tremors      RADIOLOGY & ADDITIONAL TESTS:    Consultant(s) Notes Reviewed:  [x ] YES  [ ] NO    PHYSICAL EXAM:  GENERAL: NAD, well-groomed, well-developed,not in any distress ,  HEAD:  Atraumatic, Normocephalic  NECK: Supple, No JVD, Normal thyroid  NERVOUS SYSTEM:  Alert & Oriented X3, No focal deficit   CHEST/LUNG: Good air entry bilateral with no  rales, rhonchi, wheezing, or rubs  HEART: Regular rate and rhythm; No murmurs, rubs, or gallops  ABDOMEN: Soft, Nontender, Nondistended; Bowel sounds present  EXTREMITIES:  2+ Peripheral Pulses, No clubbing, cyanosis, or edema    Care Discussed with Consultants/Other Providers [ x] YES  [ ] NO
[facility]   MD CHRISTINE SWEENEY MD RUORU WONG, PA    TEL:  FROM 9 AM to 5 PM ---OFFICE: [phone]  AVAILABLE ON TEAMS    FROM 5 PM - 9 AM PLEASE CALL ANSWERING SERVICE: [phone]    RENAL FOLLOW UP NOTE--Date of Service 06-20-25 @ 08:53  --------------------------------------------------------------------------------  HPI:      Pt seen and examined at bedside.   sitting up having breakfast  Denies SOB, chest pain     PAST HISTORY  --------------------------------------------------------------------------------  No significant changes to PMH, PSH, FHx, SHx, unless otherwise noted    ALLERGIES & MEDICATIONS  --------------------------------------------------------------------------------  Allergies    No Known Allergies    Intolerances      Standing Inpatient Medications  aspirin  chewable 81 milliGRAM(s) Oral daily  carvedilol 6.25 milliGRAM(s) Oral every 12 hours  cetirizine 10 milliGRAM(s) Oral daily  chlorhexidine 2% Cloths 1 Application(s) Topical <User Schedule>  clopidogrel Tablet 75 milliGRAM(s) Oral daily  gabapentin 300 milliGRAM(s) Oral daily  heparin   Injectable 5000 Unit(s) SubCutaneous every 12 hours  hydrALAZINE 25 milliGRAM(s) Oral two times a day  pantoprazole    Tablet 40 milliGRAM(s) Oral before breakfast  ranolazine 500 milliGRAM(s) Oral two times a day  rosuvastatin 40 milliGRAM(s) Oral at bedtime  senna 2 Tablet(s) Oral at bedtime    PRN Inpatient Medications  lidocaine   4% Patch 1 Patch Transdermal every 24 hours PRN  meclizine 12.5 milliGRAM(s) Oral every 8 hours PRN  nitroglycerin     SubLingual 0.4 milliGRAM(s) SubLingual every 5 minutes PRN  simethicone 80 milliGRAM(s) Chew daily PRN      REVIEW OF SYSTEMS  --------------------------------------------------------------------------------  General: no fever  CVS: no chest pain  MSK: no edema     VITALS/PHYSICAL EXAM  --------------------------------------------------------------------------------  T(C): 36.3 (06-20-25 @ 05:50), Max: 36.9 (06-19-25 @ 14:51)  HR: 78 (06-20-25 @ 05:50) (70 - 78)  BP: 111/66 (06-20-25 @ 05:50) (111/66 - 153/77)  RR: 18 (06-20-25 @ 05:50) (16 - 18)  SpO2: 97% (06-20-25 @ 05:50) (97% - 99%)  Wt(kg): --        Physical Exam:  	Gen: NAD  	HEENT: MMM  	Pulm: CTA B/L  	CV: S1S2  	Abd: Soft, +BS  	Ext: No LE edema B/L                      Neuro: Awake   	Skin: Warm and Dry   	Vascular access: NO HD catheter            no  nieto  LABS/STUDIES  --------------------------------------------------------------------------------              12.9   4.20  >-----------<  132      [06-20-25 @ 06:24]              38.7     140  |  105  |  26  ----------------------------<  98      [06-20-25 @ 06:24]  3.8   |  23  |  1.66        Ca     9.8     [06-20-25 @ 06:24]      Mg     2.10     [06-20-25 @ 06:24]      Phos  3.6     [06-20-25 @ 06:24]            Creatinine Trend:  SCr 1.66 [06-20 @ 06:24]  SCr 1.84 [06-19 @ 15:57]  SCr 1.68 [06-19 @ 06:51]  SCr 1.77 [06-18 @ 06:34]  SCr 1.88 [06-17 @ 06:10]    Urinalysis - [06-20-25 @ 06:24]      Color  / Appearance  / SG  / pH       Gluc 98 / Ketone   / Bili  / Urobili        Blood  / Protein  / Leuk Est  / Nitrite       RBC  / WBC  / Hyaline  / Gran  / Sq Epi  / Non Sq Epi  / Bacteria           
[facility]   MD CHRISTINE SWEENEY MD ANGELA WONG, PA    TEL:  OFFICE: [phone]  From 5pm-7am Answering Service [phone]    -- RENAL FOLLOW UP NOTE ---Date of Service 06-17-25 @ 13:17    Patient is a 74y old  Male who presents with a chief complaint of weakness neck and difficulty swallowing. (16 Jun 2025 22:28)      Patient seen and examined at bedside. c/o dizziness and neck pain    VITALS:  T(F): 98.1 (06-17-25 @ 11:00), Max: 98.2 (06-17-25 @ 01:52)  HR: 71 (06-17-25 @ 11:00)  BP: 151/69 (06-17-25 @ 11:00)  RR: 18 (06-17-25 @ 11:00)  SpO2: 100% (06-17-25 @ 11:00)  Wt(kg): --    Height (cm): 165.1 (06-17 @ 11:00)    PHYSICAL EXAM:  General: NAD  Neck: No JVD  Respiratory: CTAB, no wheezes, rales or rhonchi  Cardiovascular: S1, S2, RRR  Gastrointestinal: BS+, soft, NT/ND  Extremities: No peripheral edema    Hospital Medications:   MEDICATIONS  (STANDING):  aspirin  chewable 81 milliGRAM(s) Oral daily  carvedilol 6.25 milliGRAM(s) Oral every 12 hours  cetirizine 10 milliGRAM(s) Oral daily  chlorhexidine 2% Cloths 1 Application(s) Topical <User Schedule>  clopidogrel Tablet 75 milliGRAM(s) Oral daily  gabapentin 300 milliGRAM(s) Oral daily  heparin   Injectable 5000 Unit(s) SubCutaneous every 12 hours  pantoprazole    Tablet 40 milliGRAM(s) Oral before breakfast  rosuvastatin 40 milliGRAM(s) Oral at bedtime      LABS:  06-17    139  |  106  |  19  ----------------------------<  105[H]  4.0   |  22  |  1.88[H]    Ca    9.3      17 Jun 2025 06:10  Mg     2.30     06-16    TPro  7.2  /  Alb  4.0  /  TBili  0.6  /  DBili      /  AST  25  /  ALT  21  /  AlkPhos  98  06-16    Creatinine Trend: 1.88 <--, 2.09 <--                                12.5   4.74  )-----------( 125      ( 17 Jun 2025 06:10 )             36.1     Urine Studies:  Urinalysis - [06-17-25 @ 06:10]      Color  / Appearance  / SG  / pH       Gluc 105 / Ketone   / Bili  / Urobili        Blood  / Protein  / Leuk Est  / Nitrite       RBC  / WBC  / Hyaline  / Gran  / Sq Epi  / Non Sq Epi  / Bacteria             RADIOLOGY & ADDITIONAL STUDIES:  
Neurology Progress Note    INTERVAL HISTORY:  no acute events  patient continues to deny headache or vertigo or dizziness of any form  Today he is additionally denying any further difficulties with swallowing or choking events with eating  Today he is mainly complaining of dyspnea, and neck pain with forward head flexion  Daughter on the phone describes that she has noticed a change in voice during past 2 weeks approximately.   patient with elevated BNP and TTE likely with signs of ICM w/ decrease EF to ~35%  Patient getting neb at bedside, when asked if provides any relief, he states "some"  MRI was obtained which did not reveal any acute infarcts    REVIEW OF SYSTEMS: Otherwise denies fever, chills, headaches, vision changes, blurry vision, double vision, nausea, vomiting, hearing change, focal weakness, focal numbness, parasthesias, bowel/ bladder incontinence.  Few questions of a 10-system ROS was performed and is negative except for those items noted above and/or in the HPI.    VITALS & EXAMINATION:  Vital Signs Last 24 Hrs  T(C): 36.4 (20 Jun 2025 10:00), Max: 36.9 (19 Jun 2025 14:51)  T(F): 97.5 (20 Jun 2025 10:00), Max: 98.4 (19 Jun 2025 14:51)  HR: 78 (20 Jun 2025 10:48) (68 - 79)  BP: 102/52 (20 Jun 2025 10:00) (102/52 - 153/77)  BP(mean): --  RR: 18 (20 Jun 2025 10:00) (16 - 18)  SpO2: 98% (20 Jun 2025 10:00) (97% - 99%)    Parameters below as of 20 Jun 2025 10:48  Patient On (Oxygen Delivery Method): room air        General:  Constitutional: Male, appears stated age, nontoxic, not in distress,    Head: Normocephalic;   Eyes: clear sclera;   Extremities: No cyanosis;   Resp: breathing comfortably  Neck: no nuchal rigidity       Neurological (>12):    Alert fully oriented  mild hoarseness in speech which is fluent  naming and repetition normal  follows appendicular commands  Today counts to 11 on single breath (though poorly coordinated by patient), prior exam counts to 19  PERRL. EOM full, VFF finger counting  sustains upgaze normally, no Cogans lid twitch  Face symmetric  sensation symmetric V1-3  hears finger rubs equally  tongue and uvula midline  Head extension full, Head flexion 4/5  5-/5 in deltoids, full in rest of arms, no clear fatigablility with deltoids after flapping 20x  Full in legs  Symmetric sensation to touch in all extremities  DTR 2+ in arms, 1+ in legs  FNF normal  toes mute    LABORATORY:  CBC                       12.9   4.20  )-----------( 132      ( 20 Jun 2025 06:24 )             38.7     Chem 06-20    140  |  105  |  26[H]  ----------------------------<  98  3.8   |  23  |  1.66[H]    Ca    9.8      20 Jun 2025 06:24  Phos  3.6     06-20  Mg     2.10     06-20      LFTs   Coagulopathy   Lipid Panel   A1c   Cardiac enzymes     U/A Urinalysis Basic - ( 20 Jun 2025 06:24 )    Color: x / Appearance: x / SG: x / pH: x  Gluc: 98 mg/dL / Ketone: x  / Bili: x / Urobili: x   Blood: x / Protein: x / Nitrite: x   Leuk Esterase: x / RBC: x / WBC x   Sq Epi: x / Non Sq Epi: x / Bacteria: x      STUDIES & IMAGING: (EEG, CT, MR, U/S, TTE/SHAVON):    MRI BRAIN:  IMPRESSION:  1. No evidence of acute infarct. Stable subcentimeter chronic lacunar infarct left basal ganglia and mild white matter hypodensities; a nonspecific finding which statistically reflects chronic microvascular ischemic change.  2. No enhancing mass or leptomeningeal enhancement identified.  3. Additional findings described in detail above      TTE:  CONCLUSIONS:   1. Technically difficult image quality.   2. Left ventricular cavity is mildly dilated. Left ventricular systolic function is severely decreased with an ejection fraction of 36 % by Elias's method of disks. Regional wall motion abnormalities present.   3. Multiple segmental abnormalities exist. See findings.   4. There is mild (grade 1) left ventricular diastolic dysfunction, with normal left ventricular filling pressure.   5. Normal right ventricular cavity size, with normal wall thickness, and borderline reduced right ventricular systolic function.   6. Moderate mitral regurgitation.   7. No pericardial effusion seen.   8. Compared to the transthoracic echocardiogram performed on 11/7/2022, EF is similiar.            SD  
[facility] [place] [facility]   MD CHRISTINE SWEENEY MD ANGELA WONG, PA    TEL:  OFFICE: [phone]  From 5pm-7am Answering Service [phone]    -- RENAL FOLLOW UP NOTE ---Date of Service 06-18-25 @ 15:31    Patient is a 74y old  Male who presents with a chief complaint of weakness neck and difficulty swallowing. (17 Jun 2025 15:54)      Patient seen and examined at bedside. still c/o weakness and neck pain     VITALS:  T(F): 97.6 (06-18-25 @ 13:00), Max: 97.9 (06-18-25 @ 01:50)  HR: 71 (06-18-25 @ 13:00)  BP: 152/77 (06-18-25 @ 13:00)  RR: 18 (06-18-25 @ 13:00)  SpO2: 100% (06-18-25 @ 13:00)  Wt(kg): --    06-18 @ 07:01  -  06-18 @ 15:31  --------------------------------------------------------  IN: 780 mL / OUT: 0 mL / NET: 780 mL          PHYSICAL EXAM:  General: NAD  Neck: No JVD  Respiratory: CTAB, no wheezes, rales or rhonchi  Cardiovascular: S1, S2, RRR  Gastrointestinal: BS+, soft, NT/ND  Extremities: No peripheral edema    Hospital Medications:   MEDICATIONS  (STANDING):  aspirin  chewable 81 milliGRAM(s) Oral daily  carvedilol 6.25 milliGRAM(s) Oral every 12 hours  cetirizine 10 milliGRAM(s) Oral daily  chlorhexidine 2% Cloths 1 Application(s) Topical <User Schedule>  clopidogrel Tablet 75 milliGRAM(s) Oral daily  gabapentin 300 milliGRAM(s) Oral daily  heparin   Injectable 5000 Unit(s) SubCutaneous every 12 hours  pantoprazole    Tablet 40 milliGRAM(s) Oral before breakfast  rosuvastatin 40 milliGRAM(s) Oral at bedtime      LABS:  06-18    140  |  103  |  18  ----------------------------<  96  3.6   |  25  |  1.77[H]    Ca    9.6      18 Jun 2025 06:34  Phos  3.2     06-18  Mg     2.20     06-18      Creatinine Trend: 1.77 <--, 1.88 <--, 2.09 <--    Phosphorus: 3.2 mg/dL (06-18 @ 06:34)                              13.1   4.38  )-----------( 126      ( 18 Jun 2025 06:34 )             39.3     Urine Studies:  Urinalysis - [06-18-25 @ 06:34]      Color  / Appearance  / SG  / pH       Gluc 96 / Ketone   / Bili  / Urobili        Blood  / Protein  / Leuk Est  / Nitrite       RBC  / WBC  / Hyaline  / Gran  / Sq Epi  / Non Sq Epi  / Bacteria             RADIOLOGY & ADDITIONAL STUDIES:  
  Ulisses Aabrca MD  Interventional Cardiology / Endovascular Specialist  [place] Office : [address] [place] [address] [place] [address]  Tel:   [place] Office : [address] [facility] [place] [address]  Tel: [phone]      Pt laying in bed, NAD      MEDICATIONS:  aspirin  chewable 81 milliGRAM(s) Oral daily  carvedilol 6.25 milliGRAM(s) Oral every 12 hours  clopidogrel Tablet 75 milliGRAM(s) Oral daily  furosemide   Injectable 40 milliGRAM(s) IV Push once  heparin   Injectable 5000 Unit(s) SubCutaneous every 12 hours  hydrALAZINE 25 milliGRAM(s) Oral two times a day  nitroglycerin     SubLingual 0.4 milliGRAM(s) SubLingual every 5 minutes PRN  ranolazine 500 milliGRAM(s) Oral two times a day      cetirizine 10 milliGRAM(s) Oral daily    gabapentin 300 milliGRAM(s) Oral daily  meclizine 12.5 milliGRAM(s) Oral every 8 hours PRN    pantoprazole    Tablet 40 milliGRAM(s) Oral before breakfast  senna 2 Tablet(s) Oral at bedtime  simethicone 80 milliGRAM(s) Chew daily PRN    rosuvastatin 40 milliGRAM(s) Oral at bedtime    chlorhexidine 2% Cloths 1 Application(s) Topical <User Schedule>  lidocaine   4% Patch 1 Patch Transdermal every 24 hours PRN      PAST MEDICAL/SURGICAL HISTORY  PAST MEDICAL & SURGICAL HISTORY:  HTN (hypertension)      PAD (peripheral artery disease)  stent to L lower extremity artery      Constipation, unspecified constipation type      Hyperlipidemia      NSTEMI (non-ST elevated myocardial infarction)   and 2018 and 2019      Coronary artery disease involving native coronary artery of native heart, unspecified whether angina present      Ischemic cardiomyopathy with implantable cardioverter-defibrillator (ICD)      S/P CABG (coronary artery bypass graft)  x3; [facility]       History of coronary angiogram  multiple stents placed      History of femoral angiogram  stent placed in [facility] 2016      History of laparoscopic cholecystectomy  2016          SOCIAL HISTORY: Substance Use (street drugs): ( x ) never used  (  ) other:    FAMILY HISTORY:  Family history of coronary artery disease in brother (Sibling)  4 brothers with MI/CABG, 1  at 79yo    Family history of stroke  58yo CVA, heart attack 61yo    Family history of coronary artery disease in sister (Sibling)   from MI    Family history of MI (myocardial infarction)  mother,           PHYSICAL EXAM:  T(C): 36.2 (25 @ 14:15), Max: 36.7 (25 @ 18:19)  HR: 85 (25 @ 14:15) (68 - 85)  BP: 114/68 (25 @ 14:15) (102/52 - 153/77)  RR: 18 (25 @ 14:15) (18 - 18)  SpO2: 100% (25 @ 14:15) (97% - 100%)  Wt(kg): --  I&O's Summary          GENERAL: NAD  EYES: EOMI, PERRLA, conjunctiva and sclera clear  ENMT: Moist mucous membranes  Cardiovascular: Normal S1 S2, No JVD, No murmurs, No edema  Respiratory: Lungs clear to auscultation	  Gastrointestinal:  Soft, Non-tender, + BS	  Extremities: Normal range of motion, No clubbing, cyanosis or edema  NERVOUS SYSTEM:  Alert & Oriented X3                                    12.9   4.20  )-----------( 132      ( 2025 06:24 )             38.7     06-20    140  |  105  |  26[H]  ----------------------------<  98  3.8   |  23  |  1.66[H]    Ca    9.8      2025 06:24  Phos  3.6     06-20  Mg     2.10     06-20      proBNP:   Lipid Profile:   HgA1c:   TSH:     Consultant(s) Notes Reviewed:  [x ] YES  [ ] NO    Care Discussed with Consultants/Other Providers [ x] YES  [ ] NO    Imaging Personally Reviewed independently:  [x] YES  [ ] NO    All labs, radiologic studies, vitals, orders and medications list reviewed. Patient is seen and examined at bedside. Case discussed with medical team.              
  Ulisses Abarca MD  Interventional Cardiology / Endovascular Specialist  [place] Office : [phone] [place] [address] [place]. [address]  Tel:   [place] Office : [address] [facility] [place] [address]  Tel: [phone]    Pt laying in bed, NAD  	  MEDICATIONS:  aspirin  chewable 81 milliGRAM(s) Oral daily  carvedilol 6.25 milliGRAM(s) Oral every 12 hours  clopidogrel Tablet 75 milliGRAM(s) Oral daily  heparin   Injectable 5000 Unit(s) SubCutaneous every 12 hours  nitroglycerin     SubLingual 0.4 milliGRAM(s) SubLingual every 5 minutes PRN      cetirizine 10 milliGRAM(s) Oral daily    gabapentin 300 milliGRAM(s) Oral daily  meclizine 12.5 milliGRAM(s) Oral every 8 hours PRN    pantoprazole    Tablet 40 milliGRAM(s) Oral before breakfast    rosuvastatin 40 milliGRAM(s) Oral at bedtime    chlorhexidine 2% Cloths 1 Application(s) Topical <User Schedule>      PAST MEDICAL/SURGICAL HISTORY  PAST MEDICAL & SURGICAL HISTORY:  HTN (hypertension)      PAD (peripheral artery disease)  stent to L lower extremity artery      Constipation, unspecified constipation type      Hyperlipidemia      NSTEMI (non-ST elevated myocardial infarction)   and 2018 and       Coronary artery disease involving native coronary artery of native heart, unspecified whether angina present      Ischemic cardiomyopathy with implantable cardioverter-defibrillator (ICD)      S/P CABG (coronary artery bypass graft)  x3; [facility] 2002      History of coronary angiogram  multiple stents placed      History of femoral angiogram  stent placed in [facility] 2016      History of laparoscopic cholecystectomy  2016          SOCIAL HISTORY: Substance Use (street drugs): ( x ) never used  (  ) other:    FAMILY HISTORY:  Family history of coronary artery disease in brother (Sibling)  4 brothers with MI/CABG, 1  at 79yo    Family history of stroke  60yo CVA, heart attack 59yo    Family history of coronary artery disease in sister (Sibling)   from MI    Family history of MI (myocardial infarction)  mother,           PHYSICAL EXAM:  T(C): 36.6 (25 @ 13:00), Max: 36.8 (25 @ 01:52)  HR: 78 (25 @ 13:00) (67 - 78)  BP: 160/81 (25 @ 13:00) (122/64 - 160/81)  RR: 18 (25 @ 13:00) (16 - 19)  SpO2: 100% (25 @ 13:00) (98% - 100%)  Wt(kg): --  I&O's Summary    Height (cm): 165.1 ( @ 11:00)    GENERAL: NAD  EYES: EOMI, PERRLA, conjunctiva and sclera clear  ENMT: Moist mucous membranes  Cardiovascular: Normal S1 S2, No JVD, No murmurs, No edema  Respiratory: Lungs clear to auscultation	  Gastrointestinal:  Soft, Non-tender, + BS	  Extremities: Normal range of motion, No clubbing, cyanosis or edema  NERVOUS SYSTEM:  Alert & Oriented X3                                12.5   4.74  )-----------( 125      ( 2025 06:10 )             36.1         139  |  106  |  19  ----------------------------<  105[H]  4.0   |  22  |  1.88[H]    Ca    9.3      2025 06:10  Mg     2.30     -    TPro  7.2  /  Alb  4.0  /  TBili  0.6  /  DBili  x   /  AST  25  /  ALT  21  /  AlkPhos  98  -    proBNP:   Lipid Profile:   HgA1c:   TSH:     Consultant(s) Notes Reviewed:  [x ] YES  [ ] NO    Care Discussed with Consultants/Other Providers [ x] YES  [ ] NO    Imaging Personally Reviewed independently:  [x] YES  [ ] NO    All labs, radiologic studies, vitals, orders and medications list reviewed. Patient is seen and examined at bedside. Case discussed with medical team.              
Date of Service  : 06-18-25     INTERVAL HPI/OVERNIGHT EVENTS: I feel fine.   Vital Signs Last 24 Hrs  T(C): 36.6 (18 Jun 2025 17:00), Max: 36.6 (17 Jun 2025 21:51)  T(F): 97.8 (18 Jun 2025 17:00), Max: 97.9 (18 Jun 2025 01:50)  HR: 77 (18 Jun 2025 17:00) (71 - 78)  BP: 137/67 (18 Jun 2025 17:00) (123/61 - 152/77)  BP(mean): --  RR: 18 (18 Jun 2025 17:00) (18 - 18)  SpO2: 100% (18 Jun 2025 17:00) (98% - 100%)    Parameters below as of 18 Jun 2025 17:00  Patient On (Oxygen Delivery Method): room air      I&O's Summary    18 Jun 2025 07:01  -  18 Jun 2025 20:29  --------------------------------------------------------  IN: 780 mL / OUT: 0 mL / NET: 780 mL      MEDICATIONS  (STANDING):  aspirin  chewable 81 milliGRAM(s) Oral daily  carvedilol 6.25 milliGRAM(s) Oral every 12 hours  cetirizine 10 milliGRAM(s) Oral daily  chlorhexidine 2% Cloths 1 Application(s) Topical <User Schedule>  clopidogrel Tablet 75 milliGRAM(s) Oral daily  gabapentin 300 milliGRAM(s) Oral daily  heparin   Injectable 5000 Unit(s) SubCutaneous every 12 hours  pantoprazole    Tablet 40 milliGRAM(s) Oral before breakfast  rosuvastatin 40 milliGRAM(s) Oral at bedtime    MEDICATIONS  (PRN):  lidocaine   4% Patch 1 Patch Transdermal every 24 hours PRN neck pain  meclizine 12.5 milliGRAM(s) Oral every 8 hours PRN Dizziness  nitroglycerin     SubLingual 0.4 milliGRAM(s) SubLingual every 5 minutes PRN Chest Pain    LABS:                        13.1   4.38  )-----------( 126      ( 18 Jun 2025 06:34 )             39.3     06-18    140  |  103  |  18  ----------------------------<  96  3.6   |  25  |  1.77[H]    Ca    9.6      18 Jun 2025 06:34  Phos  3.2     06-18  Mg     2.20     06-18        Urinalysis Basic - ( 18 Jun 2025 06:34 )    Color: x / Appearance: x / SG: x / pH: x  Gluc: 96 mg/dL / Ketone: x  / Bili: x / Urobili: x   Blood: x / Protein: x / Nitrite: x   Leuk Esterase: x / RBC: x / WBC x   Sq Epi: x / Non Sq Epi: x / Bacteria: x      CAPILLARY BLOOD GLUCOSE            Urinalysis Basic - ( 18 Jun 2025 06:34 )    Color: x / Appearance: x / SG: x / pH: x  Gluc: 96 mg/dL / Ketone: x  / Bili: x / Urobili: x   Blood: x / Protein: x / Nitrite: x   Leuk Esterase: x / RBC: x / WBC x   Sq Epi: x / Non Sq Epi: x / Bacteria: x      REVIEW OF SYSTEMS:  CONSTITUTIONAL: No fever, weight loss, or fatigue  EYES: No eye pain, visual disturbances, or discharge  ENMT:  No difficulty hearing, tinnitus, vertigo; No sinus or throat pain  NECK: No pain or stiffness  RESPIRATORY: No cough, wheezing, chills or hemoptysis; No shortness of breath  CARDIOVASCULAR: No chest pain, palpitations, dizziness, or leg swelling  GASTROINTESTINAL: No abdominal or epigastric pain. No nausea, vomiting, or hematemesis; No diarrhea or constipation. No melena or hematochezia.  GENITOURINARY: No dysuria, frequency, hematuria, or incontinence  NEUROLOGICAL: No headaches, memory loss, loss of strength, numbness, or tremors      RADIOLOGY & ADDITIONAL TESTS:    Consultant(s) Notes Reviewed:  [x ] YES  [ ] NO    PHYSICAL EXAM:  GENERAL: NAD, well-groomed, well-developed,not in any distress ,  HEAD:  Atraumatic, Normocephalic  NECK: Supple, No JVD, Normal thyroid  NERVOUS SYSTEM:  Alert & Oriented X3, No focal deficit   CHEST/LUNG: Good air entry bilateral with no  rales, rhonchi, wheezing, or rubs  HEART: Regular rate and rhythm; No murmurs, rubs, or gallops  ABDOMEN: Soft, Nontender, Nondistended; Bowel sounds present  EXTREMITIES:  2+ Peripheral Pulses, No clubbing, cyanosis, or edema      Care Discussed with Consultants/Other Providers [ x] YES  [ ] NO
Date of Service  : 06-19-25    INTERVAL HPI/OVERNIGHT EVENTS: I feel fine.  Vital Signs Last 24 Hrs  T(C): 36.7 (19 Jun 2025 18:19), Max: 36.9 (19 Jun 2025 14:51)  T(F): 98.1 (19 Jun 2025 18:19), Max: 98.4 (19 Jun 2025 14:51)  HR: 75 (19 Jun 2025 18:19) (70 - 81)  BP: 143/77 (19 Jun 2025 18:19) (113/63 - 164/80)  BP(mean): --  RR: 18 (19 Jun 2025 18:19) (16 - 19)  SpO2: 98% (19 Jun 2025 18:19) (97% - 100%)    Parameters below as of 19 Jun 2025 18:19  Patient On (Oxygen Delivery Method): room air      I&O's Summary    18 Jun 2025 07:01  -  19 Jun 2025 07:00  --------------------------------------------------------  IN: 780 mL / OUT: 0 mL / NET: 780 mL      MEDICATIONS  (STANDING):  aspirin  chewable 81 milliGRAM(s) Oral daily  carvedilol 6.25 milliGRAM(s) Oral every 12 hours  cetirizine 10 milliGRAM(s) Oral daily  chlorhexidine 2% Cloths 1 Application(s) Topical <User Schedule>  clopidogrel Tablet 75 milliGRAM(s) Oral daily  gabapentin 300 milliGRAM(s) Oral daily  heparin   Injectable 5000 Unit(s) SubCutaneous every 12 hours  hydrALAZINE 25 milliGRAM(s) Oral two times a day  pantoprazole    Tablet 40 milliGRAM(s) Oral before breakfast  ranolazine 500 milliGRAM(s) Oral two times a day  rosuvastatin 40 milliGRAM(s) Oral at bedtime  senna 2 Tablet(s) Oral at bedtime    MEDICATIONS  (PRN):  lidocaine   4% Patch 1 Patch Transdermal every 24 hours PRN neck pain  meclizine 12.5 milliGRAM(s) Oral every 8 hours PRN Dizziness  nitroglycerin     SubLingual 0.4 milliGRAM(s) SubLingual every 5 minutes PRN Chest Pain  simethicone 80 milliGRAM(s) Chew daily PRN Gas    LABS:                        12.8   4.52  )-----------( 116      ( 19 Jun 2025 15:57 )             37.1     06-19    140  |  105  |  27[H]  ----------------------------<  114[H]  4.2   |  24  |  1.84[H]    Ca    9.9      19 Jun 2025 15:57  Phos  3.5     06-19  Mg     2.30     06-19        Urinalysis Basic - ( 19 Jun 2025 15:57 )    Color: x / Appearance: x / SG: x / pH: x  Gluc: 114 mg/dL / Ketone: x  / Bili: x / Urobili: x   Blood: x / Protein: x / Nitrite: x   Leuk Esterase: x / RBC: x / WBC x   Sq Epi: x / Non Sq Epi: x / Bacteria: x      CAPILLARY BLOOD GLUCOSE            Urinalysis Basic - ( 19 Jun 2025 15:57 )    Color: x / Appearance: x / SG: x / pH: x  Gluc: 114 mg/dL / Ketone: x  / Bili: x / Urobili: x   Blood: x / Protein: x / Nitrite: x   Leuk Esterase: x / RBC: x / WBC x   Sq Epi: x / Non Sq Epi: x / Bacteria: x      REVIEW OF SYSTEMS:  CONSTITUTIONAL: No fever, weight loss, or fatigue  EYES: No eye pain, visual disturbances, or discharge  ENMT:  No difficulty hearing, tinnitus, vertigo; No sinus or throat pain  NECK: No pain or stiffness  RESPIRATORY: No cough, wheezing, chills or hemoptysis; No shortness of breath  CARDIOVASCULAR: No chest pain, palpitations, dizziness, or leg swelling  GASTROINTESTINAL: No abdominal or epigastric pain. No nausea, vomiting, or hematemesis; No diarrhea or constipation. No melena or hematochezia.  GENITOURINARY: No dysuria, frequency, hematuria, or incontinence  NEUROLOGICAL: No headaches, memory loss, loss of strength, numbness, or tremors    RADIOLOGY & ADDITIONAL TESTS:    Consultant(s) Notes Reviewed:  [x ] YES  [ ] NO    PHYSICAL EXAM:  GENERAL: NAD, well-groomed, well-developed,not in any distress ,  HEAD:  Atraumatic, Normocephalic  EYES: EOMI, PERRLA, conjunctiva and sclera clear  ENMT: No tonsillar erythema, exudates, or enlargement; Moist mucous membranes, Good dentition, No lesions  NECK: Supple, No JVD, Normal thyroid  NERVOUS SYSTEM:  Alert & Oriented X3, No focal deficit   CHEST/LUNG: Good air entry bilateral with no  rales, rhonchi, wheezing, or rubs  HEART: Regular rate and rhythm; No murmurs, rubs, or gallops  ABDOMEN: Soft, Nontender, Nondistended; Bowel sounds present  EXTREMITIES:  2+ Peripheral Pulses, No clubbing, cyanosis, or edema      Care Discussed with Consultants/Other Providers [ x] YES  [ ] NO
[facility]   MD CHRISTINE SWEENEY MD ANGELA WONG, PA    TEL:  OFFICE: [phone]  From 5pm-7am Answering Service [phone]    -- RENAL FOLLOW UP NOTE ---Date of Service 06-19-25 @ 15:22    Patient is a 74y old  Male who presents with a chief complaint of weakness neck and difficulty swallowing. (19 Jun 2025 15:03)      Patient seen and examined at bedside. No chest pain/sob. still feeling weak    VITALS:  T(F): 98.4 (06-19-25 @ 14:51), Max: 98.4 (06-19-25 @ 14:51)  HR: 70 (06-19-25 @ 14:51)  BP: 145/71 (06-19-25 @ 14:51)  RR: 17 (06-19-25 @ 14:51)  SpO2: 99% (06-19-25 @ 14:51)  Wt(kg): --    06-18 @ 07:01  -  06-19 @ 07:00  --------------------------------------------------------  IN: 780 mL / OUT: 0 mL / NET: 780 mL          PHYSICAL EXAM:  General: NAD  Neck: No JVD  Respiratory: CTAB, no wheezes, rales or rhonchi  Cardiovascular: S1, S2, RRR  Gastrointestinal: BS+, soft, NT/ND  Extremities: No peripheral edema    Hospital Medications:   MEDICATIONS  (STANDING):  aspirin  chewable 81 milliGRAM(s) Oral daily  carvedilol 6.25 milliGRAM(s) Oral every 12 hours  cetirizine 10 milliGRAM(s) Oral daily  chlorhexidine 2% Cloths 1 Application(s) Topical <User Schedule>  clopidogrel Tablet 75 milliGRAM(s) Oral daily  gabapentin 300 milliGRAM(s) Oral daily  heparin   Injectable 5000 Unit(s) SubCutaneous every 12 hours  hydrALAZINE 25 milliGRAM(s) Oral two times a day  pantoprazole    Tablet 40 milliGRAM(s) Oral before breakfast  ranolazine 500 milliGRAM(s) Oral two times a day  rosuvastatin 40 milliGRAM(s) Oral at bedtime  senna 2 Tablet(s) Oral at bedtime      LABS:  06-19    140  |  105  |  24[H]  ----------------------------<  100[H]  3.9   |  22  |  1.68[H]    Ca    9.7      19 Jun 2025 06:51  Phos  3.3     06-19  Mg     2.20     06-19      Creatinine Trend: 1.68 <--, 1.77 <--, 1.88 <--, 2.09 <--    Phosphorus: 3.3 mg/dL (06-19 @ 06:51)                              12.8   4.22  )-----------( 120      ( 19 Jun 2025 06:51 )             37.7     Urine Studies:  Urinalysis - [06-19-25 @ 06:51]      Color  / Appearance  / SG  / pH       Gluc 100 / Ketone   / Bili  / Urobili        Blood  / Protein  / Leuk Est  / Nitrite       RBC  / WBC  / Hyaline  / Gran  / Sq Epi  / Non Sq Epi  / Bacteria             RADIOLOGY & ADDITIONAL STUDIES:  
Date of Service  : 06-20-25     INTERVAL HPI/OVERNIGHT EVENTS: My SOB better now. I want to go home.  Vital Signs Last 24 Hrs  T(C): 36.2 (20 Jun 2025 14:15), Max: 36.7 (19 Jun 2025 18:19)  T(F): 97.2 (20 Jun 2025 14:15), Max: 98.1 (19 Jun 2025 18:19)  HR: 85 (20 Jun 2025 14:15) (68 - 85)  BP: 114/68 (20 Jun 2025 14:15) (102/52 - 153/77)  BP(mean): --  RR: 18 (20 Jun 2025 14:15) (18 - 18)  SpO2: 100% (20 Jun 2025 14:15) (97% - 100%)    Parameters below as of 20 Jun 2025 14:15  Patient On (Oxygen Delivery Method): room air      I&O's Summary    MEDICATIONS  (STANDING):  aspirin  chewable 81 milliGRAM(s) Oral daily  carvedilol 6.25 milliGRAM(s) Oral every 12 hours  cetirizine 10 milliGRAM(s) Oral daily  chlorhexidine 2% Cloths 1 Application(s) Topical <User Schedule>  clopidogrel Tablet 75 milliGRAM(s) Oral daily  gabapentin 300 milliGRAM(s) Oral daily  heparin   Injectable 5000 Unit(s) SubCutaneous every 12 hours  hydrALAZINE 25 milliGRAM(s) Oral two times a day  pantoprazole    Tablet 40 milliGRAM(s) Oral before breakfast  ranolazine 500 milliGRAM(s) Oral two times a day  rosuvastatin 40 milliGRAM(s) Oral at bedtime  senna 2 Tablet(s) Oral at bedtime    MEDICATIONS  (PRN):  lidocaine   4% Patch 1 Patch Transdermal every 24 hours PRN neck pain  meclizine 12.5 milliGRAM(s) Oral every 8 hours PRN Dizziness  nitroglycerin     SubLingual 0.4 milliGRAM(s) SubLingual every 5 minutes PRN Chest Pain  simethicone 80 milliGRAM(s) Chew daily PRN Gas    LABS:                        12.9   4.20  )-----------( 132      ( 20 Jun 2025 06:24 )             38.7     06-20    140  |  105  |  26[H]  ----------------------------<  98  3.8   |  23  |  1.66[H]    Ca    9.8      20 Jun 2025 06:24  Phos  3.6     06-20  Mg     2.10     06-20        Urinalysis Basic - ( 20 Jun 2025 06:24 )    Color: x / Appearance: x / SG: x / pH: x  Gluc: 98 mg/dL / Ketone: x  / Bili: x / Urobili: x   Blood: x / Protein: x / Nitrite: x   Leuk Esterase: x / RBC: x / WBC x   Sq Epi: x / Non Sq Epi: x / Bacteria: x      CAPILLARY BLOOD GLUCOSE            Urinalysis Basic - ( 20 Jun 2025 06:24 )    Color: x / Appearance: x / SG: x / pH: x  Gluc: 98 mg/dL / Ketone: x  / Bili: x / Urobili: x   Blood: x / Protein: x / Nitrite: x   Leuk Esterase: x / RBC: x / WBC x   Sq Epi: x / Non Sq Epi: x / Bacteria: x      REVIEW OF SYSTEMS:  CONSTITUTIONAL: No fever, weight loss, or fatigue  EYES: No eye pain, visual disturbances, or discharge  ENMT:  No difficulty hearing, tinnitus, vertigo; No sinus or throat pain  NECK: No pain or stiffness  RESPIRATORY: No cough, wheezing, chills or hemoptysis; No shortness of breath  CARDIOVASCULAR: No chest pain, palpitations, dizziness, or leg swelling  GASTROINTESTINAL: No abdominal or epigastric pain. No nausea, vomiting, or hematemesis; No diarrhea or constipation. No melena or hematochezia.  GENITOURINARY: No dysuria, frequency, hematuria, or incontinence  NEUROLOGICAL: No headaches, memory loss, loss of strength, numbness, or tremors      RADIOLOGY & ADDITIONAL TESTS:    Consultant(s) Notes Reviewed:  [x ] YES  [ ] NO    PHYSICAL EXAM:  GENERAL: NAD, well-groomed, well-developed,not in any distress ,  HEAD:  Atraumatic, Normocephalic  NECK: Supple, No JVD, Normal thyroid  NERVOUS SYSTEM:  Alert & Oriented X3, No focal deficit   CHEST/LUNG: Good air entry bilateral with Rt side few  rales,   HEART: Regular rate and rhythm; No murmurs, rubs, or gallops  ABDOMEN: Soft, Nontender, Nondistended; Bowel sounds present  EXTREMITIES:  2+ Peripheral Pulses, No clubbing, cyanosis, or edema      Care Discussed with Consultants/Other Providers [ x] YES  [ ] NO

## 2025-06-20 NOTE — PROGRESS NOTE ADULT - PROVIDER SPECIALTY LIST ADULT
Internal Medicine
Internal Medicine
Nephrology
Cardiology
Internal Medicine
Nephrology
Neurology
Internal Medicine

## 2025-06-27 NOTE — ED PROVIDER NOTE - PROGRESS NOTE DETAILS
Patient was signed out to me by day team, pending CT imaging at the time and receiving electrolyte repletion. I was informed by nursing staff patient was found to be unresponsive in a witnessed arrest, as he walked back to his stretcher after going to the bathroom. Patient was taken into the trauma bay and ACLS protocol was followed. Patient was intubated by Dr. Nava. Patient was in V.Fib/T.Tach. Patient was shocked. Code was run per ACLS guidelines, and after around 45 minutes, patients time of death was called at 20:45. DD ED ATTG: rec'd s/o from Dr Car - 74M h/o vertigo recent w/u for vertigo seen by neuro and cards.  MR were neg.  ?MG.  P/w N/V and vertigo. Gait intact here.  EKG LVH with strain.  Getting CT chest eval for thymoma.  Found to have low potassium and low calcium.  Supplemented IV and PO, magnesium given also.  Per RN pt walking to bathroom, returned, and 1-2 minutes later became unresponsive and no pulse.  Pt taken to Tr B, CPR/ACLS initiated, pt intubated with confirmation of ETT via auscultation. and pt had multiple rounds of epi, amiodarone, lidocaine, magnesium, calcium, bicarb, fluids given.  Few episodes pt had ROSC lasting less than 1 minute, heart visualized on echo and no pericardial effusion, EF moderately decreased.  Pt then went in to V-tach and torsades, shocked 5-6 times.  Cards consulted and came and saw pt, advised more amio, given.  Despite 45 minutes of high quality CPR and ACLS, pt did not recover a pulse or responsiveness.  At 45 minutes post arrest chance of meaninful neurologic recovery zero.  Pt code called 2045.  Dr Hanna - pt PMD notified.  Pt's son  Jeremy called and notified.  ME called.

## 2025-06-27 NOTE — ED ADULT NURSE NOTE - OBJECTIVE STATEMENT
Break RN: received to 7a, axo x4, GCS 15, ambulatory; 74y M significant cardiac history and vertigo p/w ongoing dizziness since discharge from hospital xweeks ago; dizziness symptoms are unchanged per pt, states at this time hes not as dizzy; EKG completed, placed on CCM, 20 LAC, labs sent, medicated, denies pain, NAD noted, non diaphoretic, well appearing. Safety maintained. Pending imaging.

## 2025-06-27 NOTE — ED PROVIDER NOTE - ATTENDING CONTRIBUTION TO CARE
74-year-old male past medical history of CHF with an EF 36%, CAD status post CABG, hypertension presents to the ED due to of vertigo which is chronic for him.  He states that the vertigo is associated with nausea vomiting and decreased appetite.  Denies any vision changes, hearing changes, chest pain shortness of breath, back pain.      Past Medical History:  - Hospital admission last week for similar presentation as today. Seen by neurologist, cardiologist, and nephrologist, Congestive heart failure diagnosed via echocardiogram.  Neurology was concerned for possible myasthenia gravis.  Patient had an MRI of the brain at that time which showed no acute infarct.  Recommended a CT of the chest to assess for thymoma.  Outpatient neurology follow-up.  He has an outpatient follow-up with a neurologist at the end of July.    Physical exam:  GENERAL: NAD, activity normal for age, well developed/ well nourished, no cyanosis, pallor, or diaphoresis.  Mouth: Dry MM  HEAD/NECK: normocephalic atraumatic, no facial trauma, neck is supple.  RESPIRATORY: respiratory effort normal, speaks in full sentences, no tripod position, no accessory muscle use. Lungs clear to auscultation without rhonchi, wheezes, rales  CARDIAC: Regular rate and rhythm without murmurs, rubs or gallops, no peripheral edema. 2+ radial/PT and DP pulses bilaterally  ABDOMINAL: Soft, ND/NT. No evidence of fluid wave. No pulsatile masses on exam, rebound tenderness, Aguirre sign or pain over Mcburney's point.  MUSCLES/EXTREMITIES: FROM in all extremities, no joint swelling or tenderness  SKIN: Warm, pink and dry. No rashes, dermatoses, petechiae or lesions.  NEUROLOGICAL: Speech is clear and appropriate. Normal level of consciousness. Gait and coordination are normal. 5/5 strength in all extremities. sensation intact to b/l upper and lower extremities  PSYCH: Normal mood and affect. Judgement/competence is appropriate     MDM:  Patient presents ED due to nausea vomiting and vertigo.  Vital signs are stable and afebrile.    My independent interpretation of the EKG shows normal sinus rhythm with sinus rhythm at 72 bpm, normal axis, normal intervals, there are evidence of LVH.  There are ST depression noted in leads I, 2, V5 V6 with T wave inversions in those leads.  Patient also T wave version lead aVL.  ST elevation in aVR V1 V2 V3.  Unchanged compared to prior.  Patient denies any chest pain do not suspect ACS.    Will rule out electrolyte normality, thymoma, intra-abdominal infection, ACS.  Upon ambulation patient did feel dizzy he does appear to be dehydrated will give IV fluids.  No indication to perform a CT of the brain given patient's recent negative MRI additionally his symptoms are similar without any change from prior admission.    Patient was signed to Dr. Luciano pending CT chest abdomen pelvis, CBC, CMP, process, lipase, troponin and reassessment

## 2025-06-27 NOTE — ED PROVIDER NOTE - NSICDXPASTSURGICALHX_GEN_ALL_CORE_FT
PAST SURGICAL HISTORY:  History of coronary angiogram multiple stents placed    History of femoral angiogram stent placed in [facility] 2016    History of laparoscopic cholecystectomy 2016    S/P CABG (coronary artery bypass graft) x3; [facility] 2002

## 2025-06-27 NOTE — ED PROCEDURE NOTE - PROCEDURE ADDITIONAL DETAILS
7.5 ETT placed 23 at lip via glidescope with confirmed color change capnography and b/l breath sounds

## 2025-06-27 NOTE — ED ADULT TRIAGE NOTE - PAIN RATING/NUMBER SCALE (0-10): ACTIVITY
[facility] Outreach Call:    Name: RONNA ABEL    [facility] IP Care Transitions (Call 2 (09D PD))     Question 1:  General Status   Over the last week, have you developed any new or worsening symptoms, such as but not limited to,  shortness of breath, fever, fatigue, nausea, vomiting, or increased pain? If yes please press 1, if no press 2, if you're not sure please press 3, or if you're feeling better press 4.    New Symptoms     Call Status:  Attempt 1 (11/29/2024 01:40 PM CST)  Answered (11/29/2024 01:43 PM CST)    Clinical Concerns - Issues List:  Respiratory Issues  Comments:  patient states that he is SOB at times.  this is not new or worse.  patient states that he had O2 on when he was in the hospital and is wondering if that is why - because he does not have it at home.  advised patient that if he needed it at home, they would have ordered it.  patient verbalizes understanding.  patient has TCM on 12/0/4/24.  Advised on when to call PCP and when to seek care in the ED.  no other questions/concerns/needs at this time.  patient appreciative for the f/u call and support.     Clinical Concerns - Actions List:  Disease Management Education   Encouraged to Follow Up with PCP   6 (moderate pain)

## 2025-06-27 NOTE — ED PROVIDER NOTE - CARE PLAN
Principal Discharge DX:	Dizziness  Secondary Diagnosis:	Hypokalemia  Secondary Diagnosis:	Cardiac arrest   1

## 2025-06-27 NOTE — ED PROVIDER NOTE - OBJECTIVE STATEMENT
74 year old man PMH HTN, CAD with stents, vertigo, on aspirin / plavix, CKD presenting with vertigo, vomiting decreased PO intake. He was recently admitted for similar symptoms and seen by neurology, had nonconcerning  brain MRI and neuro evaluation concerning for myasthenia, cardiac workup also noncerning. since he was discharged he has had persistent symptoms despite taking meclizine. has also had decreased PO intake for the past few weeks, has been feeling generally weak, today he had more veritog and vomited twice. also feeling like food is getting stuck in his throat and not going all the way down, coming back up. denies fevers, chills, chest pain, sob, abd pain, dysuria, leg swelling.

## 2025-06-27 NOTE — ED ADULT TRIAGE NOTE - CHIEF COMPLAINT QUOTE
pt reporting to the ED for dizziness X 4 days. recently D/C on jul 19th for similar sxs. pmh of Cardiac stets, HTN. vertigo pt reporting to the ED for dizziness X 4 days. recently D/C on june 19th for similar sxs. pmh of Cardiac stets, HTN. vertigo

## 2025-06-27 NOTE — ED PROCEDURE NOTE - ATTENDING CONTRIBUTION TO CARE
I was present during the key portion of the procedure.  Bilat BS auscultated  No stomach sounds auscultated  condensation in tube.

## 2025-06-27 NOTE — ED ADULT NURSE NOTE - NSFALLUNIVINTERV_ED_ALL_ED
Bed/Stretcher in lowest position, wheels locked, appropriate side rails in place/Call bell, personal items and telephone in reach/Instruct patient to call for assistance before getting out of bed/chair/stretcher/Non-slip footwear applied when patient is off stretcher/[place] to call system/Physically safe environment - no spills, clutter or unnecessary equipment/Purposeful proactive rounding/Room/bathroom lighting operational, light cord in reach

## 2025-06-27 NOTE — ED PROVIDER NOTE - PHYSICAL EXAMINATION
Physical Exam:  Gen: no acute distress, AOx3, nontoxic appearing  Head: NCAT  HEENT: EOMI, PEERLA, normal conjunctiva, tongue midline, oral mucosa dry  Lung: CTAB, no respiratory distress, no wheezes/rhonchi/rales B/L, speaking in full sentences  CV: RRR, no murmurs, rubs or gallops  Abd: soft, NT, ND, no guarding, no rigidity, no rebound tenderness, no CVA tenderness  MSK: no visible deformities, ROM normal in UE/LE, no neck / back pain, calf tenderness  Neuro: No focal sensory or motor deficits in cranial nerves, upper and lower extremities  Skin: Warm, well perfused, no rash, no leg swelling

## 2025-06-27 NOTE — ED ADULT NURSE REASSESSMENT NOTE - NS ED NURSE REASSESS COMMENT FT1
received report from Break coverage RN, pt remains A&Ox4, resting in stretcher. pt remains endorsing dizziness, denies other symptoms at this time. respirations even and unlabored on room air. NSR-paced rhythm on monitor. pending CT. medicated as per orders. plan of care continued.

## 2025-06-27 NOTE — ED PROVIDER NOTE - CLINICAL SUMMARY MEDICAL DECISION MAKING FREE TEXT BOX
74 year old man PMH HTN, CAD with stents, vertigo, on aspirin / plavix, CKD presenting with vertigo, vomiting decreased PO intake, also having dysphagia like food is stuck in his throat, decreased PO intake for the past few weeks, symptoms not relieved by meclizine, vitals WNL, physical exam with dry mucous membranes, normal neuro exam, soft nontender abdomen, EKG unchanged from prior concerning for persistent vertigo vs mechanical obstruction (thymoma for possible MG?) vs failure to thrive (malignancy) vs pancreatitis vs gastritis vs acs. cbc, cmp, trop, lipase, CTCAP.
No

## 2025-06-27 NOTE — ED ADULT NURSE REASSESSMENT NOTE - NS ED NURSE REASSESS COMMENT FT1
pt remains A&Ox4, resting in stretcher. respirations even and unlabored on room air. NSR-paced rhythm on monitor. medicated as per orders. no new orders at this time. pending results. plan of care continued.

## 2025-06-27 NOTE — ED ADULT NURSE REASSESSMENT NOTE - NS ED NURSE REASSESS COMMENT FT1
upon rounding on patient to take vitals when pt returned from bathroom, pt was found unresponsive on stretcher. CPR initiated. pt moved to trauma B for resuscitation attempts.

## 2025-06-27 NOTE — ED ADULT NURSE REASSESSMENT NOTE - NS ED NURSE REASSESS COMMENT FT1
pt A&Ox4, ambulated to the restroom. denies any medical complaints at this time. respirations even and unlabored on room air.

## 2025-07-08 LAB
LRP4 AUTOANTIBODY TEST: SIGNIFICANT CHANGE UP
MUSK IGG SER IA-MCNC: SIGNIFICANT CHANGE UP

## 2025-07-15 ENCOUNTER — APPOINTMENT (OUTPATIENT)
Dept: ELECTROPHYSIOLOGY | Facility: CLINIC | Age: 75
End: 2025-07-15

## 2025-07-22 ENCOUNTER — APPOINTMENT (OUTPATIENT)
Dept: NEUROLOGY | Facility: HOSPITAL | Age: 75
End: 2025-07-22